# Patient Record
Sex: MALE | Race: WHITE | NOT HISPANIC OR LATINO | Employment: OTHER | URBAN - METROPOLITAN AREA
[De-identification: names, ages, dates, MRNs, and addresses within clinical notes are randomized per-mention and may not be internally consistent; named-entity substitution may affect disease eponyms.]

---

## 2019-02-11 ENCOUNTER — CONVERSION ENCOUNTER (OUTPATIENT)
Dept: GASTROENTEROLOGY | Facility: CLINIC | Age: 74
End: 2019-02-11

## 2019-02-11 ENCOUNTER — OFFICE VISIT CONVERTED (OUTPATIENT)
Dept: GASTROENTEROLOGY | Facility: CLINIC | Age: 74
End: 2019-02-11
Attending: INTERNAL MEDICINE

## 2019-03-15 ENCOUNTER — HOSPITAL ENCOUNTER (OUTPATIENT)
Dept: OTHER | Facility: HOSPITAL | Age: 74
Discharge: HOME | End: 2019-03-15
Attending: INTERNAL MEDICINE

## 2019-03-17 LAB — BACTERIA SPEC AEROBE CULT: NORMAL

## 2019-03-27 ENCOUNTER — HOSPITAL ENCOUNTER (OUTPATIENT)
Dept: OTHER | Facility: HOSPITAL | Age: 74
Discharge: HOME | End: 2019-03-27
Attending: INTERNAL MEDICINE

## 2019-03-27 LAB
ALBUMIN SERPL-MCNC: 4 G/DL (ref 3.5–5)
ALBUMIN/GLOB SERPL: 1.1 {RATIO} (ref 1.4–2.6)
ALP SERPL-CCNC: 88 U/L (ref 56–155)
ALT SERPL-CCNC: 49 U/L (ref 10–40)
ANION GAP SERPL CALC-SCNC: 18 MMOL/L (ref 8–19)
AST SERPL-CCNC: 36 U/L (ref 15–50)
BILIRUB SERPL-MCNC: 0.87 MG/DL (ref 0.2–1.3)
BUN SERPL-MCNC: 19 MG/DL (ref 5–25)
BUN/CREAT SERPL: 16 {RATIO} (ref 6–20)
CALCIUM SERPL-MCNC: 9.3 MG/DL (ref 8.7–10.4)
CHLORIDE SERPL-SCNC: 95 MMOL/L (ref 99–111)
CHOLEST SERPL-MCNC: 201 MG/DL (ref 107–200)
CHOLEST/HDLC SERPL: 5.6 {RATIO} (ref 3–6)
CK SERPL-CCNC: 72 U/L (ref 57–374)
CONV CO2: 30 MMOL/L (ref 22–32)
CONV TOTAL PROTEIN: 7.6 G/DL (ref 6.3–8.2)
CREAT UR-MCNC: 1.16 MG/DL (ref 0.7–1.2)
CRP SERPL-MCNC: 7.1 MG/L (ref 0–5)
EST. AVERAGE GLUCOSE BLD GHB EST-MCNC: 177 MG/DL
GFR SERPLBLD BASED ON 1.73 SQ M-ARVRAT: >60 ML/MIN/{1.73_M2}
GLOBULIN UR ELPH-MCNC: 3.6 G/DL (ref 2–3.5)
GLUCOSE SERPL-MCNC: 182 MG/DL (ref 70–99)
HBA1C MFR BLD: 7.8 % (ref 3.5–5.7)
HDLC SERPL-MCNC: 36 MG/DL (ref 40–60)
LDLC SERPL CALC-MCNC: 112 MG/DL (ref 70–100)
OSMOLALITY SERPL CALC.SUM OF ELEC: 293 MOSM/KG (ref 273–304)
POTASSIUM SERPL-SCNC: 4.7 MMOL/L (ref 3.5–5.3)
SODIUM SERPL-SCNC: 138 MMOL/L (ref 135–147)
TRIGL SERPL-MCNC: 460 MG/DL (ref 40–150)
URATE SERPL-MCNC: 5.8 MG/DL (ref 3.5–8.5)

## 2019-05-13 ENCOUNTER — OFFICE VISIT CONVERTED (OUTPATIENT)
Dept: GASTROENTEROLOGY | Facility: CLINIC | Age: 74
End: 2019-05-13
Attending: INTERNAL MEDICINE

## 2020-11-03 ENCOUNTER — TELEPHONE CONVERTED (OUTPATIENT)
Dept: UROLOGY | Facility: CLINIC | Age: 75
End: 2020-11-03
Attending: UROLOGY

## 2020-11-23 ENCOUNTER — TELEPHONE CONVERTED (OUTPATIENT)
Dept: UROLOGY | Facility: CLINIC | Age: 75
End: 2020-11-23
Attending: UROLOGY

## 2021-02-26 ENCOUNTER — TELEPHONE CONVERTED (OUTPATIENT)
Dept: UROLOGY | Facility: CLINIC | Age: 76
End: 2021-02-26
Attending: UROLOGY

## 2021-04-16 ENCOUNTER — OFFICE VISIT CONVERTED (OUTPATIENT)
Dept: UROLOGY | Facility: CLINIC | Age: 76
End: 2021-04-16
Attending: UROLOGY

## 2021-04-16 LAB
BILIRUB UR QL STRIP: NEGATIVE
COLOR UR: YELLOW
CONV CLARITY OF URINE: CLEAR
CONV PROTEIN IN URINE BY AUTOMATED TEST STRIP: NEGATIVE
CONV UROBILINOGEN IN URINE BY AUTOMATED TEST STRIP: NORMAL
GLUCOSE UR QL: NORMAL
HGB UR QL STRIP: NEGATIVE
KETONES UR QL STRIP: NEGATIVE
LEUKOCYTE ESTERASE UR QL STRIP: NEGATIVE
NITRITE UR QL STRIP: NEGATIVE
PH UR STRIP.AUTO: 5.5 [PH]
SP GR UR: NORMAL

## 2021-05-10 NOTE — H&P
"   History and Physical      Patient Name: Stephen Aguero   Patient ID: [id number]   Sex: Male   YOB: 1945    Primary Care Provider: Papi Vasquez MD   Referring Provider: Papi Vasquez MD    Visit Date: November 3, 2020    Provider: Alexander Montelongo MD   Location: [facility]   Location Address: [address] [place] [address]  [id number]   Location Phone: [phone]          Chief Complaint  · pt here for urologic issues      History Of Present Illness  TELEHEALTH TELEPHONE VISIT  Stephen Aguero is a 75 year old /White male who is presenting for evaluation via telehealth telephone visit. Verbal consent obtained before beginning visit.   Provider spent 15 minutes with the patient during the telehealth visit.   The following staff were present during this visit: jordan mejia   Past Medical History/ Overview of Patient Symptoms     59     PSA    9/20   5.5  8/12  2.95  7/11  2.68    \">75-year-old gentleman here for elevated PSA and BPH/ freq/urge incontience    weak stream.  No trouble with initiation or intermittency of stream.  Some frequency , can go every 30 minutes. Noc every hour.   Some urge incontinence. 4-5 pads QD.   No prostate meds  CAme on slowly    on lasix    no gross hematuria, dysuria or recurrent urinary tract infections.     No urologic family history,   Has never had any urologic surgery.    Has CHF/ LE edema.  Patient does not smoke.  Patient does not use blood thinner.    9/20 creatinine 1.0, GFR > 60     PSA    9/20   5.5  8/12  2.95  7/11  2.68           Past Medical History  Chronic pain; Diabetes; Elevated cholesterol; High blood pressure; Sleep apnea         Past Surgical History  Back surgery; Cholecystectomies, laparoscopic; Colonoscopy; Eye removal; Face surgery; Leg Surgery; Neck         Medication List  allopurinol 300 mg oral tablet; amlodipine 10 mg oral tablet; carvedilol 25 mg oral tablet; cetirizine 10 mg oral tablet; " duloxetine 30 mg oral capsule,delayed release(DR/EC); famotidine 20 mg oral tablet; Flomax 0.4 mg oral capsule; Lasix 80 mg oral tablet; Levemir FlexTouch U-100 Insuln 100 unit/mL (3 mL) subcutaneous insulin pen; metformin 500 mg oral tablet; Norco  mg oral tablet; potassium chloride 20 mEq oral tablet extended release; Vitamin D2 50,000 unit oral capsule         Allergy List  NO KNOWN DRUG ALLERGIES         Family Medical History  Family history of cancer         Social History  Alcohol (Never); Tobacco (Never)         Review of Systems  · Constitutional  o Denies  o : chills  · Gastrointestinal  o Denies  o : nausea          Assessment  · Elevated PSA     790.93/R97.20  · BPH (benign prostatic hyperplasia)     600.00/N40.0      Plan  · Orders  o Physician Telephone Evaluation, 11-20 minutes (50159) - 790.93/R97.20, 600.00/N40.0 - 11/03/2020  · Medications  o Medications have been Reconciled  o Transition of Care or Provider Policy  · Instructions  o Plan Of Care:   o Electronically Identified Patient Education Materials Provided Electronically         BPH    Having a lot of trouble with nocturia and frequency.  We will try him on Flomax 0.4 mg daily.  Risk/benefits and side effects discussed today.    Follow-up in the office in 3 months with PVR      Elevated PSA    Patient's PSA not elevated for his age.  We will check 11/21 for stability and discuss if he wants to do any further prostate cancer screening at that time.    Records reviewed today             Electronically Signed by: Alexander Montelongo MD -Author on November 3, 2020 11:27:54 AM

## 2021-05-13 NOTE — PROGRESS NOTES
"   Progress Note      Patient Name: Stephen Aguero   Patient ID: [id number]   Sex: Male   YOB: 1945    Primary Care Provider: Papi Vasquez MD   Referring Provider: Papi Vasquez MD    Visit Date: November 23, 2020    Provider: Alexander Montelongo MD   Location: [facility]   Location Address: [address] [place] [address]  [id number]   Location Phone: [phone]          Chief Complaint  · pt here for urologic issues      History Of Present Illness  TELEHEALTH TELEPHONE VISIT  Stephen Aguero is a 75 year old /White male who is presenting for evaluation via telehealth telephone visit. Verbal consent obtained before beginning visit.   Provider spent 7 minutes with the patient during the telehealth visit.   The following staff were present during this visit: jordan mejia   Past Medical History/ Overview of Patient Symptoms     59     PSA    9/20   5.5  8/12  2.95  7/11  2.68    \">75-year-old gentleman here for elevated PSA and BPH/ freq/urge incontinence  on TRT    Patient is on testosterone placement through his PCP.  Patient is doing 250 mg every 2 weeks injection.    Not bothering him being off.     started Flomax.  MAybe a little better stream.  Noc improved.   Some urge incontinence. 4-5 pads QD.   CAme on slowly    on lasix    Previous    No urologic family history,   Has never had any urologic surgery.    Has CHF/ LE edema.  Patient does not smoke.  Patient does not use blood thinner.    9/20 creatinine 1.0, GFR > 60     PSA    9/20   5.5  8/12  2.95  7/11  2.68           Past Medical History  BPH; Chronic pain; Diabetes; Elevated cholesterol; Elevated PSA; High blood pressure; Sleep apnea         Past Surgical History  Back surgery; Cholecystectomies, laparoscopic; Colonoscopy; Eye removal; Face surgery; Leg Surgery; Neck         Medication List  allopurinol 300 mg oral tablet; amlodipine 10 mg oral tablet; carvedilol 25 mg oral tablet; cetirizine 10 mg " oral tablet; duloxetine 30 mg oral capsule,delayed release(DR/EC); famotidine 20 mg oral tablet; Flomax 0.4 mg oral capsule; Lasix 80 mg oral tablet; Levemir FlexTouch U-100 Insuln 100 unit/mL (3 mL) subcutaneous insulin pen; metformin 500 mg oral tablet; Norco  mg oral tablet; potassium chloride 20 mEq oral tablet extended release; Vitamin D2 50,000 unit oral capsule         Allergy List  NO KNOWN DRUG ALLERGIES       Allergies Reconciled  Family Medical History  Family history of cancer         Social History  Alcohol (Never); Tobacco (Never)                 Assessment  · Elevated PSA     790.93/R97.20  · BPH (benign prostatic hyperplasia)     600.00/N40.0      Plan  · Orders  o Physican Telephone evaluation, 5-10 min (39689) - 790.93/R97.20, 600.00/N40.0 - 11/23/2020  o PSA ultrasensitive DIAGNOSTIC [facility] (00632) - 790.93/R97.20 - 03/08/2021  · Medications  o Medications have been Reconciled  o Transition of Care or Provider Policy  · Instructions  o Plan Of Care:   o Electronically Identified Patient Education Materials Provided Electronically         BPH    Cont  Flomax 0.4 mg daily.        Follow-up in the office in 3 months with PVR      Elevated PSA    Discussed risk and benefits of being on testosterone replacement therapy while evaluating a PSA.  Patient at this time after discussion does not want be on testosterone replacement at this point.  We will get a PSA in 3/21 and see .  At that point if PSA is stable or lower we could consider placing back on testosterone replacement after discussion.    He is getting TRT through his PCP             Electronically Signed by: Alexander Montelongo MD -Author on November 23, 2020 12:14:48 PM

## 2021-05-14 VITALS — WEIGHT: 298 LBS | HEIGHT: 71 IN | BODY MASS INDEX: 41.72 KG/M2 | RESPIRATION RATE: 19 BRPM

## 2021-05-14 NOTE — PROGRESS NOTES
"   Progress Note      Patient Name: Stephen Aguero   Patient ID: [id number]   Sex: Male   YOB: 1945    Primary Care Provider: Papi Vasquez MD   Referring Provider: Papi Vasquez MD    Visit Date: April 16, 2021    Provider: Alexander Montelongo MD   Location: [facility]   Location Address: [address] [place] [address]  [id number]   Location Phone: [phone]          Chief Complaint  · pt here for urologic issues      History Of Present Illness     59     PSA    4/21  MRI [id number] g, negative some signs of chronic prostatitis.  Hypertrophic bladder wall thickening  2/21   1.1  9/20   5.5  - PSA d 0.14  8/12  2.95  7/11  2.68    \">75-year-old gentleman here for elevated PSA and BPH/ freq/urge incontinence  h/o TRT.  Bothersome nocturia/frequency.    Patient did tell a big difference with energy being on testosterone.  Is been off this for couple months and is really bothering him.  He is having a lot of trouble with fatigue so much that sometimes even makes it too tired to walk.    was on testosterone placement through his PCP.  was doing 250 mg every 2 weeks injection.      Very fatigued.    on Flomax 0.4 mg QD.  Helping some.    Some urge incontinence. 4-5 pads QD.     Patient is having a lot of trouble with nocturia.  He got up 8 times a night.  He has really decreased his fluid intake 2/2 his frequency and nocturia is very bothersome.    No UTI/GH    on lasix    Previous    No urologic family history,   Has never had any urologic surgery.    Has CHF/ LE edema.  Patient does not smoke.  Patient does not use blood thinner.    9/20 creatinine 1.0, GFR > 60     PSA    4/21  MRI [id number] g, negative some signs of chronic prostatitis.  Hypertrophic bladder wall thickening  2/21   1.1  9/20   5.5  - PSA d 0.14  8/12  2.95  7/11  2.68           Past Medical History  BPH; Chronic pain; Diabetes; Elevated cholesterol; Elevated PSA; High blood pressure; Sleep apnea " "        Past Surgical History  Back surgery; Cholecystectomies, laparoscopic; Colonoscopy; Eye removal; Face surgery; Leg Surgery; Neck         Medication List  allopurinol 300 mg oral tablet; amlodipine 10 mg oral tablet; carvedilol 25 mg oral tablet; cetirizine 10 mg oral tablet; duloxetine 30 mg oral capsule,delayed release(DR/EC); famotidine 20 mg oral tablet; finasteride 5 mg oral tablet; Flomax 0.4 mg oral capsule; Lasix 80 mg oral tablet; Levemir FlexTouch U-100 Insuln 100 unit/mL (3 mL) subcutaneous insulin pen; metformin 500 mg oral tablet; Norco  mg oral tablet; potassium chloride 20 mEq oral tablet extended release; Vitamin D2 50,000 unit oral capsule         Allergy List  SULFA (SULFONAMIDES)       Allergies Reconciled  Family Medical History  Family history of cancer         Social History  Alcohol (Never); Tobacco (Never)         Review of Systems  · Constitutional  o Denies  o : chills, fever  · Gastrointestinal  o Denies  o : nausea, vomiting      Vitals  Date Time BP Position Site L\R Cuff Size HR RR TEMP (F) WT  HT  BMI kg/m2 BSA m2 O2 Sat FR L/min FiO2 HC       04/16/2021 10:12 AM       19  298lbs 0oz 5'  11\" 41.56 2.6             Physical Examination  · Constitutional  o Appearance  o : Well-appearing, well-developed, in no acute distress  · Respiratory  o Respiratory Effort  o : Unlabored breathing  · Neurologic  o Mental Status Examination  o :   § Orientation  § : Grossly oriented to person, place and time, judgment and insight intact, normal mood and affect          Results  · In-Office Procedures  o Lab procedure  § Automated Dipstick Urinalysis (Surg Spec) WITHOUT Micro [facility] (58738)   § Color Ur: Yellow   § Clarity Ur: Clear   § Glucose Ur Ql Strip: 100 mg/dL   § Bilirub Ur Ql Strip: Negative   § Ketones Ur Ql Strip: Negative   § Sp Gr Ur Qn: greater than 1.030   § Hgb Ur Ql Strip: Negative   § pH Ur-LsCnc: 5.5   § Prot Ur Ql Strip: Negative   § Urobilinogen Ur Strip-mCnc: 0.2 E.U./dL "   § Nitrite Ur Ql Strip: Negative   § WBC Est Ur Ql Strip: Negative   o Surgical procedure  § IOP - Bladder Scan/Residual Urine (85099)   § Specimen vol Ur: 56       Assessment  · Elevated PSA     790.93/R97.20  · BPH (benign prostatic hyperplasia)     600.00/N40.0  · Nocturia     788.43/R35.1      Plan  · Orders  o PSA ultrasensitive DIAGNOSTIC [facility] (72530) - 790.93/R97.20 - 10/16/2021  · Medications  o Medications have been Reconciled  o Transition of Care or Provider Policy  · Instructions  o Electronically Identified Patient Education Materials Provided Electronically     BPH/Nocturia    Continue Flomax 0.4 mg daily.  Risk and benefits discussed.  Patient is doing some better.  Still have a lot of trouble frequency and nocturia.  start  finasteride 5 mg daily.  Risk/benefits and side effect discussed.      Elevated PSA/prostate cancer on testosterone replacement    MRI negative, reviewed with the patient today.  Patient given reassurance at this time I feel that because of his medical comorbidities and his negative MRI would be very low risk for him to have a prostate cancer that would be clinically significant.  I do think at this time it is safe him to go back on testosterone replacement after risk and benefits of this were discussed with the patient he is aware.  He was let his PCP know.      Follow-up in 6 months with PSA    PVR at follow-up             Electronically Signed by: Alexander Montelongo MD -Author on April 16, 2021 11:14:58 AM

## 2021-05-14 NOTE — PROGRESS NOTES
"   Progress Note      Patient Name: Stephen Aguero   Patient ID: [id number]   Sex: Male   YOB: 1945    Primary Care Provider: Papi Vasquez MD   Referring Provider: Papi Vasquez MD    Visit Date: February 26, 2021    Provider: Alexander Montelongo MD   Location: [facility]   Location Address: [address] [place] [address]  [id number]   Location Phone: [phone]          Chief Complaint  · pt here for urologic issues      History Of Present Illness  TELEHEALTH TELEPHONE VISIT  Stephen Aguero is a 75 year old /White male who is presenting for evaluation via telehealth telephone visit. Verbal consent obtained before beginning visit.   Provider spent 11 minutes with the patient during the telehealth visit.   The following staff were present during this visit: PATRICIA Sofia   Past Medical History/ Overview of Patient Symptoms     59     PSA    2/21   1.1  9/20   5.5  8/12  2.95  7/11  2.68    \">75-year-old gentleman here for elevated PSA and BPH/ freq/urge incontinence  on TRT    was on testosterone placement through his PCP.  was doing 250 mg every 2 weeks injection.    He has been off for the last few months.    Very fatigued.    was on Flomax 0.4 mg QD.  Patient could not see any difference in this medication with his urination so he stopped this.  Some urge incontinence. 4-5 pads QD.       No UTI/dysuria/GH    on lasix    Previous    No urologic family history,   Has never had any urologic surgery.    Has CHF/ LE edema.  Patient does not smoke.  Patient does not use blood thinner.    9/20 creatinine 1.0, GFR > 60     PSA    2/21   1.1  9/20   5.5  8/12  2.95  7/11  2.68           Past Medical History  BPH; Chronic pain; Diabetes; Elevated cholesterol; Elevated PSA; High blood pressure; Sleep apnea         Past Surgical History  Back surgery; Cholecystectomies, laparoscopic; Colonoscopy; Eye removal; Face surgery; Leg Surgery; Neck         Medication " List  allopurinol 300 mg oral tablet; amlodipine 10 mg oral tablet; carvedilol 25 mg oral tablet; cetirizine 10 mg oral tablet; duloxetine 30 mg oral capsule,delayed release(DR/EC); famotidine 20 mg oral tablet; Flomax 0.4 mg oral capsule; Lasix 80 mg oral tablet; Levemir FlexTouch U-100 Insuln 100 unit/mL (3 mL) subcutaneous insulin pen; metformin 500 mg oral tablet; Norco  mg oral tablet; potassium chloride 20 mEq oral tablet extended release; Vitamin D2 50,000 unit oral capsule         Allergy List  SULFA (SULFONAMIDES)         Family Medical History  Family history of cancer         Social History  Alcohol (Never); Tobacco (Never)         Review of Systems  · Constitutional  o Denies  o : chills, fever  · Gastrointestinal  o Denies  o : nausea, vomiting              Assessment  · Elevated PSA     790.93/R97.20  · BPH (benign prostatic hyperplasia)     600.00/N40.0      Plan  · Orders  o Physician Telephone Evaluation, 11-20 minutes (59805) - 790.93/R97.20, 600.00/N40.0 - 02/26/2021  o MRI prostate wo then w contrast (20585) - 790.93/R97.20 - 03/19/2021   3-19-21 AT [facility] LOCATION. APPT TIME 5:20. ARRIVE AT 4:50 FOR REGISTRATION.  · Medications  o Medications have been Reconciled  o Transition of Care or Provider Policy  · Instructions  o Electronically Identified Patient Education Materials Provided Electronically     Elevated PSA    Today came back down after being off testosterone.  We did discuss I do think we should go ahead and get MRI the prostate rule out underlying malignancy.  Patient at this time will continue to stay off testosterone replacement and will see him back with MRI    Patient understands we are r/o a malignancy and he must follow-up     BPH    Stopped Flomax, did not like this was helping.  At this time wants no further treatment -  doing okay             Electronically Signed by: Alexander Montelongo MD -Author on March 1, 2021 08:42:26 AM

## 2021-05-15 VITALS
DIASTOLIC BLOOD PRESSURE: 75 MMHG | BODY MASS INDEX: 36.14 KG/M2 | WEIGHT: 258.12 LBS | SYSTOLIC BLOOD PRESSURE: 137 MMHG | HEIGHT: 71 IN

## 2021-05-16 VITALS
BODY MASS INDEX: 36.57 KG/M2 | WEIGHT: 261.25 LBS | HEIGHT: 71 IN | DIASTOLIC BLOOD PRESSURE: 81 MMHG | SYSTOLIC BLOOD PRESSURE: 136 MMHG

## 2021-07-26 PROBLEM — E78.00 ELEVATED CHOLESTEROL: Status: ACTIVE | Noted: 2021-07-26

## 2021-07-26 PROBLEM — E11.9 DIABETES (HCC): Status: ACTIVE | Noted: 2021-07-26

## 2021-07-26 PROBLEM — G47.30 SLEEP APNEA: Status: ACTIVE | Noted: 2021-07-26

## 2021-07-26 PROBLEM — I10 HIGH BLOOD PRESSURE: Status: ACTIVE | Noted: 2021-07-26

## 2021-07-30 ENCOUNTER — CLINICAL SUPPORT (OUTPATIENT)
Dept: GASTROENTEROLOGY | Facility: CLINIC | Age: 76
End: 2021-07-30

## 2021-07-30 ENCOUNTER — PREP FOR SURGERY (OUTPATIENT)
Dept: OTHER | Facility: HOSPITAL | Age: 76
End: 2021-07-30

## 2021-07-30 DIAGNOSIS — Z12.11 SCREENING FOR COLON CANCER: Primary | ICD-10-CM

## 2021-07-30 RX ORDER — FAMOTIDINE 20 MG/1
20 TABLET, FILM COATED ORAL 2 TIMES DAILY
COMMUNITY

## 2021-07-30 RX ORDER — CETIRIZINE HYDROCHLORIDE 10 MG/1
10 TABLET ORAL DAILY
COMMUNITY

## 2021-07-30 RX ORDER — FINASTERIDE 5 MG/1
TABLET, FILM COATED ORAL
COMMUNITY
Start: 2021-04-16 | End: 2022-03-02

## 2021-07-30 RX ORDER — SODIUM, POTASSIUM,MAG SULFATES 17.5-3.13G
2 SOLUTION, RECONSTITUTED, ORAL ORAL EVERY 12 HOURS
Qty: 177 ML | Refills: 0 | Status: SHIPPED | OUTPATIENT
Start: 2021-07-30 | End: 2022-03-02

## 2021-07-30 RX ORDER — HYDROCODONE BITARTRATE AND ACETAMINOPHEN 10; 325 MG/1; MG/1
1 TABLET ORAL EVERY 6 HOURS PRN
COMMUNITY

## 2021-07-30 RX ORDER — FUROSEMIDE 80 MG
80 TABLET ORAL DAILY
Status: ON HOLD | COMMUNITY
End: 2022-03-04 | Stop reason: SDUPTHER

## 2021-07-30 RX ORDER — GLIPIZIDE 10 MG/1
10 TABLET ORAL
COMMUNITY

## 2021-07-30 RX ORDER — BENAZEPRIL HYDROCHLORIDE 40 MG/1
40 TABLET, FILM COATED ORAL DAILY
COMMUNITY

## 2021-07-30 RX ORDER — ALLOPURINOL 300 MG/1
300 TABLET ORAL DAILY
COMMUNITY

## 2021-07-30 RX ORDER — INSULIN DETEMIR 100 [IU]/ML
INJECTION, SOLUTION SUBCUTANEOUS
COMMUNITY
End: 2022-03-02

## 2021-07-30 RX ORDER — DULOXETIN HYDROCHLORIDE 30 MG/1
30 CAPSULE, DELAYED RELEASE ORAL DAILY
COMMUNITY

## 2021-07-30 RX ORDER — AMLODIPINE BESYLATE 10 MG/1
10 TABLET ORAL DAILY
COMMUNITY
End: 2022-03-04 | Stop reason: HOSPADM

## 2021-07-30 RX ORDER — ERGOCALCIFEROL 1.25 MG/1
50000 CAPSULE ORAL WEEKLY
COMMUNITY

## 2021-07-30 RX ORDER — POTASSIUM CHLORIDE 20 MEQ/1
20 TABLET, EXTENDED RELEASE ORAL 2 TIMES DAILY
Status: ON HOLD | COMMUNITY
End: 2022-03-04 | Stop reason: SDUPTHER

## 2021-07-30 RX ORDER — CARVEDILOL 25 MG/1
25 TABLET ORAL 2 TIMES DAILY WITH MEALS
COMMUNITY

## 2021-11-16 DIAGNOSIS — R97.20 ELEVATED PROSTATE SPECIFIC ANTIGEN (PSA): Primary | ICD-10-CM

## 2022-01-03 ENCOUNTER — TELEPHONE (OUTPATIENT)
Dept: UROLOGY | Facility: CLINIC | Age: 77
End: 2022-01-03

## 2022-01-03 NOTE — TELEPHONE ENCOUNTER
Spoke with patient. Reminded him of follow up appt on 1-18-22 at 1:30pm.  Also advised we need PSA results prior to that appt.  He stated he will get it done this week or next.

## 2022-01-14 ENCOUNTER — LAB (OUTPATIENT)
Dept: LAB | Facility: HOSPITAL | Age: 77
End: 2022-01-14

## 2022-01-14 DIAGNOSIS — R97.20 ELEVATED PROSTATE SPECIFIC ANTIGEN (PSA): ICD-10-CM

## 2022-01-14 LAB — PSA SERPL-MCNC: 1.09 NG/ML (ref 0–4)

## 2022-01-14 PROCEDURE — 36415 COLL VENOUS BLD VENIPUNCTURE: CPT

## 2022-01-14 PROCEDURE — 84153 ASSAY OF PSA TOTAL: CPT

## 2022-01-16 PROBLEM — N40.1 BENIGN PROSTATIC HYPERPLASIA WITH URINARY FREQUENCY: Status: ACTIVE | Noted: 2022-01-16

## 2022-01-16 PROBLEM — R35.0 BENIGN PROSTATIC HYPERPLASIA WITH URINARY FREQUENCY: Status: ACTIVE | Noted: 2022-01-16

## 2022-01-16 PROBLEM — R97.20 ELEVATED PSA: Status: ACTIVE | Noted: 2022-01-16

## 2022-03-02 ENCOUNTER — APPOINTMENT (OUTPATIENT)
Dept: GENERAL RADIOLOGY | Facility: HOSPITAL | Age: 77
End: 2022-03-02

## 2022-03-02 ENCOUNTER — HOSPITAL ENCOUNTER (INPATIENT)
Facility: HOSPITAL | Age: 77
LOS: 2 days | Discharge: HOME HEALTH | End: 2022-03-04
Attending: EMERGENCY MEDICINE | Admitting: INTERNAL MEDICINE

## 2022-03-02 DIAGNOSIS — R09.02 HYPOXIA: Chronic | ICD-10-CM

## 2022-03-02 DIAGNOSIS — Z78.9 DECREASED ACTIVITIES OF DAILY LIVING (ADL): ICD-10-CM

## 2022-03-02 DIAGNOSIS — R53.1 GENERALIZED WEAKNESS: Primary | ICD-10-CM

## 2022-03-02 DIAGNOSIS — R26.2 DIFFICULTY IN WALKING: ICD-10-CM

## 2022-03-02 PROBLEM — R42 POSTURAL DIZZINESS WITH PRESYNCOPE: Status: ACTIVE | Noted: 2022-03-02

## 2022-03-02 PROBLEM — IMO0002 UNCONTROLLED DIABETES MELLITUS: Status: ACTIVE | Noted: 2021-07-26

## 2022-03-02 PROBLEM — R55 POSTURAL DIZZINESS WITH PRESYNCOPE: Status: ACTIVE | Noted: 2022-03-02

## 2022-03-02 LAB
ALBUMIN SERPL-MCNC: 3 G/DL (ref 3.5–5.2)
ALBUMIN/GLOB SERPL: 0.7 G/DL
ALP SERPL-CCNC: 160 U/L (ref 39–117)
ALT SERPL W P-5'-P-CCNC: 26 U/L (ref 1–41)
ANION GAP SERPL CALCULATED.3IONS-SCNC: 7.4 MMOL/L (ref 5–15)
ANION GAP SERPL CALCULATED.3IONS-SCNC: 7.5 MMOL/L (ref 5–15)
AST SERPL-CCNC: 35 U/L (ref 1–40)
BACTERIA UR QL AUTO: ABNORMAL /HPF
BASOPHILS # BLD AUTO: 0.09 10*3/MM3 (ref 0–0.2)
BASOPHILS NFR BLD AUTO: 1.2 % (ref 0–1.5)
BILIRUB SERPL-MCNC: 0.9 MG/DL (ref 0–1.2)
BILIRUB UR QL STRIP: NEGATIVE
BUN SERPL-MCNC: 15 MG/DL (ref 8–23)
BUN SERPL-MCNC: 15 MG/DL (ref 8–23)
BUN/CREAT SERPL: 17.2 (ref 7–25)
BUN/CREAT SERPL: 19 (ref 7–25)
CALCIUM SPEC-SCNC: 8.9 MG/DL (ref 8.6–10.5)
CALCIUM SPEC-SCNC: 9.2 MG/DL (ref 8.6–10.5)
CHLORIDE SERPL-SCNC: 101 MMOL/L (ref 98–107)
CHLORIDE SERPL-SCNC: 97 MMOL/L (ref 98–107)
CLARITY UR: ABNORMAL
CO2 SERPL-SCNC: 30.5 MMOL/L (ref 22–29)
CO2 SERPL-SCNC: 30.6 MMOL/L (ref 22–29)
COD CRY URNS QL: ABNORMAL /HPF
COLOR UR: ABNORMAL
CREAT SERPL-MCNC: 0.79 MG/DL (ref 0.76–1.27)
CREAT SERPL-MCNC: 0.87 MG/DL (ref 0.76–1.27)
DEPRECATED RDW RBC AUTO: 55.4 FL (ref 37–54)
EGFRCR SERPLBLD CKD-EPI 2021: 88.9 ML/MIN/1.73
EGFRCR SERPLBLD CKD-EPI 2021: 91.5 ML/MIN/1.73
EOSINOPHIL # BLD AUTO: 0.36 10*3/MM3 (ref 0–0.4)
EOSINOPHIL NFR BLD AUTO: 4.7 % (ref 0.3–6.2)
ERYTHROCYTE [DISTWIDTH] IN BLOOD BY AUTOMATED COUNT: 15.6 % (ref 12.3–15.4)
GLOBULIN UR ELPH-MCNC: 4.4 GM/DL
GLUCOSE BLDC GLUCOMTR-MCNC: 177 MG/DL (ref 70–99)
GLUCOSE SERPL-MCNC: 180 MG/DL (ref 65–99)
GLUCOSE SERPL-MCNC: 266 MG/DL (ref 65–99)
GLUCOSE UR STRIP-MCNC: ABNORMAL MG/DL
HBA1C MFR BLD: 8.4 % (ref 4.8–5.6)
HCT VFR BLD AUTO: 48.5 % (ref 37.5–51)
HGB BLD-MCNC: 16 G/DL (ref 13–17.7)
HGB UR QL STRIP.AUTO: ABNORMAL
HOLD SPECIMEN: NORMAL
HOLD SPECIMEN: NORMAL
HYALINE CASTS UR QL AUTO: ABNORMAL /LPF
IMM GRANULOCYTES # BLD AUTO: 0.06 10*3/MM3 (ref 0–0.05)
IMM GRANULOCYTES NFR BLD AUTO: 0.8 % (ref 0–0.5)
KETONES UR QL STRIP: NEGATIVE
LEUKOCYTE ESTERASE UR QL STRIP.AUTO: ABNORMAL
LYMPHOCYTES # BLD AUTO: 1.61 10*3/MM3 (ref 0.7–3.1)
LYMPHOCYTES NFR BLD AUTO: 20.8 % (ref 19.6–45.3)
MAGNESIUM SERPL-MCNC: 2.1 MG/DL (ref 1.6–2.4)
MCH RBC QN AUTO: 31.8 PG (ref 26.6–33)
MCHC RBC AUTO-ENTMCNC: 33 G/DL (ref 31.5–35.7)
MCV RBC AUTO: 96.4 FL (ref 79–97)
MONOCYTES # BLD AUTO: 0.85 10*3/MM3 (ref 0.1–0.9)
MONOCYTES NFR BLD AUTO: 11 % (ref 5–12)
NEUTROPHILS NFR BLD AUTO: 4.76 10*3/MM3 (ref 1.7–7)
NEUTROPHILS NFR BLD AUTO: 61.5 % (ref 42.7–76)
NITRITE UR QL STRIP: NEGATIVE
NRBC BLD AUTO-RTO: 0 /100 WBC (ref 0–0.2)
NT-PROBNP SERPL-MCNC: 271.8 PG/ML (ref 0–1800)
PH UR STRIP.AUTO: 5.5 [PH] (ref 5–8)
PLATELET # BLD AUTO: 173 10*3/MM3 (ref 140–450)
PMV BLD AUTO: 10.3 FL (ref 6–12)
POTASSIUM SERPL-SCNC: 4.2 MMOL/L (ref 3.5–5.2)
POTASSIUM SERPL-SCNC: 4.5 MMOL/L (ref 3.5–5.2)
PROCALCITONIN SERPL-MCNC: 0.09 NG/ML (ref 0–0.25)
PROT SERPL-MCNC: 7.4 G/DL (ref 6–8.5)
PROT UR QL STRIP: ABNORMAL
RBC # BLD AUTO: 5.03 10*6/MM3 (ref 4.14–5.8)
RBC # UR STRIP: ABNORMAL /HPF
REF LAB TEST METHOD: ABNORMAL
SODIUM SERPL-SCNC: 135 MMOL/L (ref 136–145)
SODIUM SERPL-SCNC: 139 MMOL/L (ref 136–145)
SP GR UR STRIP: 1.03 (ref 1–1.03)
SQUAMOUS #/AREA URNS HPF: ABNORMAL /HPF
T4 FREE SERPL-MCNC: 0.8 NG/DL (ref 0.93–1.7)
TROPONIN T SERPL-MCNC: <0.01 NG/ML (ref 0–0.03)
TSH SERPL DL<=0.05 MIU/L-ACNC: 3 UIU/ML (ref 0.27–4.2)
UROBILINOGEN UR QL STRIP: ABNORMAL
WBC # UR STRIP: ABNORMAL /HPF
WBC NRBC COR # BLD: 7.73 10*3/MM3 (ref 3.4–10.8)
WHOLE BLOOD HOLD SPECIMEN: NORMAL
WHOLE BLOOD HOLD SPECIMEN: NORMAL

## 2022-03-02 PROCEDURE — 63710000001 LEVALBUTEROL PER 0.5 MG: Performed by: INTERNAL MEDICINE

## 2022-03-02 PROCEDURE — 99284 EMERGENCY DEPT VISIT MOD MDM: CPT

## 2022-03-02 PROCEDURE — 82962 GLUCOSE BLOOD TEST: CPT

## 2022-03-02 PROCEDURE — 83735 ASSAY OF MAGNESIUM: CPT

## 2022-03-02 PROCEDURE — 84439 ASSAY OF FREE THYROXINE: CPT | Performed by: INTERNAL MEDICINE

## 2022-03-02 PROCEDURE — 80053 COMPREHEN METABOLIC PANEL: CPT

## 2022-03-02 PROCEDURE — 83880 ASSAY OF NATRIURETIC PEPTIDE: CPT | Performed by: INTERNAL MEDICINE

## 2022-03-02 PROCEDURE — 85025 COMPLETE CBC W/AUTO DIFF WBC: CPT

## 2022-03-02 PROCEDURE — 81001 URINALYSIS AUTO W/SCOPE: CPT | Performed by: EMERGENCY MEDICINE

## 2022-03-02 PROCEDURE — 83036 HEMOGLOBIN GLYCOSYLATED A1C: CPT | Performed by: INTERNAL MEDICINE

## 2022-03-02 PROCEDURE — 93005 ELECTROCARDIOGRAM TRACING: CPT | Performed by: EMERGENCY MEDICINE

## 2022-03-02 PROCEDURE — 71045 X-RAY EXAM CHEST 1 VIEW: CPT

## 2022-03-02 PROCEDURE — 94640 AIRWAY INHALATION TREATMENT: CPT

## 2022-03-02 PROCEDURE — 25010000002 ENOXAPARIN PER 10 MG: Performed by: INTERNAL MEDICINE

## 2022-03-02 PROCEDURE — 84145 PROCALCITONIN (PCT): CPT | Performed by: INTERNAL MEDICINE

## 2022-03-02 PROCEDURE — 84443 ASSAY THYROID STIM HORMONE: CPT | Performed by: INTERNAL MEDICINE

## 2022-03-02 PROCEDURE — 93005 ELECTROCARDIOGRAM TRACING: CPT

## 2022-03-02 PROCEDURE — 87086 URINE CULTURE/COLONY COUNT: CPT | Performed by: EMERGENCY MEDICINE

## 2022-03-02 PROCEDURE — 84484 ASSAY OF TROPONIN QUANT: CPT

## 2022-03-02 PROCEDURE — 93010 ELECTROCARDIOGRAM REPORT: CPT | Performed by: SPECIALIST

## 2022-03-02 PROCEDURE — 25010000002 SODIUM CHLORIDE 0.9 % WITH KCL 20 MEQ 20-0.9 MEQ/L-% SOLUTION: Performed by: INTERNAL MEDICINE

## 2022-03-02 PROCEDURE — 94799 UNLISTED PULMONARY SVC/PX: CPT

## 2022-03-02 RX ORDER — INSULIN LISPRO 100 [IU]/ML
INJECTION, SOLUTION INTRAVENOUS; SUBCUTANEOUS 3 TIMES DAILY
COMMUNITY
Start: 2021-12-09

## 2022-03-02 RX ORDER — ACETAMINOPHEN 325 MG/1
650 TABLET ORAL EVERY 4 HOURS PRN
Status: DISCONTINUED | OUTPATIENT
Start: 2022-03-02 | End: 2022-03-04 | Stop reason: HOSPADM

## 2022-03-02 RX ORDER — SODIUM CHLORIDE AND POTASSIUM CHLORIDE 150; 900 MG/100ML; MG/100ML
100 INJECTION, SOLUTION INTRAVENOUS CONTINUOUS
Status: DISCONTINUED | OUTPATIENT
Start: 2022-03-02 | End: 2022-03-03

## 2022-03-02 RX ORDER — NALOXONE HCL 0.4 MG/ML
0.4 VIAL (ML) INJECTION
Status: DISCONTINUED | OUTPATIENT
Start: 2022-03-02 | End: 2022-03-04 | Stop reason: HOSPADM

## 2022-03-02 RX ORDER — LEVALBUTEROL INHALATION SOLUTION 0.63 MG/3ML
0.63 SOLUTION RESPIRATORY (INHALATION) EVERY 4 HOURS PRN
Status: DISCONTINUED | OUTPATIENT
Start: 2022-03-02 | End: 2022-03-04 | Stop reason: HOSPADM

## 2022-03-02 RX ORDER — DEXTROSE MONOHYDRATE 100 MG/ML
25 INJECTION, SOLUTION INTRAVENOUS
Status: DISCONTINUED | OUTPATIENT
Start: 2022-03-02 | End: 2022-03-04 | Stop reason: HOSPADM

## 2022-03-02 RX ORDER — ONDANSETRON 2 MG/ML
4 INJECTION INTRAMUSCULAR; INTRAVENOUS EVERY 6 HOURS PRN
Status: DISCONTINUED | OUTPATIENT
Start: 2022-03-02 | End: 2022-03-04 | Stop reason: HOSPADM

## 2022-03-02 RX ORDER — FAMOTIDINE 20 MG/1
20 TABLET, FILM COATED ORAL DAILY
Status: DISCONTINUED | OUTPATIENT
Start: 2022-03-02 | End: 2022-03-04 | Stop reason: HOSPADM

## 2022-03-02 RX ORDER — POLYETHYLENE GLYCOL 3350 17 G/17G
17 POWDER, FOR SOLUTION ORAL DAILY PRN
Status: DISCONTINUED | OUTPATIENT
Start: 2022-03-02 | End: 2022-03-04 | Stop reason: HOSPADM

## 2022-03-02 RX ORDER — NICOTINE POLACRILEX 4 MG
15 LOZENGE BUCCAL
Status: DISCONTINUED | OUTPATIENT
Start: 2022-03-02 | End: 2022-03-04 | Stop reason: HOSPADM

## 2022-03-02 RX ORDER — SODIUM CHLORIDE 0.9 % (FLUSH) 0.9 %
10 SYRINGE (ML) INJECTION AS NEEDED
Status: DISCONTINUED | OUTPATIENT
Start: 2022-03-02 | End: 2022-03-04 | Stop reason: HOSPADM

## 2022-03-02 RX ORDER — CARVEDILOL 12.5 MG/1
12.5 TABLET ORAL 2 TIMES DAILY WITH MEALS
Status: DISCONTINUED | OUTPATIENT
Start: 2022-03-02 | End: 2022-03-04 | Stop reason: HOSPADM

## 2022-03-02 RX ORDER — ACETAMINOPHEN 160 MG/5ML
650 SOLUTION ORAL EVERY 4 HOURS PRN
Status: DISCONTINUED | OUTPATIENT
Start: 2022-03-02 | End: 2022-03-04 | Stop reason: HOSPADM

## 2022-03-02 RX ORDER — INSULIN GLARGINE 300 U/ML
60-70 INJECTION, SOLUTION SUBCUTANEOUS DAILY
COMMUNITY
Start: 2021-12-09

## 2022-03-02 RX ORDER — AMOXICILLIN 250 MG
2 CAPSULE ORAL NIGHTLY
Status: DISCONTINUED | OUTPATIENT
Start: 2022-03-02 | End: 2022-03-04 | Stop reason: HOSPADM

## 2022-03-02 RX ORDER — HYDROCODONE BITARTRATE AND ACETAMINOPHEN 5; 325 MG/1; MG/1
1 TABLET ORAL EVERY 4 HOURS PRN
Status: DISCONTINUED | OUTPATIENT
Start: 2022-03-02 | End: 2022-03-04 | Stop reason: HOSPADM

## 2022-03-02 RX ORDER — BISACODYL 5 MG/1
5 TABLET, DELAYED RELEASE ORAL DAILY PRN
Status: DISCONTINUED | OUTPATIENT
Start: 2022-03-02 | End: 2022-03-04 | Stop reason: HOSPADM

## 2022-03-02 RX ORDER — BISACODYL 10 MG
10 SUPPOSITORY, RECTAL RECTAL DAILY PRN
Status: DISCONTINUED | OUTPATIENT
Start: 2022-03-02 | End: 2022-03-04 | Stop reason: HOSPADM

## 2022-03-02 RX ORDER — SODIUM CHLORIDE 0.9 % (FLUSH) 0.9 %
10 SYRINGE (ML) INJECTION EVERY 12 HOURS SCHEDULED
Status: DISCONTINUED | OUTPATIENT
Start: 2022-03-02 | End: 2022-03-04 | Stop reason: HOSPADM

## 2022-03-02 RX ORDER — ACETAMINOPHEN 650 MG/1
650 SUPPOSITORY RECTAL EVERY 4 HOURS PRN
Status: DISCONTINUED | OUTPATIENT
Start: 2022-03-02 | End: 2022-03-04 | Stop reason: HOSPADM

## 2022-03-02 RX ORDER — LISINOPRIL 20 MG/1
20 TABLET ORAL
Status: DISCONTINUED | OUTPATIENT
Start: 2022-03-03 | End: 2022-03-04 | Stop reason: HOSPADM

## 2022-03-02 RX ADMIN — LEVALBUTEROL HYDROCHLORIDE 0.63 MG: 0.63 SOLUTION RESPIRATORY (INHALATION) at 21:25

## 2022-03-02 RX ADMIN — CARVEDILOL 12.5 MG: 12.5 TABLET, FILM COATED ORAL at 20:09

## 2022-03-02 RX ADMIN — POTASSIUM CHLORIDE AND SODIUM CHLORIDE 100 ML/HR: 900; 150 INJECTION, SOLUTION INTRAVENOUS at 19:06

## 2022-03-02 RX ADMIN — SENNOSIDES AND DOCUSATE SODIUM 2 TABLET: 50; 8.6 TABLET ORAL at 20:34

## 2022-03-02 RX ADMIN — FAMOTIDINE 20 MG: 20 TABLET ORAL at 20:09

## 2022-03-02 RX ADMIN — SODIUM CHLORIDE 1000 ML: 9 INJECTION, SOLUTION INTRAVENOUS at 15:19

## 2022-03-02 NOTE — H&P
[facility]   HISTORY AND PHYSICAL    Patient Name: Stephen Aguero  : 1945  MRN: [id number]  Primary Care Physician:  Papi Vasquez MD  Date of admission: 3/2/2022    Subjective   Subjective     Chief Complaint:   Weakness and almost passing out    HPI:    Stephen Aguero is a 76 y.o. male who was supposed to see his PCP Dr. Vasquez this afternoon he was getting ready, he was in the shower he felt like passing out and buckling down.  Patient's leg felt weak and he could not get up.  He was brought into ER work-up in the ER were essentially negative except for some generalized weakness.  Patient felt too weak to go home ER physician recommended admission to hospital for further care.  When examined patient in room patient is awake alert and oriented does not have much symptoms except for feeling weak.  Denies any chest pain or shortness of breath.  After the episode discharge checked his sugar it was 182..  Patient does report that he is feeling weak for several months and he has not been out of house for 3 months usually sits in the chair and walks to bathroom that is all his activity.    Review of Systems:    Fatigue and weakness.  No chest pain.  No shortness of breath.  Denies fever chills.  Complains of weakness of both legs inability to get up and walk.      Personal History     Past Medical History:   Diagnosis Date   • BPH (benign prostatic hyperplasia)    • Chronic pain    • Diabetes (HCC)    • Elevated cholesterol    • Elevated PSA    • High blood pressure    • Sleep apnea        Past Surgical History:   Procedure Laterality Date   • BACK SURGERY     • COLONOSCOPY      [facility]   • EYE FOREIGN BODY REMOVAL     • FACIAL COSMETIC SURGERY     • LAPAROSCOPIC CHOLECYSTECTOMY     • LEG SURGERY     • NECK SURGERY         Family History: family history includes Cancer (age of onset: 55) in his sister. Otherwise pertinent FHx was reviewed and not pertinent to current issue.    Social History:  reports  that he has never smoked. He has never used smokeless tobacco. Drug use questions deferred to the physician. He reports that he does not drink alcohol.    Home Medications:  DULoxetine, HYDROcodone-acetaminophen, Insulin Glargine (2 Unit Dial), Insulin Lispro (1 Unit Dial), allopurinol, amLODIPine, benazepril, carvedilol, cetirizine, ergocalciferol, famotidine, furosemide, glipizide, metFORMIN, and potassium chloride      Allergies:  Allergies   Allergen Reactions   • Sulfa Antibiotics Unknown - High Severity       Objective   Objective     Vitals:   Temp:  [98.3 °F (36.8 °C)] 98.3 °F (36.8 °C)  Heart Rate:  [] 106  Resp:  [20] 20  BP: (121-137)/(55-87) 128/75    Physical Exam    Elderly obese male not in acute distress.  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  Left eye pupil reactive.  Neck supple no adenopathy.  Heart regular distant sounds.  Lungs diminished breath sounds but clear bilaterally.  Abdomen obese.  Extremities trace of edema  Neuro awake alert and oriented      I have personally reviewed the results from the time of this admission to 3/2/2022 18:56 EST and agree with these findings:  [x]  Laboratory  []  Microbiology  [x]  Radiology  []  EKG/Telemetry   []  Cardiology/Vascular   []  Pathology  []  Old records  []  Other:    CBC:    WBC   Date Value Ref Range Status   03/02/2022 7.73 3.40 - 10.80 10*3/mm3 Final     RBC   Date Value Ref Range Status   03/02/2022 5.03 4.14 - 5.80 10*6/mm3 Final     Hemoglobin   Date Value Ref Range Status   03/02/2022 16.0 13.0 - 17.7 g/dL Final     Hematocrit   Date Value Ref Range Status   03/02/2022 48.5 37.5 - 51.0 % Final     MCV   Date Value Ref Range Status   03/02/2022 96.4 79.0 - 97.0 fL Final     MCH   Date Value Ref Range Status   03/02/2022 31.8 26.6 - 33.0 pg Final     MCHC   Date Value Ref Range Status   03/02/2022 33.0 31.5 - 35.7 g/dL Final     RDW   Date Value Ref Range Status   03/02/2022 15.6  (H) 12.3 - 15.4 % Final     RDW-SD   Date Value Ref Range Status   03/02/2022 55.4 (H) 37.0 - 54.0 fl Final     MPV   Date Value Ref Range Status   03/02/2022 10.3 6.0 - 12.0 fL Final     Platelets   Date Value Ref Range Status   03/02/2022 173 140 - 450 10*3/mm3 Final     Neutrophil %   Date Value Ref Range Status   03/02/2022 61.5 42.7 - 76.0 % Final     Lymphocyte %   Date Value Ref Range Status   03/02/2022 20.8 19.6 - 45.3 % Final     Monocyte %   Date Value Ref Range Status   03/02/2022 11.0 5.0 - 12.0 % Final     Eosinophil %   Date Value Ref Range Status   03/02/2022 4.7 0.3 - 6.2 % Final     Basophil %   Date Value Ref Range Status   03/02/2022 1.2 0.0 - 1.5 % Final     Immature Grans %   Date Value Ref Range Status   03/02/2022 0.8 (H) 0.0 - 0.5 % Final     Neutrophils, Absolute   Date Value Ref Range Status   03/02/2022 4.76 1.70 - 7.00 10*3/mm3 Final     Lymphocytes, Absolute   Date Value Ref Range Status   03/02/2022 1.61 0.70 - 3.10 10*3/mm3 Final     Monocytes, Absolute   Date Value Ref Range Status   03/02/2022 0.85 0.10 - 0.90 10*3/mm3 Final     Eosinophils, Absolute   Date Value Ref Range Status   03/02/2022 0.36 0.00 - 0.40 10*3/mm3 Final     Basophils, Absolute   Date Value Ref Range Status   03/02/2022 0.09 0.00 - 0.20 10*3/mm3 Final     Immature Grans, Absolute   Date Value Ref Range Status   03/02/2022 0.06 (H) 0.00 - 0.05 10*3/mm3 Final     nRBC   Date Value Ref Range Status   03/02/2022 0.0 0.0 - 0.2 /100 WBC Final        BMP:    Lab Results   Component Value Date    GLUCOSE 180 (H) 03/02/2022    BUN 15 03/02/2022    CREATININE 0.79 03/02/2022    BCR 19.0 03/02/2022    K 4.5 03/02/2022    CO2 30.6 (H) 03/02/2022    CALCIUM 9.2 03/02/2022    ALBUMIN 3.00 (L) 03/02/2022    LABIL2 1.0 (L) 09/27/2020    AST 35 03/02/2022    ALT 26 03/02/2022        XR Chest 1 View    Result Date: 3/2/2022    Lung volume exam with stable cardiomegaly and mild pulmonary vascular congestion.  Increased prominence  of the interstitial markings may reflect a mild degree of underlying pulmonary edema.  Superimposed pneumonia cannot be excluded       ARGELIA TINSLEY MD       Electronically Signed and Approved By: ARGELIA TINSLEY MD on 3/02/2022 at 11:41                      Assessment/Plan   Assessment / Plan       Current Diagnosis:  Active Hospital Problems    Diagnosis    • **Postural dizziness with presyncope    • Generalized weakness    • High blood pressure    • Sleep apnea    • Uncontrolled diabetes mellitus (HCC)      Plan:   Patient admitted to hospital per ER physician request.  Patient reports weakness and inability to walk.  No acute pathology noted on initial exam and lab work.  We will proceed with basic labs including a pro Gilbert, BR NP, thyroid and A1c.  Suspect hypoglycemia or hypotension.  Monitor sugars closely.  Monitor blood pressure with orthostats.  Requesting a CPAP, advised to use CPAP from home at home settings.  Hold most of the meds at this time.  We will reintroduce meds as patient improves.  PT and OT  Discussed with RN      DVT prophylaxis:  Medical DVT prophylaxis orders are present.    GI Prophylaxis:    Pepcid    CODE STATUS:    Code Status (Patient has no pulse and is not breathing): CPR (Attempt to Resuscitate)  Medical Interventions (Patient has pulse or is breathing): Full Support    Admission Status:  I believe this patient meets inpatient status.             I have dictated this note utilizing Dragon Dictation.             Please note that portions of this note were completed with a voice recognition program.             Part of this note may be an electronic transcription/translation of spoken language to printed text         using the Dragon Dictation System.       Electronically signed by Marco Bishop MD, 03/02/22, 6:46 PM EST.    Total time spent with in evaluation and management: 33 minutes

## 2022-03-03 ENCOUNTER — APPOINTMENT (OUTPATIENT)
Dept: CARDIOLOGY | Facility: HOSPITAL | Age: 77
End: 2022-03-03

## 2022-03-03 PROBLEM — R60.0 PEDAL EDEMA: Chronic | Status: ACTIVE | Noted: 2022-03-03

## 2022-03-03 PROBLEM — I87.2 CHRONIC VENOUS STASIS DERMATITIS: Chronic | Status: ACTIVE | Noted: 2022-03-03

## 2022-03-03 LAB
ANION GAP SERPL CALCULATED.3IONS-SCNC: 7.4 MMOL/L (ref 5–15)
BACTERIA SPEC AEROBE CULT: NORMAL
BH CV ECHO MEAS - AO ROOT DIAM: 3 CM
BH CV ECHO MEAS - EF(MOD-BP): 55 %
BH CV ECHO MEAS - IVSD: 1.5 CM
BH CV ECHO MEAS - LA DIMENSION(2D): 5.5 CM
BH CV ECHO MEAS - LAT PEAK E' VEL: 5 CM/SEC
BH CV ECHO MEAS - LVIDD: 5.5 CM
BH CV ECHO MEAS - LVIDS: 3.8 CM
BH CV ECHO MEAS - LVPWD: 1.7 CM
BH CV ECHO MEAS - MED PEAK E' VEL: 10 CM/SEC
BH CV ECHO MEAS - MV A MAX VEL: 42 CM/SEC
BH CV ECHO MEAS - MV DEC TIME: 188 MSEC
BH CV ECHO MEAS - MV E MAX VEL: 116 CM/SEC
BH CV ECHO MEAS - MV E/A: 2.7
BH CV ECHO MEASUREMENTS AVERAGE E/E' RATIO: 15.47
BH CV LOWER VASCULAR LEFT COMMON FEMORAL AUGMENT: NORMAL
BH CV LOWER VASCULAR LEFT COMMON FEMORAL COMPRESS: NORMAL
BH CV LOWER VASCULAR LEFT COMMON FEMORAL PHASIC: NORMAL
BH CV LOWER VASCULAR LEFT COMMON FEMORAL SPONT: NORMAL
BH CV LOWER VASCULAR LEFT DISTAL FEMORAL AUGMENT: NORMAL
BH CV LOWER VASCULAR LEFT DISTAL FEMORAL COMPETENT: NORMAL
BH CV LOWER VASCULAR LEFT DISTAL FEMORAL COMPRESS: NORMAL
BH CV LOWER VASCULAR LEFT DISTAL FEMORAL PHASIC: NORMAL
BH CV LOWER VASCULAR LEFT DISTAL FEMORAL SPONT: NORMAL
BH CV LOWER VASCULAR LEFT GASTRONEMIUS COMPRESS: NORMAL
BH CV LOWER VASCULAR LEFT GREATER SAPH AK COMPRESS: NORMAL
BH CV LOWER VASCULAR LEFT LESSER SAPH COMPRESS: NORMAL
BH CV LOWER VASCULAR LEFT MID FEMORAL COMPRESS: NORMAL
BH CV LOWER VASCULAR LEFT PERONEAL COMPRESS: NORMAL
BH CV LOWER VASCULAR LEFT POPLITEAL AUGMENT: NORMAL
BH CV LOWER VASCULAR LEFT POPLITEAL COMPETENT: NORMAL
BH CV LOWER VASCULAR LEFT POPLITEAL COMPRESS: NORMAL
BH CV LOWER VASCULAR LEFT POPLITEAL PHASIC: NORMAL
BH CV LOWER VASCULAR LEFT POPLITEAL SPONT: NORMAL
BH CV LOWER VASCULAR LEFT POSTERIOR TIBIAL AUGMENT: NORMAL
BH CV LOWER VASCULAR LEFT POSTERIOR TIBIAL COMPRESS: NORMAL
BH CV LOWER VASCULAR LEFT PROXIMAL FEMORAL COMPRESS: NORMAL
BH CV LOWER VASCULAR RIGHT COMMON FEMORAL AUGMENT: NORMAL
BH CV LOWER VASCULAR RIGHT COMMON FEMORAL COMPETENT: NORMAL
BH CV LOWER VASCULAR RIGHT COMMON FEMORAL COMPRESS: NORMAL
BH CV LOWER VASCULAR RIGHT COMMON FEMORAL PHASIC: NORMAL
BH CV LOWER VASCULAR RIGHT COMMON FEMORAL SPONT: NORMAL
BH CV LOWER VASCULAR RIGHT DISTAL FEMORAL AUGMENT: NORMAL
BH CV LOWER VASCULAR RIGHT DISTAL FEMORAL COMPETENT: NORMAL
BH CV LOWER VASCULAR RIGHT DISTAL FEMORAL COMPRESS: NORMAL
BH CV LOWER VASCULAR RIGHT DISTAL FEMORAL PHASIC: NORMAL
BH CV LOWER VASCULAR RIGHT DISTAL FEMORAL SPONT: NORMAL
BH CV LOWER VASCULAR RIGHT GASTRONEMIUS AUGMENT: NORMAL
BH CV LOWER VASCULAR RIGHT GASTRONEMIUS COMPRESS: NORMAL
BH CV LOWER VASCULAR RIGHT GREATER SAPH AK COMPRESS: NORMAL
BH CV LOWER VASCULAR RIGHT LESSER SAPH COMPRESS: NORMAL
BH CV LOWER VASCULAR RIGHT MID FEMORAL COMPRESS: NORMAL
BH CV LOWER VASCULAR RIGHT PERONEAL AUGMENT: NORMAL
BH CV LOWER VASCULAR RIGHT PERONEAL COMPRESS: NORMAL
BH CV LOWER VASCULAR RIGHT POPLITEAL AUGMENT: NORMAL
BH CV LOWER VASCULAR RIGHT POPLITEAL COMPETENT: NORMAL
BH CV LOWER VASCULAR RIGHT POPLITEAL COMPRESS: NORMAL
BH CV LOWER VASCULAR RIGHT POPLITEAL PHASIC: NORMAL
BH CV LOWER VASCULAR RIGHT POPLITEAL SPONT: NORMAL
BH CV LOWER VASCULAR RIGHT POSTERIOR TIBIAL AUGMENT: NORMAL
BH CV LOWER VASCULAR RIGHT POSTERIOR TIBIAL COMPRESS: NORMAL
BH CV LOWER VASCULAR RIGHT PROXIMAL FEMORAL COMPRESS: NORMAL
BUN SERPL-MCNC: 14 MG/DL (ref 8–23)
BUN/CREAT SERPL: 18.9 (ref 7–25)
CALCIUM SPEC-SCNC: 8.8 MG/DL (ref 8.6–10.5)
CHLORIDE SERPL-SCNC: 100 MMOL/L (ref 98–107)
CO2 SERPL-SCNC: 29.6 MMOL/L (ref 22–29)
CREAT SERPL-MCNC: 0.74 MG/DL (ref 0.76–1.27)
EGFRCR SERPLBLD CKD-EPI 2021: 93.3 ML/MIN/1.73
GLUCOSE BLDC GLUCOMTR-MCNC: 184 MG/DL (ref 70–99)
GLUCOSE BLDC GLUCOMTR-MCNC: 215 MG/DL (ref 70–99)
GLUCOSE BLDC GLUCOMTR-MCNC: 231 MG/DL (ref 70–99)
GLUCOSE BLDC GLUCOMTR-MCNC: 231 MG/DL (ref 70–99)
GLUCOSE BLDC GLUCOMTR-MCNC: 288 MG/DL (ref 70–99)
GLUCOSE SERPL-MCNC: 198 MG/DL (ref 65–99)
IVRT: 63 MSEC
LEFT ATRIUM VOLUME INDEX: 28 ML/M2
MAXIMAL PREDICTED HEART RATE: 143 BPM
MAXIMAL PREDICTED HEART RATE: 143 BPM
POTASSIUM SERPL-SCNC: 4.6 MMOL/L (ref 3.5–5.2)
SODIUM SERPL-SCNC: 137 MMOL/L (ref 136–145)
STRESS TARGET HR: 122 BPM
STRESS TARGET HR: 122 BPM

## 2022-03-03 PROCEDURE — 93970 EXTREMITY STUDY: CPT | Performed by: SURGERY

## 2022-03-03 PROCEDURE — 97161 PT EVAL LOW COMPLEX 20 MIN: CPT

## 2022-03-03 PROCEDURE — 80048 BASIC METABOLIC PNL TOTAL CA: CPT | Performed by: INTERNAL MEDICINE

## 2022-03-03 PROCEDURE — 93306 TTE W/DOPPLER COMPLETE: CPT | Performed by: INTERNAL MEDICINE

## 2022-03-03 PROCEDURE — 63710000001 INSULIN LISPRO (HUMAN) PER 5 UNITS: Performed by: INTERNAL MEDICINE

## 2022-03-03 PROCEDURE — 93970 EXTREMITY STUDY: CPT

## 2022-03-03 PROCEDURE — 93306 TTE W/DOPPLER COMPLETE: CPT

## 2022-03-03 PROCEDURE — 82962 GLUCOSE BLOOD TEST: CPT

## 2022-03-03 PROCEDURE — 25010000002 SODIUM CHLORIDE 0.9 % WITH KCL 20 MEQ 20-0.9 MEQ/L-% SOLUTION: Performed by: INTERNAL MEDICINE

## 2022-03-03 RX ORDER — SIMETHICONE 80 MG
80 TABLET,CHEWABLE ORAL 4 TIMES DAILY PRN
Status: DISCONTINUED | OUTPATIENT
Start: 2022-03-03 | End: 2022-03-04 | Stop reason: HOSPADM

## 2022-03-03 RX ADMIN — INSULIN LISPRO 8 UNITS: 100 INJECTION, SOLUTION INTRAVENOUS; SUBCUTANEOUS at 21:23

## 2022-03-03 RX ADMIN — FAMOTIDINE 20 MG: 20 TABLET ORAL at 08:27

## 2022-03-03 RX ADMIN — INSULIN LISPRO 5 UNITS: 100 INJECTION, SOLUTION INTRAVENOUS; SUBCUTANEOUS at 17:43

## 2022-03-03 RX ADMIN — SENNOSIDES AND DOCUSATE SODIUM 2 TABLET: 50; 8.6 TABLET ORAL at 21:05

## 2022-03-03 RX ADMIN — CARVEDILOL 12.5 MG: 12.5 TABLET, FILM COATED ORAL at 17:08

## 2022-03-03 RX ADMIN — Medication 10 ML: at 08:28

## 2022-03-03 RX ADMIN — HYDROCODONE BITARTRATE AND ACETAMINOPHEN 1 TABLET: 5; 325 TABLET ORAL at 08:27

## 2022-03-03 RX ADMIN — POTASSIUM CHLORIDE AND SODIUM CHLORIDE 100 ML/HR: 900; 150 INJECTION, SOLUTION INTRAVENOUS at 05:31

## 2022-03-03 RX ADMIN — CARVEDILOL 12.5 MG: 12.5 TABLET, FILM COATED ORAL at 08:27

## 2022-03-03 RX ADMIN — SIMETHICONE 80 MG: 80 TABLET, CHEWABLE ORAL at 23:25

## 2022-03-03 RX ADMIN — HYDROCODONE BITARTRATE AND ACETAMINOPHEN 1 TABLET: 5; 325 TABLET ORAL at 17:14

## 2022-03-03 RX ADMIN — TRIAMCINOLONE ACETONIDE 1 APPLICATION: 1 OINTMENT TOPICAL at 21:05

## 2022-03-03 RX ADMIN — Medication 10 ML: at 21:05

## 2022-03-03 NOTE — SIGNIFICANT NOTE
[facility]  Wound RN Consult/Progress Note         Patient Name: Stephen Aguero  : 1945  MRN: [id number]  Primary Care Physician:  Papi Vasquez MD  Referring Physician: Brendan Washington DO  Date of admission: 3/2/2022    Visit Dx:    ICD-10-CM ICD-9-CM   1. Generalized weakness  R53.1 780.79   2. Difficulty in walking  R26.2 719.7       SITUATION     Reason for Consult: Dryness and skin tear to right lower leg      BACKGROUND     Past Medical History:   Diagnosis Date   • BPH (benign prostatic hyperplasia)    • Chronic pain    • Diabetes (HCC)    • Elevated cholesterol    • Elevated PSA    • High blood pressure    • Sleep apnea        Past Surgical History:   Procedure Laterality Date   • BACK SURGERY     • COLONOSCOPY      [facility]   • EYE FOREIGN BODY REMOVAL     • FACIAL COSMETIC SURGERY     • LAPAROSCOPIC CHOLECYSTECTOMY     • LEG SURGERY     • NECK SURGERY         Allergies:  Allergies   Allergen Reactions   • Sulfa Antibiotics Unknown - High Severity       ASSESSMENT     Wound Assessment:  Wound 22 1712 Right lower leg Traumatic (Active)   Wound Image    22 1712   Dressing Appearance dried drainage; open to air 22 1712   Periwound redness; warm; dry 22 1712   Periwound Temperature warm 22 1712   Periwound Skin Turgor soft 22 1712   Edges callused 22 1712   Drainage Characteristics/Odor serosanguineous 22 1712   Drainage Amount small 22 1712   Care, Wound cleansed with; soap and water; irrigated with; sterile normal saline 22 1712   Dressing Care dressing applied; petroleum-based; silicone; border dressing 22 1712   Periwound Care topical treatment applied 22 1712        Epitheal %: 100    Exposed Bone: 0    Exposed Tendon: 0    RECOMMENDATION/PLAN      Seen today on [address] [place] and alert and oriented x 3. States legs buckled at home and reason why admitted to hospital. Upon assessment legs bilaterally are covered in yellow  thicken scaly lesions. With a  Traumatic skin tear to right leg. Legs cleansed bilaterally and petroleum gauze and silicone boarder dressing applied to tear to right leg. Duokex VLE is ordered to lower legs by primary MD. Overal skin condition is dry but no other breakdown noted to any folds or bony prominences.    Recommending daily dressing changes to the skin tear, washing legs bilaterally, and applying triamicinolone (Kenalog) to both lower legs bilaterally; may cover with a kerlex as needed.    Short Term Goal: Regain skin integrity    Mayank Moise RN     Electronically signed by Mayank Moise RN, 03/03/22, 5:19 PM EST.

## 2022-03-03 NOTE — PLAN OF CARE
Goal Outcome Evaluation:              Outcome Summary: PT HAS BEEN STABLE THROUGHOUT THE DAY. NO CONCERNS NOTED. FAMILY HAS BEEN AT BEDSIDE FOR THE MAJORITY OF THE DAY.

## 2022-03-03 NOTE — PLAN OF CARE
Goal Outcome Evaluation:  Plan of Care Reviewed With: patient           Outcome Summary: At this time, pt demonstrates safety w/ bed mobility, transfers, and gait. Pt reports they have been using the bathroom independently all day. Pt would likely benefit from outpatient physical therapy in order to challenge functional endurance to achieve pt's goals of improving their endurance. At this time, pt is safe to d/c.

## 2022-03-03 NOTE — PLAN OF CARE
Goal Outcome Evaluation:  Plan of Care Reviewed With: patient        Progress: no change  Outcome Summary: pt. resting quietly, no changes noted.  pt. is unsteady when he ambulates, one person assist needed.

## 2022-03-03 NOTE — THERAPY EVALUATION
Acute Care - Physical Therapy Initial Evaluation and Discharge Summary   [facility]     Patient Name: Stephen Aguero  : 1945  MRN: [id number]     Admit date: 3/2/2022     Referring Physician: Marco Bishop MD     Surgery Date:* No surgery found *          Today's Date: 3/3/2022      Visit Dx:     ICD-10-CM ICD-9-CM   1. Generalized weakness  R53.1 780.79   2. Difficulty in walking  R26.2 719.7     Patient Active Problem List   Diagnosis   • Uncontrolled diabetes mellitus (HCC)   • Elevated cholesterol   • High blood pressure   • Sleep apnea   • Screening for colon cancer   • Elevated PSA   • Benign prostatic hyperplasia with urinary frequency   • Generalized weakness   • Postural dizziness with presyncope     Past Medical History:   Diagnosis Date   • BPH (benign prostatic hyperplasia)    • Chronic pain    • Diabetes (HCC)    • Elevated cholesterol    • Elevated PSA    • High blood pressure    • Sleep apnea      Past Surgical History:   Procedure Laterality Date   • BACK SURGERY     • COLONOSCOPY      [facility]   • EYE FOREIGN BODY REMOVAL     • FACIAL COSMETIC SURGERY     • LAPAROSCOPIC CHOLECYSTECTOMY     • LEG SURGERY     • NECK SURGERY       PT Assessment (last 12 hours)     PT Evaluation and Treatment     Row Name 22 1511          Physical Therapy Time and Intention    Subjective Information complains of; pain  -PHYLLIS     Document Type evaluation  -PHYLLIS     Mode of Treatment individual therapy; physical therapy  -PHYLLIS     Patient Effort adequate  -PHYLLIS     Symptoms Noted During/After Treatment none  -PHYLLIS     Comment Pt is interested in improving their endurance at this time.  -PHYLLIS     Row Name 22 1511          General Information    Patient Profile Reviewed yes  -PHYLLIS     Patient Observations alert; cooperative; agree to therapy  -PHYLLIS     Prior Level of Function independent:; all household mobility; gait; transfer  -PHYLLIS     Equipment Currently Used at Home none  Pt reports they are going to start using their  RW. Pt owns a hospital bed, RW, cane, and WC.  -PHYLLIS     Limitations/Impairments visual  R eye  -PHYLLIS     Barriers to Rehab none identified  -PHYLLIS     Row Name 03/03/22 1511          Living Environment    Current Living Arrangements home/apartment/condo  -PHYLLIS     Home Accessibility stairs to enter home; stairs within home  -PHYLLIS     Lives With spouse  daughter can come and visit  -PHYLLIS     Row Name 03/03/22 1511          Home Main Entrance    Number of Stairs, Main Entrance two  -PHYLLIS     Row Name 03/03/22 1511          Stairs Within Home, Primary    Number of Stairs, Within Home, Primary other (see comments)  13 to basement, pt does not use  -PHYLLIS     Row Name 03/03/22 1511          Pain    Additional Documentation Pain Scale: Numbers Pre/Post-Treatment (Group)  -PHYLLIS     Row Name 03/03/22 1511          Pain Scale: Numbers Pre/Post-Treatment    Pretreatment Pain Rating 8/10  -PHYLLIS     Posttreatment Pain Rating 8/10  -PHYLLIS     Pain Location - Side Left  -PHYLLIS     Pain Location - Orientation --  LE  -PHYLLIS     Row Name 03/03/22 1511          Range of Motion (ROM)    Range of Motion ROM is WFL  -PHYLLIS     Row Name 03/03/22 1511          Strength (Manual Muscle Testing)    Strength (Manual Muscle Testing) left lower extremity strength; right lower extremity strength  -PHYLLIS     Left Lower Extremity Strength hip; knee; ankle  -PHYLLIS     Hip, Left (Strength) 4/5  -PHYLLIS     Knee, Left (Strength) 5/5  -PHYLLIS     Ankle, Left (Strength) 5/5  -PHYLLIS     Right Lower Extremity Strength hip; knee; ankle  -PHYLLIS     Hip, Right (Strength) 4/5  -PHYLLIS     Knee, Right (Strength) 5/5  -PHYLLIS     Ankle, Right (Strength) 5/5  -PHYLLIS     Row Name 03/03/22 1511          Bed Mobility    Bed Mobility bed mobility (all) activities  -PHYLLIS     All Activities, [place] (Bed Mobility) supervision  -PHYLLIS     Row Name 03/03/22 1511          Transfers    Transfers sit-stand transfer; stand-sit transfer  -PHYLLIS     Sit-Stand [place] (Transfers) supervision  -PHYLLIS     Stand-Sit [place] (Transfers)  supervision  -PHYLLIS     Row Name 03/03/22 1511          Sit-Stand Transfer    Assistive Device (Sit-Stand Transfers) --  no AD  -PHYLLIS     Row Name 03/03/22 1511          Stand-Sit Transfer    Assistive Device (Stand-Sit Transfers) --  no AD  -PHYLLIS     Row Name 03/03/22 1511          Gait/Stairs (Locomotion)    Gait/Stairs Locomotion gait/ambulation independence; gait/ambulation assistive device  -PHYLLIS     [place] Level (Gait) contact guard  -PHYLLIS     Assistive Device (Gait) walker, front-wheeled  -PHYLLIS     Distance in Feet (Gait) 25  -PHYLLIS     Pattern (Gait) step-through  -PHYLLIS     Deviations/Abnormal Patterns (Gait) base of support, wide  -PHYLLIS     Row Name 03/03/22 1511          Safety Issues, Functional Mobility    Impairments Affecting Function (Mobility) endurance/activity tolerance  -PHYLLIS     Row Name 03/03/22 1511          Balance    Balance Assessment standing dynamic balance  -PHYLLIS     Dynamic Standing Balance WFL  -PHYLLIS     Row Name 03/03/22 1511          Plan of Care Review    Plan of Care Reviewed With patient  -PHYLLIS     Outcome Summary At this time, pt demonstrates safety w/ bed mobility, transfers, and gait. Pt reports they have been using the bathroom independently all day. Pt would likely benefit from outpatient physical therapy in order to challenge functional endurance to achieve pt's goals of improving their endurance. At this time, pt is safe to d/c.  -PHYLLIS     Row Name 03/03/22 1511          Positioning and Restraints    Pre-Treatment Position in bed  -PHYLLIS     Post Treatment Position bed  -PHYLLIS     Row Name 03/03/22 1511          Therapy Assessment/Plan (PT)    Criteria for Skilled Interventions Met (PT) no problems identified which require skilled intervention  -PHYLLIS     Row Name 03/03/22 1511          Therapy Plan Review/Discharge Plan (PT)    Therapy Plan Review (PT) evaluation/treatment results reviewed; patient  -PHYLLIS           User Key  (r) = Recorded By, (t) = Taken By, (c) = Cosigned By    Initials Name Provider Type     Micky Malave, PT Physical Therapist                Physical Therapy Education                 Title: PT OT SLP Therapies (Done)     Topic: Physical Therapy (Done)     Point: Mobility training (Done)     Learning Progress Summary           Patient Acceptance, E, VU by PHYLLIS at 3/3/2022 1518   Family Acceptance, E, VU by PHYLLIS at 3/3/2022 1518                   Point: Home exercise program (Done)     Learning Progress Summary           Patient Acceptance, E, VU by PHYLLIS at 3/3/2022 1518   Family Acceptance, E, VU by PHYLLIS at 3/3/2022 1518                   Point: Body mechanics (Done)     Learning Progress Summary           Patient Acceptance, E, VU by PHYLLIS at 3/3/2022 1518   Family Acceptance, E, VU by PHYLLIS at 3/3/2022 1518                   Point: Precautions (Done)     Learning Progress Summary           Patient Acceptance, E, VU by PHYLLIS at 3/3/2022 1518   Family Acceptance, E, VU by PHYLLIS at 3/3/2022 1518                               User Key     Initials Effective Dates Name Provider Type Discipline    PHYLLIS 06/03/21 -  Micky Perkins, PT Physical Therapist PT              PT Recommendation and Plan  Anticipated Discharge Disposition (PT): home with outpatient therapy services, home with home health  Therapy Frequency (PT): evaluation only  Plan of Care Reviewed With: patient  Outcome Summary: At this time, pt demonstrates safety w/ bed mobility, transfers, and gait. Pt reports they have been using the bathroom independently all day. Pt would likely benefit from outpatient physical therapy in order to challenge functional endurance to achieve pt's goals of improving their endurance. At this time, pt is safe to d/c.   Outcome Measures     Row Name 03/03/22 1500             How much help from another person do you currently need...    Turning from your back to your side while in flat bed without using bedrails? 4  -PHYLLIS      Moving from lying on back to sitting on the side of a flat bed without bedrails? 4  -PHYLLIS      Moving to  and from a bed to a chair (including a wheelchair)? 4  -PHYLLIS      Standing up from a chair using your arms (e.g., wheelchair, bedside chair)? 4  -PHYLLIS      Climbing 3-5 steps with a railing? 3  -PHYLLIS      To walk in hospital room? 4  -PHYLLIS      AM-PAC 6 Clicks Score (PT) 23  -PHYLLIS              Functional Assessment    Outcome Measure Options AM-PAC 6 Clicks Basic Mobility (PT)  -PHYLLIS            User Key  (r) = Recorded By, (t) = Taken By, (c) = Cosigned By    Initials Name Provider Type    Micky Malave, PT Physical Therapist                 Time Calculation:    PT Charges     Row Name 03/03/22 1511             Time Calculation    PT Received On 03/03/22  -PHYLLIS              Untimed Charges    PT Eval/Re-eval Minutes 35  -PHYLLIS              Total Minutes    Untimed Charges Total Minutes 35  -PHYLLIS       Total Minutes 35  -PHYLLIS            User Key  (r) = Recorded By, (t) = Taken By, (c) = Cosigned By    Initials Name Provider Type    Micky Malave PT Physical Therapist              Therapy Charges for Today     Code Description Service Date Service Provider Modifiers Qty    61058095342 HC PT EVAL LOW COMPLEXITY 3 3/3/2022 Micky Perkins, PT GP 1          PT G-Codes  Outcome Measure Options: AM-PAC 6 Clicks Basic Mobility (PT)  AM-PAC 6 Clicks Score (PT): 23    Micky Perkins PT  3/3/2022

## 2022-03-04 VITALS
HEIGHT: 71 IN | WEIGHT: 288.14 LBS | RESPIRATION RATE: 18 BRPM | DIASTOLIC BLOOD PRESSURE: 54 MMHG | OXYGEN SATURATION: 95 % | SYSTOLIC BLOOD PRESSURE: 123 MMHG | BODY MASS INDEX: 40.34 KG/M2 | TEMPERATURE: 97.9 F | HEART RATE: 89 BPM

## 2022-03-04 PROBLEM — R42 POSTURAL DIZZINESS WITH PRESYNCOPE: Status: RESOLVED | Noted: 2022-03-02 | Resolved: 2022-03-04

## 2022-03-04 PROBLEM — R31.29 MICROSCOPIC HEMATURIA: Status: ACTIVE | Noted: 2022-03-04

## 2022-03-04 PROBLEM — R55 POSTURAL DIZZINESS WITH PRESYNCOPE: Status: RESOLVED | Noted: 2022-03-02 | Resolved: 2022-03-04

## 2022-03-04 PROBLEM — R09.02 HYPOXIA: Chronic | Status: ACTIVE | Noted: 2022-03-04

## 2022-03-04 PROBLEM — E11.65 UNCONTROLLED DIABETES MELLITUS WITH HYPERGLYCEMIA: Status: ACTIVE | Noted: 2021-07-26

## 2022-03-04 LAB
ANION GAP SERPL CALCULATED.3IONS-SCNC: 7.7 MMOL/L (ref 5–15)
BUN SERPL-MCNC: 16 MG/DL (ref 8–23)
BUN/CREAT SERPL: 19.5 (ref 7–25)
CALCIUM SPEC-SCNC: 8.9 MG/DL (ref 8.6–10.5)
CHLORIDE SERPL-SCNC: 100 MMOL/L (ref 98–107)
CO2 SERPL-SCNC: 29.3 MMOL/L (ref 22–29)
CREAT SERPL-MCNC: 0.82 MG/DL (ref 0.76–1.27)
EGFRCR SERPLBLD CKD-EPI 2021: 90.5 ML/MIN/1.73
GLUCOSE BLDC GLUCOMTR-MCNC: 190 MG/DL (ref 70–99)
GLUCOSE BLDC GLUCOMTR-MCNC: 229 MG/DL (ref 70–99)
GLUCOSE SERPL-MCNC: 198 MG/DL (ref 65–99)
POTASSIUM SERPL-SCNC: 4.5 MMOL/L (ref 3.5–5.2)
SODIUM SERPL-SCNC: 137 MMOL/L (ref 136–145)

## 2022-03-04 PROCEDURE — 97165 OT EVAL LOW COMPLEX 30 MIN: CPT

## 2022-03-04 PROCEDURE — 82962 GLUCOSE BLOOD TEST: CPT

## 2022-03-04 PROCEDURE — 63710000001 INSULIN LISPRO (HUMAN) PER 5 UNITS: Performed by: INTERNAL MEDICINE

## 2022-03-04 PROCEDURE — 94799 UNLISTED PULMONARY SVC/PX: CPT

## 2022-03-04 PROCEDURE — 80048 BASIC METABOLIC PNL TOTAL CA: CPT | Performed by: INTERNAL MEDICINE

## 2022-03-04 PROCEDURE — 94618 PULMONARY STRESS TESTING: CPT

## 2022-03-04 RX ORDER — POTASSIUM CHLORIDE 20 MEQ/1
20 TABLET, EXTENDED RELEASE ORAL DAILY
Qty: 15 TABLET | Refills: 0 | Status: SHIPPED | OUTPATIENT
Start: 2022-03-04 | End: 2022-03-19

## 2022-03-04 RX ORDER — FUROSEMIDE 80 MG
40 TABLET ORAL DAILY
Qty: 8 TABLET | Refills: 0 | Status: SHIPPED | OUTPATIENT
Start: 2022-03-04 | End: 2022-03-19

## 2022-03-04 RX ORDER — CEFUROXIME AXETIL 500 MG/1
500 TABLET ORAL 2 TIMES DAILY
Qty: 14 TABLET | Refills: 0 | Status: SHIPPED | OUTPATIENT
Start: 2022-03-04 | End: 2022-03-11

## 2022-03-04 RX ADMIN — INSULIN LISPRO 5 UNITS: 100 INJECTION, SOLUTION INTRAVENOUS; SUBCUTANEOUS at 11:57

## 2022-03-04 RX ADMIN — LISINOPRIL 20 MG: 20 TABLET ORAL at 08:45

## 2022-03-04 RX ADMIN — HYDROCODONE BITARTRATE AND ACETAMINOPHEN 1 TABLET: 5; 325 TABLET ORAL at 08:52

## 2022-03-04 RX ADMIN — FAMOTIDINE 20 MG: 20 TABLET ORAL at 08:45

## 2022-03-04 RX ADMIN — Medication 10 ML: at 08:44

## 2022-03-04 RX ADMIN — TRIAMCINOLONE ACETONIDE 1 APPLICATION: 1 OINTMENT TOPICAL at 08:46

## 2022-03-04 RX ADMIN — INSULIN LISPRO 3 UNITS: 100 INJECTION, SOLUTION INTRAVENOUS; SUBCUTANEOUS at 08:45

## 2022-03-04 RX ADMIN — CARVEDILOL 12.5 MG: 12.5 TABLET, FILM COATED ORAL at 08:45

## 2022-03-04 NOTE — NURSING NOTE
Exercise Oximetry    Patient Name:Stephen Aguero   MRN: [id number]   Date: 03/04/22             ROOM AIR BASELINE   SpO2% 96   Heart Rate 93   Blood Pressure 154/81     EXERCISE ON ROOM AIR SpO2% EXERCISE ON O2 @  3 LPM SpO2%   1 MINUTE 92 1 MINUTE 94   2 MINUTES 88 2 MINUTES 92   3 MINUTES 90 3 MINUTES 91   4 MINUTES 87 4 MINUTES 91   5 MINUTES 85 5 MINUTES 90   6 MINUTES  6 MINUTES 88              Distance Walked  250 Distance Walked 250   Dyspnea (Tisha Scale)   Dyspnea (Tisha Scale)   Fatigue (Tisha Scale)   Fatigue (Tisha Scale)   SpO2% Post Exercise  92 SpO2% Post Exercise  92   HR Post Exercise  112 HR Post Exercise 102   Time to Recovery   Time to Recovery     Comments:

## 2022-03-04 NOTE — PLAN OF CARE
Goal Outcome Evaluation:  Plan of Care Reviewed With: patient, daughter        Progress: no change  Outcome Summary: Skilled OT services are not indicated at this time as patient has had no significant decline in functional status. Recommend 3-1 commode and tub transfer bench to increase safety at home. DC OT. Pt/daughter in agreement.

## 2022-03-04 NOTE — PLAN OF CARE
Goal Outcome Evaluation:  Plan of Care Reviewed With: patient        Progress: no change  Outcome Summary: pt. resting quietly, no changes noted.  daughter at bedside.  pt. medicated x 1 for c/o gas with relief noted.

## 2022-03-04 NOTE — THERAPY EVALUATION
Patient Name: Stephen Aguero  : 1945    MRN: [id number]                              Today's Date: 3/4/2022       Admit Date: 3/2/2022    Visit Dx:     ICD-10-CM ICD-9-CM   1. Generalized weakness  R53.1 780.79   2. Difficulty in walking  R26.2 719.7   3. Decreased activities of daily living (ADL)  Z78.9 V49.89     Patient Active Problem List   Diagnosis   • Uncontrolled diabetes mellitus (HCC)   • Elevated cholesterol   • High blood pressure   • Sleep apnea   • Screening for colon cancer   • Elevated PSA   • Benign prostatic hyperplasia with urinary frequency   • Generalized weakness   • Postural dizziness with presyncope   • Pedal edema   • Chronic venous stasis dermatitis     Past Medical History:   Diagnosis Date   • BPH (benign prostatic hyperplasia)    • Chronic pain    • Diabetes (HCC)    • Elevated cholesterol    • Elevated PSA    • High blood pressure    • Sleep apnea      Past Surgical History:   Procedure Laterality Date   • BACK SURGERY     • COLONOSCOPY      [facility]   • EYE FOREIGN BODY REMOVAL     • FACIAL COSMETIC SURGERY     • LAPAROSCOPIC CHOLECYSTECTOMY     • LEG SURGERY     • NECK SURGERY        General Information     Row Name 22 1006          OT Time and Intention    Document Type evaluation  -AV     Mode of Treatment individual therapy; occupational therapy  -AV     Row Name 22 1006          General Information    Patient Profile Reviewed yes  -AV     Prior Level of Function ADL's; cooking; all household mobility  stands to shower/tub. stands at sink to groom. difficulty transfer/regular commode.ambulates without device. no home O2.  -AV     Existing Precautions/Restrictions no known precautions/restrictions  -AV     Barriers to Rehab none identified  -AV     Row Name 22 1006          Occupational Profile    Reason for Services/Referral (Occupational Profile) Patientis a 76 year old male admitted to hospital on 3/2/22 with near syncope. He is currently on 2N/ room air.  OT consulted to evaluate for ADL needs. No previous OT services for current condition.  -AV     Row Name 03/04/22 1006          Living Environment    Lives With spouse  -AV     Row Name 03/04/22 1006          Home Main Entrance    Number of Stairs, Main Entrance two  -AV     Row Name 03/04/22 1006          Stairs Within Home, Primary    Stairs, Within Home, Primary non-essential basement stairs  -AV     Row Name 03/04/22 1006          Cognition    Orientation Status (Cognition) --  alert, pleasant and cooperative. able to retain information and follow commands  -AV           User Key  (r) = Recorded By, (t) = Taken By, (c) = Cosigned By    Initials Name Provider Type    Emigdio Gonzales OT Occupational Therapist                 Mobility/ADL's     Row Name 03/04/22 1008          Transfers    Comment (Transfers) supervision  -AV     Row Name 03/04/22 1008          Activities of Daily Living    BADL Assessment/Intervention --  (I) ADLs with supervision for standing portions due to hospital setting. Pt reports no difficulty with task completion.  -AV           User Key  (r) = Recorded By, (t) = Taken By, (c) = Cosigned By    Initials Name Provider Type    Emigdio Gonzales OT Occupational Therapist               Obj/Interventions     Row Name 03/04/22 1009          Sensory Assessment (Somatosensory)    Sensory Assessment (Somatosensory) UE sensation intact  -AV     Row Name 03/04/22 1009          Vision Assessment/Intervention    Visual Impairment/Limitations other (see comments)  WFL left eye. none right.  -AV     Row Name 03/04/22 1009          Range of Motion Comprehensive    General Range of Motion bilateral upper extremity ROM WFL  AROM  -AV     Row Name 03/04/22 1009          Strength Comprehensive (MMT)    Comment, General Manual Muscle Testing (MMT) Assessment upper extremities equal at 5/5  -AV     Row Name 03/04/22 1009          Motor Skills    Motor Skills coordination; functional endurance  -AV      Coordination WFL  right dominant  -AV     Functional Endurance WFL basic ADLs  -AV     Row Name 03/04/22 1009          Balance    Balance Assessment standing dynamic balance  -AV     Dynamic Standing Balance WFL  -AV           User Key  (r) = Recorded By, (t) = Taken By, (c) = Cosigned By    Initials Name Provider Type    Emigdio Gonzales, OT Occupational Therapist               Goals/Plan    No documentation.                Clinical Impression     Row Name 03/04/22 1011          Pain Assessment    Additional Documentation Pain Scale: Numbers Pre/Post-Treatment (Group)  -AV     Row Name 03/04/22 1011          Pain Scale: Numbers Pre/Post-Treatment    Pretreatment Pain Rating 8/10  -AV     Posttreatment Pain Rating 8/10  -AV     Pre/Posttreatment Pain Comment reports he has just told nurse  -AV     Row Name 03/04/22 1011          Plan of Care Review    Plan of Care Reviewed With patient; daughter  -AV     Progress no change  -AV     Outcome Summary Skilled OT services are not indicated at this time as patient has had no significant decline in functional status. Recommend 3-1 commode and tub transfer bench to increase safety at home. DC OT. Pt/daughter in agreement.  -AV     Row Name 03/04/22 1011          Therapy Assessment/Plan (OT)    Patient/Family Therapy Goal Statement (OT) NA  -AV     Rehab Potential (OT) --  NA  -AV     Criteria for Skilled Therapeutic Interventions Met (OT) does not meet criteria for skilled intervention  -AV     Therapy Frequency (OT) evaluation only  -AV     Row Name 03/04/22 1011          Therapy Plan Review/Discharge Plan (OT)    Equipment Needs Upon Discharge (OT) commode chair; tub bench  -AV     Anticipated Discharge Disposition (OT) home  -AV     Row Name 03/04/22 1011          Vital Signs    O2 Delivery Pre Treatment room air  -AV     O2 Delivery Intra Treatment room air  -AV     O2 Delivery Post Treatment room air  -AV           User Key  (r) = Recorded By, (t) = Taken By, (c) =  Cosigned By    Initials Name Provider Type    Emigdio Gonzales OT Occupational Therapist               Outcome Measures     Row Name 03/04/22 1013          How much help from another is currently needed...    Putting on and taking off regular lower body clothing? 4  -AV     Bathing (including washing, rinsing, and drying) 4  -AV     Toileting (which includes using toilet bed pan or urinal) 4  -AV     Putting on and taking off regular upper body clothing 4  -AV     Taking care of personal grooming (such as brushing teeth) 4  -AV     Eating meals 4  -AV     AM-PAC 6 Clicks Score (OT) 24  -AV     Row Name 03/04/22 0852          How much help from another person do you currently need...    Turning from your back to your side while in flat bed without using bedrails? 4  -DB     Moving from lying on back to sitting on the side of a flat bed without bedrails? 4  -DB     Moving to and from a bed to a chair (including a wheelchair)? 4  -DB     Standing up from a chair using your arms (e.g., wheelchair, bedside chair)? 4  -DB     Climbing 3-5 steps with a railing? 3  -DB     To walk in hospital room? 3  -DB     AM-PAC 6 Clicks Score (PT) 22  -DB     Row Name 03/04/22 1013          Functional Assessment    Outcome Measure Options AM-PAC 6 Clicks Daily Activity (OT); Optimal Instrument  -AV     Row Name 03/04/22 1013          Optimal Instrument    Optimal Instrument Optimal - 3  -AV     Bending/Stooping 1  -AV     Standing 1  -AV     Reaching 1  -AV     From the list, choose the 3 activities you would most like to be able to do without any difficulty Bending/stooping; Standing; Reaching  -AV     Total Score Optimal - 3 3  -AV           User Key  (r) = Recorded By, (t) = Taken By, (c) = Cosigned By    Initials Name Provider Type    Emigdio oGnzales OT Occupational Therapist    DB Marcelina Silverman, RN Registered Nurse                Occupational Therapy Education                 Title: PT OT SLP Therapies (Done)     Topic:  Occupational Therapy (Done)     Point: ADL training (Done)     Description:   Instruct learner(s) on proper safety adaptation and remediation techniques during self care or transfers.   Instruct in proper use of assistive devices.              Learning Progress Summary           Patient Acceptance, E, VU by AV at 3/4/2022 1014    Comment: no need for skilled OT services at this time  adaptive equipment recommendations    daughter present throughout session   Family Acceptance, E, VU by AV at 3/4/2022 1014    Comment: no need for skilled OT services at this time  adaptive equipment recommendations    daughter present throughout session                               User Key     Initials Effective Dates Name Provider Type Discipline     06/16/21 -  Emigdio Alamo OT Occupational Therapist OT              OT Recommendation and Plan  Therapy Frequency (OT): evaluation only  Plan of Care Review  Plan of Care Reviewed With: patient, daughter  Progress: no change  Outcome Summary: Skilled OT services are not indicated at this time as patient has had no significant decline in functional status. Recommend 3-1 commode and tub transfer bench to increase safety at home. DC OT. Pt/daughter in agreement.     Time Calculation:    Time Calculation- OT     Row Name 03/04/22 1015             Time Calculation- OT    OT Received On 03/04/22  -AV              Untimed Charges    OT Eval/Re-eval Minutes 35  -AV              Total Minutes    Untimed Charges Total Minutes 35  -AV       Total Minutes 35  -AV            User Key  (r) = Recorded By, (t) = Taken By, (c) = Cosigned By    Initials Name Provider Type    AV Emigdio Alamo OT Occupational Therapist              Therapy Charges for Today     Code Description Service Date Service Provider Modifiers Qty    46637205838 HC OT EVAL LOW COMPLEXITY 3 3/4/2022 Emigdio Alamo OT GO 1               Emigdio Alamo OT  3/4/2022

## 2022-03-04 NOTE — PROGRESS NOTES
[facility]     Progress Note    Patient Name: Stephen Aguero  : 1945  MRN: [id number]  Primary Care Physician:  Papi Vasquez MD  Date of admission: 3/2/2022      Subjective   Brief summary.    Patient admitted per ER recommendation for generalized weakness and inability to walk      HPI:  Follow-up on patient's weakness  Difficulty walking difficulty getting up.  Patient has this issue for several weeks.  Daughter at bedside , now concerned about weakness.  He was trying to get in shower and felt weak and buckled his knees.  Patient complains of some shortness of breath with exertion.  Swelling of legs and chronic venous stasis in legs.  Dry skin and scaly  Concerned about infection    Review of Systems     No chest pain  Shortness of breath with exertion  Swelling of abdomen  Swelling of legs  No fever chills        Objective     Vitals:   Temp:  [97.4 °F (36.3 °C)-98.3 °F (36.8 °C)] 98.3 °F (36.8 °C)  Heart Rate:  [] 100  Resp:  [18-22] 20  BP: (119-158)/(62-94) 153/87  Flow (L/min):  [2] 2    Physical Exam :     Morbidly obese male not in acute distress comfortable in bed  Daughter at bedside  Heart regular  Lungs diminished breath sounds  Abdomen obese soft nontender  Extremities with 2+ edema and chronic venous stasis changes and scales      Result Review:  I have personally reviewed the results from the time of this admission to 3/3/2022 19:34 EST and agree with these findings:  [x]  Laboratory  [x]  Microbiology  [x]  Radiology  []  EKG/Telemetry   []  Cardiology/Vascular   []  Pathology  []  Old records  []  Other:           Assessment / Plan       Active Hospital Problems:  Active Hospital Problems    Diagnosis    • **Postural dizziness with presyncope    • Generalized weakness    • High blood pressure    • Sleep apnea    • Uncontrolled diabetes mellitus (HCC)        Plan:   No evidence of orthostatic hypotension documented by nursing  Check 2D echo  Check venous Doppler  No evidence  of significant infection in lower extremities  Patient has chronic issues  Daughter concerned about them now  Will consult PT OT  Will benefit from rehab, monitor for hypoglycemia       DVT prophylaxis:  Medical DVT prophylaxis orders are present.    CODE STATUS:   Code Status (Patient has no pulse and is not breathing): CPR (Attempt to Resuscitate)  Medical Interventions (Patient has pulse or is breathing): Full Support            Electronically signed by Marco Bishop MD, 03/03/22, 7:34 PM EST.

## 2022-03-04 NOTE — DISCHARGE SUMMARY
[facility]         DISCHARGE SUMMARY    Patient Name: Stephen Aguero  : 1945  MRN: [id number]    Date of Admission: 3/2/2022  Date of Discharge:   Primary Care Physician: Papi Vasquez MD    Consults     No orders found from 2022 to 3/3/2022.          Presenting Problem:   Generalized weakness [R53.1]    Active and Resolved Hospital Problems:  Active Hospital Problems    Diagnosis POA   • Hypoxia [R09.02] Yes   • Microscopic hematuria [R31.29] Yes   • Pedal edema [R60.0] Yes   • Chronic venous stasis dermatitis [I87.2] Yes   • Generalized weakness [R53.1] Yes   • High blood pressure [I10] Yes   • Sleep apnea [G47.30] Yes   • Uncontrolled diabetes mellitus with hyperglycemia (HCC) [E11.65] Yes      Resolved Hospital Problems    Diagnosis POA   • **Postural dizziness with presyncope [R42, R55] Yes     Mild hematuria    Hospital Course     Hospital Course:  Stephen Aguero is a 76 y.o. male admitted to hospital for generalized weakness.  Patient had weakness and presyncopal episode.  He was admitted per ER request.  Extensive work-up was done which was negative for any acute issues.  Patient had mildly orthostatic hypotension he was on Norvasc, Benzapril and beta-blocker Coreg.  His Norvasc was discontinued for hypotension as well as edema of legs.  Patient has chronic venous stasis in both lower extremities.  Wound care nurse saw patient and recommended steroid cream.  Patient has been weak and unable to walk at home for several weeks and walks minimally from bed to bathroom.  Patient's daughter was present all along during the stay in hospital.  Patient had an echo which was showing mild diastolic dysfunction.  A venous Doppler for lower extremities did not show any DVT.  Patient was hypoxic with exertion.  Hypoxia is multifactorial related to diastolic dysfunction and deconditioning and hypoventilation.  Patient had a 6-minute walk test which qualified him for  oxygen.  He will be discharged home with home health as he is feeling better.  I recommended inpatient rehab and evaluation for rehab placement but patient and daughter both declined.  Patient will be discharged home with home health, oxygen and walker was provided at discharge.    ADD----  Addendum.    After patient discharge I reviewed his labs and his urine has shown some hematuria and WBCs, although patient has no symptoms I called him and talk to him over the phone he reports he is seeing urologist Dr. Manzanares and having some prostate issues.  Patient was notified that his urine cultures are negative but considering significant amount of WBCs and microscopic hematuria I will prescribe him Ceftin patient was advised to pick the prescription from the pharmacy.  And to discuss further with his PCP on his next visit        DISCHARGE Follow Up Recommendations for labs and diagnostics:   Discharge to home  Heart healthy diet\  Elastic SNATOS hose for lower extremities.  Decrease salt intake.  Monitoring of blood pressure recommended.  Monitor urine for hematuria as an outpatient.  If continues to have blood needs additional work-up including CT ultrasound or urology work-up may be warranted.    Day of Discharge     Vital Signs:  Temp:  [97.3 °F (36.3 °C)-97.9 °F (36.6 °C)] 97.9 °F (36.6 °C)  Heart Rate:  [77-94] 89  Resp:  [16-20] 18  BP: (123-154)/(52-81) 123/54    Physical Exam:    Elderly male not in acute distress  Neck supple  Heart regular and lungs clear  Abdomen soft and obese.  Extremities 2+ edema no calf tenderness.        Pertinent  and/or Most Recent Results     LAB RESULTS:      Lab 03/02/22  1830 03/02/22  1052   WBC  --  7.73   HEMOGLOBIN  --  16.0   HEMATOCRIT  --  48.5   PLATELETS  --  173   NEUTROS ABS  --  4.76   IMMATURE GRANS (ABS)  --  0.06*   LYMPHS ABS  --  1.61   MONOS ABS  --  0.85   EOS ABS  --  0.36   MCV  --  96.4   PROCALCITONIN 0.09  --          Lab 03/04/22  0416 03/03/22  0425  03/02/22  1830 03/02/22  1052   SODIUM 137 137 139 135*   POTASSIUM 4.5 4.6 4.2 4.5   CHLORIDE 100 100 101 97*   CO2 29.3* 29.6* 30.5* 30.6*   ANION GAP 7.7 7.4 7.5 7.4   BUN 16 14 15 15   CREATININE 0.82 0.74* 0.87 0.79   EGFR 90.5 93.3 88.9 91.5   GLUCOSE 198* 198* 266* 180*   CALCIUM 8.9 8.8 8.9 9.2   MAGNESIUM  --   --   --  2.1   HEMOGLOBIN A1C  --   --  8.40*  --    TSH  --   --  3.000  --          Lab 03/02/22  1052   TOTAL PROTEIN 7.4   ALBUMIN 3.00*   GLOBULIN 4.4   ALT (SGPT) 26   AST (SGOT) 35   BILIRUBIN 0.9   ALK PHOS 160*         Lab 03/02/22  1830 03/02/22  1052   PROBNP 271.8  --    TROPONIN T  --  <0.010                 Brief Urine Lab Results  (Last result in the past 365 days)      Color   Clarity   Blood   Leuk Est   Nitrite   Protein   CREAT   Urine HCG        03/02/22 1422 Dark Yellow   Cloudy   Moderate (2+)   Small (1+)   Negative   >=300 mg/dL (3+)               Microbiology Results (last 10 days)     Procedure Component Value - Date/Time    Urine Culture - Urine, Urine, Random Void [[id number]] Collected: 03/02/22 1422    Lab Status: Final result Specimen: Urine, Random Void Updated: 03/03/22 1134     Urine Culture <10,000 CFU/mL Normal Urogenital Gaby          XR Chest 1 View    Result Date: 3/2/2022  Impression:   Lung volume exam with stable cardiomegaly and mild pulmonary vascular congestion.  Increased prominence of the interstitial markings may reflect a mild degree of underlying pulmonary edema.  Superimposed pneumonia cannot be excluded       ARGELIA TINSLEY MD       Electronically Signed and Approved By: ARGELIA TINSLEY MD on 3/02/2022 at 11:41               Results for orders placed during the hospital encounter of 03/02/22    Duplex Venous Lower Extremity - Bilateral CAR    Interpretation Summary  · Normal bilateral lower extremity venous duplex scan.      Results for orders placed during the hospital encounter of 03/02/22    Duplex Venous Lower Extremity - Bilateral  CAR    Interpretation Summary  · Normal bilateral lower extremity venous duplex scan.      Results for orders placed during the hospital encounter of 03/02/22    Adult Transthoracic Echo Complete W/ Cont if Necessary Per Protocol    Interpretation Summary  · The left ventricular cavity is borderline dilated.  · Left ventricular wall thickness is consistent with mild concentric hypertrophy.  · Left ventricular ejection fraction appears to be 56 - 60%.  · Left ventricular diastolic function is consistent with (grade II w/high LAP) pseudonormalization.  · Estimated right ventricular systolic pressure from tricuspid regurgitation is normal (<35 mmHg).  · Mild tricuspid regurgitation, mild aortic valve sclerosis but normal function      Labs Pending at Discharge:        Discharge Details        Discharge Medications      New Medications      Instructions Start Date   cefuroxime 500 MG tablet  Commonly known as: CEFTIN   500 mg, Oral, 2 Times Daily      triamcinolone 0.1 % ointment  Commonly known as: KENALOG   1 application, Topical, 2 Times Daily         Changes to Medications      Instructions Start Date   furosemide 80 MG tablet  Commonly known as: Lasix  What changed: how much to take   40 mg, Oral, Daily      potassium chloride 20 MEQ CR tablet  Commonly known as: K-DUR,KLOR-CON  What changed: when to take this   20 mEq, Oral, Daily         Continue These Medications      Instructions Start Date   allopurinol 300 MG tablet  Commonly known as: ZYLOPRIM   300 mg, Oral, Daily      benazepril 40 MG tablet  Commonly known as: LOTENSIN   40 mg, Oral, Daily      carvedilol 25 MG tablet  Commonly known as: COREG   25 mg, Oral, 2 Times Daily With Meals      cetirizine 10 MG tablet  Commonly known as: zyrTEC   10 mg, Oral, Daily      DULoxetine 30 MG capsule  Commonly known as: CYMBALTA   30 mg, Oral, Daily      ergocalciferol 1.25 MG (92895 UT) capsule  Commonly known as: ERGOCALCIFEROL   50,000 Units, Oral, Weekly       famotidine 20 MG tablet  Commonly known as: PEPCID   20 mg, Oral, 2 Times Daily      glipizide 10 MG tablet  Commonly known as: GLUCOTROL   10 mg, Oral, 2 Times Daily Before Meals      HumaLOG KwikPen 100 UNIT/ML solution pen-injector  Generic drug: Insulin Lispro (1 Unit Dial)   3 Times Daily      metFORMIN 500 MG tablet  Commonly known as: GLUCOPHAGE   1,000 mg, Oral, 2 Times Daily With Meals      Norco  MG per tablet  Generic drug: HYDROcodone-acetaminophen   1 tablet, Oral, Every 6 Hours PRN      Toujeo Max SoloStar 300 UNIT/ML solution pen-injector injection  Generic drug: Insulin Glargine (2 Unit Dial)   60-70 Units, Subcutaneous, Daily         Stop These Medications    amLODIPine 10 MG tablet  Commonly known as: NORVASC            Allergies   Allergen Reactions   • Sulfa Antibiotics Unknown - High Severity         [place] [facility] [place]    Diet:  Heart healthy      Discharge Activity:     Activity Instructions     Activity as Tolerated              No future appointments.    Additional Instructions for the Follow-ups that You Need to Schedule     Discharge Follow-up with PCP   As directed       Currently Documented PCP:    Papi Vasquez MD    PCP Phone Number:    [phone]     Follow Up Details: Monday           Work-up for hematuria as outpatient recommended    Time spent on Discharge including face to face service: 29 minutes.            I have dictated this note utilizing Dragon Dictation.             Please note that portions of this note were completed with a voice recognition program.             Part of this note may be an electronic transcription/translation of spoken language to printed text         using the Dragon Dictation System.       Electronically signed by Marco Bishop MD, 03/04/22, 6:00 PM EST.

## 2022-03-05 ENCOUNTER — READMISSION MANAGEMENT (OUTPATIENT)
Dept: CALL CENTER | Facility: HOSPITAL | Age: 77
End: 2022-03-05

## 2022-03-05 NOTE — OUTREACH NOTE
Prep Survey    Flowsheet Row Responses   [facility] facility patient discharged from? [facility]   Is LACE score < 7 ? Yes   Emergency Room discharge w/ pulse ox? No   Eligibility Not Eligible   What are the reasons patient is not eligible? Other   Does the patient have one of the following disease processes/diagnoses(primary or secondary)? Other   Prep survey completed? Yes          BETTY MCCORMACK - Registered Nurse

## 2022-03-23 LAB — QT INTERVAL: 400 MS

## 2023-01-01 ENCOUNTER — APPOINTMENT (OUTPATIENT)
Dept: GENERAL RADIOLOGY | Facility: HOSPITAL | Age: 78
DRG: 189 | End: 2023-01-01
Payer: MEDICARE

## 2023-01-01 ENCOUNTER — APPOINTMENT (OUTPATIENT)
Dept: CT IMAGING | Facility: HOSPITAL | Age: 78
DRG: 189 | End: 2023-01-01
Payer: MEDICARE

## 2023-01-01 ENCOUNTER — APPOINTMENT (OUTPATIENT)
Dept: MRI IMAGING | Facility: HOSPITAL | Age: 78
DRG: 189 | End: 2023-01-01
Payer: MEDICARE

## 2023-01-01 ENCOUNTER — HOSPITAL ENCOUNTER (INPATIENT)
Facility: HOSPITAL | Age: 78
LOS: 16 days | DRG: 189 | End: 2023-04-29
Attending: EMERGENCY MEDICINE | Admitting: INTERNAL MEDICINE
Payer: MEDICARE

## 2023-01-01 VITALS
HEIGHT: 72 IN | HEART RATE: 121 BPM | DIASTOLIC BLOOD PRESSURE: 44 MMHG | BODY MASS INDEX: 38.79 KG/M2 | OXYGEN SATURATION: 76 % | RESPIRATION RATE: 40 BRPM | TEMPERATURE: 100.1 F | SYSTOLIC BLOOD PRESSURE: 66 MMHG | WEIGHT: 286.38 LBS

## 2023-01-01 DIAGNOSIS — Z78.9 DECREASED ACTIVITIES OF DAILY LIVING (ADL): ICD-10-CM

## 2023-01-01 DIAGNOSIS — J96.00 ACUTE RESPIRATORY FAILURE, UNSPECIFIED WHETHER WITH HYPOXIA OR HYPERCAPNIA: Primary | ICD-10-CM

## 2023-01-01 DIAGNOSIS — R26.2 DIFFICULTY IN WALKING: ICD-10-CM

## 2023-01-01 LAB
ALBUMIN SERPL-MCNC: 2.3 G/DL (ref 3.5–5.2)
ALBUMIN SERPL-MCNC: 2.6 G/DL (ref 3.5–5.2)
ALBUMIN SERPL-MCNC: 2.7 G/DL (ref 3.5–5.2)
ALBUMIN SERPL-MCNC: 2.7 G/DL (ref 3.5–5.2)
ALBUMIN SERPL-MCNC: 2.8 G/DL (ref 3.5–5.2)
ALBUMIN SERPL-MCNC: 2.9 G/DL (ref 3.5–5.2)
ALBUMIN SERPL-MCNC: 3 G/DL (ref 3.5–5.2)
ALBUMIN/GLOB SERPL: 0.5 G/DL
ALBUMIN/GLOB SERPL: 0.6 G/DL
ALBUMIN/GLOB SERPL: 0.7 G/DL
ALBUMIN/GLOB SERPL: 0.7 G/DL
ALP SERPL-CCNC: 121 U/L (ref 39–117)
ALP SERPL-CCNC: 125 U/L (ref 39–117)
ALP SERPL-CCNC: 128 U/L (ref 39–117)
ALP SERPL-CCNC: 129 U/L (ref 39–117)
ALP SERPL-CCNC: 148 U/L (ref 39–117)
ALP SERPL-CCNC: 148 U/L (ref 39–117)
ALP SERPL-CCNC: 152 U/L (ref 39–117)
ALP SERPL-CCNC: 158 U/L (ref 39–117)
ALP SERPL-CCNC: 161 U/L (ref 39–117)
ALT SERPL W P-5'-P-CCNC: 21 U/L (ref 1–41)
ALT SERPL W P-5'-P-CCNC: 22 U/L (ref 1–41)
ALT SERPL W P-5'-P-CCNC: 22 U/L (ref 1–41)
ALT SERPL W P-5'-P-CCNC: 23 U/L (ref 1–41)
ALT SERPL W P-5'-P-CCNC: 24 U/L (ref 1–41)
ALT SERPL W P-5'-P-CCNC: 27 U/L (ref 1–41)
ALT SERPL W P-5'-P-CCNC: 28 U/L (ref 1–41)
ALT SERPL W P-5'-P-CCNC: 30 U/L (ref 1–41)
ALT SERPL W P-5'-P-CCNC: 31 U/L (ref 1–41)
AMMONIA BLD-SCNC: 105 UMOL/L (ref 16–60)
AMMONIA BLD-SCNC: 59 UMOL/L (ref 16–60)
AMMONIA BLD-SCNC: 62 UMOL/L (ref 16–60)
AMMONIA BLD-SCNC: 68 UMOL/L (ref 16–60)
AMMONIA BLD-SCNC: 96 UMOL/L (ref 16–60)
ANION GAP SERPL CALCULATED.3IONS-SCNC: 11 MMOL/L (ref 5–15)
ANION GAP SERPL CALCULATED.3IONS-SCNC: 12.7 MMOL/L (ref 5–15)
ANION GAP SERPL CALCULATED.3IONS-SCNC: 5.7 MMOL/L (ref 5–15)
ANION GAP SERPL CALCULATED.3IONS-SCNC: 6.6 MMOL/L (ref 5–15)
ANION GAP SERPL CALCULATED.3IONS-SCNC: 7.5 MMOL/L (ref 5–15)
ANION GAP SERPL CALCULATED.3IONS-SCNC: 8.2 MMOL/L (ref 5–15)
ANION GAP SERPL CALCULATED.3IONS-SCNC: 8.7 MMOL/L (ref 5–15)
ANION GAP SERPL CALCULATED.3IONS-SCNC: 9.6 MMOL/L (ref 5–15)
ANION GAP SERPL CALCULATED.3IONS-SCNC: 9.6 MMOL/L (ref 5–15)
ANION GAP SERPL CALCULATED.3IONS-SCNC: 9.8 MMOL/L (ref 5–15)
ARTERIAL PATENCY WRIST A: POSITIVE
AST SERPL-CCNC: 31 U/L (ref 1–40)
AST SERPL-CCNC: 31 U/L (ref 1–40)
AST SERPL-CCNC: 33 U/L (ref 1–40)
AST SERPL-CCNC: 33 U/L (ref 1–40)
AST SERPL-CCNC: 35 U/L (ref 1–40)
AST SERPL-CCNC: 36 U/L (ref 1–40)
AST SERPL-CCNC: 40 U/L (ref 1–40)
AST SERPL-CCNC: 41 U/L (ref 1–40)
AST SERPL-CCNC: 44 U/L (ref 1–40)
BACTERIA SPEC AEROBE CULT: NORMAL
BACTERIA SPEC AEROBE CULT: NORMAL
BASE EXCESS BLDA CALC-SCNC: 11.5 MMOL/L (ref -2–2)
BASE EXCESS BLDA CALC-SCNC: 4.8 MMOL/L (ref -2–2)
BASE EXCESS BLDA CALC-SCNC: 5.6 MMOL/L (ref -2–2)
BASE EXCESS BLDA CALC-SCNC: 7.8 MMOL/L (ref -2–2)
BASOPHILS # BLD AUTO: 0.06 10*3/MM3 (ref 0–0.2)
BASOPHILS # BLD AUTO: 0.06 10*3/MM3 (ref 0–0.2)
BASOPHILS # BLD AUTO: 0.07 10*3/MM3 (ref 0–0.2)
BASOPHILS # BLD AUTO: 0.08 10*3/MM3 (ref 0–0.2)
BASOPHILS # BLD AUTO: 0.09 10*3/MM3 (ref 0–0.2)
BASOPHILS # BLD AUTO: 0.09 10*3/MM3 (ref 0–0.2)
BASOPHILS # BLD AUTO: 0.1 10*3/MM3 (ref 0–0.2)
BASOPHILS # BLD AUTO: 0.11 10*3/MM3 (ref 0–0.2)
BASOPHILS # BLD AUTO: 0.12 10*3/MM3 (ref 0–0.2)
BASOPHILS NFR BLD AUTO: 0.7 % (ref 0–1.5)
BASOPHILS NFR BLD AUTO: 0.7 % (ref 0–1.5)
BASOPHILS NFR BLD AUTO: 0.8 % (ref 0–1.5)
BASOPHILS NFR BLD AUTO: 0.9 % (ref 0–1.5)
BASOPHILS NFR BLD AUTO: 1.1 % (ref 0–1.5)
BASOPHILS NFR BLD AUTO: 1.1 % (ref 0–1.5)
BASOPHILS NFR BLD AUTO: 1.2 % (ref 0–1.5)
BASOPHILS NFR BLD AUTO: 1.4 % (ref 0–1.5)
BASOPHILS NFR BLD AUTO: 1.4 % (ref 0–1.5)
BDY SITE: ABNORMAL
BILIRUB SERPL-MCNC: 0.8 MG/DL (ref 0–1.2)
BILIRUB SERPL-MCNC: 0.8 MG/DL (ref 0–1.2)
BILIRUB SERPL-MCNC: 0.9 MG/DL (ref 0–1.2)
BILIRUB SERPL-MCNC: 0.9 MG/DL (ref 0–1.2)
BILIRUB SERPL-MCNC: 1 MG/DL (ref 0–1.2)
BILIRUB SERPL-MCNC: 1.1 MG/DL (ref 0–1.2)
BILIRUB SERPL-MCNC: 1.1 MG/DL (ref 0–1.2)
BUN SERPL-MCNC: 14 MG/DL (ref 8–23)
BUN SERPL-MCNC: 17 MG/DL (ref 8–23)
BUN SERPL-MCNC: 19 MG/DL (ref 8–23)
BUN SERPL-MCNC: 20 MG/DL (ref 8–23)
BUN SERPL-MCNC: 23 MG/DL (ref 8–23)
BUN SERPL-MCNC: 23 MG/DL (ref 8–23)
BUN SERPL-MCNC: 24 MG/DL (ref 8–23)
BUN SERPL-MCNC: 27 MG/DL (ref 8–23)
BUN/CREAT SERPL: 15 (ref 7–25)
BUN/CREAT SERPL: 17.7 (ref 7–25)
BUN/CREAT SERPL: 17.9 (ref 7–25)
BUN/CREAT SERPL: 20 (ref 7–25)
BUN/CREAT SERPL: 20.2 (ref 7–25)
BUN/CREAT SERPL: 22.1 (ref 7–25)
BUN/CREAT SERPL: 22.1 (ref 7–25)
BUN/CREAT SERPL: 23.1 (ref 7–25)
BUN/CREAT SERPL: 24.7 (ref 7–25)
BUN/CREAT SERPL: 26.7 (ref 7–25)
CA-I BLDA-SCNC: 1.07 MMOL/L (ref 1.13–1.32)
CALCIUM SPEC-SCNC: 8.7 MG/DL (ref 8.6–10.5)
CALCIUM SPEC-SCNC: 8.8 MG/DL (ref 8.6–10.5)
CALCIUM SPEC-SCNC: 8.9 MG/DL (ref 8.6–10.5)
CALCIUM SPEC-SCNC: 9 MG/DL (ref 8.6–10.5)
CALCIUM SPEC-SCNC: 9.2 MG/DL (ref 8.6–10.5)
CALCIUM SPEC-SCNC: 9.4 MG/DL (ref 8.6–10.5)
CALCIUM SPEC-SCNC: 9.6 MG/DL (ref 8.6–10.5)
CALCIUM SPEC-SCNC: 9.7 MG/DL (ref 8.6–10.5)
CHLORIDE BLDA-SCNC: 95 MMOL/L (ref 98–106)
CHLORIDE SERPL-SCNC: 91 MMOL/L (ref 98–107)
CHLORIDE SERPL-SCNC: 92 MMOL/L (ref 98–107)
CHLORIDE SERPL-SCNC: 92 MMOL/L (ref 98–107)
CHLORIDE SERPL-SCNC: 93 MMOL/L (ref 98–107)
CHLORIDE SERPL-SCNC: 95 MMOL/L (ref 98–107)
CHLORIDE SERPL-SCNC: 96 MMOL/L (ref 98–107)
CHLORIDE SERPL-SCNC: 96 MMOL/L (ref 98–107)
CHLORIDE SERPL-SCNC: 97 MMOL/L (ref 98–107)
CK SERPL-CCNC: 40 U/L (ref 20–200)
CO2 SERPL-SCNC: 22.3 MMOL/L (ref 22–29)
CO2 SERPL-SCNC: 29.3 MMOL/L (ref 22–29)
CO2 SERPL-SCNC: 29.4 MMOL/L (ref 22–29)
CO2 SERPL-SCNC: 32.4 MMOL/L (ref 22–29)
CO2 SERPL-SCNC: 33 MMOL/L (ref 22–29)
CO2 SERPL-SCNC: 34.4 MMOL/L (ref 22–29)
CO2 SERPL-SCNC: 35.2 MMOL/L (ref 22–29)
CO2 SERPL-SCNC: 35.5 MMOL/L (ref 22–29)
CO2 SERPL-SCNC: 37.8 MMOL/L (ref 22–29)
CO2 SERPL-SCNC: 38.3 MMOL/L (ref 22–29)
COHGB MFR BLD: 0.9 % (ref 0–1.5)
COHGB MFR BLD: 1 % (ref 0–1.5)
COHGB MFR BLD: 1.1 % (ref 0–1.5)
COHGB MFR BLD: 1.7 % (ref 0–1.5)
CREAT SERPL-MCNC: 0.77 MG/DL (ref 0.76–1.27)
CREAT SERPL-MCNC: 0.78 MG/DL (ref 0.76–1.27)
CREAT SERPL-MCNC: 0.81 MG/DL (ref 0.76–1.27)
CREAT SERPL-MCNC: 0.84 MG/DL (ref 0.76–1.27)
CREAT SERPL-MCNC: 0.86 MG/DL (ref 0.76–1.27)
CREAT SERPL-MCNC: 1.01 MG/DL (ref 0.76–1.27)
CREAT SERPL-MCNC: 1.04 MG/DL (ref 0.76–1.27)
CREAT SERPL-MCNC: 1.13 MG/DL (ref 0.76–1.27)
CREAT SERPL-MCNC: 1.15 MG/DL (ref 0.76–1.27)
CREAT SERPL-MCNC: 1.3 MG/DL (ref 0.76–1.27)
D-LACTATE SERPL-SCNC: 3.7 MMOL/L (ref 0.5–2)
D-LACTATE SERPL-SCNC: 4.2 MMOL/L (ref 0.5–2)
DEPRECATED RDW RBC AUTO: 54.7 FL (ref 37–54)
DEPRECATED RDW RBC AUTO: 54.7 FL (ref 37–54)
DEPRECATED RDW RBC AUTO: 54.9 FL (ref 37–54)
DEPRECATED RDW RBC AUTO: 55.1 FL (ref 37–54)
DEPRECATED RDW RBC AUTO: 55.4 FL (ref 37–54)
DEPRECATED RDW RBC AUTO: 55.6 FL (ref 37–54)
DEPRECATED RDW RBC AUTO: 55.7 FL (ref 37–54)
DEPRECATED RDW RBC AUTO: 56 FL (ref 37–54)
DEPRECATED RDW RBC AUTO: 56 FL (ref 37–54)
EGFRCR SERPLBLD CKD-EPI 2021: 56.2 ML/MIN/1.73
EGFRCR SERPLBLD CKD-EPI 2021: 65.1 ML/MIN/1.73
EGFRCR SERPLBLD CKD-EPI 2021: 66.5 ML/MIN/1.73
EGFRCR SERPLBLD CKD-EPI 2021: 73.5 ML/MIN/1.73
EGFRCR SERPLBLD CKD-EPI 2021: 76.1 ML/MIN/1.73
EGFRCR SERPLBLD CKD-EPI 2021: 88.6 ML/MIN/1.73
EGFRCR SERPLBLD CKD-EPI 2021: 89.3 ML/MIN/1.73
EGFRCR SERPLBLD CKD-EPI 2021: 90.2 ML/MIN/1.73
EGFRCR SERPLBLD CKD-EPI 2021: 91.3 ML/MIN/1.73
EGFRCR SERPLBLD CKD-EPI 2021: 91.6 ML/MIN/1.73
EOSINOPHIL # BLD AUTO: 0.22 10*3/MM3 (ref 0–0.4)
EOSINOPHIL # BLD AUTO: 0.29 10*3/MM3 (ref 0–0.4)
EOSINOPHIL # BLD AUTO: 0.3 10*3/MM3 (ref 0–0.4)
EOSINOPHIL # BLD AUTO: 0.33 10*3/MM3 (ref 0–0.4)
EOSINOPHIL # BLD AUTO: 0.36 10*3/MM3 (ref 0–0.4)
EOSINOPHIL # BLD AUTO: 0.37 10*3/MM3 (ref 0–0.4)
EOSINOPHIL # BLD AUTO: 0.44 10*3/MM3 (ref 0–0.4)
EOSINOPHIL # BLD AUTO: 0.45 10*3/MM3 (ref 0–0.4)
EOSINOPHIL # BLD AUTO: 0.68 10*3/MM3 (ref 0–0.4)
EOSINOPHIL NFR BLD AUTO: 2.2 % (ref 0.3–6.2)
EOSINOPHIL NFR BLD AUTO: 3.9 % (ref 0.3–6.2)
EOSINOPHIL NFR BLD AUTO: 4 % (ref 0.3–6.2)
EOSINOPHIL NFR BLD AUTO: 4.1 % (ref 0.3–6.2)
EOSINOPHIL NFR BLD AUTO: 4.2 % (ref 0.3–6.2)
EOSINOPHIL NFR BLD AUTO: 4.2 % (ref 0.3–6.2)
EOSINOPHIL NFR BLD AUTO: 4.4 % (ref 0.3–6.2)
EOSINOPHIL NFR BLD AUTO: 5.3 % (ref 0.3–6.2)
EOSINOPHIL NFR BLD AUTO: 8.4 % (ref 0.3–6.2)
ERYTHROCYTE [DISTWIDTH] IN BLOOD BY AUTOMATED COUNT: 15.3 % (ref 12.3–15.4)
ERYTHROCYTE [DISTWIDTH] IN BLOOD BY AUTOMATED COUNT: 15.4 % (ref 12.3–15.4)
ERYTHROCYTE [DISTWIDTH] IN BLOOD BY AUTOMATED COUNT: 15.4 % (ref 12.3–15.4)
ERYTHROCYTE [DISTWIDTH] IN BLOOD BY AUTOMATED COUNT: 15.5 % (ref 12.3–15.4)
ERYTHROCYTE [DISTWIDTH] IN BLOOD BY AUTOMATED COUNT: 15.6 % (ref 12.3–15.4)
ERYTHROCYTE [DISTWIDTH] IN BLOOD BY AUTOMATED COUNT: 15.6 % (ref 12.3–15.4)
FHHB: 3 % (ref 0–5)
FHHB: 3.3 % (ref 0–5)
FHHB: 5.7 % (ref 0–5)
FHHB: 9.7 % (ref 0–5)
GAS FLOW AIRWAY: 2 LPM
GEN 5 2HR TROPONIN T REFLEX: 30 NG/L
GLOBULIN UR ELPH-MCNC: 4.1 GM/DL
GLOBULIN UR ELPH-MCNC: 4.3 GM/DL
GLOBULIN UR ELPH-MCNC: 4.5 GM/DL
GLOBULIN UR ELPH-MCNC: 4.6 GM/DL
GLOBULIN UR ELPH-MCNC: 4.7 GM/DL
GLOBULIN UR ELPH-MCNC: 4.9 GM/DL
GLOBULIN UR ELPH-MCNC: 4.9 GM/DL
GLUCOSE BLDA-MCNC: 79 MG/DL (ref 70–99)
GLUCOSE BLDC GLUCOMTR-MCNC: 118 MG/DL (ref 70–99)
GLUCOSE BLDC GLUCOMTR-MCNC: 131 MG/DL (ref 70–99)
GLUCOSE BLDC GLUCOMTR-MCNC: 149 MG/DL (ref 70–99)
GLUCOSE BLDC GLUCOMTR-MCNC: 155 MG/DL (ref 70–99)
GLUCOSE BLDC GLUCOMTR-MCNC: 160 MG/DL (ref 70–99)
GLUCOSE BLDC GLUCOMTR-MCNC: 160 MG/DL (ref 70–99)
GLUCOSE BLDC GLUCOMTR-MCNC: 230 MG/DL (ref 70–99)
GLUCOSE BLDC GLUCOMTR-MCNC: 231 MG/DL (ref 70–99)
GLUCOSE BLDC GLUCOMTR-MCNC: 59 MG/DL (ref 70–99)
GLUCOSE BLDC GLUCOMTR-MCNC: 62 MG/DL (ref 70–99)
GLUCOSE BLDC GLUCOMTR-MCNC: 62 MG/DL (ref 70–99)
GLUCOSE BLDC GLUCOMTR-MCNC: 83 MG/DL (ref 70–99)
GLUCOSE SERPL-MCNC: 135 MG/DL (ref 65–99)
GLUCOSE SERPL-MCNC: 135 MG/DL (ref 65–99)
GLUCOSE SERPL-MCNC: 142 MG/DL (ref 65–99)
GLUCOSE SERPL-MCNC: 167 MG/DL (ref 65–99)
GLUCOSE SERPL-MCNC: 190 MG/DL (ref 65–99)
GLUCOSE SERPL-MCNC: 192 MG/DL (ref 65–99)
GLUCOSE SERPL-MCNC: 192 MG/DL (ref 65–99)
GLUCOSE SERPL-MCNC: 237 MG/DL (ref 65–99)
GLUCOSE SERPL-MCNC: 83 MG/DL (ref 65–99)
GLUCOSE SERPL-MCNC: 93 MG/DL (ref 65–99)
HCO3 BLDA-SCNC: 30.7 MMOL/L (ref 22–26)
HCO3 BLDA-SCNC: 31.1 MMOL/L (ref 22–26)
HCO3 BLDA-SCNC: 32.5 MMOL/L (ref 22–26)
HCO3 BLDA-SCNC: 37.1 MMOL/L (ref 22–26)
HCT VFR BLD AUTO: 41.1 % (ref 37.5–51)
HCT VFR BLD AUTO: 43 % (ref 37.5–51)
HCT VFR BLD AUTO: 43.9 % (ref 37.5–51)
HCT VFR BLD AUTO: 44.3 % (ref 37.5–51)
HCT VFR BLD AUTO: 45.1 % (ref 37.5–51)
HCT VFR BLD AUTO: 46.4 % (ref 37.5–51)
HCT VFR BLD AUTO: 48.3 % (ref 37.5–51)
HCT VFR BLD AUTO: 48.6 % (ref 37.5–51)
HCT VFR BLD AUTO: 50.6 % (ref 37.5–51)
HGB BLD-MCNC: 13.8 G/DL (ref 13–17.7)
HGB BLD-MCNC: 14.6 G/DL (ref 13–17.7)
HGB BLD-MCNC: 14.7 G/DL (ref 13–17.7)
HGB BLD-MCNC: 14.7 G/DL (ref 13–17.7)
HGB BLD-MCNC: 14.9 G/DL (ref 13–17.7)
HGB BLD-MCNC: 15.8 G/DL (ref 13–17.7)
HGB BLD-MCNC: 16.1 G/DL (ref 13–17.7)
HGB BLD-MCNC: 16.3 G/DL (ref 13–17.7)
HGB BLD-MCNC: 16.9 G/DL (ref 13–17.7)
HGB BLDA-MCNC: 15 G/DL (ref 13.8–16.4)
HGB BLDA-MCNC: 16.1 G/DL (ref 13.8–16.4)
HGB BLDA-MCNC: 16.5 G/DL (ref 13.8–16.4)
HGB BLDA-MCNC: 17.4 G/DL (ref 13.8–16.4)
HOLD SPECIMEN: NORMAL
HOLD SPECIMEN: NORMAL
IMM GRANULOCYTES # BLD AUTO: 0.02 10*3/MM3 (ref 0–0.05)
IMM GRANULOCYTES # BLD AUTO: 0.03 10*3/MM3 (ref 0–0.05)
IMM GRANULOCYTES NFR BLD AUTO: 0.2 % (ref 0–0.5)
IMM GRANULOCYTES NFR BLD AUTO: 0.3 % (ref 0–0.5)
IMM GRANULOCYTES NFR BLD AUTO: 0.4 % (ref 0–0.5)
IMM GRANULOCYTES NFR BLD AUTO: 0.4 % (ref 0–0.5)
INHALED O2 CONCENTRATION: 21 %
INHALED O2 CONCENTRATION: 28 %
LACTATE BLDA-SCNC: 3.55 MMOL/L (ref 0.5–2)
LACTATE BLDA-SCNC: ABNORMAL MMOL/L
LYMPHOCYTES # BLD AUTO: 2.08 10*3/MM3 (ref 0.7–3.1)
LYMPHOCYTES # BLD AUTO: 2.29 10*3/MM3 (ref 0.7–3.1)
LYMPHOCYTES # BLD AUTO: 2.3 10*3/MM3 (ref 0.7–3.1)
LYMPHOCYTES # BLD AUTO: 2.38 10*3/MM3 (ref 0.7–3.1)
LYMPHOCYTES # BLD AUTO: 2.53 10*3/MM3 (ref 0.7–3.1)
LYMPHOCYTES # BLD AUTO: 2.58 10*3/MM3 (ref 0.7–3.1)
LYMPHOCYTES # BLD AUTO: 2.66 10*3/MM3 (ref 0.7–3.1)
LYMPHOCYTES # BLD AUTO: 2.9 10*3/MM3 (ref 0.7–3.1)
LYMPHOCYTES # BLD AUTO: 3.55 10*3/MM3 (ref 0.7–3.1)
LYMPHOCYTES NFR BLD AUTO: 20.2 % (ref 19.6–45.3)
LYMPHOCYTES NFR BLD AUTO: 22.9 % (ref 19.6–45.3)
LYMPHOCYTES NFR BLD AUTO: 26.7 % (ref 19.6–45.3)
LYMPHOCYTES NFR BLD AUTO: 28.9 % (ref 19.6–45.3)
LYMPHOCYTES NFR BLD AUTO: 32 % (ref 19.6–45.3)
LYMPHOCYTES NFR BLD AUTO: 33.9 % (ref 19.6–45.3)
LYMPHOCYTES NFR BLD AUTO: 35 % (ref 19.6–45.3)
LYMPHOCYTES NFR BLD AUTO: 35.6 % (ref 19.6–45.3)
LYMPHOCYTES NFR BLD AUTO: 40.3 % (ref 19.6–45.3)
MAGNESIUM SERPL-MCNC: 2 MG/DL (ref 1.6–2.4)
MCH RBC QN AUTO: 32 PG (ref 26.6–33)
MCH RBC QN AUTO: 32.4 PG (ref 26.6–33)
MCH RBC QN AUTO: 32.4 PG (ref 26.6–33)
MCH RBC QN AUTO: 32.8 PG (ref 26.6–33)
MCH RBC QN AUTO: 32.9 PG (ref 26.6–33)
MCH RBC QN AUTO: 32.9 PG (ref 26.6–33)
MCH RBC QN AUTO: 33 PG (ref 26.6–33)
MCH RBC QN AUTO: 33 PG (ref 26.6–33)
MCH RBC QN AUTO: 33.2 PG (ref 26.6–33)
MCHC RBC AUTO-ENTMCNC: 33 G/DL (ref 31.5–35.7)
MCHC RBC AUTO-ENTMCNC: 33.1 G/DL (ref 31.5–35.7)
MCHC RBC AUTO-ENTMCNC: 33.2 G/DL (ref 31.5–35.7)
MCHC RBC AUTO-ENTMCNC: 33.4 G/DL (ref 31.5–35.7)
MCHC RBC AUTO-ENTMCNC: 33.5 G/DL (ref 31.5–35.7)
MCHC RBC AUTO-ENTMCNC: 33.6 G/DL (ref 31.5–35.7)
MCHC RBC AUTO-ENTMCNC: 33.7 G/DL (ref 31.5–35.7)
MCHC RBC AUTO-ENTMCNC: 34 G/DL (ref 31.5–35.7)
MCHC RBC AUTO-ENTMCNC: 34.1 G/DL (ref 31.5–35.7)
MCV RBC AUTO: 96.6 FL (ref 79–97)
MCV RBC AUTO: 96.9 FL (ref 79–97)
MCV RBC AUTO: 97.3 FL (ref 79–97)
MCV RBC AUTO: 97.6 FL (ref 79–97)
MCV RBC AUTO: 98 FL (ref 79–97)
MCV RBC AUTO: 98.1 FL (ref 79–97)
MCV RBC AUTO: 98.2 FL (ref 79–97)
MCV RBC AUTO: 98.3 FL (ref 79–97)
MCV RBC AUTO: 98.4 FL (ref 79–97)
METHGB BLD QL: 0.2 % (ref 0–1.5)
METHGB BLD QL: 0.3 % (ref 0–1.5)
MODALITY: ABNORMAL
MONOCYTES # BLD AUTO: 1.11 10*3/MM3 (ref 0.1–0.9)
MONOCYTES # BLD AUTO: 1.16 10*3/MM3 (ref 0.1–0.9)
MONOCYTES # BLD AUTO: 1.16 10*3/MM3 (ref 0.1–0.9)
MONOCYTES # BLD AUTO: 1.26 10*3/MM3 (ref 0.1–0.9)
MONOCYTES # BLD AUTO: 1.27 10*3/MM3 (ref 0.1–0.9)
MONOCYTES # BLD AUTO: 1.29 10*3/MM3 (ref 0.1–0.9)
MONOCYTES # BLD AUTO: 1.29 10*3/MM3 (ref 0.1–0.9)
MONOCYTES # BLD AUTO: 1.37 10*3/MM3 (ref 0.1–0.9)
MONOCYTES # BLD AUTO: 1.44 10*3/MM3 (ref 0.1–0.9)
MONOCYTES NFR BLD AUTO: 13.7 % (ref 5–12)
MONOCYTES NFR BLD AUTO: 14 % (ref 5–12)
MONOCYTES NFR BLD AUTO: 14 % (ref 5–12)
MONOCYTES NFR BLD AUTO: 14.3 % (ref 5–12)
MONOCYTES NFR BLD AUTO: 14.9 % (ref 5–12)
MONOCYTES NFR BLD AUTO: 15 % (ref 5–12)
MONOCYTES NFR BLD AUTO: 15.4 % (ref 5–12)
MONOCYTES NFR BLD AUTO: 15.7 % (ref 5–12)
MONOCYTES NFR BLD AUTO: 15.8 % (ref 5–12)
NEUTROPHILS NFR BLD AUTO: 3.14 10*3/MM3 (ref 1.7–7)
NEUTROPHILS NFR BLD AUTO: 3.22 10*3/MM3 (ref 1.7–7)
NEUTROPHILS NFR BLD AUTO: 3.45 10*3/MM3 (ref 1.7–7)
NEUTROPHILS NFR BLD AUTO: 3.49 10*3/MM3 (ref 1.7–7)
NEUTROPHILS NFR BLD AUTO: 3.93 10*3/MM3 (ref 1.7–7)
NEUTROPHILS NFR BLD AUTO: 38.6 % (ref 42.7–76)
NEUTROPHILS NFR BLD AUTO: 39.6 % (ref 42.7–76)
NEUTROPHILS NFR BLD AUTO: 4.16 10*3/MM3 (ref 1.7–7)
NEUTROPHILS NFR BLD AUTO: 4.54 10*3/MM3 (ref 1.7–7)
NEUTROPHILS NFR BLD AUTO: 43.7 % (ref 42.7–76)
NEUTROPHILS NFR BLD AUTO: 46.2 % (ref 42.7–76)
NEUTROPHILS NFR BLD AUTO: 47.4 % (ref 42.7–76)
NEUTROPHILS NFR BLD AUTO: 50.6 % (ref 42.7–76)
NEUTROPHILS NFR BLD AUTO: 52.7 % (ref 42.7–76)
NEUTROPHILS NFR BLD AUTO: 6 10*3/MM3 (ref 1.7–7)
NEUTROPHILS NFR BLD AUTO: 6.19 10*3/MM3 (ref 1.7–7)
NEUTROPHILS NFR BLD AUTO: 60.2 % (ref 42.7–76)
NEUTROPHILS NFR BLD AUTO: 60.2 % (ref 42.7–76)
NOTE: ABNORMAL
NRBC BLD AUTO-RTO: 0 /100 WBC (ref 0–0.2)
NT-PROBNP SERPL-MCNC: 117.4 PG/ML (ref 0–1800)
NT-PROBNP SERPL-MCNC: 436 PG/ML (ref 0–1800)
OXYHGB MFR BLDV: 89 % (ref 94–99)
OXYHGB MFR BLDV: 92.3 % (ref 94–99)
OXYHGB MFR BLDV: 95.6 % (ref 94–99)
OXYHGB MFR BLDV: 95.6 % (ref 94–99)
PCO2 BLDA: 44.6 MM HG (ref 35–45)
PCO2 BLDA: 47.1 MM HG (ref 35–45)
PCO2 BLDA: 49.6 MM HG (ref 35–45)
PCO2 BLDA: 51.3 MM HG (ref 35–45)
PH BLDA: 7.41 PH UNITS (ref 7.35–7.45)
PH BLDA: 7.44 PH UNITS (ref 7.35–7.45)
PH BLDA: 7.48 PH UNITS (ref 7.35–7.45)
PH BLDA: 7.48 PH UNITS (ref 7.35–7.45)
PLATELET # BLD AUTO: 156 10*3/MM3 (ref 140–450)
PLATELET # BLD AUTO: 207 10*3/MM3 (ref 140–450)
PLATELET # BLD AUTO: 207 10*3/MM3 (ref 140–450)
PLATELET # BLD AUTO: 211 10*3/MM3 (ref 140–450)
PLATELET # BLD AUTO: 212 10*3/MM3 (ref 140–450)
PLATELET # BLD AUTO: 216 10*3/MM3 (ref 140–450)
PLATELET # BLD AUTO: 220 10*3/MM3 (ref 140–450)
PLATELET # BLD AUTO: 225 10*3/MM3 (ref 140–450)
PLATELET # BLD AUTO: 230 10*3/MM3 (ref 140–450)
PMV BLD AUTO: 10.2 FL (ref 6–12)
PMV BLD AUTO: 10.4 FL (ref 6–12)
PMV BLD AUTO: 10.5 FL (ref 6–12)
PMV BLD AUTO: 10.7 FL (ref 6–12)
PMV BLD AUTO: 10.7 FL (ref 6–12)
PMV BLD AUTO: 9.9 FL (ref 6–12)
PMV BLD AUTO: 9.9 FL (ref 6–12)
PO2 BLD: 247 MM[HG] (ref 0–500)
PO2 BLD: 284 MM[HG] (ref 0–500)
PO2 BLD: 327 MM[HG] (ref 0–500)
PO2 BLD: 349 MM[HG] (ref 0–500)
PO2 BLDA: 59.6 MM HG (ref 80–100)
PO2 BLDA: 69.2 MM HG (ref 80–100)
PO2 BLDA: 91.5 MM HG (ref 80–100)
PO2 BLDA: 97.8 MM HG (ref 80–100)
POTASSIUM BLDA-SCNC: 4.15 MMOL/L (ref 3.5–5)
POTASSIUM SERPL-SCNC: 3.6 MMOL/L (ref 3.5–5.2)
POTASSIUM SERPL-SCNC: 3.7 MMOL/L (ref 3.5–5.2)
POTASSIUM SERPL-SCNC: 3.8 MMOL/L (ref 3.5–5.2)
POTASSIUM SERPL-SCNC: 3.9 MMOL/L (ref 3.5–5.2)
POTASSIUM SERPL-SCNC: 4.2 MMOL/L (ref 3.5–5.2)
POTASSIUM SERPL-SCNC: 4.3 MMOL/L (ref 3.5–5.2)
POTASSIUM SERPL-SCNC: 4.4 MMOL/L (ref 3.5–5.2)
POTASSIUM SERPL-SCNC: 4.4 MMOL/L (ref 3.5–5.2)
POTASSIUM SERPL-SCNC: 4.7 MMOL/L (ref 3.5–5.2)
POTASSIUM SERPL-SCNC: 5.5 MMOL/L (ref 3.5–5.2)
PROT SERPL-MCNC: 6.8 G/DL (ref 6–8.5)
PROT SERPL-MCNC: 6.9 G/DL (ref 6–8.5)
PROT SERPL-MCNC: 7 G/DL (ref 6–8.5)
PROT SERPL-MCNC: 7.1 G/DL (ref 6–8.5)
PROT SERPL-MCNC: 7.2 G/DL (ref 6–8.5)
PROT SERPL-MCNC: 7.4 G/DL (ref 6–8.5)
PROT SERPL-MCNC: 7.5 G/DL (ref 6–8.5)
PROT SERPL-MCNC: 7.6 G/DL (ref 6–8.5)
PROT SERPL-MCNC: 7.9 G/DL (ref 6–8.5)
QT INTERVAL: 414 MS
QT INTERVAL: 419 MS
RBC # BLD AUTO: 4.19 10*6/MM3 (ref 4.14–5.8)
RBC # BLD AUTO: 4.42 10*6/MM3 (ref 4.14–5.8)
RBC # BLD AUTO: 4.47 10*6/MM3 (ref 4.14–5.8)
RBC # BLD AUTO: 4.54 10*6/MM3 (ref 4.14–5.8)
RBC # BLD AUTO: 4.6 10*6/MM3 (ref 4.14–5.8)
RBC # BLD AUTO: 4.79 10*6/MM3 (ref 4.14–5.8)
RBC # BLD AUTO: 4.91 10*6/MM3 (ref 4.14–5.8)
RBC # BLD AUTO: 5.03 10*6/MM3 (ref 4.14–5.8)
RBC # BLD AUTO: 5.15 10*6/MM3 (ref 4.14–5.8)
SAO2 % BLDCOA: 90.2 % (ref 95–99)
SAO2 % BLDCOA: 94.2 % (ref 95–99)
SAO2 % BLDCOA: 96.7 % (ref 95–99)
SAO2 % BLDCOA: 97 % (ref 95–99)
SODIUM BLDA-SCNC: 137.5 MMOL/L (ref 136–146)
SODIUM SERPL-SCNC: 130 MMOL/L (ref 136–145)
SODIUM SERPL-SCNC: 131 MMOL/L (ref 136–145)
SODIUM SERPL-SCNC: 131 MMOL/L (ref 136–145)
SODIUM SERPL-SCNC: 135 MMOL/L (ref 136–145)
SODIUM SERPL-SCNC: 136 MMOL/L (ref 136–145)
SODIUM SERPL-SCNC: 136 MMOL/L (ref 136–145)
SODIUM SERPL-SCNC: 139 MMOL/L (ref 136–145)
SODIUM SERPL-SCNC: 139 MMOL/L (ref 136–145)
SODIUM SERPL-SCNC: 141 MMOL/L (ref 136–145)
SODIUM SERPL-SCNC: 141 MMOL/L (ref 136–145)
TROPONIN T DELTA: -2 NG/L
TROPONIN T SERPL HS-MCNC: 24 NG/L
TROPONIN T SERPL HS-MCNC: 32 NG/L
WBC NRBC COR # BLD: 10.28 10*3/MM3 (ref 3.4–10.8)
WBC NRBC COR # BLD: 7.37 10*3/MM3 (ref 3.4–10.8)
WBC NRBC COR # BLD: 7.46 10*3/MM3 (ref 3.4–10.8)
WBC NRBC COR # BLD: 8.14 10*3/MM3 (ref 3.4–10.8)
WBC NRBC COR # BLD: 8.23 10*3/MM3 (ref 3.4–10.8)
WBC NRBC COR # BLD: 8.3 10*3/MM3 (ref 3.4–10.8)
WBC NRBC COR # BLD: 8.61 10*3/MM3 (ref 3.4–10.8)
WBC NRBC COR # BLD: 8.81 10*3/MM3 (ref 3.4–10.8)
WBC NRBC COR # BLD: 9.98 10*3/MM3 (ref 3.4–10.8)
WHOLE BLOOD HOLD COAG: NORMAL
WHOLE BLOOD HOLD SPECIMEN: NORMAL

## 2023-01-01 PROCEDURE — 94799 UNLISTED PULMONARY SVC/PX: CPT

## 2023-01-01 PROCEDURE — 71045 X-RAY EXAM CHEST 1 VIEW: CPT

## 2023-01-01 PROCEDURE — 82140 ASSAY OF AMMONIA: CPT | Performed by: INTERNAL MEDICINE

## 2023-01-01 PROCEDURE — 94761 N-INVAS EAR/PLS OXIMETRY MLT: CPT

## 2023-01-01 PROCEDURE — 0 CEFTRIAXONE PER 250 MG: Performed by: EMERGENCY MEDICINE

## 2023-01-01 PROCEDURE — 84484 ASSAY OF TROPONIN QUANT: CPT | Performed by: EMERGENCY MEDICINE

## 2023-01-01 PROCEDURE — 25010000002 FUROSEMIDE PER 20 MG: Performed by: INTERNAL MEDICINE

## 2023-01-01 PROCEDURE — 80053 COMPREHEN METABOLIC PANEL: CPT | Performed by: INTERNAL MEDICINE

## 2023-01-01 PROCEDURE — 25010000002 MORPHINE PER 10 MG: Performed by: INTERNAL MEDICINE

## 2023-01-01 PROCEDURE — 97166 OT EVAL MOD COMPLEX 45 MIN: CPT

## 2023-01-01 PROCEDURE — 25510000001 IOPAMIDOL PER 1 ML: Performed by: EMERGENCY MEDICINE

## 2023-01-01 PROCEDURE — 83735 ASSAY OF MAGNESIUM: CPT | Performed by: EMERGENCY MEDICINE

## 2023-01-01 PROCEDURE — 83880 ASSAY OF NATRIURETIC PEPTIDE: CPT | Performed by: INTERNAL MEDICINE

## 2023-01-01 PROCEDURE — 70551 MRI BRAIN STEM W/O DYE: CPT

## 2023-01-01 PROCEDURE — 82375 ASSAY CARBOXYHB QUANT: CPT | Performed by: INTERNAL MEDICINE

## 2023-01-01 PROCEDURE — 93005 ELECTROCARDIOGRAM TRACING: CPT

## 2023-01-01 PROCEDURE — 36600 WITHDRAWAL OF ARTERIAL BLOOD: CPT

## 2023-01-01 PROCEDURE — 99233 SBSQ HOSP IP/OBS HIGH 50: CPT | Performed by: INTERNAL MEDICINE

## 2023-01-01 PROCEDURE — 99222 1ST HOSP IP/OBS MODERATE 55: CPT | Performed by: INTERNAL MEDICINE

## 2023-01-01 PROCEDURE — 85025 COMPLETE CBC W/AUTO DIFF WBC: CPT | Performed by: INTERNAL MEDICINE

## 2023-01-01 PROCEDURE — 83050 HGB METHEMOGLOBIN QUAN: CPT | Performed by: INTERNAL MEDICINE

## 2023-01-01 PROCEDURE — 82375 ASSAY CARBOXYHB QUANT: CPT

## 2023-01-01 PROCEDURE — 94762 N-INVAS EAR/PLS OXIMTRY CONT: CPT

## 2023-01-01 PROCEDURE — 97161 PT EVAL LOW COMPLEX 20 MIN: CPT

## 2023-01-01 PROCEDURE — 94640 AIRWAY INHALATION TREATMENT: CPT

## 2023-01-01 PROCEDURE — 25010000002 HALOPERIDOL LACTATE PER 5 MG: Performed by: INTERNAL MEDICINE

## 2023-01-01 PROCEDURE — 71260 CT THORAX DX C+: CPT

## 2023-01-01 PROCEDURE — 83050 HGB METHEMOGLOBIN QUAN: CPT

## 2023-01-01 PROCEDURE — 25010000002 AZITHROMYCIN PER 500 MG: Performed by: EMERGENCY MEDICINE

## 2023-01-01 PROCEDURE — 97110 THERAPEUTIC EXERCISES: CPT

## 2023-01-01 PROCEDURE — 82805 BLOOD GASES W/O2 SATURATION: CPT

## 2023-01-01 PROCEDURE — 82962 GLUCOSE BLOOD TEST: CPT

## 2023-01-01 PROCEDURE — 25010000002 LORAZEPAM PER 2 MG: Performed by: INTERNAL MEDICINE

## 2023-01-01 PROCEDURE — 99223 1ST HOSP IP/OBS HIGH 75: CPT | Performed by: INTERNAL MEDICINE

## 2023-01-01 PROCEDURE — 99232 SBSQ HOSP IP/OBS MODERATE 35: CPT | Performed by: INTERNAL MEDICINE

## 2023-01-01 PROCEDURE — 70450 CT HEAD/BRAIN W/O DYE: CPT

## 2023-01-01 PROCEDURE — 94760 N-INVAS EAR/PLS OXIMETRY 1: CPT

## 2023-01-01 PROCEDURE — 36600 WITHDRAWAL OF ARTERIAL BLOOD: CPT | Performed by: STUDENT IN AN ORGANIZED HEALTH CARE EDUCATION/TRAINING PROGRAM

## 2023-01-01 PROCEDURE — 87040 BLOOD CULTURE FOR BACTERIA: CPT

## 2023-01-01 PROCEDURE — 82805 BLOOD GASES W/O2 SATURATION: CPT | Performed by: INTERNAL MEDICINE

## 2023-01-01 PROCEDURE — 36600 WITHDRAWAL OF ARTERIAL BLOOD: CPT | Performed by: INTERNAL MEDICINE

## 2023-01-01 PROCEDURE — 82375 ASSAY CARBOXYHB QUANT: CPT | Performed by: STUDENT IN AN ORGANIZED HEALTH CARE EDUCATION/TRAINING PROGRAM

## 2023-01-01 PROCEDURE — 94660 CPAP INITIATION&MGMT: CPT

## 2023-01-01 PROCEDURE — 82805 BLOOD GASES W/O2 SATURATION: CPT | Performed by: STUDENT IN AN ORGANIZED HEALTH CARE EDUCATION/TRAINING PROGRAM

## 2023-01-01 PROCEDURE — 80048 BASIC METABOLIC PNL TOTAL CA: CPT | Performed by: INTERNAL MEDICINE

## 2023-01-01 PROCEDURE — 99285 EMERGENCY DEPT VISIT HI MDM: CPT

## 2023-01-01 PROCEDURE — 93005 ELECTROCARDIOGRAM TRACING: CPT | Performed by: INTERNAL MEDICINE

## 2023-01-01 PROCEDURE — 93005 ELECTROCARDIOGRAM TRACING: CPT | Performed by: EMERGENCY MEDICINE

## 2023-01-01 PROCEDURE — 83880 ASSAY OF NATRIURETIC PEPTIDE: CPT | Performed by: EMERGENCY MEDICINE

## 2023-01-01 PROCEDURE — 82550 ASSAY OF CK (CPK): CPT | Performed by: INTERNAL MEDICINE

## 2023-01-01 PROCEDURE — 85025 COMPLETE CBC W/AUTO DIFF WBC: CPT

## 2023-01-01 PROCEDURE — 83605 ASSAY OF LACTIC ACID: CPT | Performed by: EMERGENCY MEDICINE

## 2023-01-01 PROCEDURE — 84484 ASSAY OF TROPONIN QUANT: CPT | Performed by: INTERNAL MEDICINE

## 2023-01-01 PROCEDURE — 93010 ELECTROCARDIOGRAM REPORT: CPT | Performed by: INTERNAL MEDICINE

## 2023-01-01 PROCEDURE — 83050 HGB METHEMOGLOBIN QUAN: CPT | Performed by: STUDENT IN AN ORGANIZED HEALTH CARE EDUCATION/TRAINING PROGRAM

## 2023-01-01 PROCEDURE — 80053 COMPREHEN METABOLIC PANEL: CPT | Performed by: EMERGENCY MEDICINE

## 2023-01-01 PROCEDURE — 5A09357 ASSISTANCE WITH RESPIRATORY VENTILATION, LESS THAN 24 CONSECUTIVE HOURS, CONTINUOUS POSITIVE AIRWAY PRESSURE: ICD-10-PCS | Performed by: INTERNAL MEDICINE

## 2023-01-01 RX ORDER — LORAZEPAM 2 MG/ML
1 INJECTION INTRAMUSCULAR
Status: DISCONTINUED | OUTPATIENT
Start: 2023-01-01 | End: 2023-01-01 | Stop reason: HOSPADM

## 2023-01-01 RX ORDER — ACETAMINOPHEN 650 MG/1
650 SUPPOSITORY RECTAL EVERY 4 HOURS PRN
Status: DISCONTINUED | OUTPATIENT
Start: 2023-01-01 | End: 2023-01-01 | Stop reason: HOSPADM

## 2023-01-01 RX ORDER — HYDROCODONE BITARTRATE AND ACETAMINOPHEN 5; 325 MG/1; MG/1
1 TABLET ORAL EVERY 4 HOURS PRN
Status: DISCONTINUED | OUTPATIENT
Start: 2023-01-01 | End: 2023-01-01

## 2023-01-01 RX ORDER — QUETIAPINE FUMARATE 25 MG/1
25 TABLET, FILM COATED ORAL NIGHTLY
Status: DISCONTINUED | OUTPATIENT
Start: 2023-01-01 | End: 2023-01-01

## 2023-01-01 RX ORDER — ACETAMINOPHEN 325 MG/1
650 TABLET ORAL EVERY 4 HOURS PRN
Status: DISCONTINUED | OUTPATIENT
Start: 2023-01-01 | End: 2023-01-01

## 2023-01-01 RX ORDER — POTASSIUM CHLORIDE 750 MG/1
40 CAPSULE, EXTENDED RELEASE ORAL ONCE
Status: COMPLETED | OUTPATIENT
Start: 2023-01-01 | End: 2023-01-01

## 2023-01-01 RX ORDER — NALOXONE HCL 0.4 MG/ML
0.4 VIAL (ML) INJECTION
Status: DISCONTINUED | OUTPATIENT
Start: 2023-01-01 | End: 2023-01-01 | Stop reason: HOSPADM

## 2023-01-01 RX ORDER — METOLAZONE 5 MG/1
10 TABLET ORAL ONCE
Status: COMPLETED | OUTPATIENT
Start: 2023-01-01 | End: 2023-01-01

## 2023-01-01 RX ORDER — MORPHINE SULFATE 2 MG/ML
2 INJECTION, SOLUTION INTRAMUSCULAR; INTRAVENOUS EVERY 4 HOURS PRN
Status: DISCONTINUED | OUTPATIENT
Start: 2023-01-01 | End: 2023-01-01

## 2023-01-01 RX ORDER — CARVEDILOL 6.25 MG/1
6.25 TABLET ORAL 2 TIMES DAILY
Status: DISCONTINUED | OUTPATIENT
Start: 2023-01-01 | End: 2023-01-01

## 2023-01-01 RX ORDER — ATROPINE SULFATE 10 MG/ML
2 SOLUTION/ DROPS OPHTHALMIC
Status: DISCONTINUED | OUTPATIENT
Start: 2023-01-01 | End: 2023-01-01 | Stop reason: HOSPADM

## 2023-01-01 RX ORDER — OXYCODONE HYDROCHLORIDE 5 MG/1
5 TABLET ORAL EVERY 8 HOURS SCHEDULED
Status: DISCONTINUED | OUTPATIENT
Start: 2023-01-01 | End: 2023-01-01

## 2023-01-01 RX ORDER — HYDROCODONE BITARTRATE AND ACETAMINOPHEN 10; 325 MG/1; MG/1
1 TABLET ORAL EVERY 4 HOURS
Status: DISCONTINUED | OUTPATIENT
Start: 2023-01-01 | End: 2023-01-01

## 2023-01-01 RX ORDER — HYDROCODONE BITARTRATE AND ACETAMINOPHEN 5; 325 MG/1; MG/1
1 TABLET ORAL EVERY 4 HOURS PRN
Status: DISPENSED | OUTPATIENT
Start: 2023-01-01 | End: 2023-01-01

## 2023-01-01 RX ORDER — CARVEDILOL 25 MG/1
25 TABLET ORAL 2 TIMES DAILY
Status: DISCONTINUED | OUTPATIENT
Start: 2023-01-01 | End: 2023-01-01

## 2023-01-01 RX ORDER — POLYETHYLENE GLYCOL 3350 17 G/17G
17 POWDER, FOR SOLUTION ORAL DAILY
COMMUNITY

## 2023-01-01 RX ORDER — ALBUTEROL SULFATE 2.5 MG/3ML
2.5 SOLUTION RESPIRATORY (INHALATION) EVERY 4 HOURS PRN
COMMUNITY

## 2023-01-01 RX ORDER — HALOPERIDOL 5 MG/ML
4 INJECTION INTRAMUSCULAR EVERY 4 HOURS PRN
Status: DISCONTINUED | OUTPATIENT
Start: 2023-01-01 | End: 2023-01-01 | Stop reason: HOSPADM

## 2023-01-01 RX ORDER — ALUMINA, MAGNESIA, AND SIMETHICONE 2400; 2400; 240 MG/30ML; MG/30ML; MG/30ML
15 SUSPENSION ORAL EVERY 6 HOURS PRN
Status: DISCONTINUED | OUTPATIENT
Start: 2023-01-01 | End: 2023-01-01

## 2023-01-01 RX ORDER — MORPHINE SULFATE 2 MG/ML
2 INJECTION, SOLUTION INTRAMUSCULAR; INTRAVENOUS
Status: DISCONTINUED | OUTPATIENT
Start: 2023-01-01 | End: 2023-01-01

## 2023-01-01 RX ORDER — LACTULOSE 10 G/15ML
30 SOLUTION ORAL 3 TIMES DAILY
Status: DISCONTINUED | OUTPATIENT
Start: 2023-01-01 | End: 2023-01-01

## 2023-01-01 RX ORDER — MORPHINE SULFATE 2 MG/ML
2 INJECTION, SOLUTION INTRAMUSCULAR; INTRAVENOUS
Status: DISCONTINUED | OUTPATIENT
Start: 2023-01-01 | End: 2023-01-01 | Stop reason: HOSPADM

## 2023-01-01 RX ORDER — SPIRONOLACTONE 25 MG/1
50 TABLET ORAL
Status: DISCONTINUED | OUTPATIENT
Start: 2023-01-01 | End: 2023-01-01

## 2023-01-01 RX ORDER — FAMOTIDINE 20 MG/1
40 TABLET, FILM COATED ORAL DAILY
Status: DISCONTINUED | OUTPATIENT
Start: 2023-01-01 | End: 2023-01-01

## 2023-01-01 RX ORDER — SPIRONOLACTONE 25 MG/1
100 TABLET ORAL
Status: DISCONTINUED | OUTPATIENT
Start: 2023-01-01 | End: 2023-01-01

## 2023-01-01 RX ORDER — INSULIN ASPART 100 [IU]/ML
INJECTION, SOLUTION INTRAVENOUS; SUBCUTANEOUS
COMMUNITY

## 2023-01-01 RX ORDER — CEFTRIAXONE SODIUM 2 G/50ML
2 INJECTION, SOLUTION INTRAVENOUS ONCE
Status: COMPLETED | OUTPATIENT
Start: 2023-01-01 | End: 2023-01-01

## 2023-01-01 RX ORDER — IPRATROPIUM BROMIDE AND ALBUTEROL SULFATE 2.5; .5 MG/3ML; MG/3ML
3 SOLUTION RESPIRATORY (INHALATION) EVERY 6 HOURS PRN
Status: DISCONTINUED | OUTPATIENT
Start: 2023-01-01 | End: 2023-01-01 | Stop reason: HOSPADM

## 2023-01-01 RX ORDER — GINSENG 100 MG
1 CAPSULE ORAL EVERY 12 HOURS SCHEDULED
Status: DISCONTINUED | OUTPATIENT
Start: 2023-01-01 | End: 2023-01-01

## 2023-01-01 RX ORDER — LORAZEPAM 2 MG/ML
1 CONCENTRATE ORAL
Status: DISCONTINUED | OUTPATIENT
Start: 2023-01-01 | End: 2023-01-01 | Stop reason: HOSPADM

## 2023-01-01 RX ORDER — SODIUM CHLORIDE 9 MG/ML
75 INJECTION, SOLUTION INTRAVENOUS CONTINUOUS
Status: DISCONTINUED | OUTPATIENT
Start: 2023-01-01 | End: 2023-01-01

## 2023-01-01 RX ORDER — OXYCODONE HYDROCHLORIDE 5 MG/1
10 TABLET ORAL EVERY 6 HOURS
Status: DISCONTINUED | OUTPATIENT
Start: 2023-01-01 | End: 2023-01-01

## 2023-01-01 RX ORDER — SPIRONOLACTONE 25 MG/1
25 TABLET ORAL DAILY
Status: DISCONTINUED | OUTPATIENT
Start: 2023-01-01 | End: 2023-01-01

## 2023-01-01 RX ORDER — ONDANSETRON 4 MG/1
4 TABLET, FILM COATED ORAL EVERY 8 HOURS PRN
COMMUNITY

## 2023-01-01 RX ORDER — GLYCOPYRROLATE 0.2 MG/ML
0.4 INJECTION INTRAMUSCULAR; INTRAVENOUS
Status: DISCONTINUED | OUTPATIENT
Start: 2023-01-01 | End: 2023-01-01 | Stop reason: HOSPADM

## 2023-01-01 RX ORDER — SODIUM CHLORIDE 0.9 % (FLUSH) 0.9 %
10 SYRINGE (ML) INJECTION AS NEEDED
Status: DISCONTINUED | OUTPATIENT
Start: 2023-01-01 | End: 2023-01-01 | Stop reason: HOSPADM

## 2023-01-01 RX ORDER — MORPHINE SULFATE 2 MG/ML
2 INJECTION, SOLUTION INTRAMUSCULAR; INTRAVENOUS
Status: ACTIVE | OUTPATIENT
Start: 2023-01-01 | End: 2023-01-01

## 2023-01-01 RX ORDER — HYDROCODONE BITARTRATE AND ACETAMINOPHEN 10; 325 MG/1; MG/1
1 TABLET ORAL EVERY 6 HOURS PRN
Status: DISCONTINUED | OUTPATIENT
Start: 2023-01-01 | End: 2023-01-01

## 2023-01-01 RX ORDER — HYDROCODONE BITARTRATE AND ACETAMINOPHEN 10; 325 MG/1; MG/1
1 TABLET ORAL EVERY 4 HOURS PRN
Status: DISCONTINUED | OUTPATIENT
Start: 2023-01-01 | End: 2023-01-01

## 2023-01-01 RX ORDER — GINSENG 100 MG
1 CAPSULE ORAL
Status: DISCONTINUED | OUTPATIENT
Start: 2023-01-01 | End: 2023-01-01 | Stop reason: HOSPADM

## 2023-01-01 RX ORDER — QUETIAPINE FUMARATE 25 MG/1
12.5 TABLET, FILM COATED ORAL NIGHTLY
Status: DISCONTINUED | OUTPATIENT
Start: 2023-01-01 | End: 2023-01-01 | Stop reason: HOSPADM

## 2023-01-01 RX ORDER — POLYETHYLENE GLYCOL 3350 17 G/17G
17 POWDER, FOR SOLUTION ORAL DAILY PRN
Status: DISCONTINUED | OUTPATIENT
Start: 2023-01-01 | End: 2023-01-01 | Stop reason: HOSPADM

## 2023-01-01 RX ORDER — TEMAZEPAM 15 MG/1
15 CAPSULE ORAL NIGHTLY PRN
Status: ACTIVE | OUTPATIENT
Start: 2023-01-01 | End: 2023-01-01

## 2023-01-01 RX ORDER — FUROSEMIDE 10 MG/ML
40 INJECTION INTRAMUSCULAR; INTRAVENOUS DAILY
Status: DISCONTINUED | OUTPATIENT
Start: 2023-01-01 | End: 2023-01-01

## 2023-01-01 RX ORDER — FUROSEMIDE 40 MG/1
40 TABLET ORAL DAILY
Status: DISCONTINUED | OUTPATIENT
Start: 2023-01-01 | End: 2023-01-01

## 2023-01-01 RX ORDER — FUROSEMIDE 10 MG/ML
40 INJECTION INTRAMUSCULAR; INTRAVENOUS EVERY 12 HOURS
Status: DISCONTINUED | OUTPATIENT
Start: 2023-01-01 | End: 2023-01-01

## 2023-01-01 RX ORDER — AMOXICILLIN 250 MG
1 CAPSULE ORAL 2 TIMES DAILY
Status: DISCONTINUED | OUTPATIENT
Start: 2023-01-01 | End: 2023-01-01 | Stop reason: HOSPADM

## 2023-01-01 RX ORDER — BISACODYL 10 MG
10 SUPPOSITORY, RECTAL RECTAL DAILY PRN
Status: DISCONTINUED | OUTPATIENT
Start: 2023-01-01 | End: 2023-01-01 | Stop reason: HOSPADM

## 2023-01-01 RX ORDER — DEXTROSE MONOHYDRATE 25 G/50ML
25 INJECTION, SOLUTION INTRAVENOUS ONCE
Status: COMPLETED | OUTPATIENT
Start: 2023-01-01 | End: 2023-01-01

## 2023-01-01 RX ORDER — ONDANSETRON 2 MG/ML
4 INJECTION INTRAMUSCULAR; INTRAVENOUS EVERY 4 HOURS PRN
Status: DISCONTINUED | OUTPATIENT
Start: 2023-01-01 | End: 2023-01-01 | Stop reason: HOSPADM

## 2023-01-01 RX ORDER — LACTULOSE 10 G/15ML
30 SOLUTION ORAL 2 TIMES DAILY
Status: DISCONTINUED | OUTPATIENT
Start: 2023-01-01 | End: 2023-01-01

## 2023-01-01 RX ORDER — MORPHINE SULFATE 20 MG/ML
5 SOLUTION ORAL
Status: DISCONTINUED | OUTPATIENT
Start: 2023-01-01 | End: 2023-01-01

## 2023-01-01 RX ORDER — MORPHINE SULFATE 20 MG/ML
10 SOLUTION ORAL
Status: DISCONTINUED | OUTPATIENT
Start: 2023-01-01 | End: 2023-01-01 | Stop reason: HOSPADM

## 2023-01-01 RX ORDER — BISACODYL 5 MG/1
5 TABLET, DELAYED RELEASE ORAL DAILY PRN
Status: DISCONTINUED | OUTPATIENT
Start: 2023-01-01 | End: 2023-01-01 | Stop reason: HOSPADM

## 2023-01-01 RX ORDER — ALPRAZOLAM 0.25 MG/1
0.25 TABLET ORAL EVERY 6 HOURS PRN
Status: DISPENSED | OUTPATIENT
Start: 2023-01-01 | End: 2023-01-01

## 2023-01-01 RX ADMIN — APIXABAN 5 MG: 5 TABLET, FILM COATED ORAL at 20:21

## 2023-01-01 RX ADMIN — LORAZEPAM 1 MG: 2 SOLUTION, CONCENTRATE ORAL at 15:29

## 2023-01-01 RX ADMIN — APIXABAN 5 MG: 5 TABLET, FILM COATED ORAL at 20:49

## 2023-01-01 RX ADMIN — BACITRACIN 0.9 G: 500 OINTMENT TOPICAL at 21:15

## 2023-01-01 RX ADMIN — LACTULOSE 30 G: 20 SOLUTION ORAL at 11:47

## 2023-01-01 RX ADMIN — HALOPERIDOL LACTATE 4 MG: 5 INJECTION, SOLUTION INTRAMUSCULAR at 14:22

## 2023-01-01 RX ADMIN — POTASSIUM CHLORIDE 40 MEQ: 10 CAPSULE, COATED, EXTENDED RELEASE ORAL at 15:54

## 2023-01-01 RX ADMIN — LORAZEPAM 1 MG: 2 SOLUTION, CONCENTRATE ORAL at 12:01

## 2023-01-01 RX ADMIN — MICONAZOLE NITRATE: 2 POWDER TOPICAL at 21:15

## 2023-01-01 RX ADMIN — APIXABAN 5 MG: 5 TABLET, FILM COATED ORAL at 09:41

## 2023-01-01 RX ADMIN — MICONAZOLE NITRATE: 2 POWDER TOPICAL at 08:29

## 2023-01-01 RX ADMIN — APIXABAN 5 MG: 5 TABLET, FILM COATED ORAL at 20:54

## 2023-01-01 RX ADMIN — ATROPINE SULFATE 2 DROP: 10 SOLUTION/ DROPS OPHTHALMIC at 07:37

## 2023-01-01 RX ADMIN — MICONAZOLE NITRATE: 2 POWDER TOPICAL at 09:29

## 2023-01-01 RX ADMIN — BACITRACIN 0.9 G: 500 OINTMENT TOPICAL at 09:24

## 2023-01-01 RX ADMIN — TRIAMCINOLONE ACETONIDE 1 APPLICATION: 1 OINTMENT TOPICAL at 09:29

## 2023-01-01 RX ADMIN — HALOPERIDOL LACTATE 4 MG: 5 INJECTION, SOLUTION INTRAMUSCULAR at 00:00

## 2023-01-01 RX ADMIN — ALPRAZOLAM 0.25 MG: 0.25 TABLET ORAL at 06:09

## 2023-01-01 RX ADMIN — LACTULOSE 30 G: 20 SOLUTION ORAL at 09:49

## 2023-01-01 RX ADMIN — LORAZEPAM 1 MG: 2 SOLUTION, CONCENTRATE ORAL at 00:14

## 2023-01-01 RX ADMIN — CARVEDILOL 6.25 MG: 6.25 TABLET, FILM COATED ORAL at 09:24

## 2023-01-01 RX ADMIN — SENNOSIDES AND DOCUSATE SODIUM 1 TABLET: 8.6; 5 TABLET ORAL at 09:48

## 2023-01-01 RX ADMIN — CARVEDILOL 6.25 MG: 6.25 TABLET, FILM COATED ORAL at 20:49

## 2023-01-01 RX ADMIN — Medication 10 ML: at 09:27

## 2023-01-01 RX ADMIN — LACTULOSE 30 G: 20 SOLUTION ORAL at 16:30

## 2023-01-01 RX ADMIN — SENNOSIDES AND DOCUSATE SODIUM 1 TABLET: 8.6; 5 TABLET ORAL at 09:41

## 2023-01-01 RX ADMIN — OXYCODONE HYDROCHLORIDE 10 MG: 5 TABLET ORAL at 18:35

## 2023-01-01 RX ADMIN — LORAZEPAM 1 MG: 2 SOLUTION, CONCENTRATE ORAL at 05:17

## 2023-01-01 RX ADMIN — HYDROCODONE BITARTRATE AND ACETAMINOPHEN 1 TABLET: 5; 325 TABLET ORAL at 10:57

## 2023-01-01 RX ADMIN — MICONAZOLE NITRATE: 2 POWDER TOPICAL at 20:50

## 2023-01-01 RX ADMIN — APIXABAN 5 MG: 5 TABLET, FILM COATED ORAL at 21:20

## 2023-01-01 RX ADMIN — OXYCODONE 5 MG: 5 TABLET ORAL at 21:55

## 2023-01-01 RX ADMIN — LACTULOSE 30 G: 20 SOLUTION ORAL at 20:54

## 2023-01-01 RX ADMIN — BACITRACIN 0.9 G: 500 OINTMENT TOPICAL at 11:46

## 2023-01-01 RX ADMIN — HYDROCODONE BITARTRATE AND ACETAMINOPHEN 1 TABLET: 5; 325 TABLET ORAL at 08:27

## 2023-01-01 RX ADMIN — SENNOSIDES AND DOCUSATE SODIUM 1 TABLET: 8.6; 5 TABLET ORAL at 11:46

## 2023-01-01 RX ADMIN — SPIRONOLACTONE 50 MG: 25 TABLET ORAL at 18:35

## 2023-01-01 RX ADMIN — LORAZEPAM 1 MG: 2 SOLUTION, CONCENTRATE ORAL at 18:22

## 2023-01-01 RX ADMIN — FUROSEMIDE 40 MG: 10 INJECTION, SOLUTION INTRAMUSCULAR; INTRAVENOUS at 09:23

## 2023-01-01 RX ADMIN — FAMOTIDINE 40 MG: 20 TABLET, FILM COATED ORAL at 09:48

## 2023-01-01 RX ADMIN — Medication 10 ML: at 09:28

## 2023-01-01 RX ADMIN — LACTULOSE 30 G: 20 SOLUTION ORAL at 09:28

## 2023-01-01 RX ADMIN — METOLAZONE 10 MG: 5 TABLET ORAL at 11:46

## 2023-01-01 RX ADMIN — CARVEDILOL 6.25 MG: 6.25 TABLET, FILM COATED ORAL at 09:28

## 2023-01-01 RX ADMIN — SPIRONOLACTONE 100 MG: 25 TABLET ORAL at 08:27

## 2023-01-01 RX ADMIN — AZITHROMYCIN DIHYDRATE 500 MG: 500 INJECTION, POWDER, LYOPHILIZED, FOR SOLUTION INTRAVENOUS at 16:20

## 2023-01-01 RX ADMIN — LACTULOSE 30 G: 20 SOLUTION ORAL at 20:21

## 2023-01-01 RX ADMIN — ATROPINE SULFATE 2 DROP: 10 SOLUTION/ DROPS OPHTHALMIC at 19:28

## 2023-01-01 RX ADMIN — HYDROCODONE BITARTRATE AND ACETAMINOPHEN 1 TABLET: 5; 325 TABLET ORAL at 06:09

## 2023-01-01 RX ADMIN — MORPHINE SULFATE 5 MG: 20 SOLUTION ORAL at 12:01

## 2023-01-01 RX ADMIN — TRIAMCINOLONE ACETONIDE 1 APPLICATION: 1 OINTMENT TOPICAL at 09:55

## 2023-01-01 RX ADMIN — ALPRAZOLAM 0.25 MG: 0.25 TABLET ORAL at 16:10

## 2023-01-01 RX ADMIN — OXYCODONE HYDROCHLORIDE 10 MG: 5 TABLET ORAL at 11:10

## 2023-01-01 RX ADMIN — LORAZEPAM 1 MG: 2 SOLUTION, CONCENTRATE ORAL at 14:21

## 2023-01-01 RX ADMIN — GLYCOPYRROLATE 0.4 MG: 0.2 INJECTION INTRAMUSCULAR; INTRAVENOUS at 13:18

## 2023-01-01 RX ADMIN — LACTULOSE 30 G: 20 SOLUTION ORAL at 16:06

## 2023-01-01 RX ADMIN — TRIAMCINOLONE ACETONIDE 1 APPLICATION: 1 OINTMENT TOPICAL at 08:30

## 2023-01-01 RX ADMIN — MORPHINE SULFATE 10 MG: 20 SOLUTION ORAL at 21:28

## 2023-01-01 RX ADMIN — LORAZEPAM 1 MG: 2 SOLUTION, CONCENTRATE ORAL at 13:49

## 2023-01-01 RX ADMIN — LACTULOSE 30 G: 20 SOLUTION ORAL at 09:44

## 2023-01-01 RX ADMIN — MORPHINE SULFATE 10 MG: 20 SOLUTION ORAL at 01:34

## 2023-01-01 RX ADMIN — LACTULOSE 30 G: 20 SOLUTION ORAL at 21:06

## 2023-01-01 RX ADMIN — LORAZEPAM 1 MG: 2 SOLUTION, CONCENTRATE ORAL at 21:11

## 2023-01-01 RX ADMIN — MICONAZOLE NITRATE: 2 POWDER TOPICAL at 10:33

## 2023-01-01 RX ADMIN — TRIAMCINOLONE ACETONIDE 1 APPLICATION: 1 OINTMENT TOPICAL at 09:38

## 2023-01-01 RX ADMIN — Medication 10 ML: at 21:58

## 2023-01-01 RX ADMIN — BACITRACIN 0.9 G: 500 OINTMENT TOPICAL at 20:21

## 2023-01-01 RX ADMIN — DEXTROSE MONOHYDRATE 25 G: 25 INJECTION, SOLUTION INTRAVENOUS at 17:42

## 2023-01-01 RX ADMIN — BACITRACIN 0.9 G: 500 OINTMENT TOPICAL at 21:06

## 2023-01-01 RX ADMIN — MORPHINE SULFATE 5 MG: 20 SOLUTION ORAL at 11:58

## 2023-01-01 RX ADMIN — SENNOSIDES AND DOCUSATE SODIUM 1 TABLET: 8.6; 5 TABLET ORAL at 08:27

## 2023-01-01 RX ADMIN — SPIRONOLACTONE 50 MG: 25 TABLET ORAL at 09:48

## 2023-01-01 RX ADMIN — FUROSEMIDE 40 MG: 40 TABLET ORAL at 09:27

## 2023-01-01 RX ADMIN — LORAZEPAM 1 MG: 2 INJECTION INTRAMUSCULAR; INTRAVENOUS at 19:59

## 2023-01-01 RX ADMIN — CARVEDILOL 6.25 MG: 6.25 TABLET, FILM COATED ORAL at 21:15

## 2023-01-01 RX ADMIN — HALOPERIDOL LACTATE 4 MG: 5 INJECTION, SOLUTION INTRAMUSCULAR at 22:37

## 2023-01-01 RX ADMIN — CARVEDILOL 6.25 MG: 6.25 TABLET, FILM COATED ORAL at 20:21

## 2023-01-01 RX ADMIN — MORPHINE SULFATE 2 MG: 2 INJECTION, SOLUTION INTRAMUSCULAR; INTRAVENOUS at 19:59

## 2023-01-01 RX ADMIN — MORPHINE SULFATE 2 MG: 2 INJECTION, SOLUTION INTRAMUSCULAR; INTRAVENOUS at 19:26

## 2023-01-01 RX ADMIN — LACTULOSE 30 G: 20 SOLUTION ORAL at 08:28

## 2023-01-01 RX ADMIN — FUROSEMIDE 40 MG: 10 INJECTION, SOLUTION INTRAMUSCULAR; INTRAVENOUS at 09:53

## 2023-01-01 RX ADMIN — MICONAZOLE NITRATE: 2 POWDER TOPICAL at 20:22

## 2023-01-01 RX ADMIN — LORAZEPAM 1 MG: 2 SOLUTION, CONCENTRATE ORAL at 03:20

## 2023-01-01 RX ADMIN — LACTULOSE 30 G: 20 SOLUTION ORAL at 20:13

## 2023-01-01 RX ADMIN — LORAZEPAM 1 MG: 2 SOLUTION, CONCENTRATE ORAL at 01:35

## 2023-01-01 RX ADMIN — BACITRACIN 0.9 G: 500 OINTMENT TOPICAL at 08:26

## 2023-01-01 RX ADMIN — SENNOSIDES AND DOCUSATE SODIUM 1 TABLET: 8.6; 5 TABLET ORAL at 20:54

## 2023-01-01 RX ADMIN — SPIRONOLACTONE 50 MG: 25 TABLET ORAL at 17:42

## 2023-01-01 RX ADMIN — HYDROCODONE BITARTRATE AND ACETAMINOPHEN 1 TABLET: 5; 325 TABLET ORAL at 20:58

## 2023-01-01 RX ADMIN — GLYCOPYRROLATE 0.4 MG: 0.2 INJECTION INTRAMUSCULAR; INTRAVENOUS at 22:45

## 2023-01-01 RX ADMIN — FAMOTIDINE 40 MG: 20 TABLET, FILM COATED ORAL at 08:26

## 2023-01-01 RX ADMIN — LACTULOSE 30 G: 20 SOLUTION ORAL at 09:26

## 2023-01-01 RX ADMIN — MICONAZOLE NITRATE: 2 POWDER TOPICAL at 09:19

## 2023-01-01 RX ADMIN — ALPRAZOLAM 0.25 MG: 0.25 TABLET ORAL at 21:15

## 2023-01-01 RX ADMIN — LORAZEPAM 1 MG: 2 SOLUTION, CONCENTRATE ORAL at 05:53

## 2023-01-01 RX ADMIN — FAMOTIDINE 40 MG: 20 TABLET, FILM COATED ORAL at 09:27

## 2023-01-01 RX ADMIN — FUROSEMIDE 40 MG: 10 INJECTION, SOLUTION INTRAMUSCULAR; INTRAVENOUS at 03:02

## 2023-01-01 RX ADMIN — CARVEDILOL 6.25 MG: 6.25 TABLET, FILM COATED ORAL at 20:12

## 2023-01-01 RX ADMIN — GLYCOPYRROLATE 0.4 MG: 0.2 INJECTION INTRAMUSCULAR; INTRAVENOUS at 19:08

## 2023-01-01 RX ADMIN — ALPRAZOLAM 0.25 MG: 0.25 TABLET ORAL at 02:39

## 2023-01-01 RX ADMIN — TRIAMCINOLONE ACETONIDE 1 APPLICATION: 1 OINTMENT TOPICAL at 16:57

## 2023-01-01 RX ADMIN — ATROPINE SULFATE 2 DROP: 10 SOLUTION/ DROPS OPHTHALMIC at 08:42

## 2023-01-01 RX ADMIN — MORPHINE SULFATE 2 MG: 2 INJECTION, SOLUTION INTRAMUSCULAR; INTRAVENOUS at 16:04

## 2023-01-01 RX ADMIN — SPIRONOLACTONE 50 MG: 25 TABLET ORAL at 18:08

## 2023-01-01 RX ADMIN — LORAZEPAM 1 MG: 2 SOLUTION, CONCENTRATE ORAL at 10:17

## 2023-01-01 RX ADMIN — ATROPINE SULFATE 2 DROP: 10 SOLUTION/ DROPS OPHTHALMIC at 01:19

## 2023-01-01 RX ADMIN — HYDROCODONE BITARTRATE AND ACETAMINOPHEN 1 TABLET: 5; 325 TABLET ORAL at 22:40

## 2023-01-01 RX ADMIN — FAMOTIDINE 40 MG: 20 TABLET, FILM COATED ORAL at 09:41

## 2023-01-01 RX ADMIN — LACTULOSE 30 G: 20 SOLUTION ORAL at 21:15

## 2023-01-01 RX ADMIN — MORPHINE SULFATE 2 MG: 2 INJECTION, SOLUTION INTRAMUSCULAR; INTRAVENOUS at 18:53

## 2023-01-01 RX ADMIN — HYDROCODONE BITARTRATE AND ACETAMINOPHEN 1 TABLET: 10; 325 TABLET ORAL at 09:41

## 2023-01-01 RX ADMIN — ATROPINE SULFATE 2 DROP: 10 SOLUTION/ DROPS OPHTHALMIC at 15:08

## 2023-01-01 RX ADMIN — MORPHINE SULFATE 2 MG: 2 INJECTION, SOLUTION INTRAMUSCULAR; INTRAVENOUS at 03:45

## 2023-01-01 RX ADMIN — ATROPINE SULFATE 2 DROP: 10 SOLUTION/ DROPS OPHTHALMIC at 03:29

## 2023-01-01 RX ADMIN — LORAZEPAM 1 MG: 2 SOLUTION, CONCENTRATE ORAL at 23:07

## 2023-01-01 RX ADMIN — GLYCOPYRROLATE 0.4 MG: 0.2 INJECTION INTRAMUSCULAR; INTRAVENOUS at 15:37

## 2023-01-01 RX ADMIN — HYDROCODONE BITARTRATE AND ACETAMINOPHEN 1 TABLET: 10; 325 TABLET ORAL at 22:19

## 2023-01-01 RX ADMIN — HYDROCODONE BITARTRATE AND ACETAMINOPHEN 1 TABLET: 10; 325 TABLET ORAL at 16:27

## 2023-01-01 RX ADMIN — SPIRONOLACTONE 50 MG: 25 TABLET ORAL at 09:27

## 2023-01-01 RX ADMIN — HYDROCODONE BITARTRATE AND ACETAMINOPHEN 1 TABLET: 5; 325 TABLET ORAL at 13:15

## 2023-01-01 RX ADMIN — MORPHINE SULFATE 5 MG: 20 SOLUTION ORAL at 14:21

## 2023-01-01 RX ADMIN — MICONAZOLE NITRATE: 2 POWDER TOPICAL at 12:52

## 2023-01-01 RX ADMIN — CARVEDILOL 6.25 MG: 6.25 TABLET, FILM COATED ORAL at 08:27

## 2023-01-01 RX ADMIN — HYDROCODONE BITARTRATE AND ACETAMINOPHEN 1 TABLET: 5; 325 TABLET ORAL at 17:17

## 2023-01-01 RX ADMIN — MORPHINE SULFATE 10 MG: 20 SOLUTION ORAL at 17:55

## 2023-01-01 RX ADMIN — SPIRONOLACTONE 50 MG: 25 TABLET ORAL at 09:24

## 2023-01-01 RX ADMIN — SPIRONOLACTONE 50 MG: 25 TABLET ORAL at 17:48

## 2023-01-01 RX ADMIN — CARVEDILOL 6.25 MG: 6.25 TABLET, FILM COATED ORAL at 21:57

## 2023-01-01 RX ADMIN — ATROPINE SULFATE 2 DROP: 10 SOLUTION/ DROPS OPHTHALMIC at 16:07

## 2023-01-01 RX ADMIN — SENNOSIDES AND DOCUSATE SODIUM 1 TABLET: 8.6; 5 TABLET ORAL at 21:20

## 2023-01-01 RX ADMIN — SPIRONOLACTONE 50 MG: 25 TABLET ORAL at 09:41

## 2023-01-01 RX ADMIN — MORPHINE SULFATE 10 MG: 20 SOLUTION ORAL at 09:24

## 2023-01-01 RX ADMIN — LACTULOSE 30 G: 20 SOLUTION ORAL at 09:53

## 2023-01-01 RX ADMIN — LACTULOSE 30 G: 20 SOLUTION ORAL at 20:49

## 2023-01-01 RX ADMIN — LORAZEPAM 1 MG: 2 SOLUTION, CONCENTRATE ORAL at 13:18

## 2023-01-01 RX ADMIN — LORAZEPAM 1 MG: 2 INJECTION INTRAMUSCULAR; INTRAVENOUS at 18:54

## 2023-01-01 RX ADMIN — ATROPINE SULFATE 2 DROP: 10 SOLUTION/ DROPS OPHTHALMIC at 05:17

## 2023-01-01 RX ADMIN — BACITRACIN 0.9 G: 500 OINTMENT TOPICAL at 10:34

## 2023-01-01 RX ADMIN — APIXABAN 5 MG: 5 TABLET, FILM COATED ORAL at 08:42

## 2023-01-01 RX ADMIN — ATROPINE SULFATE 2 DROP: 10 SOLUTION/ DROPS OPHTHALMIC at 17:55

## 2023-01-01 RX ADMIN — ALPRAZOLAM 0.25 MG: 0.25 TABLET ORAL at 12:18

## 2023-01-01 RX ADMIN — LACTULOSE 30 G: 20 SOLUTION ORAL at 21:57

## 2023-01-01 RX ADMIN — FAMOTIDINE 40 MG: 20 TABLET, FILM COATED ORAL at 11:46

## 2023-01-01 RX ADMIN — LACTULOSE 30 G: 20 SOLUTION ORAL at 17:17

## 2023-01-01 RX ADMIN — BACITRACIN 0.9 G: 500 OINTMENT TOPICAL at 20:50

## 2023-01-01 RX ADMIN — CARVEDILOL 6.25 MG: 6.25 TABLET, FILM COATED ORAL at 21:06

## 2023-01-01 RX ADMIN — LACTULOSE 30 G: 20 SOLUTION ORAL at 09:23

## 2023-01-01 RX ADMIN — FUROSEMIDE 40 MG: 10 INJECTION, SOLUTION INTRAMUSCULAR; INTRAVENOUS at 08:42

## 2023-01-01 RX ADMIN — MICONAZOLE NITRATE: 2 POWDER TOPICAL at 14:15

## 2023-01-01 RX ADMIN — SODIUM CHLORIDE 500 ML: 9 INJECTION, SOLUTION INTRAVENOUS at 19:02

## 2023-01-01 RX ADMIN — APIXABAN 5 MG: 5 TABLET, FILM COATED ORAL at 20:12

## 2023-01-01 RX ADMIN — BACITRACIN 0.9 G: 500 OINTMENT TOPICAL at 09:28

## 2023-01-01 RX ADMIN — SENNOSIDES AND DOCUSATE SODIUM 1 TABLET: 8.6; 5 TABLET ORAL at 09:23

## 2023-01-01 RX ADMIN — SPIRONOLACTONE 50 MG: 25 TABLET ORAL at 17:34

## 2023-01-01 RX ADMIN — Medication 10 ML: at 09:54

## 2023-01-01 RX ADMIN — CEFTRIAXONE SODIUM 2 G: 2 INJECTION, SOLUTION INTRAVENOUS at 16:14

## 2023-01-01 RX ADMIN — SODIUM CHLORIDE 250 ML: 9 INJECTION, SOLUTION INTRAVENOUS at 18:27

## 2023-01-01 RX ADMIN — GLYCOPYRROLATE 0.4 MG: 0.2 INJECTION INTRAMUSCULAR; INTRAVENOUS at 13:35

## 2023-01-01 RX ADMIN — APIXABAN 5 MG: 5 TABLET, FILM COATED ORAL at 09:28

## 2023-01-01 RX ADMIN — Medication 10 ML: at 21:57

## 2023-01-01 RX ADMIN — MORPHINE SULFATE 5 MG: 20 SOLUTION ORAL at 18:22

## 2023-01-01 RX ADMIN — ACETAMINOPHEN 650 MG: 325 TABLET ORAL at 11:52

## 2023-01-01 RX ADMIN — HALOPERIDOL LACTATE 4 MG: 5 INJECTION, SOLUTION INTRAMUSCULAR at 15:30

## 2023-01-01 RX ADMIN — SENNOSIDES AND DOCUSATE SODIUM 1 TABLET: 8.6; 5 TABLET ORAL at 21:15

## 2023-01-01 RX ADMIN — SPIRONOLACTONE 50 MG: 25 TABLET ORAL at 11:46

## 2023-01-01 RX ADMIN — Medication 10 ML: at 15:38

## 2023-01-01 RX ADMIN — HYDROCODONE BITARTRATE AND ACETAMINOPHEN 1 TABLET: 5; 325 TABLET ORAL at 21:20

## 2023-01-01 RX ADMIN — OXYCODONE HYDROCHLORIDE 10 MG: 5 TABLET ORAL at 13:54

## 2023-01-01 RX ADMIN — MICONAZOLE NITRATE: 2 POWDER TOPICAL at 09:50

## 2023-01-01 RX ADMIN — MORPHINE SULFATE 5 MG: 20 SOLUTION ORAL at 13:49

## 2023-01-01 RX ADMIN — MORPHINE SULFATE 5 MG: 20 SOLUTION ORAL at 15:29

## 2023-01-01 RX ADMIN — CARVEDILOL 25 MG: 25 TABLET, FILM COATED ORAL at 09:27

## 2023-01-01 RX ADMIN — MORPHINE SULFATE 2 MG: 2 INJECTION, SOLUTION INTRAMUSCULAR; INTRAVENOUS at 15:31

## 2023-01-01 RX ADMIN — MORPHINE SULFATE 10 MG: 20 SOLUTION ORAL at 15:37

## 2023-01-01 RX ADMIN — CARVEDILOL 6.25 MG: 6.25 TABLET, FILM COATED ORAL at 21:20

## 2023-01-01 RX ADMIN — CARVEDILOL 6.25 MG: 6.25 TABLET, FILM COATED ORAL at 20:54

## 2023-01-01 RX ADMIN — SENNOSIDES AND DOCUSATE SODIUM 1 TABLET: 8.6; 5 TABLET ORAL at 09:28

## 2023-01-01 RX ADMIN — LORAZEPAM 1 MG: 2 SOLUTION, CONCENTRATE ORAL at 07:45

## 2023-01-01 RX ADMIN — MICONAZOLE NITRATE: 2 POWDER TOPICAL at 21:39

## 2023-01-01 RX ADMIN — SENNOSIDES AND DOCUSATE SODIUM 1 TABLET: 8.6; 5 TABLET ORAL at 09:27

## 2023-01-01 RX ADMIN — SPIRONOLACTONE 50 MG: 25 TABLET ORAL at 09:54

## 2023-01-01 RX ADMIN — ATROPINE SULFATE 2 DROP: 10 SOLUTION/ DROPS OPHTHALMIC at 08:00

## 2023-01-01 RX ADMIN — HYDROCODONE BITARTRATE AND ACETAMINOPHEN 1 TABLET: 5; 325 TABLET ORAL at 15:10

## 2023-01-01 RX ADMIN — SENNOSIDES AND DOCUSATE SODIUM 1 TABLET: 8.6; 5 TABLET ORAL at 21:55

## 2023-01-01 RX ADMIN — BACITRACIN 0.9 G: 500 OINTMENT TOPICAL at 10:20

## 2023-01-01 RX ADMIN — MORPHINE SULFATE 5 MG: 20 SOLUTION ORAL at 03:21

## 2023-01-01 RX ADMIN — SENNOSIDES AND DOCUSATE SODIUM 1 TABLET: 8.6; 5 TABLET ORAL at 21:56

## 2023-01-01 RX ADMIN — HYDROCODONE BITARTRATE AND ACETAMINOPHEN 1 TABLET: 5; 325 TABLET ORAL at 15:02

## 2023-01-01 RX ADMIN — MORPHINE SULFATE 5 MG: 20 SOLUTION ORAL at 10:00

## 2023-01-01 RX ADMIN — ATROPINE SULFATE 2 DROP: 10 SOLUTION/ DROPS OPHTHALMIC at 14:21

## 2023-01-01 RX ADMIN — MORPHINE SULFATE 10 MG: 20 SOLUTION ORAL at 07:45

## 2023-01-01 RX ADMIN — HYDROCODONE BITARTRATE AND ACETAMINOPHEN 1 TABLET: 5; 325 TABLET ORAL at 04:22

## 2023-01-01 RX ADMIN — FAMOTIDINE 40 MG: 20 TABLET, FILM COATED ORAL at 09:23

## 2023-01-01 RX ADMIN — LACTULOSE 30 G: 20 SOLUTION ORAL at 21:20

## 2023-01-01 RX ADMIN — LORAZEPAM 1 MG: 2 INJECTION INTRAMUSCULAR; INTRAVENOUS at 21:39

## 2023-01-01 RX ADMIN — SENNOSIDES AND DOCUSATE SODIUM 1 TABLET: 8.6; 5 TABLET ORAL at 20:21

## 2023-01-01 RX ADMIN — FAMOTIDINE 40 MG: 20 TABLET, FILM COATED ORAL at 09:26

## 2023-01-01 RX ADMIN — APIXABAN 5 MG: 5 TABLET, FILM COATED ORAL at 21:55

## 2023-01-01 RX ADMIN — SPIRONOLACTONE 50 MG: 25 TABLET ORAL at 20:13

## 2023-01-01 RX ADMIN — MICONAZOLE NITRATE: 2 POWDER TOPICAL at 21:58

## 2023-01-01 RX ADMIN — Medication 10 ML: at 09:23

## 2023-01-01 RX ADMIN — SPIRONOLACTONE 50 MG: 25 TABLET ORAL at 09:28

## 2023-01-01 RX ADMIN — IPRATROPIUM BROMIDE AND ALBUTEROL SULFATE 3 ML: 2.5; .5 SOLUTION RESPIRATORY (INHALATION) at 05:36

## 2023-01-01 RX ADMIN — MORPHINE SULFATE 10 MG: 20 SOLUTION ORAL at 10:17

## 2023-01-01 RX ADMIN — TRIAMCINOLONE ACETONIDE 1 APPLICATION: 1 OINTMENT TOPICAL at 14:16

## 2023-01-01 RX ADMIN — SPIRONOLACTONE 50 MG: 25 TABLET ORAL at 17:36

## 2023-01-01 RX ADMIN — IOPAMIDOL 100 ML: 755 INJECTION, SOLUTION INTRAVENOUS at 17:05

## 2023-01-01 RX ADMIN — MORPHINE SULFATE 2 MG: 2 INJECTION, SOLUTION INTRAMUSCULAR; INTRAVENOUS at 21:39

## 2023-01-01 RX ADMIN — FAMOTIDINE 40 MG: 20 TABLET, FILM COATED ORAL at 09:54

## 2023-01-01 RX ADMIN — APIXABAN 5 MG: 5 TABLET, FILM COATED ORAL at 09:27

## 2023-01-01 RX ADMIN — CARVEDILOL 6.25 MG: 6.25 TABLET, FILM COATED ORAL at 09:48

## 2023-01-01 RX ADMIN — APIXABAN 5 MG: 5 TABLET, FILM COATED ORAL at 09:54

## 2023-01-01 RX ADMIN — MORPHINE SULFATE 5 MG: 20 SOLUTION ORAL at 08:51

## 2023-01-01 RX ADMIN — HYDROCODONE BITARTRATE AND ACETAMINOPHEN 1 TABLET: 5; 325 TABLET ORAL at 20:54

## 2023-01-01 RX ADMIN — SPIRONOLACTONE 25 MG: 25 TABLET ORAL at 09:27

## 2023-01-01 RX ADMIN — SPIRONOLACTONE 100 MG: 25 TABLET ORAL at 18:02

## 2023-01-01 RX ADMIN — FUROSEMIDE 40 MG: 10 INJECTION, SOLUTION INTRAMUSCULAR; INTRAVENOUS at 08:26

## 2023-01-01 RX ADMIN — LORAZEPAM 1 MG: 2 SOLUTION, CONCENTRATE ORAL at 15:37

## 2023-01-01 RX ADMIN — LORAZEPAM 1 MG: 2 SOLUTION, CONCENTRATE ORAL at 11:58

## 2023-01-01 RX ADMIN — LACTULOSE 30 G: 20 SOLUTION ORAL at 08:42

## 2023-01-01 RX ADMIN — MORPHINE SULFATE 5 MG: 20 SOLUTION ORAL at 13:18

## 2023-01-01 RX ADMIN — ATROPINE SULFATE 2 DROP: 10 SOLUTION/ DROPS OPHTHALMIC at 01:35

## 2023-01-01 RX ADMIN — TRIAMCINOLONE ACETONIDE 1 APPLICATION: 1 OINTMENT TOPICAL at 10:20

## 2023-01-01 RX ADMIN — HALOPERIDOL LACTATE 4 MG: 5 INJECTION, SOLUTION INTRAMUSCULAR at 11:01

## 2023-01-01 RX ADMIN — FUROSEMIDE 40 MG: 40 TABLET ORAL at 09:41

## 2023-01-01 RX ADMIN — LORAZEPAM 1 MG: 2 SOLUTION, CONCENTRATE ORAL at 03:29

## 2023-01-01 RX ADMIN — TRIAMCINOLONE ACETONIDE 1 APPLICATION: 1 OINTMENT TOPICAL at 08:43

## 2023-01-01 RX ADMIN — CARVEDILOL 6.25 MG: 6.25 TABLET, FILM COATED ORAL at 08:42

## 2023-01-01 RX ADMIN — MICONAZOLE NITRATE: 2 POWDER TOPICAL at 21:21

## 2023-01-01 RX ADMIN — MORPHINE SULFATE 2 MG: 2 INJECTION, SOLUTION INTRAMUSCULAR; INTRAVENOUS at 12:13

## 2023-01-01 RX ADMIN — CARVEDILOL 6.25 MG: 6.25 TABLET, FILM COATED ORAL at 09:27

## 2023-01-01 RX ADMIN — SENNOSIDES AND DOCUSATE SODIUM 1 TABLET: 8.6; 5 TABLET ORAL at 20:49

## 2023-01-01 RX ADMIN — BACITRACIN 0.9 G: 500 OINTMENT TOPICAL at 09:53

## 2023-01-01 RX ADMIN — APIXABAN 5 MG: 5 TABLET, FILM COATED ORAL at 21:15

## 2023-01-01 RX ADMIN — FUROSEMIDE 40 MG: 10 INJECTION, SOLUTION INTRAMUSCULAR; INTRAVENOUS at 09:28

## 2023-01-01 RX ADMIN — HYDROCODONE BITARTRATE AND ACETAMINOPHEN 1 TABLET: 5; 325 TABLET ORAL at 04:36

## 2023-01-01 RX ADMIN — MORPHINE SULFATE 10 MG: 20 SOLUTION ORAL at 19:08

## 2023-01-01 RX ADMIN — MORPHINE SULFATE 2 MG: 2 INJECTION, SOLUTION INTRAMUSCULAR; INTRAVENOUS at 14:54

## 2023-01-01 RX ADMIN — LACTULOSE 30 G: 20 SOLUTION ORAL at 09:25

## 2023-01-01 RX ADMIN — BACITRACIN 0.9 G: 500 OINTMENT TOPICAL at 08:42

## 2023-01-01 RX ADMIN — LACTULOSE 30 G: 20 SOLUTION ORAL at 16:25

## 2023-01-01 RX ADMIN — MORPHINE SULFATE 2 MG: 2 INJECTION, SOLUTION INTRAMUSCULAR; INTRAVENOUS at 15:02

## 2023-01-01 RX ADMIN — MICONAZOLE NITRATE: 2 POWDER TOPICAL at 09:25

## 2023-01-01 RX ADMIN — CARVEDILOL 6.25 MG: 6.25 TABLET, FILM COATED ORAL at 09:41

## 2023-01-01 RX ADMIN — APIXABAN 5 MG: 5 TABLET, FILM COATED ORAL at 09:48

## 2023-01-01 RX ADMIN — SODIUM CHLORIDE 75 ML/HR: 9 INJECTION, SOLUTION INTRAVENOUS at 20:37

## 2023-01-01 RX ADMIN — BACITRACIN 0.9 G: 500 OINTMENT TOPICAL at 14:46

## 2023-01-01 RX ADMIN — LACTULOSE 30 G: 20 SOLUTION ORAL at 21:56

## 2023-01-01 RX ADMIN — SENNOSIDES AND DOCUSATE SODIUM 1 TABLET: 8.6; 5 TABLET ORAL at 21:57

## 2023-01-01 RX ADMIN — CARVEDILOL 6.25 MG: 6.25 TABLET, FILM COATED ORAL at 11:46

## 2023-01-01 RX ADMIN — APIXABAN 5 MG: 5 TABLET, FILM COATED ORAL at 08:27

## 2023-01-01 RX ADMIN — ATROPINE SULFATE 2 DROP: 10 SOLUTION/ DROPS OPHTHALMIC at 11:58

## 2023-01-01 RX ADMIN — QUETIAPINE FUMARATE 12.5 MG: 25 TABLET ORAL at 21:57

## 2023-01-01 RX ADMIN — HYDROCODONE BITARTRATE AND ACETAMINOPHEN 1 TABLET: 5; 325 TABLET ORAL at 02:43

## 2023-01-01 RX ADMIN — HYDROCODONE BITARTRATE AND ACETAMINOPHEN 1 TABLET: 5; 325 TABLET ORAL at 09:27

## 2023-01-01 RX ADMIN — QUETIAPINE FUMARATE 25 MG: 25 TABLET ORAL at 21:57

## 2023-01-01 RX ADMIN — APIXABAN 5 MG: 5 TABLET, FILM COATED ORAL at 21:57

## 2023-01-01 RX ADMIN — HYDROCODONE BITARTRATE AND ACETAMINOPHEN 1 TABLET: 5; 325 TABLET ORAL at 21:15

## 2023-01-01 RX ADMIN — FUROSEMIDE 40 MG: 10 INJECTION, SOLUTION INTRAMUSCULAR; INTRAVENOUS at 12:32

## 2023-01-01 RX ADMIN — LORAZEPAM 1 MG: 2 SOLUTION, CONCENTRATE ORAL at 23:50

## 2023-01-01 RX ADMIN — FUROSEMIDE 40 MG: 40 TABLET ORAL at 09:49

## 2023-01-01 RX ADMIN — FAMOTIDINE 40 MG: 20 TABLET, FILM COATED ORAL at 09:28

## 2023-01-01 RX ADMIN — FUROSEMIDE 40 MG: 10 INJECTION, SOLUTION INTRAMUSCULAR; INTRAVENOUS at 11:46

## 2023-01-01 RX ADMIN — LORAZEPAM 1 MG: 2 SOLUTION, CONCENTRATE ORAL at 01:10

## 2023-01-01 RX ADMIN — SENNOSIDES AND DOCUSATE SODIUM 1 TABLET: 8.6; 5 TABLET ORAL at 08:42

## 2023-01-01 RX ADMIN — LORAZEPAM 1 MG: 2 SOLUTION, CONCENTRATE ORAL at 22:45

## 2023-01-01 RX ADMIN — MICONAZOLE NITRATE: 2 POWDER TOPICAL at 21:55

## 2023-01-01 RX ADMIN — TRIAMCINOLONE ACETONIDE 1 APPLICATION: 1 OINTMENT TOPICAL at 10:34

## 2023-01-01 RX ADMIN — SPIRONOLACTONE 50 MG: 25 TABLET ORAL at 08:42

## 2023-01-01 RX ADMIN — BACITRACIN 0.9 G: 500 OINTMENT TOPICAL at 16:57

## 2023-01-01 RX ADMIN — MORPHINE SULFATE 10 MG: 20 SOLUTION ORAL at 22:45

## 2023-01-01 RX ADMIN — APIXABAN 5 MG: 5 TABLET, FILM COATED ORAL at 11:46

## 2023-01-01 RX ADMIN — BACITRACIN 0.9 G: 500 OINTMENT TOPICAL at 21:01

## 2023-01-01 RX ADMIN — MORPHINE SULFATE 5 MG: 20 SOLUTION ORAL at 18:07

## 2023-01-01 RX ADMIN — BACITRACIN 0.9 G: 500 OINTMENT TOPICAL at 20:54

## 2023-01-01 RX ADMIN — MORPHINE SULFATE 2 MG: 2 INJECTION, SOLUTION INTRAMUSCULAR; INTRAVENOUS at 19:13

## 2023-01-01 RX ADMIN — ATROPINE SULFATE 2 DROP: 10 SOLUTION/ DROPS OPHTHALMIC at 10:23

## 2023-01-01 RX ADMIN — LORAZEPAM 1 MG: 2 SOLUTION, CONCENTRATE ORAL at 10:00

## 2023-01-01 RX ADMIN — LACTULOSE 30 G: 20 SOLUTION ORAL at 15:02

## 2023-01-01 RX ADMIN — FAMOTIDINE 40 MG: 20 TABLET, FILM COATED ORAL at 08:42

## 2023-01-01 RX ADMIN — APIXABAN 5 MG: 5 TABLET, FILM COATED ORAL at 21:07

## 2023-01-01 RX ADMIN — LORAZEPAM 1 MG: 2 SOLUTION, CONCENTRATE ORAL at 22:49

## 2023-01-01 RX ADMIN — GLYCOPYRROLATE 0.4 MG: 0.2 INJECTION INTRAMUSCULAR; INTRAVENOUS at 03:31

## 2023-01-01 RX ADMIN — CARVEDILOL 6.25 MG: 6.25 TABLET, FILM COATED ORAL at 21:55

## 2023-01-01 RX ADMIN — CARVEDILOL 6.25 MG: 6.25 TABLET, FILM COATED ORAL at 09:54

## 2023-01-01 RX ADMIN — SODIUM CHLORIDE 1000 ML: 9 INJECTION, SOLUTION INTRAVENOUS at 16:12

## 2023-01-01 RX ADMIN — SENNOSIDES AND DOCUSATE SODIUM 1 TABLET: 8.6; 5 TABLET ORAL at 09:54

## 2023-01-01 RX ADMIN — APIXABAN 5 MG: 5 TABLET, FILM COATED ORAL at 09:24

## 2023-03-22 ENCOUNTER — HOSPITAL ENCOUNTER (INPATIENT)
Facility: HOSPITAL | Age: 78
LOS: 17 days | Discharge: NURSING, REHAB OR LONG-TERM CARE | DRG: 871 | End: 2023-04-08
Attending: EMERGENCY MEDICINE | Admitting: INTERNAL MEDICINE
Payer: MEDICARE

## 2023-03-22 ENCOUNTER — APPOINTMENT (OUTPATIENT)
Dept: CT IMAGING | Facility: HOSPITAL | Age: 78
DRG: 871 | End: 2023-03-22
Payer: MEDICARE

## 2023-03-22 ENCOUNTER — APPOINTMENT (OUTPATIENT)
Dept: GENERAL RADIOLOGY | Facility: HOSPITAL | Age: 78
DRG: 871 | End: 2023-03-22
Payer: MEDICARE

## 2023-03-22 DIAGNOSIS — R40.0 SOMNOLENCE: ICD-10-CM

## 2023-03-22 DIAGNOSIS — R13.12 DYSPHAGIA, OROPHARYNGEAL: ICD-10-CM

## 2023-03-22 DIAGNOSIS — J44.9 CHRONIC OBSTRUCTIVE PULMONARY DISEASE, UNSPECIFIED COPD TYPE: Chronic | ICD-10-CM

## 2023-03-22 DIAGNOSIS — R06.89 HYPERCAPNIA: ICD-10-CM

## 2023-03-22 DIAGNOSIS — A41.9 SEPSIS, DUE TO UNSPECIFIED ORGANISM, UNSPECIFIED WHETHER ACUTE ORGAN DYSFUNCTION PRESENT: ICD-10-CM

## 2023-03-22 DIAGNOSIS — J96.10 CHRONIC RESPIRATORY FAILURE, UNSPECIFIED WHETHER WITH HYPOXIA OR HYPERCAPNIA: ICD-10-CM

## 2023-03-22 DIAGNOSIS — E16.2 HYPOGLYCEMIA: Primary | ICD-10-CM

## 2023-03-22 DIAGNOSIS — J18.9 PNEUMONIA OF BOTH LUNGS DUE TO INFECTIOUS ORGANISM, UNSPECIFIED PART OF LUNG: ICD-10-CM

## 2023-03-22 DIAGNOSIS — Z78.9 DECREASED ACTIVITIES OF DAILY LIVING (ADL): ICD-10-CM

## 2023-03-22 DIAGNOSIS — R26.2 DIFFICULTY WALKING: ICD-10-CM

## 2023-03-22 PROBLEM — J96.02 ACUTE RESPIRATORY FAILURE WITH HYPERCAPNIA: Status: ACTIVE | Noted: 2023-03-22

## 2023-03-22 LAB
ALBUMIN SERPL-MCNC: 2.9 G/DL (ref 3.5–5.2)
ALBUMIN SERPL-MCNC: 2.9 G/DL (ref 3.5–5.2)
ALBUMIN/GLOB SERPL: 0.7 G/DL
ALBUMIN/GLOB SERPL: 0.7 G/DL
ALP SERPL-CCNC: 138 U/L (ref 39–117)
ALP SERPL-CCNC: 138 U/L (ref 39–117)
ALT SERPL W P-5'-P-CCNC: 29 U/L (ref 1–41)
ALT SERPL W P-5'-P-CCNC: 29 U/L (ref 1–41)
AMPHET+METHAMPHET UR QL: NEGATIVE
AMPHET+METHAMPHET UR QL: NEGATIVE
ANION GAP SERPL CALCULATED.3IONS-SCNC: 5.5 MMOL/L (ref 5–15)
ANION GAP SERPL CALCULATED.3IONS-SCNC: 5.5 MMOL/L (ref 5–15)
ARTERIAL PATENCY WRIST A: POSITIVE
AST SERPL-CCNC: 40 U/L (ref 1–40)
AST SERPL-CCNC: 40 U/L (ref 1–40)
BACTERIA UR QL AUTO: ABNORMAL /HPF
BACTERIA UR QL AUTO: ABNORMAL /HPF
BARBITURATES UR QL SCN: NEGATIVE
BARBITURATES UR QL SCN: NEGATIVE
BASE EXCESS BLDA CALC-SCNC: 0.8 MMOL/L (ref -2–2)
BASE EXCESS BLDA CALC-SCNC: 0.8 MMOL/L (ref -2–2)
BASE EXCESS BLDA CALC-SCNC: 2.9 MMOL/L (ref -2–2)
BASE EXCESS BLDA CALC-SCNC: 2.9 MMOL/L (ref -2–2)
BASOPHILS # BLD AUTO: 0.1 10*3/MM3 (ref 0–0.2)
BASOPHILS # BLD AUTO: 0.1 10*3/MM3 (ref 0–0.2)
BASOPHILS NFR BLD AUTO: 1.1 % (ref 0–1.5)
BASOPHILS NFR BLD AUTO: 1.1 % (ref 0–1.5)
BDY SITE: ABNORMAL
BENZODIAZ UR QL SCN: NEGATIVE
BENZODIAZ UR QL SCN: NEGATIVE
BILIRUB SERPL-MCNC: 0.8 MG/DL (ref 0–1.2)
BILIRUB SERPL-MCNC: 0.8 MG/DL (ref 0–1.2)
BILIRUB UR QL STRIP: ABNORMAL
BILIRUB UR QL STRIP: ABNORMAL
BUN SERPL-MCNC: 16 MG/DL (ref 8–23)
BUN SERPL-MCNC: 16 MG/DL (ref 8–23)
BUN/CREAT SERPL: 23.2 (ref 7–25)
BUN/CREAT SERPL: 23.2 (ref 7–25)
CA-I BLDA-SCNC: 1.18 MMOL/L (ref 1.13–1.32)
CA-I BLDA-SCNC: 1.18 MMOL/L (ref 1.13–1.32)
CALCIUM SPEC-SCNC: 8.6 MG/DL (ref 8.6–10.5)
CALCIUM SPEC-SCNC: 8.6 MG/DL (ref 8.6–10.5)
CANNABINOIDS SERPL QL: NEGATIVE
CANNABINOIDS SERPL QL: NEGATIVE
CHLORIDE BLDA-SCNC: 97 MMOL/L (ref 98–106)
CHLORIDE BLDA-SCNC: 97 MMOL/L (ref 98–106)
CHLORIDE SERPL-SCNC: 100 MMOL/L (ref 98–107)
CHLORIDE SERPL-SCNC: 100 MMOL/L (ref 98–107)
CLARITY UR: ABNORMAL
CLARITY UR: ABNORMAL
CO2 SERPL-SCNC: 32.5 MMOL/L (ref 22–29)
CO2 SERPL-SCNC: 32.5 MMOL/L (ref 22–29)
COCAINE UR QL: NEGATIVE
COCAINE UR QL: NEGATIVE
COHGB MFR BLD: 1.6 % (ref 0–1.5)
COHGB MFR BLD: 1.6 % (ref 0–1.5)
COHGB MFR BLD: 1.9 % (ref 0–1.5)
COHGB MFR BLD: 1.9 % (ref 0–1.5)
COLOR UR: ABNORMAL
COLOR UR: ABNORMAL
CREAT SERPL-MCNC: 0.69 MG/DL (ref 0.76–1.27)
CREAT SERPL-MCNC: 0.69 MG/DL (ref 0.76–1.27)
D-LACTATE SERPL-SCNC: 1.5 MMOL/L (ref 0.5–2)
D-LACTATE SERPL-SCNC: 1.5 MMOL/L (ref 0.5–2)
D-LACTATE SERPL-SCNC: 2.1 MMOL/L (ref 0.5–2)
D-LACTATE SERPL-SCNC: 2.1 MMOL/L (ref 0.5–2)
DEPRECATED RDW RBC AUTO: 58.4 FL (ref 37–54)
DEPRECATED RDW RBC AUTO: 58.4 FL (ref 37–54)
EGFRCR SERPLBLD CKD-EPI 2021: 94.7 ML/MIN/1.73
EGFRCR SERPLBLD CKD-EPI 2021: 94.7 ML/MIN/1.73
EOSINOPHIL # BLD AUTO: 0.3 10*3/MM3 (ref 0–0.4)
EOSINOPHIL # BLD AUTO: 0.3 10*3/MM3 (ref 0–0.4)
EOSINOPHIL NFR BLD AUTO: 3.3 % (ref 0.3–6.2)
EOSINOPHIL NFR BLD AUTO: 3.3 % (ref 0.3–6.2)
ERYTHROCYTE [DISTWIDTH] IN BLOOD BY AUTOMATED COUNT: 15.9 % (ref 12.3–15.4)
ERYTHROCYTE [DISTWIDTH] IN BLOOD BY AUTOMATED COUNT: 15.9 % (ref 12.3–15.4)
FHHB: 2.9 % (ref 0–5)
FHHB: 2.9 % (ref 0–5)
FHHB: 5.4 % (ref 0–5)
FHHB: 5.4 % (ref 0–5)
FLUAV AG NPH QL: NEGATIVE
FLUAV AG NPH QL: NEGATIVE
FLUBV AG NPH QL IA: NEGATIVE
FLUBV AG NPH QL IA: NEGATIVE
GAS FLOW AIRWAY: 2 LPM
GAS FLOW AIRWAY: 2 LPM
GAS FLOW AIRWAY: 4 LPM
GAS FLOW AIRWAY: 4 LPM
GLOBULIN UR ELPH-MCNC: 4.2 GM/DL
GLOBULIN UR ELPH-MCNC: 4.2 GM/DL
GLUCOSE BLDA-MCNC: 283 MG/DL (ref 70–99)
GLUCOSE BLDA-MCNC: 283 MG/DL (ref 70–99)
GLUCOSE BLDC GLUCOMTR-MCNC: 100 MG/DL (ref 70–99)
GLUCOSE BLDC GLUCOMTR-MCNC: 100 MG/DL (ref 70–99)
GLUCOSE BLDC GLUCOMTR-MCNC: 109 MG/DL (ref 70–99)
GLUCOSE BLDC GLUCOMTR-MCNC: 109 MG/DL (ref 70–99)
GLUCOSE BLDC GLUCOMTR-MCNC: 136 MG/DL (ref 70–99)
GLUCOSE BLDC GLUCOMTR-MCNC: 136 MG/DL (ref 70–99)
GLUCOSE BLDC GLUCOMTR-MCNC: 76 MG/DL (ref 70–99)
GLUCOSE BLDC GLUCOMTR-MCNC: 76 MG/DL (ref 70–99)
GLUCOSE BLDC GLUCOMTR-MCNC: 97 MG/DL (ref 70–99)
GLUCOSE BLDC GLUCOMTR-MCNC: 97 MG/DL (ref 70–99)
GLUCOSE SERPL-MCNC: 145 MG/DL (ref 65–99)
GLUCOSE SERPL-MCNC: 145 MG/DL (ref 65–99)
GLUCOSE UR STRIP-MCNC: ABNORMAL MG/DL
GLUCOSE UR STRIP-MCNC: ABNORMAL MG/DL
HCO3 BLDA-SCNC: 30.3 MMOL/L (ref 22–26)
HCO3 BLDA-SCNC: 30.3 MMOL/L (ref 22–26)
HCO3 BLDA-SCNC: 32.3 MMOL/L (ref 22–26)
HCO3 BLDA-SCNC: 32.3 MMOL/L (ref 22–26)
HCT VFR BLD AUTO: 47 % (ref 37.5–51)
HCT VFR BLD AUTO: 47 % (ref 37.5–51)
HGB BLD-MCNC: 15.4 G/DL (ref 13–17.7)
HGB BLD-MCNC: 15.4 G/DL (ref 13–17.7)
HGB BLDA-MCNC: 15.2 G/DL (ref 13.8–16.4)
HGB BLDA-MCNC: 15.2 G/DL (ref 13.8–16.4)
HGB BLDA-MCNC: 16.1 G/DL (ref 13.8–16.4)
HGB BLDA-MCNC: 16.1 G/DL (ref 13.8–16.4)
HGB UR QL STRIP.AUTO: ABNORMAL
HGB UR QL STRIP.AUTO: ABNORMAL
HOLD SPECIMEN: NORMAL
HYALINE CASTS UR QL AUTO: ABNORMAL /LPF
HYALINE CASTS UR QL AUTO: ABNORMAL /LPF
IMM GRANULOCYTES # BLD AUTO: 0.04 10*3/MM3 (ref 0–0.05)
IMM GRANULOCYTES # BLD AUTO: 0.04 10*3/MM3 (ref 0–0.05)
IMM GRANULOCYTES NFR BLD AUTO: 0.4 % (ref 0–0.5)
IMM GRANULOCYTES NFR BLD AUTO: 0.4 % (ref 0–0.5)
INHALED O2 CONCENTRATION: 28 %
INHALED O2 CONCENTRATION: 28 %
INHALED O2 CONCENTRATION: 36 %
INHALED O2 CONCENTRATION: 36 %
KETONES UR QL STRIP: NEGATIVE
KETONES UR QL STRIP: NEGATIVE
LACTATE BLDA-SCNC: 1.29 MMOL/L (ref 0.5–2)
LACTATE BLDA-SCNC: 1.29 MMOL/L (ref 0.5–2)
LEUKOCYTE ESTERASE UR QL STRIP.AUTO: ABNORMAL
LEUKOCYTE ESTERASE UR QL STRIP.AUTO: ABNORMAL
LYMPHOCYTES # BLD AUTO: 2.25 10*3/MM3 (ref 0.7–3.1)
LYMPHOCYTES # BLD AUTO: 2.25 10*3/MM3 (ref 0.7–3.1)
LYMPHOCYTES NFR BLD AUTO: 24.6 % (ref 19.6–45.3)
LYMPHOCYTES NFR BLD AUTO: 24.6 % (ref 19.6–45.3)
MCH RBC QN AUTO: 32.5 PG (ref 26.6–33)
MCH RBC QN AUTO: 32.5 PG (ref 26.6–33)
MCHC RBC AUTO-ENTMCNC: 32.8 G/DL (ref 31.5–35.7)
MCHC RBC AUTO-ENTMCNC: 32.8 G/DL (ref 31.5–35.7)
MCV RBC AUTO: 99.2 FL (ref 79–97)
MCV RBC AUTO: 99.2 FL (ref 79–97)
METHADONE UR QL SCN: NEGATIVE
METHADONE UR QL SCN: NEGATIVE
METHGB BLD QL: 0.1 % (ref 0–1.5)
METHGB BLD QL: 0.1 % (ref 0–1.5)
METHGB BLD QL: 0.2 % (ref 0–1.5)
METHGB BLD QL: 0.2 % (ref 0–1.5)
MODALITY: ABNORMAL
MONOCYTES # BLD AUTO: 1.11 10*3/MM3 (ref 0.1–0.9)
MONOCYTES # BLD AUTO: 1.11 10*3/MM3 (ref 0.1–0.9)
MONOCYTES NFR BLD AUTO: 12.2 % (ref 5–12)
MONOCYTES NFR BLD AUTO: 12.2 % (ref 5–12)
NEUTROPHILS NFR BLD AUTO: 5.33 10*3/MM3 (ref 1.7–7)
NEUTROPHILS NFR BLD AUTO: 5.33 10*3/MM3 (ref 1.7–7)
NEUTROPHILS NFR BLD AUTO: 58.4 % (ref 42.7–76)
NEUTROPHILS NFR BLD AUTO: 58.4 % (ref 42.7–76)
NITRITE UR QL STRIP: POSITIVE
NITRITE UR QL STRIP: POSITIVE
NRBC BLD AUTO-RTO: 0 /100 WBC (ref 0–0.2)
NRBC BLD AUTO-RTO: 0 /100 WBC (ref 0–0.2)
NT-PROBNP SERPL-MCNC: 232.7 PG/ML (ref 0–1800)
NT-PROBNP SERPL-MCNC: 232.7 PG/ML (ref 0–1800)
OPIATES UR QL: NEGATIVE
OPIATES UR QL: NEGATIVE
OXYCODONE UR QL SCN: NEGATIVE
OXYCODONE UR QL SCN: NEGATIVE
OXYHGB MFR BLDV: 92.8 % (ref 94–99)
OXYHGB MFR BLDV: 92.8 % (ref 94–99)
OXYHGB MFR BLDV: 95.1 % (ref 94–99)
OXYHGB MFR BLDV: 95.1 % (ref 94–99)
PCO2 BLDA: 69.5 MM HG (ref 35–45)
PCO2 BLDA: 69.5 MM HG (ref 35–45)
PCO2 BLDA: 72 MM HG (ref 35–45)
PCO2 BLDA: 72 MM HG (ref 35–45)
PH BLDA: 7.26 PH UNITS (ref 7.35–7.45)
PH BLDA: 7.26 PH UNITS (ref 7.35–7.45)
PH BLDA: 7.27 PH UNITS (ref 7.35–7.45)
PH BLDA: 7.27 PH UNITS (ref 7.35–7.45)
PH UR STRIP.AUTO: 6.5 [PH] (ref 5–8)
PH UR STRIP.AUTO: 6.5 [PH] (ref 5–8)
PLATELET # BLD AUTO: 212 10*3/MM3 (ref 140–450)
PLATELET # BLD AUTO: 212 10*3/MM3 (ref 140–450)
PMV BLD AUTO: 10.5 FL (ref 6–12)
PMV BLD AUTO: 10.5 FL (ref 6–12)
PO2 BLD: 300 MM[HG] (ref 0–500)
PO2 BLD: 300 MM[HG] (ref 0–500)
PO2 BLD: 310 MM[HG] (ref 0–500)
PO2 BLD: 310 MM[HG] (ref 0–500)
PO2 BLDA: 111.5 MM HG (ref 80–100)
PO2 BLDA: 111.5 MM HG (ref 80–100)
PO2 BLDA: 84.1 MM HG (ref 80–100)
PO2 BLDA: 84.1 MM HG (ref 80–100)
POTASSIUM BLDA-SCNC: 3.76 MMOL/L (ref 3.5–5)
POTASSIUM BLDA-SCNC: 3.76 MMOL/L (ref 3.5–5)
POTASSIUM SERPL-SCNC: 3.7 MMOL/L (ref 3.5–5.2)
POTASSIUM SERPL-SCNC: 3.7 MMOL/L (ref 3.5–5.2)
PROCALCITONIN SERPL-MCNC: 0.09 NG/ML (ref 0–0.25)
PROCALCITONIN SERPL-MCNC: 0.09 NG/ML (ref 0–0.25)
PROT SERPL-MCNC: 7.1 G/DL (ref 6–8.5)
PROT SERPL-MCNC: 7.1 G/DL (ref 6–8.5)
PROT UR QL STRIP: ABNORMAL
PROT UR QL STRIP: ABNORMAL
RBC # BLD AUTO: 4.74 10*6/MM3 (ref 4.14–5.8)
RBC # BLD AUTO: 4.74 10*6/MM3 (ref 4.14–5.8)
RBC # UR STRIP: ABNORMAL /HPF
RBC # UR STRIP: ABNORMAL /HPF
REF LAB TEST METHOD: ABNORMAL
REF LAB TEST METHOD: ABNORMAL
SAO2 % BLDCOA: 94.5 % (ref 95–99)
SAO2 % BLDCOA: 94.5 % (ref 95–99)
SAO2 % BLDCOA: 97 % (ref 95–99)
SAO2 % BLDCOA: 97 % (ref 95–99)
SARS-COV-2 RNA RESP QL NAA+PROBE: NOT DETECTED
SARS-COV-2 RNA RESP QL NAA+PROBE: NOT DETECTED
SODIUM BLDA-SCNC: 135.3 MMOL/L (ref 136–146)
SODIUM BLDA-SCNC: 135.3 MMOL/L (ref 136–146)
SODIUM SERPL-SCNC: 138 MMOL/L (ref 136–145)
SODIUM SERPL-SCNC: 138 MMOL/L (ref 136–145)
SP GR UR STRIP: 1.03 (ref 1–1.03)
SP GR UR STRIP: 1.03 (ref 1–1.03)
SQUAMOUS #/AREA URNS HPF: ABNORMAL /HPF
SQUAMOUS #/AREA URNS HPF: ABNORMAL /HPF
UROBILINOGEN UR QL STRIP: ABNORMAL
UROBILINOGEN UR QL STRIP: ABNORMAL
WBC # UR STRIP: ABNORMAL /HPF
WBC # UR STRIP: ABNORMAL /HPF
WBC NRBC COR # BLD: 9.13 10*3/MM3 (ref 3.4–10.8)
WBC NRBC COR # BLD: 9.13 10*3/MM3 (ref 3.4–10.8)
WHOLE BLOOD HOLD COAG: NORMAL
WHOLE BLOOD HOLD COAG: NORMAL
WHOLE BLOOD HOLD SPECIMEN: NORMAL
WHOLE BLOOD HOLD SPECIMEN: NORMAL

## 2023-03-22 PROCEDURE — 99285 EMERGENCY DEPT VISIT HI MDM: CPT

## 2023-03-22 PROCEDURE — 80307 DRUG TEST PRSMV CHEM ANLYZR: CPT | Performed by: EMERGENCY MEDICINE

## 2023-03-22 PROCEDURE — 25010000002 CEFEPIME PER 500 MG: Performed by: EMERGENCY MEDICINE

## 2023-03-22 PROCEDURE — 93005 ELECTROCARDIOGRAM TRACING: CPT

## 2023-03-22 PROCEDURE — 85025 COMPLETE CBC W/AUTO DIFF WBC: CPT | Performed by: EMERGENCY MEDICINE

## 2023-03-22 PROCEDURE — 83605 ASSAY OF LACTIC ACID: CPT | Performed by: INTERNAL MEDICINE

## 2023-03-22 PROCEDURE — 94799 UNLISTED PULMONARY SVC/PX: CPT

## 2023-03-22 PROCEDURE — 36600 WITHDRAWAL OF ARTERIAL BLOOD: CPT | Performed by: EMERGENCY MEDICINE

## 2023-03-22 PROCEDURE — 36415 COLL VENOUS BLD VENIPUNCTURE: CPT

## 2023-03-22 PROCEDURE — U0004 COV-19 TEST NON-CDC HGH THRU: HCPCS | Performed by: EMERGENCY MEDICINE

## 2023-03-22 PROCEDURE — 87086 URINE CULTURE/COLONY COUNT: CPT | Performed by: EMERGENCY MEDICINE

## 2023-03-22 PROCEDURE — 82962 GLUCOSE BLOOD TEST: CPT

## 2023-03-22 PROCEDURE — 81001 URINALYSIS AUTO W/SCOPE: CPT | Performed by: EMERGENCY MEDICINE

## 2023-03-22 PROCEDURE — U0005 INFEC AGEN DETEC AMPLI PROBE: HCPCS | Performed by: EMERGENCY MEDICINE

## 2023-03-22 PROCEDURE — 71045 X-RAY EXAM CHEST 1 VIEW: CPT

## 2023-03-22 PROCEDURE — 25010000002 VANCOMYCIN 5 G RECONSTITUTED SOLUTION: Performed by: EMERGENCY MEDICINE

## 2023-03-22 PROCEDURE — 25010000002 ENOXAPARIN PER 10 MG: Performed by: INTERNAL MEDICINE

## 2023-03-22 PROCEDURE — 83605 ASSAY OF LACTIC ACID: CPT | Performed by: EMERGENCY MEDICINE

## 2023-03-22 PROCEDURE — 87077 CULTURE AEROBIC IDENTIFY: CPT | Performed by: EMERGENCY MEDICINE

## 2023-03-22 PROCEDURE — 87040 BLOOD CULTURE FOR BACTERIA: CPT | Performed by: EMERGENCY MEDICINE

## 2023-03-22 PROCEDURE — 83050 HGB METHEMOGLOBIN QUAN: CPT | Performed by: EMERGENCY MEDICINE

## 2023-03-22 PROCEDURE — 82805 BLOOD GASES W/O2 SATURATION: CPT | Performed by: EMERGENCY MEDICINE

## 2023-03-22 PROCEDURE — 87186 SC STD MICRODIL/AGAR DIL: CPT | Performed by: EMERGENCY MEDICINE

## 2023-03-22 PROCEDURE — 87899 AGENT NOS ASSAY W/OPTIC: CPT | Performed by: INTERNAL MEDICINE

## 2023-03-22 PROCEDURE — 93005 ELECTROCARDIOGRAM TRACING: CPT | Performed by: EMERGENCY MEDICINE

## 2023-03-22 PROCEDURE — 93010 ELECTROCARDIOGRAM REPORT: CPT | Performed by: SPECIALIST

## 2023-03-22 PROCEDURE — 87804 INFLUENZA ASSAY W/OPTIC: CPT | Performed by: INTERNAL MEDICINE

## 2023-03-22 PROCEDURE — 70450 CT HEAD/BRAIN W/O DYE: CPT

## 2023-03-22 PROCEDURE — 80053 COMPREHEN METABOLIC PANEL: CPT | Performed by: EMERGENCY MEDICINE

## 2023-03-22 PROCEDURE — 86738 MYCOPLASMA ANTIBODY: CPT | Performed by: INTERNAL MEDICINE

## 2023-03-22 PROCEDURE — 82375 ASSAY CARBOXYHB QUANT: CPT | Performed by: EMERGENCY MEDICINE

## 2023-03-22 PROCEDURE — 84145 PROCALCITONIN (PCT): CPT | Performed by: INTERNAL MEDICINE

## 2023-03-22 PROCEDURE — 83880 ASSAY OF NATRIURETIC PEPTIDE: CPT | Performed by: EMERGENCY MEDICINE

## 2023-03-22 PROCEDURE — 94660 CPAP INITIATION&MGMT: CPT

## 2023-03-22 PROCEDURE — 87449 NOS EACH ORGANISM AG IA: CPT | Performed by: INTERNAL MEDICINE

## 2023-03-22 PROCEDURE — 94640 AIRWAY INHALATION TREATMENT: CPT

## 2023-03-22 RX ORDER — IPRATROPIUM/ALBUTEROL SULFATE 20-100 MCG
1 MIST INHALER (GRAM) INHALATION 4 TIMES DAILY
COMMUNITY
Start: 2023-02-28 | End: 2023-04-08 | Stop reason: HOSPADM

## 2023-03-22 RX ORDER — INSULIN LISPRO 100 [IU]/ML
INJECTION, SOLUTION INTRAVENOUS; SUBCUTANEOUS
COMMUNITY
Start: 2022-12-20 | End: 2023-04-08 | Stop reason: HOSPADM

## 2023-03-22 RX ORDER — BISACODYL 5 MG/1
5 TABLET, DELAYED RELEASE ORAL DAILY PRN
Status: DISCONTINUED | OUTPATIENT
Start: 2023-03-22 | End: 2023-04-08 | Stop reason: HOSPADM

## 2023-03-22 RX ORDER — GLIPIZIDE 10 MG/1
10 TABLET ORAL 2 TIMES DAILY
COMMUNITY
Start: 2022-12-20 | End: 2023-04-08 | Stop reason: HOSPADM

## 2023-03-22 RX ORDER — ALLOPURINOL 300 MG/1
300 TABLET ORAL DAILY
Status: ON HOLD | COMMUNITY
Start: 2022-12-20

## 2023-03-22 RX ORDER — TRIAMCINOLONE ACETONIDE 1 MG/G
1 CREAM TOPICAL 2 TIMES DAILY
Status: ON HOLD | COMMUNITY
Start: 2022-12-20

## 2023-03-22 RX ORDER — SODIUM CHLORIDE 0.9 % (FLUSH) 0.9 %
10 SYRINGE (ML) INJECTION AS NEEDED
Status: DISCONTINUED | OUTPATIENT
Start: 2023-03-22 | End: 2023-04-08 | Stop reason: HOSPADM

## 2023-03-22 RX ORDER — ALUMINA, MAGNESIA, AND SIMETHICONE 2400; 2400; 240 MG/30ML; MG/30ML; MG/30ML
15 SUSPENSION ORAL EVERY 6 HOURS PRN
Status: DISCONTINUED | OUTPATIENT
Start: 2023-03-22 | End: 2023-04-08 | Stop reason: HOSPADM

## 2023-03-22 RX ORDER — TEMAZEPAM 15 MG/1
15 CAPSULE ORAL NIGHTLY PRN
Status: DISCONTINUED | OUTPATIENT
Start: 2023-03-22 | End: 2023-03-29

## 2023-03-22 RX ORDER — ACETAMINOPHEN 325 MG/1
650 TABLET ORAL EVERY 4 HOURS PRN
Status: DISCONTINUED | OUTPATIENT
Start: 2023-03-22 | End: 2023-04-08 | Stop reason: HOSPADM

## 2023-03-22 RX ORDER — FAMOTIDINE 20 MG/1
20 TABLET, FILM COATED ORAL 2 TIMES DAILY
Status: ON HOLD | COMMUNITY
Start: 2022-12-20

## 2023-03-22 RX ORDER — ALPRAZOLAM 0.25 MG/1
0.25 TABLET ORAL EVERY 6 HOURS PRN
Status: DISCONTINUED | OUTPATIENT
Start: 2023-03-22 | End: 2023-03-29

## 2023-03-22 RX ORDER — FUROSEMIDE 80 MG
80 TABLET ORAL DAILY
Status: ON HOLD | COMMUNITY
Start: 2022-12-20

## 2023-03-22 RX ORDER — NICOTINE POLACRILEX 4 MG
15 LOZENGE BUCCAL
Status: DISCONTINUED | OUTPATIENT
Start: 2023-03-22 | End: 2023-04-08 | Stop reason: HOSPADM

## 2023-03-22 RX ORDER — ENOXAPARIN SODIUM 100 MG/ML
40 INJECTION SUBCUTANEOUS EVERY 12 HOURS
Status: DISCONTINUED | OUTPATIENT
Start: 2023-03-22 | End: 2023-03-25

## 2023-03-22 RX ORDER — DEXTROSE MONOHYDRATE 25 G/50ML
25 INJECTION, SOLUTION INTRAVENOUS
Status: DISCONTINUED | OUTPATIENT
Start: 2023-03-22 | End: 2023-04-08 | Stop reason: HOSPADM

## 2023-03-22 RX ORDER — DEXTROSE MONOHYDRATE 25 G/50ML
INJECTION, SOLUTION INTRAVENOUS
Status: COMPLETED
Start: 2023-03-22 | End: 2023-03-22

## 2023-03-22 RX ORDER — BENAZEPRIL HYDROCHLORIDE 40 MG/1
40 TABLET, FILM COATED ORAL DAILY
COMMUNITY
Start: 2022-12-20 | End: 2023-04-08 | Stop reason: HOSPADM

## 2023-03-22 RX ORDER — CARVEDILOL 25 MG/1
25 TABLET ORAL 2 TIMES DAILY WITH MEALS
Status: ON HOLD | COMMUNITY
Start: 2022-12-20

## 2023-03-22 RX ORDER — IPRATROPIUM BROMIDE AND ALBUTEROL SULFATE 2.5; .5 MG/3ML; MG/3ML
3 SOLUTION RESPIRATORY (INHALATION)
Status: DISCONTINUED | OUTPATIENT
Start: 2023-03-22 | End: 2023-04-02

## 2023-03-22 RX ORDER — MORPHINE SULFATE 2 MG/ML
2 INJECTION, SOLUTION INTRAMUSCULAR; INTRAVENOUS
Status: ACTIVE | OUTPATIENT
Start: 2023-03-22 | End: 2023-03-25

## 2023-03-22 RX ORDER — CEFEPIME 1 G/50ML
2 INJECTION, SOLUTION INTRAVENOUS ONCE
Status: COMPLETED | OUTPATIENT
Start: 2023-03-22 | End: 2023-03-22

## 2023-03-22 RX ORDER — INSULIN GLARGINE 300 U/ML
60 INJECTION, SOLUTION SUBCUTANEOUS DAILY
Status: ON HOLD | COMMUNITY
Start: 2022-12-20 | End: 2023-04-08 | Stop reason: SDUPTHER

## 2023-03-22 RX ORDER — NALOXONE HCL 0.4 MG/ML
0.4 VIAL (ML) INJECTION
Status: DISCONTINUED | OUTPATIENT
Start: 2023-03-22 | End: 2023-04-08 | Stop reason: HOSPADM

## 2023-03-22 RX ORDER — DULOXETIN HYDROCHLORIDE 30 MG/1
60 CAPSULE, DELAYED RELEASE ORAL DAILY
Status: ON HOLD | COMMUNITY
Start: 2022-12-20

## 2023-03-22 RX ORDER — AMOXICILLIN 250 MG
1 CAPSULE ORAL 2 TIMES DAILY
Status: DISCONTINUED | OUTPATIENT
Start: 2023-03-22 | End: 2023-04-08 | Stop reason: HOSPADM

## 2023-03-22 RX ORDER — FAMOTIDINE 20 MG/1
40 TABLET, FILM COATED ORAL DAILY
Status: DISCONTINUED | OUTPATIENT
Start: 2023-03-23 | End: 2023-04-08 | Stop reason: HOSPADM

## 2023-03-22 RX ORDER — HYDROCODONE BITARTRATE AND ACETAMINOPHEN 5; 325 MG/1; MG/1
1 TABLET ORAL EVERY 4 HOURS PRN
Status: DISCONTINUED | OUTPATIENT
Start: 2023-03-22 | End: 2023-03-23

## 2023-03-22 RX ORDER — BISACODYL 10 MG
10 SUPPOSITORY, RECTAL RECTAL DAILY PRN
Status: DISCONTINUED | OUTPATIENT
Start: 2023-03-22 | End: 2023-04-08 | Stop reason: HOSPADM

## 2023-03-22 RX ORDER — ONDANSETRON 2 MG/ML
4 INJECTION INTRAMUSCULAR; INTRAVENOUS EVERY 4 HOURS PRN
Status: DISCONTINUED | OUTPATIENT
Start: 2023-03-22 | End: 2023-04-08 | Stop reason: HOSPADM

## 2023-03-22 RX ORDER — POLYETHYLENE GLYCOL 3350 17 G/17G
17 POWDER, FOR SOLUTION ORAL DAILY PRN
Status: DISCONTINUED | OUTPATIENT
Start: 2023-03-22 | End: 2023-04-08 | Stop reason: HOSPADM

## 2023-03-22 RX ORDER — POTASSIUM CHLORIDE 1500 MG/1
1 TABLET, FILM COATED, EXTENDED RELEASE ORAL DAILY
Status: ON HOLD | COMMUNITY
Start: 2022-12-20

## 2023-03-22 RX ORDER — DEXTROSE MONOHYDRATE 25 G/50ML
25 INJECTION, SOLUTION INTRAVENOUS ONCE
Status: COMPLETED | OUTPATIENT
Start: 2023-03-22 | End: 2023-03-22

## 2023-03-22 RX ADMIN — ENOXAPARIN SODIUM 40 MG: 100 INJECTION SUBCUTANEOUS at 22:07

## 2023-03-22 RX ADMIN — CEFEPIME 2 G: 1 INJECTION, SOLUTION INTRAVENOUS at 17:00

## 2023-03-22 RX ADMIN — SODIUM CHLORIDE 1000 ML: 9 INJECTION, SOLUTION INTRAVENOUS at 17:39

## 2023-03-22 RX ADMIN — DEXTROSE MONOHYDRATE 25 G: 500 INJECTION PARENTERAL at 18:42

## 2023-03-22 RX ADMIN — VANCOMYCIN HYDROCHLORIDE 2750 MG: 5 INJECTION, POWDER, LYOPHILIZED, FOR SOLUTION INTRAVENOUS at 17:50

## 2023-03-22 RX ADMIN — IPRATROPIUM BROMIDE AND ALBUTEROL SULFATE 3 ML: 2.5; .5 SOLUTION RESPIRATORY (INHALATION) at 23:03

## 2023-03-22 RX ADMIN — DEXTROSE MONOHYDRATE 25 G: 500 INJECTION PARENTERAL at 15:44

## 2023-03-22 RX ADMIN — SODIUM CHLORIDE 1000 ML: 9 INJECTION, SOLUTION INTRAVENOUS at 16:04

## 2023-03-22 NOTE — H&P
[facility]   HISTORY AND PHYSICAL    Patient Name: Stephen Aguero  : 1945  MRN: [id number]  Primary Care Physician:  Papi Vasquez MD  Date of admission: 3/22/2023    Subjective   Subjective     Chief Complaint:   Decreased level of consciousness          HPI:  (Patient seen and evaluated in the emergency room, room 1 around 7:15 PM on 2023).      Stephen Aguero is a 77 y.o. male brought into ER with decreased level of consciousness.  Patient was in good health and state of health when his wife left to go for shopping, and she came back she found him unresponsive sugar was low.  EMS was called.  Patient was given dextrose sugar was noted to be 45.  Patient was brought into ER where his sugar was 95.  Patient had some shortness of breath and confused in ER.  Further work-up revealed CO2 retention and respiratory acidosis.  Patient was placed on BiPAP, when examined patient is lethargic and sleepy difficult to arouse but opening eyes on verbal commands.  No further history available according to wife and family member who is at bedside reports patient has not been doing well for several months, almost 6 -7 months he has not seen any physician and he is homebound.  Wife reports he is taking medications and he took insulin yesterday.  Patient was also treated for possible sepsis in the ER by fluids, he was hypothermic he was placed on warming blanket.      Review of Systems:    No fever chills reported.  Weakness and fatigue.  Swelling of legs.  No chest pain.  Decreased level of consciousness prior to arrival.  No nausea vomiting or diarrhea reported.  No reports of headache.      Personal History     Past Medical History:   Diagnosis Date   • Diabetes mellitus (HCC)    • Elevated cholesterol    • GERD (gastroesophageal reflux disease)    • Hypertension         Significant for COPD, diabetes, hypertension, gout, questionable CHF    Past Surgical History:   Procedure Laterality Date   •  ABDOMINAL SURGERY     • APPENDECTOMY     • EYE SURGERY          No recent surgeries    Family History: Family history is unknown by patient. Otherwise pertinent FHx was reviewed and not pertinent to current issue.    Social History:  reports that he has quit smoking. His smoking use included cigarettes. He has never used smokeless tobacco. He reports that he does not drink alcohol and does not use drugs.    Home Medications:  DULoxetine, Insulin Glargine (2 Unit Dial), Insulin Lispro (1 Unit Dial), allopurinol, benazepril, carvedilol, famotidine, furosemide, glipizide, ipratropium-albuterol, metFORMIN, potassium chloride, and triamcinolone      Allergies:  Allergies   Allergen Reactions   • Sulfa Antibiotics Unknown - Low Severity       Objective   Objective     Vitals:   Temp:  [93.9 °F (34.4 °C)-98.8 °F (37.1 °C)] 98.8 °F (37.1 °C)  Heart Rate:  [] 104  Resp:  [19-22] 22  BP: (102-176)/(64-95) 110/68  Flow (L/min):  [2] 2    Physical Exam  Elderly male, obese, in mild respiratory distress wearing BiPAP .  HEENT pupils reactive external ocular muscle intact on the left side, right enophthalmos..  Tongue is dry.  Neck supple obese.  Heart regular.  Lungs diminished breath sounds bilaterally.  Abdomen is obese nontender.  Extremities with 2+ edema in lower extremities.  Chronic venous stasis changes noted.  Skin dry and scaly in lower extremities.  Neuro exam cannot be done completely as patient somnolent.  Patient moving extremities equally.          I have personally reviewed the results from the time of this admission to 3/23/2023 07:32 EDT and agree with these findings:  [x]  Laboratory  [x]  Microbiology  [x]  Radiology  []  EKG/Telemetry   []  Cardiology/Vascular   []  Pathology  []  Old records  []  Other:    CBC:    WBC   Date Value Ref Range Status   03/23/2023 9.64 3.40 - 10.80 10*3/mm3 Final     RBC   Date Value Ref Range Status   03/23/2023 4.47 4.14 - 5.80 10*6/mm3 Final     Hemoglobin   Date Value  Ref Range Status   03/23/2023 14.6 13.0 - 17.7 g/dL Final     Hematocrit   Date Value Ref Range Status   03/23/2023 43.6 37.5 - 51.0 % Final     MCV   Date Value Ref Range Status   03/23/2023 97.5 (H) 79.0 - 97.0 fL Final     MCH   Date Value Ref Range Status   03/23/2023 32.7 26.6 - 33.0 pg Final     MCHC   Date Value Ref Range Status   03/23/2023 33.5 31.5 - 35.7 g/dL Final     RDW   Date Value Ref Range Status   03/23/2023 15.8 (H) 12.3 - 15.4 % Final     RDW-SD   Date Value Ref Range Status   03/23/2023 57.4 (H) 37.0 - 54.0 fl Final     MPV   Date Value Ref Range Status   03/23/2023 10.1 6.0 - 12.0 fL Final     Platelets   Date Value Ref Range Status   03/23/2023 201 140 - 450 10*3/mm3 Final     Neutrophil %   Date Value Ref Range Status   03/23/2023 62.4 42.7 - 76.0 % Final     Lymphocyte %   Date Value Ref Range Status   03/23/2023 20.0 19.6 - 45.3 % Final     Monocyte %   Date Value Ref Range Status   03/23/2023 13.1 (H) 5.0 - 12.0 % Final     Eosinophil %   Date Value Ref Range Status   03/23/2023 3.1 0.3 - 6.2 % Final     Basophil %   Date Value Ref Range Status   03/23/2023 1.1 0.0 - 1.5 % Final     Immature Grans %   Date Value Ref Range Status   03/23/2023 0.3 0.0 - 0.5 % Final     Neutrophils, Absolute   Date Value Ref Range Status   03/23/2023 6.01 1.70 - 7.00 10*3/mm3 Final     Lymphocytes, Absolute   Date Value Ref Range Status   03/23/2023 1.93 0.70 - 3.10 10*3/mm3 Final     Monocytes, Absolute   Date Value Ref Range Status   03/23/2023 1.26 (H) 0.10 - 0.90 10*3/mm3 Final     Eosinophils, Absolute   Date Value Ref Range Status   03/23/2023 0.30 0.00 - 0.40 10*3/mm3 Final     Basophils, Absolute   Date Value Ref Range Status   03/23/2023 0.11 0.00 - 0.20 10*3/mm3 Final     Immature Grans, Absolute   Date Value Ref Range Status   03/23/2023 0.03 0.00 - 0.05 10*3/mm3 Final     nRBC   Date Value Ref Range Status   03/23/2023 0.0 0.0 - 0.2 /100 WBC Final        BMP:    Lab Results   Component Value Date     GLUCOSE 108 (H) 03/23/2023    BUN 15 03/23/2023    CREATININE 0.64 (L) 03/23/2023    BCR 23.4 03/23/2023    K 4.2 03/23/2023    CO2 28.7 03/23/2023    CALCIUM 8.3 (L) 03/23/2023    ALBUMIN 2.4 (L) 03/23/2023    AST 35 03/23/2023    ALT 25 03/23/2023        CT Head Without Contrast    Result Date: 3/22/2023    CT scan of the head without IV contrast demonstrating diffuse atrophy and white matter changes.  No acute intracranial abnormality is seen.  Postoperative changes are seen in the right orbital and right frontal sinus region.  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     MORENA KINGSLEY MD       Electronically Signed and Approved By: MORENA KINGSLEY MD on 3/22/2023 at 16:37                      Assessment & Plan   Assessment / Plan       Current Diagnosis:  Active Hospital Problems    Diagnosis    • **Acute respiratory failure with hypercapnia (HCC)    • Hypoglycemia    • Diabetes mellitus (HCC)    • COPD (chronic obstructive pulmonary disease) (HCC)    • Pneumonia    • Acute UTI (urinary tract infection)      Plan:   Patient presented with multiple issues, patient was brought in for decreased level of consciousness, work-up revealed, pneumonia, hypercapnia, UTI.    Hypoglycemia resolved post glucose administration.  Detailed history cannot be obtained patient's wife at bedside but not able to give me much information other than he is mostly bedridden and progressively getting worse for the last 6 months.  Unable to see any physician for the last 6 months as he is homebound.    At this point we will aggressively treat for acute respiratory failure with CO2 retention..  BiPAP.  Neb treatments.  IV antibiotics initiated in ER continue antibiotics empirically for UTI as well as pneumonia.  Monitor sugars.  GI prophylaxis.  Intensivist consulted, Dr. Luis notified.  Further management will based on clinical course, lab results and consultant input.        DVT prophylaxis:  Medical DVT prophylaxis orders are  present.    GI Prophylaxis:    Pepcid    CODE STATUS:    Code Status (Patient has no pulse and is not breathing): CPR (Attempt to Resuscitate)  Medical Interventions (Patient has pulse or is breathing): Full Support    Admission Status:  I believe this patient meets inpatient status.             I have dictated this note utilizing Dragon Dictation.             Please note that portions of this note were completed with a voice recognition program.             Part of this note may be an electronic transcription/translation of spoken language to printed text         using the Dragon Dictation System.       Electronically signed by Marco iBshop MD, 03/22/23, 7:32 PM EDT.    Total time spent with in evaluation and management:

## 2023-03-22 NOTE — ED PROVIDER NOTES
"Time: 3:47 PM EDT  Date of encounter:  3/22/2023  Independent Historian/Clinical History and Information was obtained by:   Nursing Staff and EMS  Chief Complaint: hypoglycemia     History is limited by: Altered Mental Status    History of Present Illness:  Patient is a 77 y.o. year old male who presents to the emergency department for evaluation of hypoglycemia. Patient was found unresponsive on couch by wife. Wife states that she told EMS that she was only gone for 30 minutes. EMS checked patient blood sugar which was 45. EMS states that patient was given D50 and the blood sugar went up to 95.     HPI    Patient Care Team  Primary Care Provider: Papi Vasquez MD    Past Medical History:     Allergies   Allergen Reactions   • Sulfa Antibiotics Unknown - Low Severity     Past Medical History:   Diagnosis Date   • Diabetes mellitus (HCC)    • Elevated cholesterol    • GERD (gastroesophageal reflux disease)    • Hypertension      Past Surgical History:   Procedure Laterality Date   • ABDOMINAL SURGERY     • APPENDECTOMY     • EYE SURGERY       Family History   Family history unknown: Yes       Home Medications:  Prior to Admission medications    Not on File        Social History:   Social History     Tobacco Use   • Smoking status: Former     Types: Cigarettes   • Smokeless tobacco: Never   Vaping Use   • Vaping Use: Never used   Substance Use Topics   • Alcohol use: Never   • Drug use: Never         Review of Systems:  Review of Systems   Unable to perform ROS: Mental status change        Physical Exam:  /64 (BP Location: Other (Comment), Patient Position: Lying) Comment (BP Location): left wrist  Pulse 78   Temp 97.2 °F (36.2 °C) (Oral)   Resp 28   Ht 180.3 cm (71\")   Wt (!) 141 kg (310 lb 3 oz)   SpO2 (!) 89%   BMI 43.26 kg/m²     Physical Exam  Vitals and nursing note reviewed.   HENT:      Head: Normocephalic and atraumatic.   Eyes:      Extraocular Movements: Extraocular movements intact. "   Cardiovascular:      Rate and Rhythm: Normal rate. Rhythm irregularly irregular.      Comments: Patient is in A-fib   Pulmonary:      Effort: Pulmonary effort is normal. No respiratory distress.      Breath sounds: Normal breath sounds.   Abdominal:      General: Abdomen is flat.      Palpations: Abdomen is soft.      Tenderness: There is no abdominal tenderness.   Musculoskeletal:         General: Normal range of motion.      Cervical back: Normal range of motion and neck supple.      Right lower leg: No edema.      Left lower leg: No edema.   Skin:     General: Skin is warm and dry.      Capillary Refill: Capillary refill takes less than 2 seconds.      Comments: Chronic skin changes on legs    Neurological:      Comments: Patient is mildly arousable but does not answer questions                   Procedures:  Procedures      Medical Decision Making:  Sepsis criteria was met in the emergency department and the Sepsis protocol (including antibiotic administration) was initiated.      SIRS criteria considered:   1.  Temperature > 100.4 or <98.6    2.  Heart Rate > 90    3.  Respiratory Rate > 22    4.  WBC > 12K or <4K.             Severe Sepsis:     Respiratory: Mechanical Ventilation or Bipap  Hypotension: SBP > 90 or MAP < 65  Renal: Creatinine > 2  Metabolic: Lactic Acid > 2  Hematologic: Platelets < 100K or INR > 1.5  Hepatic: BILI  >  2  CNS: Sudden AMS     Septic Shock:     Severe Sepsis + Persistent hypotension or Lactic Acid > 4     Normal saline bolus, Antibiotics, and final disposition was based on these definitions.        Sepsis was recognized at 1624    Antibiotics were ordered.       30 cc/kg bolus was not indicated.       Patient did not receive the recommended 30ml/kg fluid bolus for sepsis because it would be harmful or detrimental to the patient.    The patient has Heart Failure.   The patient was ordered 1 L of fluids.    Total Critical Care time of 45 minutes. Total critical care time  documented does not include time spent on separately billed procedures for services of nurses or physician assistants. I personally saw and examined the patient. I have reviewed all diagnostic interpretations and treatment plans as written. I was present for the key portions of any procedures performed and the inclusive time noted in any critical care statement. Critical care time includes patient management by me, time spent at the patients bedside,  time to review lab and imaging results, discussing patient care, documentation in the medical record, and time spent with family or caregiver.    Comorbidities that affect care:    Diabetes, Hypertension    External Notes reviewed:    None      The following orders were placed and all results were independently analyzed by me:  Orders Placed This Encounter   Procedures   • Blood Culture - Blood,   • Blood Culture - Blood,   • COVID-19,APTIMA PANTHER(YESSENIA),[facility]GEL, NP/OP SWAB IN UTM/VTM/SALINE TRANSPORT MEDIA,24 HR TAT - Swab, Nasopharynx   • Urine Culture - Urine,   • Influenza Antigen, Rapid - Swab, Nasopharynx   • Respiratory Panel PCR w/COVID-19(SARS-CoV-2) RACH/OG/TRENA/PAD/COR/MAD/RADHA In-House, NP Swab in UTM/VTM, 3-4 HR TAT - Swab, Nasopharynx   • S. Pneumo Ag Urine or CSF - Urine, Urine, Clean Catch   • Legionella Antigen, Urine - Urine, Urine, Clean Catch   • Respiratory Culture - Sputum, Cough   • Mycoplasma Pneumoniae Antibody, IgM - Blood, Arm, Left   • CT Head Without Contrast   • XR Chest 1 View   • XR Abdomen KUB   • Hayes Draw   • ABG with Co-Ox and Electrolytes   • Urine Drug Screen - Urine, Clean Catch   • Lactic Acid, Plasma   • BNP   • Blood Gas, Arterial -With Co-Ox Panel: Yes   • Urinalysis With Culture If Indicated - Urine, Clean Catch   • Comprehensive Metabolic Panel   • CBC Auto Differential   • Urinalysis, Microscopic Only - Urine, Clean Catch   • CBC Auto Differential   • Comprehensive Metabolic Panel   • Lactic Acid, Plasma   •  Procalcitonin   • STAT Lactic Acid, Reflex   • STAT Lactic Acid, Reflex   • STAT Lactic Acid, Reflex   • Comprehensive Metabolic Panel   • Basic Metabolic Panel   • TSH   • T4, Free   • CBC Auto Differential   • Magnesium   • High Sensitivity Troponin T   • High Sensitivity Troponin T 2Hr   • CBC Auto Differential   • Diet: Cardiac Diets; Healthy Heart (2-3 Na+); Texture: Regular Texture (IDDSI 7); Fluid Consistency: Thin (IDDSI 0)   • Cardiac Monitoring   • Continuous Pulse Oximetry   • Vital Signs   • Apply warming blanket   • Vital Signs   • Notify Provider (With Default Parameters)   • Up With Assistance   • Intake & Output   • Weigh Patient   • Oral Care   • Continue Indwelling Urinary Catheter   • Assess Need for Indwelling Urinary Catheter - Follow Removal Protocol   • Urinary Catheter Care   • Code Status and Medical Interventions:   • General MD Inpatient Consult   • Inpatient Case Management  Consult   • Inpatient Pulmonology Consult   • Inpatient Cardiology Consult   • OT Consult: Eval & Treat   • PT Consult: Eval & Treat Discharge Placement Assessment   • Oxygen Therapy- Nasal Cannula; Titrate for SPO2: 90%   • NIPPV (CPAP or BIPAP)   • SLP Consult: Eval & Treat RN Dysphagia Screen Failed   • POC Glucose STAT   • POC Glucose Once   • POC Glucose Once   • POC Glucose Once   • POC Glucose Once   • POC Glucose Once   • POC Glucose Once   • POC Glucose Once   • POC Glucose Once   • POC Glucose Once   • POC Glucose Once   • POC Glucose Once   • POC Glucose Q6H   • POC Glucose Once   • POC Glucose Once   • POC Glucose Once   • POC Glucose Once   • POC Glucose Once   • POC Glucose Once   • POC Glucose Once   • POC Glucose Once   • ECG 12 Lead Rhythm Change   • ECG 12 Lead Tachycardia   • Adult Transthoracic Echo Complete W/ Cont if Necessary Per Protocol   • Consult to Wound / Ostomy Care   • Insert Peripheral IV   • Inpatient Admission   • Transfer Patient   • Green Top (Gel)   • Lavender Top    • Gold Top - SST   • Light Blue Top   • CBC & Differential   • CBC & Differential   • CBC & Differential       Medications Given in the Emergency Department:  Medications   sodium chloride 0.9 % flush 10 mL (10 mL Intravenous Given 3/24/23 0809)   acetaminophen (TYLENOL) tablet 650 mg (has no administration in time range)   morphine injection 2 mg (has no administration in time range)     And   naloxone (NARCAN) injection 0.4 mg (has no administration in time range)   HYDROmorphone (DILAUDID) injection 0.5 mg (0.5 mg Intravenous Given 3/23/23 1120)     And   naloxone (NARCAN) injection 0.4 mg (has no administration in time range)   aluminum-magnesium hydroxide-simethicone (MAALOX MAX) 400-400-40 MG/5ML suspension 15 mL (has no administration in time range)   famotidine (PEPCID) tablet 40 mg (40 mg Oral Given 3/24/23 0810)   ALPRAZolam (XANAX) tablet 0.25 mg (has no administration in time range)   sennosides-docusate (PERICOLACE) 8.6-50 MG per tablet 1 tablet ( Oral Canceled Entry 3/24/23 2006)     And   polyethylene glycol (MIRALAX) packet 17 g (has no administration in time range)     And   bisacodyl (DULCOLAX) EC tablet 5 mg (has no administration in time range)     And   bisacodyl (DULCOLAX) suppository 10 mg (has no administration in time range)   ondansetron (ZOFRAN) injection 4 mg (has no administration in time range)   temazepam (RESTORIL) capsule 15 mg (has no administration in time range)   Pharmacy to Dose enoxaparin (LOVENOX) (has no administration in time range)   ipratropium-albuterol (DUO-NEB) nebulizer solution 3 mL (3 mL Nebulization Given 3/25/23 0750)   dextrose (GLUTOSE) oral gel 15 g (has no administration in time range)   dextrose (D50W) (25 g/50 mL) IV injection 25 g (has no administration in time range)   glucagon (GLUCAGEN) injection 1 mg (has no administration in time range)   insulin regular (humuLIN R,novoLIN R) injection 2-7 Units (2 Units Subcutaneous Not Given 3/25/23 0519)    Enoxaparin Sodium (LOVENOX) syringe 40 mg ( Subcutaneous Canceled Entry 3/24/23 2109)   Pharmacy to Dose Cefepime (has no administration in time range)   cefepime (MAXIPIME) 1 g/100 mL 0.9% NS IVPB (mbp) (1 g Intravenous New Bag 3/24/23 2308)   HYDROcodone-acetaminophen (NORCO)  MG per tablet 1 tablet (1 tablet Oral Given 3/23/23 1628)   carvedilol (COREG) tablet 25 mg ( Oral Canceled Entry 3/24/23 2006)   DULoxetine (CYMBALTA) DR capsule 60 mg (60 mg Oral Given 3/24/23 1233)   glipizide (GLUCOTROL) tablet 5 mg (5 mg Oral Given 3/24/23 1233)   dilTIAZem CD (CARDIZEM CD) 24 hr capsule 240 mg (240 mg Oral Given 3/24/23 1745)   sodium chloride 0.9 % bolus 1,000 mL (0 mL Intravenous Stopped 3/22/23 1734)   sodium chloride 0.9 % bolus 1,000 mL (1,000 mL Intravenous New Bag 3/22/23 1739)   vancomycin 2750 mg/500 mL 0.9% NS IVPB ([facility]) (2,750 mg Intravenous New Bag 3/22/23 1750)   cefepime (MAXIPIME) IVPB 2 g (premix) in D5 (0 g Intravenous Stopped 3/22/23 1739)   dextrose (D50W) (25 g/50 mL) IV injection 25 g (25 g Intravenous Given 3/22/23 1842)        ED Course:         Labs:    Lab Results (last 24 hours)     Procedure Component Value Units Date/Time    CBC & Differential [[id number]]  (Abnormal) Collected: 03/24/23 1110    Specimen: Blood Updated: 03/24/23 1119    Narrative:      The following orders were created for panel order CBC & Differential.  Procedure                               Abnormality         Status                     ---------                               -----------         ------                     CBC Auto Differential[[id number]]        Abnormal            Final result                 Please view results for these tests on the individual orders.    Basic Metabolic Panel [[id number]]  (Abnormal) Collected: 03/24/23 1110    Specimen: Blood Updated: 03/24/23 1142     Glucose 177 mg/dL      BUN 14 mg/dL      Creatinine 0.59 mg/dL      Sodium 139 mmol/L      Potassium 4.2 mmol/L      Chloride  102 mmol/L      CO2 29.7 mmol/L      Calcium 8.5 mg/dL      BUN/Creatinine Ratio 23.7     Anion Gap 7.3 mmol/L      eGFR 99.3 mL/min/1.73     Narrative:      GFR Normal >60  Chronic Kidney Disease <60  Kidney Failure <15    The GFR formula is only valid for adults with stable renal function between ages 18 and 70.    TSH [[id number]]  (Normal) Collected: 03/24/23 1110    Specimen: Blood Updated: 03/24/23 1148     TSH 2.410 uIU/mL     T4, Free [[id number]]  (Abnormal) Collected: 03/24/23 1110    Specimen: Blood Updated: 03/24/23 1148     Free T4 0.85 ng/dL     Narrative:      Results may be falsely increased if patient taking Biotin.      CBC Auto Differential [[id number]]  (Abnormal) Collected: 03/24/23 1110    Specimen: Blood Updated: 03/24/23 1119     WBC 8.95 10*3/mm3      RBC 4.43 10*6/mm3      Hemoglobin 14.5 g/dL      Hematocrit 43.9 %      MCV 99.1 fL      MCH 32.7 pg      MCHC 33.0 g/dL      RDW 16.2 %      RDW-SD 59.6 fl      MPV 10.0 fL      Platelets 180 10*3/mm3      Neutrophil % 54.7 %      Lymphocyte % 24.2 %      Monocyte % 15.9 %      Eosinophil % 3.8 %      Basophil % 1.1 %      Immature Grans % 0.3 %      Neutrophils, Absolute 4.89 10*3/mm3      Lymphocytes, Absolute 2.17 10*3/mm3      Monocytes, Absolute 1.42 10*3/mm3      Eosinophils, Absolute 0.34 10*3/mm3      Basophils, Absolute 0.10 10*3/mm3      Immature Grans, Absolute 0.03 10*3/mm3      nRBC 0.0 /100 WBC     Magnesium [[id number]]  (Normal) Collected: 03/24/23 1110    Specimen: Blood Updated: 03/24/23 1414     Magnesium 1.9 mg/dL     POC Glucose Once [[id number]]  (Abnormal) Collected: 03/24/23 1207    Specimen: Blood Updated: 03/24/23 1208     Glucose 130 mg/dL      Comment: Serial Number: [id number]:  [id number]       POC Glucose Once [[id number]]  (Abnormal) Collected: 03/24/23 1713    Specimen: Blood Updated: 03/24/23 1714     Glucose 118 mg/dL      Comment: Serial Number: [id number]:  [id number]       High Sensitivity Troponin T  [[id number]]  (Abnormal) Collected: 03/24/23 1715    Specimen: Blood from Arm, Right Updated: 03/24/23 1802     HS Troponin T 19 ng/L     Narrative:      High Sensitive Troponin T Reference Range:  <10.0 ng/L- Negative Female for AMI  <15.0 ng/L- Negative Male for AMI  >=10 - Abnormal Female indicating possible myocardial injury.  >=15 - Abnormal Male indicating possible myocardial injury.   Clinicians would have to utilize clinical acumen, EKG, Troponin, and serial changes to determine if it is an Acute Myocardial Infarction or myocardial injury due to an underlying chronic condition.         High Sensitivity Troponin T 2Hr [[id number]]  (Abnormal) Collected: 03/24/23 1942    Specimen: Blood from Arm, Right Updated: 03/24/23 2024     HS Troponin T 18 ng/L      Troponin T Delta -1 ng/L     Narrative:      High Sensitive Troponin T Reference Range:  <10.0 ng/L- Negative Female for AMI  <15.0 ng/L- Negative Male for AMI  >=10 - Abnormal Female indicating possible myocardial injury.  >=15 - Abnormal Male indicating possible myocardial injury.   Clinicians would have to utilize clinical acumen, EKG, Troponin, and serial changes to determine if it is an Acute Myocardial Infarction or myocardial injury due to an underlying chronic condition.         POC Glucose Once [[id number]]  (Abnormal) Collected: 03/24/23 2306    Specimen: Blood Updated: 03/24/23 2308     Glucose 124 mg/dL      Comment: Serial Number: [id number]:  [id number]       Comprehensive Metabolic Panel [[id number]]  (Abnormal) Collected: 03/25/23 0509    Specimen: Blood Updated: 03/25/23 0629     Glucose 150 mg/dL      BUN 16 mg/dL      Creatinine 0.57 mg/dL      Sodium 139 mmol/L      Potassium 4.1 mmol/L      Chloride 104 mmol/L      CO2 30.2 mmol/L      Calcium 8.3 mg/dL      Total Protein 6.3 g/dL      Albumin 2.4 g/dL      ALT (SGPT) 21 U/L      AST (SGOT) 27 U/L      Alkaline Phosphatase 112 U/L      Total Bilirubin 0.9 mg/dL      Globulin 3.9 gm/dL       A/G Ratio 0.6 g/dL      BUN/Creatinine Ratio 28.1     Anion Gap 4.8 mmol/L      eGFR 100.3 mL/min/1.73     Narrative:      GFR Normal >60  Chronic Kidney Disease <60  Kidney Failure <15    The GFR formula is only valid for adults with stable renal function between ages 18 and 70.    CBC & Differential [[id number]]  (Abnormal) Collected: 03/25/23 0509    Specimen: Blood Updated: 03/25/23 0556    Narrative:      The following orders were created for panel order CBC & Differential.  Procedure                               Abnormality         Status                     ---------                               -----------         ------                     CBC Auto Differential[[id number]]        Abnormal            Final result                 Please view results for these tests on the individual orders.    CBC Auto Differential [[id number]]  (Abnormal) Collected: 03/25/23 0509    Specimen: Blood Updated: 03/25/23 0556     WBC 7.35 10*3/mm3      RBC 4.14 10*6/mm3      Hemoglobin 13.6 g/dL      Hematocrit 40.9 %      MCV 98.8 fL      MCH 32.9 pg      MCHC 33.3 g/dL      RDW 15.9 %      RDW-SD 57.8 fl      MPV 10.4 fL      Platelets 166 10*3/mm3      Neutrophil % 52.8 %      Lymphocyte % 28.0 %      Monocyte % 13.7 %      Eosinophil % 4.1 %      Basophil % 1.0 %      Immature Grans % 0.4 %      Neutrophils, Absolute 3.88 10*3/mm3      Lymphocytes, Absolute 2.06 10*3/mm3      Monocytes, Absolute 1.01 10*3/mm3      Eosinophils, Absolute 0.30 10*3/mm3      Basophils, Absolute 0.07 10*3/mm3      Immature Grans, Absolute 0.03 10*3/mm3      nRBC 0.0 /100 WBC     POC Glucose Once [[id number]]  (Abnormal) Collected: 03/25/23 0517    Specimen: Blood Updated: 03/25/23 0518     Glucose 136 mg/dL      Comment: Serial Number: [id number]:  [id number]              Imaging:    No Radiology Exams Resulted Within Past 24 Hours      Differential Diagnosis and Discussion:    Metabolic: Differential diagnosis includes but is not limited  to hypertension, hyperglycemia, hyperkalemia, hypocalcemia, metabolic acidosis, hypokalemia, hypoglycemia, malnutrition, hypothyroidism, hyperthyroidism, and adrenal insufficiency.     All labs were reviewed and interpreted by me.  EKG was interpreted by me.    MDM  Number of Diagnoses or Management Options     Amount and/or Complexity of Data Reviewed  Obtain history from someone other than the patient: yes (Per nursing staff, patient's blood sugar was rechecked in trauma room and the reading was 53)         Patient Care Considerations:          Consultants/Shared Management Plan:    PCP: I have discussed the case with Dr. XIOMARA Bishop who agrees to accept the patient for admission.    Social Determinants of Health:    Patient has presented with family members who are responsible, reliable and will ensure follow up care.      Disposition and Care Coordination:    Admit:   Through independent evaluation of the patient's history, physical, and imperical data, the patient meets criteria for observation/admission to the hospital.        Final diagnoses:   Hypoglycemia   Pneumonia of both lungs due to infectious organism, unspecified part of lung   Hypercapnia   Somnolence   Sepsis, due to unspecified organism, unspecified whether acute organ dysfunction present (HCC)        ED Disposition     ED Disposition   Decision to Admit    Condition   --    Comment   Level of Care: Critical Care [6]   Diagnosis: Acute respiratory failure with hypercapnia (HCC) [229252]   Admitting Physician: YOAV BISHOP [[id number]]   Isolate for COVID?: No [0]   Certification: I Certify That Inpatient Hospital Services Are Medically Necessary For Greater Than 2 Midnights               This medical record created using voice recognition software.        Documentation assistance provided by Polly Aceves acting as scribe for  Dr. Brendan Washington DO . Information recorded by the scribe was done at my direction and has been verified and validated by me.        Polly Aceves  03/22/23 1554       Brendan Washington DO  03/25/23 0858

## 2023-03-23 ENCOUNTER — APPOINTMENT (OUTPATIENT)
Dept: GENERAL RADIOLOGY | Facility: HOSPITAL | Age: 78
DRG: 871 | End: 2023-03-23
Payer: MEDICARE

## 2023-03-23 PROBLEM — E16.2 HYPOGLYCEMIA: Status: ACTIVE | Noted: 2023-01-01

## 2023-03-23 PROBLEM — E66.2 OBESITY HYPOVENTILATION SYNDROME: Status: ACTIVE | Noted: 2023-03-23

## 2023-03-23 LAB
ALBUMIN SERPL-MCNC: 2.4 G/DL (ref 3.5–5.2)
ALBUMIN SERPL-MCNC: 2.4 G/DL (ref 3.5–5.2)
ALBUMIN/GLOB SERPL: 0.6 G/DL
ALBUMIN/GLOB SERPL: 0.6 G/DL
ALP SERPL-CCNC: 113 U/L (ref 39–117)
ALP SERPL-CCNC: 113 U/L (ref 39–117)
ALT SERPL W P-5'-P-CCNC: 25 U/L (ref 1–41)
ALT SERPL W P-5'-P-CCNC: 25 U/L (ref 1–41)
ANION GAP SERPL CALCULATED.3IONS-SCNC: 6.3 MMOL/L (ref 5–15)
ANION GAP SERPL CALCULATED.3IONS-SCNC: 6.3 MMOL/L (ref 5–15)
AST SERPL-CCNC: 35 U/L (ref 1–40)
AST SERPL-CCNC: 35 U/L (ref 1–40)
BASOPHILS # BLD AUTO: 0.11 10*3/MM3 (ref 0–0.2)
BASOPHILS # BLD AUTO: 0.11 10*3/MM3 (ref 0–0.2)
BASOPHILS NFR BLD AUTO: 1.1 % (ref 0–1.5)
BASOPHILS NFR BLD AUTO: 1.1 % (ref 0–1.5)
BILIRUB SERPL-MCNC: 0.9 MG/DL (ref 0–1.2)
BILIRUB SERPL-MCNC: 0.9 MG/DL (ref 0–1.2)
BUN SERPL-MCNC: 15 MG/DL (ref 8–23)
BUN SERPL-MCNC: 15 MG/DL (ref 8–23)
BUN/CREAT SERPL: 23.4 (ref 7–25)
BUN/CREAT SERPL: 23.4 (ref 7–25)
CALCIUM SPEC-SCNC: 8.3 MG/DL (ref 8.6–10.5)
CALCIUM SPEC-SCNC: 8.3 MG/DL (ref 8.6–10.5)
CHLORIDE SERPL-SCNC: 104 MMOL/L (ref 98–107)
CHLORIDE SERPL-SCNC: 104 MMOL/L (ref 98–107)
CO2 SERPL-SCNC: 28.7 MMOL/L (ref 22–29)
CO2 SERPL-SCNC: 28.7 MMOL/L (ref 22–29)
CREAT SERPL-MCNC: 0.64 MG/DL (ref 0.76–1.27)
CREAT SERPL-MCNC: 0.64 MG/DL (ref 0.76–1.27)
D-LACTATE SERPL-SCNC: 1.9 MMOL/L (ref 0.5–2)
D-LACTATE SERPL-SCNC: 1.9 MMOL/L (ref 0.5–2)
D-LACTATE SERPL-SCNC: 2.2 MMOL/L (ref 0.5–2)
D-LACTATE SERPL-SCNC: 2.2 MMOL/L (ref 0.5–2)
D-LACTATE SERPL-SCNC: 2.3 MMOL/L (ref 0.5–2)
D-LACTATE SERPL-SCNC: 2.3 MMOL/L (ref 0.5–2)
DEPRECATED RDW RBC AUTO: 57.4 FL (ref 37–54)
DEPRECATED RDW RBC AUTO: 57.4 FL (ref 37–54)
EGFRCR SERPLBLD CKD-EPI 2021: 96.9 ML/MIN/1.73
EGFRCR SERPLBLD CKD-EPI 2021: 96.9 ML/MIN/1.73
EOSINOPHIL # BLD AUTO: 0.3 10*3/MM3 (ref 0–0.4)
EOSINOPHIL # BLD AUTO: 0.3 10*3/MM3 (ref 0–0.4)
EOSINOPHIL NFR BLD AUTO: 3.1 % (ref 0.3–6.2)
EOSINOPHIL NFR BLD AUTO: 3.1 % (ref 0.3–6.2)
ERYTHROCYTE [DISTWIDTH] IN BLOOD BY AUTOMATED COUNT: 15.8 % (ref 12.3–15.4)
ERYTHROCYTE [DISTWIDTH] IN BLOOD BY AUTOMATED COUNT: 15.8 % (ref 12.3–15.4)
GLOBULIN UR ELPH-MCNC: 4 GM/DL
GLOBULIN UR ELPH-MCNC: 4 GM/DL
GLUCOSE BLDC GLUCOMTR-MCNC: 108 MG/DL (ref 70–99)
GLUCOSE BLDC GLUCOMTR-MCNC: 108 MG/DL (ref 70–99)
GLUCOSE BLDC GLUCOMTR-MCNC: 117 MG/DL (ref 70–99)
GLUCOSE BLDC GLUCOMTR-MCNC: 117 MG/DL (ref 70–99)
GLUCOSE BLDC GLUCOMTR-MCNC: 144 MG/DL (ref 70–99)
GLUCOSE BLDC GLUCOMTR-MCNC: 144 MG/DL (ref 70–99)
GLUCOSE BLDC GLUCOMTR-MCNC: 76 MG/DL (ref 70–99)
GLUCOSE BLDC GLUCOMTR-MCNC: 76 MG/DL (ref 70–99)
GLUCOSE BLDC GLUCOMTR-MCNC: 80 MG/DL (ref 70–99)
GLUCOSE BLDC GLUCOMTR-MCNC: 80 MG/DL (ref 70–99)
GLUCOSE BLDC GLUCOMTR-MCNC: 87 MG/DL (ref 70–99)
GLUCOSE BLDC GLUCOMTR-MCNC: 87 MG/DL (ref 70–99)
GLUCOSE BLDC GLUCOMTR-MCNC: 88 MG/DL (ref 70–99)
GLUCOSE BLDC GLUCOMTR-MCNC: 88 MG/DL (ref 70–99)
GLUCOSE BLDC GLUCOMTR-MCNC: 98 MG/DL (ref 70–99)
GLUCOSE BLDC GLUCOMTR-MCNC: 98 MG/DL (ref 70–99)
GLUCOSE SERPL-MCNC: 108 MG/DL (ref 65–99)
GLUCOSE SERPL-MCNC: 108 MG/DL (ref 65–99)
HCT VFR BLD AUTO: 43.6 % (ref 37.5–51)
HCT VFR BLD AUTO: 43.6 % (ref 37.5–51)
HGB BLD-MCNC: 14.6 G/DL (ref 13–17.7)
HGB BLD-MCNC: 14.6 G/DL (ref 13–17.7)
IMM GRANULOCYTES # BLD AUTO: 0.03 10*3/MM3 (ref 0–0.05)
IMM GRANULOCYTES # BLD AUTO: 0.03 10*3/MM3 (ref 0–0.05)
IMM GRANULOCYTES NFR BLD AUTO: 0.3 % (ref 0–0.5)
IMM GRANULOCYTES NFR BLD AUTO: 0.3 % (ref 0–0.5)
L PNEUMO1 AG UR QL IA: NEGATIVE
L PNEUMO1 AG UR QL IA: NEGATIVE
LYMPHOCYTES # BLD AUTO: 1.93 10*3/MM3 (ref 0.7–3.1)
LYMPHOCYTES # BLD AUTO: 1.93 10*3/MM3 (ref 0.7–3.1)
LYMPHOCYTES NFR BLD AUTO: 20 % (ref 19.6–45.3)
LYMPHOCYTES NFR BLD AUTO: 20 % (ref 19.6–45.3)
M PNEUMO IGM SER QL: POSITIVE
M PNEUMO IGM SER QL: POSITIVE
MCH RBC QN AUTO: 32.7 PG (ref 26.6–33)
MCH RBC QN AUTO: 32.7 PG (ref 26.6–33)
MCHC RBC AUTO-ENTMCNC: 33.5 G/DL (ref 31.5–35.7)
MCHC RBC AUTO-ENTMCNC: 33.5 G/DL (ref 31.5–35.7)
MCV RBC AUTO: 97.5 FL (ref 79–97)
MCV RBC AUTO: 97.5 FL (ref 79–97)
MONOCYTES # BLD AUTO: 1.26 10*3/MM3 (ref 0.1–0.9)
MONOCYTES # BLD AUTO: 1.26 10*3/MM3 (ref 0.1–0.9)
MONOCYTES NFR BLD AUTO: 13.1 % (ref 5–12)
MONOCYTES NFR BLD AUTO: 13.1 % (ref 5–12)
NEUTROPHILS NFR BLD AUTO: 6.01 10*3/MM3 (ref 1.7–7)
NEUTROPHILS NFR BLD AUTO: 6.01 10*3/MM3 (ref 1.7–7)
NEUTROPHILS NFR BLD AUTO: 62.4 % (ref 42.7–76)
NEUTROPHILS NFR BLD AUTO: 62.4 % (ref 42.7–76)
NRBC BLD AUTO-RTO: 0 /100 WBC (ref 0–0.2)
NRBC BLD AUTO-RTO: 0 /100 WBC (ref 0–0.2)
PLATELET # BLD AUTO: 201 10*3/MM3 (ref 140–450)
PLATELET # BLD AUTO: 201 10*3/MM3 (ref 140–450)
PMV BLD AUTO: 10.1 FL (ref 6–12)
PMV BLD AUTO: 10.1 FL (ref 6–12)
POTASSIUM SERPL-SCNC: 4.2 MMOL/L (ref 3.5–5.2)
POTASSIUM SERPL-SCNC: 4.2 MMOL/L (ref 3.5–5.2)
PROT SERPL-MCNC: 6.4 G/DL (ref 6–8.5)
PROT SERPL-MCNC: 6.4 G/DL (ref 6–8.5)
RBC # BLD AUTO: 4.47 10*6/MM3 (ref 4.14–5.8)
RBC # BLD AUTO: 4.47 10*6/MM3 (ref 4.14–5.8)
S PNEUM AG SPEC QL LA: NEGATIVE
S PNEUM AG SPEC QL LA: NEGATIVE
SODIUM SERPL-SCNC: 139 MMOL/L (ref 136–145)
SODIUM SERPL-SCNC: 139 MMOL/L (ref 136–145)
WBC NRBC COR # BLD: 9.64 10*3/MM3 (ref 3.4–10.8)
WBC NRBC COR # BLD: 9.64 10*3/MM3 (ref 3.4–10.8)

## 2023-03-23 PROCEDURE — 85025 COMPLETE CBC W/AUTO DIFF WBC: CPT | Performed by: INTERNAL MEDICINE

## 2023-03-23 PROCEDURE — 99291 CRITICAL CARE FIRST HOUR: CPT | Performed by: INTERNAL MEDICINE

## 2023-03-23 PROCEDURE — 25010000002 ENOXAPARIN PER 10 MG: Performed by: INTERNAL MEDICINE

## 2023-03-23 PROCEDURE — 92610 EVALUATE SWALLOWING FUNCTION: CPT

## 2023-03-23 PROCEDURE — 25010000002 CEFEPIME PER 500 MG: Performed by: INTERNAL MEDICINE

## 2023-03-23 PROCEDURE — 94761 N-INVAS EAR/PLS OXIMETRY MLT: CPT

## 2023-03-23 PROCEDURE — 25010000002 HYDROMORPHONE 1 MG/ML SOLUTION: Performed by: INTERNAL MEDICINE

## 2023-03-23 PROCEDURE — 94799 UNLISTED PULMONARY SVC/PX: CPT

## 2023-03-23 PROCEDURE — 83605 ASSAY OF LACTIC ACID: CPT | Performed by: INTERNAL MEDICINE

## 2023-03-23 PROCEDURE — 94664 DEMO&/EVAL PT USE INHALER: CPT

## 2023-03-23 PROCEDURE — 74018 RADEX ABDOMEN 1 VIEW: CPT

## 2023-03-23 PROCEDURE — 80053 COMPREHEN METABOLIC PANEL: CPT | Performed by: INTERNAL MEDICINE

## 2023-03-23 PROCEDURE — 82962 GLUCOSE BLOOD TEST: CPT

## 2023-03-23 RX ORDER — HYDROCODONE BITARTRATE AND ACETAMINOPHEN 10; 325 MG/1; MG/1
1 TABLET ORAL EVERY 6 HOURS PRN
Status: DISCONTINUED | OUTPATIENT
Start: 2023-03-23 | End: 2023-03-29

## 2023-03-23 RX ADMIN — IPRATROPIUM BROMIDE AND ALBUTEROL SULFATE 3 ML: 2.5; .5 SOLUTION RESPIRATORY (INHALATION) at 06:39

## 2023-03-23 RX ADMIN — SENNOSIDES AND DOCUSATE SODIUM 1 TABLET: 8.6; 5 TABLET ORAL at 21:45

## 2023-03-23 RX ADMIN — CEFEPIME HYDROCHLORIDE 1 G: 1 INJECTION, POWDER, FOR SOLUTION INTRAMUSCULAR; INTRAVENOUS at 16:28

## 2023-03-23 RX ADMIN — IPRATROPIUM BROMIDE AND ALBUTEROL SULFATE 3 ML: 2.5; .5 SOLUTION RESPIRATORY (INHALATION) at 15:18

## 2023-03-23 RX ADMIN — IPRATROPIUM BROMIDE AND ALBUTEROL SULFATE 3 ML: 2.5; .5 SOLUTION RESPIRATORY (INHALATION) at 18:54

## 2023-03-23 RX ADMIN — IPRATROPIUM BROMIDE AND ALBUTEROL SULFATE 3 ML: 2.5; .5 SOLUTION RESPIRATORY (INHALATION) at 04:34

## 2023-03-23 RX ADMIN — CEFEPIME HYDROCHLORIDE 1 G: 1 INJECTION, POWDER, FOR SOLUTION INTRAMUSCULAR; INTRAVENOUS at 00:14

## 2023-03-23 RX ADMIN — FAMOTIDINE 40 MG: 20 TABLET ORAL at 09:04

## 2023-03-23 RX ADMIN — Medication 10 ML: at 21:45

## 2023-03-23 RX ADMIN — ENOXAPARIN SODIUM 40 MG: 100 INJECTION SUBCUTANEOUS at 09:04

## 2023-03-23 RX ADMIN — HYDROMORPHONE HYDROCHLORIDE 0.5 MG: 1 INJECTION, SOLUTION INTRAMUSCULAR; INTRAVENOUS; SUBCUTANEOUS at 11:20

## 2023-03-23 RX ADMIN — IPRATROPIUM BROMIDE AND ALBUTEROL SULFATE 3 ML: 2.5; .5 SOLUTION RESPIRATORY (INHALATION) at 11:39

## 2023-03-23 RX ADMIN — CEFEPIME HYDROCHLORIDE 1 G: 1 INJECTION, POWDER, FOR SOLUTION INTRAMUSCULAR; INTRAVENOUS at 08:36

## 2023-03-23 RX ADMIN — ENOXAPARIN SODIUM 40 MG: 100 INJECTION SUBCUTANEOUS at 21:45

## 2023-03-23 RX ADMIN — HYDROCODONE BITARTRATE AND ACETAMINOPHEN 1 TABLET: 5; 325 TABLET ORAL at 10:32

## 2023-03-23 RX ADMIN — HYDROCODONE BITARTRATE AND ACETAMINOPHEN 1 TABLET: 10; 325 TABLET ORAL at 16:28

## 2023-03-23 RX ADMIN — HYDROCODONE BITARTRATE AND ACETAMINOPHEN 1 TABLET: 5; 325 TABLET ORAL at 03:18

## 2023-03-23 NOTE — PLAN OF CARE
Goal Outcome Evaluation:         Pt tolerated bipap throughout the night.  No soa is noted. No breakdown noted on the skin. Pt is currently on 14/7 30%,

## 2023-03-23 NOTE — PLAN OF CARE
Goal Outcome Evaluation:   Pt. Has tolerated the bipap very well. Sats around mid 90's on 35%02.

## 2023-03-23 NOTE — CONSULTS
[facility]   Consult Note    Patient Name: Stephen Aguero  : 1945  MRN: [id number]  Primary Care Physician:  Papi Vasquez MD  Referring Physician: No Known Provider  Date of admission: 3/22/2023    Inpatient Pulmonology Consult  Consult performed by: Ramses Luis DO  Consult ordered by: Marco Bishop MD        Subjective   Subjective     Reason for Consult/ Chief Complaint: Reason for consultation critical care management    History of Present Illness  Stephen Aguero is a 77 y.o. male in the ICU due to low level of consciousness  Was brought in by family after having been found to have decreased level of consciousness  Found to have a blood glucose of 45  Has been very confused  Does have a urinary tract infection    Review of Systems   Positive for confusion  Positive for difficulty urinating  Positive for weakness  Positive for fatigue  Positive for leg swelling      Personal History     Past Medical History:   Diagnosis Date   • Diabetes mellitus (HCC)    • Elevated cholesterol    • GERD (gastroesophageal reflux disease)    • Hypertension        Past Surgical History:   Procedure Laterality Date   • ABDOMINAL SURGERY     • APPENDECTOMY     • EYE SURGERY         Family History: Family history is unknown by patient. Otherwise pertinent FHx was reviewed and not pertinent to current issue.    Social History:  reports that he has quit smoking. His smoking use included cigarettes. He has never used smokeless tobacco. He reports that he does not drink alcohol and does not use drugs.    Home Medications:   DULoxetine, Insulin Glargine (2 Unit Dial), Insulin Lispro (1 Unit Dial), allopurinol, benazepril, carvedilol, famotidine, furosemide, glipizide, ipratropium-albuterol, metFORMIN, potassium chloride, and triamcinolone    Allergies:  Allergies   Allergen Reactions   • Sulfa Antibiotics Unknown - Low Severity       Objective    Objective     Vitals:  Temp:  [93.9 °F (34.4 °C)-98.8 °F (37.1  °C)] 97.9 °F (36.6 °C)  Heart Rate:  [] 108  Resp:  [19-22] 20  BP: ()/(61-97) 137/67  Flow (L/min):  [2] 2    Physical Exam  Right eyelid abnormality  Obese male  Bilateral lower extremity edema with venous stasis changes  Diminished breath sounds at the bases  Abdomen is obese  Result Review    Result Review:  I have personally reviewed the results from the time of this admission to 3/23/2023 12:57 EDT and agree with these findings:  [x]  Laboratory  [x]  Microbiology  [x]  Radiology  [x]  EKG/Telemetry   [x]  Cardiology/Vascular   []  Pathology  []  Old records  []  Other:    Most notable findings include:     Assessment & Plan   Assessment / Plan     Brief Patient Summary:  Stephen Aguero is a 77 y.o. male who in the intensive care unit with altered mental status    Active Hospital Problems:  Active Hospital Problems    Diagnosis    • **Acute respiratory failure with hypercapnia (HCC)    • Hypoglycemia    • Obesity hypoventilation syndrome (HCC)    • Diabetes mellitus (HCC)    • COPD (chronic obstructive pulmonary disease) (HCC)    • Pneumonia    • Acute UTI (urinary tract infection)        Plan:  Blood gas reviewed showed a pH of 7.27 with a PCO2 of 72 and a PaO2 of 84.1 suspect that the patient hypercapnia was due in part due to his altered mental status from hypoglycemia    We will continue with nocturnal NIPPV given his chronic obesity hypoventilation syndrome    Patient does have urinary tract infection with greater than 100,000 gram-negative rods     Continue BiPAP 14/7 asleep    Sharma catheter placed due to urinary retention and urinary tract infection    Continue DuoNeb scheduled    Resume patient's home [place]    Check for COVID and influenza    Sliding scale insulin    We will hold off on steroids at this time    Patient is critically ill in ICU with  hypercapnic respiratory failure, COPD altered mental status and urinary tract infection     I spent 30 minutes of critical care time excluding  any procedure notes, spent in review, analysis, obtaining history and physical, formulating care plan, and I led multi-disciplinary critical care rounds with bedside nurse, respiratory therapist, clinical pharmacist and other allied services. I have discussed the case with primary service and other consultants as well.        Electronically signed by Ramses Luis DO, 03/23/23, 11:05 AM EDT.

## 2023-03-23 NOTE — THERAPY EVALUATION
Acute Care - Speech Language Pathology   Swallow Initial Evaluation [facility]     Patient Name: Stephen Aguero  : 1945  MRN: [id number]  Today's Date: 3/23/2023               Admit Date: 3/22/2023    Visit Dx:     ICD-10-CM ICD-9-CM   1. Hypoglycemia  E16.2 251.2   2. Pneumonia of both lungs due to infectious organism, unspecified part of lung  J18.9 483.8   3. Hypercapnia  R06.89 786.09   4. Somnolence  R40.0 780.09   5. Sepsis, due to unspecified organism, unspecified whether acute organ dysfunction present (HCC)  A41.9 038.9     995.91   6. Dysphagia, oropharyngeal  R13.12 787.22     Patient Active Problem List   Diagnosis   • Acute respiratory failure with hypercapnia (HCC)   • Hypoglycemia   • Diabetes mellitus (HCC)   • COPD (chronic obstructive pulmonary disease) (HCC)   • Pneumonia   • Acute UTI (urinary tract infection)     Past Medical History:   Diagnosis Date   • Diabetes mellitus (HCC)    • Elevated cholesterol    • GERD (gastroesophageal reflux disease)    • Hypertension      Past Surgical History:   Procedure Laterality Date   • ABDOMINAL SURGERY     • APPENDECTOMY     • EYE SURGERY         PAIN SCALE: None noted    PRECAUTIONS/CONTRAINDICATIONS: None noted    SUSPECTED ABUSE/NEGLECT/EXPLOITATION: None noted    SOCIAL/PSYCHOLOGICAL NEEDS/BARRIERS: None noted    PAST SOCIAL HISTORY: Lives at home    PRIOR FUNCTION: Some recent decline in function    PATIENT GOALS/EXPECTATIONS: Did not report    HISTORY: This patient is a 77-year-old male admitted with the above diagnosis.  Patient denies any difficulty swallowing at home however does report having a sore throat recently but make swallowing painful at times.    CURRENT DIET LEVEL: Regular diet-thin liquids    OBJECTIVE:    TEST ADMINISTERED: Clinical dysphagia evaluation    COGNITION/SAFETY AWARENESS: Alert    BEHAVIORAL OBSERVATIONS: Cooperative    ORAL MOTOR EXAM: Generalized oral motor weakness is noted.    VOICE QUALITY: Vocal quality within  functional limits    REFLEX EXAM: Deferred    POSTURE: 90 degrees upright    FEEDING/SWALLOWING FUNCTION: Assessed with thin liquids, purée consistencies and soft solids    CLINICAL OBSERVATIONS: Oral stage is characterized by prolonged mastication with soft solids.  No significant oral residue noted after the swallow.  Pharyngeal phase is timely with good laryngeal elevation per palpation.  Denies globus sensation after completion of swallow.  Patient does require strategies with O2 sats dropping into the upper 80s at times with progressive trials, fatigues easily    DYSPHAGIA CRITERIA: Risk of aspiration     FUNCTIONAL ASSESSMENT INSTRUMENT: Patient currently scored a level 5 of 7 on Functional Communication Measures for swallowing indicating a 20-39% limitation in function.    ASSESSMENT/ PLAN OF CARE:  Pt presents with limitations, noted below, that impede his ability to swallow safely. The skills of a therapist will be required to safely and effectively implement the following treatment plan to restore maximal level of function.    PROBLEMS:  1.  Dysphagia   LTG 1: (30 days) patient will increase ability to tolerate least restrictive diet and improve functional communication measure for swallowing to a 6 of 7   STG 1a: (14 days) patient/family teaching regarding diet recommendations, safe swallow strategies and signs and symptoms of aspiration.   STG 1b: (14 days) patient will tolerate mechanical soft diet and thin liquids without clinical sign or symptom of aspiration with 8 of 10 trials.   STG 1c: (14 days) patient will tolerate therapeutic trials of regular solids without clinical sign or symptom of aspiration with 8 of 10 trials.     TREATMENT: Dysphagia therapy to ensure diet tolerance, advance to least restrictive diet and analyze for aspiration risk.    FREQUENCY/DURATION: Daily 5 times a week    REHAB POTENTIAL:  Pt has good rehab potential.  The following limitations may influence improvement/ length  of tx medical status.    RECOMMENDATIONS:   1.   DIET: Mechanical soft diet-thin liquids   2.  POSITION: 90 degrees upright for all intake  3.  COMPENSATORY STRATEGIES: Small single sips of liquids, encourage frequent rest periods of short of breath or if O2 sats drop below 90, oral meds in applesauce whole    Pt/responsible party agrees with plan of care and has been informed of all alternatives, risks and benefits.    EDUCATION  The patient has been educated in the following areas:   Dysphagia (Swallowing Impairment).           Shyanne Moss, SLP  3/23/2023

## 2023-03-23 NOTE — PLAN OF CARE
Problem: Adult Inpatient Plan of Care  Goal: Plan of Care Review  Outcome: Ongoing, Progressing  Flowsheets (Taken 3/23/2023 1831)  Progress: no change  Plan of Care Reviewed With: patient  Outcome Evaluation: Patient disoriented to situation. Patient is now a Q6 hour blood glucose check, blood glucose levels WDL. Patient on IV antibiotics.  Goal: Patient-Specific Goal (Individualized)  Outcome: Ongoing, Progressing  Goal: Absence of Hospital-Acquired Illness or Injury  Outcome: Ongoing, Progressing  Intervention: Identify and Manage Fall Risk  Recent Flowsheet Documentation  Taken 3/23/2023 1800 by Venessa Glasgow RN  Safety Promotion/Fall Prevention: safety round/check completed  Taken 3/23/2023 1600 by Venessa Glasgow RN  Safety Promotion/Fall Prevention: safety round/check completed  Taken 3/23/2023 1300 by Venessa Glasgow RN  Safety Promotion/Fall Prevention: safety round/check completed  Taken 3/23/2023 1205 by Venessa Glasgow RN  Safety Promotion/Fall Prevention: safety round/check completed  Goal: Optimal Comfort and Wellbeing  Outcome: Ongoing, Progressing  Intervention: Provide Person-Centered Care  Recent Flowsheet Documentation  Taken 3/23/2023 1252 by Venessa Glasgow RN  Trust Relationship/Rapport:   care explained   choices provided   emotional support provided   empathic listening provided   questions answered   questions encouraged   reassurance provided   thoughts/feelings acknowledged  Goal: Readiness for Transition of Care  Outcome: Ongoing, Progressing     Problem: Noninvasive Ventilation Acute  Goal: Effective Unassisted Ventilation and Oxygenation  Outcome: Ongoing, Progressing     Problem: Skin Injury Risk Increased  Goal: Skin Health and Integrity  Outcome: Ongoing, Progressing     Problem: Fall Injury Risk  Goal: Absence of Fall and Fall-Related Injury  Outcome: Ongoing, Progressing  Intervention: Promote Injury-Free Environment  Recent Flowsheet Documentation  Taken 3/23/2023 1800 by  Venessa Glasgow, RN  Safety Promotion/Fall Prevention: safety round/check completed  Taken 3/23/2023 1600 by Venessa Glasgow RN  Safety Promotion/Fall Prevention: safety round/check completed  Taken 3/23/2023 1300 by Venessa Glasgow, RN  Safety Promotion/Fall Prevention: safety round/check completed  Taken 3/23/2023 1205 by Venessa Glasgow RN  Safety Promotion/Fall Prevention: safety round/check completed   Goal Outcome Evaluation:  Plan of Care Reviewed With: patient        Progress: no change  Outcome Evaluation: Patient disoriented to situation. Patient is now a Q6 hour blood glucose check, blood glucose levels WDL. Patient on IV antibiotics.

## 2023-03-23 NOTE — PROGRESS NOTES
[facility]     Progress Note    Patient Name: Stephen Aguero  : 1945  MRN: [id number]  Primary Care Physician:  Papi Vasquez MD  Date of admission: 3/22/2023      Subjective   Brief summary.  Patient admitted with respiratory failure and UTI along with decreased level of consciousness      HPI:  Patient today more awake and alert sitting in bed and eating breakfast.  Does not recollect much of yesterday's events.  Family at bedside.  Denies any shortness of breath.  Overall weak.  Blood sugar still on the low side    Review of Systems     Fatigue.  No CP.  No complaints of shortness of breath  Leg swelling.  No abdominal pain  No nausea vomiting      Objective     Vitals:   Temp:  [93.9 °F (34.4 °C)-98.8 °F (37.1 °C)] 98.8 °F (37.1 °C)  Heart Rate:  [] 104  Resp:  [19-22] 22  BP: (102-176)/(64-95) 110/68  Flow (L/min):  [2] 2    Physical Exam :     Elderly morbidly obese male not in acute distress  Poor hygiene  Neck supple  Heart regular  Lungs diminished breath sounds  Abdomen obese and soft  Nontender  Extremities with 3+ edema with chronic venous stasis changes      Result Review:  I have personally reviewed the results from the time of this admission to 3/23/2023 09:19 EDT and agree with these findings:  [x]  Laboratory  [x]  Microbiology  [x]  Radiology  []  EKG/Telemetry   []  Cardiology/Vascular   []  Pathology  []  Old records  []  Other:           Assessment / Plan       Active Hospital Problems:  Active Hospital Problems    Diagnosis    • **Acute respiratory failure with hypercapnia (HCC)    • Hypoglycemia    • Diabetes mellitus (HCC)    • COPD (chronic obstructive pulmonary disease) (HCC)    • Pneumonia    • Acute UTI (urinary tract infection)        Plan:   Hypoglycemia improving but still somewhat low.  Monitor sugars.  Respiratory status improved.  Continue nebs.  Pneumonia on chest x-ray.  Pneumonia work-up pending.  Continue antibiotic for UTI cefepime initiated pending  cultures.  Increase activity.  Out of bed.  If remains stable transfer out of ICU.         DVT prophylaxis:  Medical DVT prophylaxis orders are present.    CODE STATUS:   Code Status (Patient has no pulse and is not breathing): CPR (Attempt to Resuscitate)  Medical Interventions (Patient has pulse or is breathing): Full Support            Electronically signed by Marco Bishop MD, 03/23/23, 9:19 AM EDT.

## 2023-03-24 ENCOUNTER — APPOINTMENT (OUTPATIENT)
Dept: CARDIOLOGY | Facility: HOSPITAL | Age: 78
DRG: 871 | End: 2023-03-24
Payer: MEDICARE

## 2023-03-24 PROBLEM — R00.0 TACHYCARDIA: Status: ACTIVE | Noted: 2023-03-24

## 2023-03-24 LAB
ANION GAP SERPL CALCULATED.3IONS-SCNC: 7.3 MMOL/L (ref 5–15)
ANION GAP SERPL CALCULATED.3IONS-SCNC: 7.3 MMOL/L (ref 5–15)
BACTERIA SPEC AEROBE CULT: ABNORMAL
BACTERIA SPEC AEROBE CULT: ABNORMAL
BASOPHILS # BLD AUTO: 0.1 10*3/MM3 (ref 0–0.2)
BASOPHILS # BLD AUTO: 0.1 10*3/MM3 (ref 0–0.2)
BASOPHILS NFR BLD AUTO: 1.1 % (ref 0–1.5)
BASOPHILS NFR BLD AUTO: 1.1 % (ref 0–1.5)
BUN SERPL-MCNC: 14 MG/DL (ref 8–23)
BUN SERPL-MCNC: 14 MG/DL (ref 8–23)
BUN/CREAT SERPL: 23.7 (ref 7–25)
BUN/CREAT SERPL: 23.7 (ref 7–25)
CALCIUM SPEC-SCNC: 8.5 MG/DL (ref 8.6–10.5)
CALCIUM SPEC-SCNC: 8.5 MG/DL (ref 8.6–10.5)
CHLORIDE SERPL-SCNC: 102 MMOL/L (ref 98–107)
CHLORIDE SERPL-SCNC: 102 MMOL/L (ref 98–107)
CO2 SERPL-SCNC: 29.7 MMOL/L (ref 22–29)
CO2 SERPL-SCNC: 29.7 MMOL/L (ref 22–29)
CREAT SERPL-MCNC: 0.59 MG/DL (ref 0.76–1.27)
CREAT SERPL-MCNC: 0.59 MG/DL (ref 0.76–1.27)
DEPRECATED RDW RBC AUTO: 59.6 FL (ref 37–54)
DEPRECATED RDW RBC AUTO: 59.6 FL (ref 37–54)
EGFRCR SERPLBLD CKD-EPI 2021: 99.3 ML/MIN/1.73
EGFRCR SERPLBLD CKD-EPI 2021: 99.3 ML/MIN/1.73
EOSINOPHIL # BLD AUTO: 0.34 10*3/MM3 (ref 0–0.4)
EOSINOPHIL # BLD AUTO: 0.34 10*3/MM3 (ref 0–0.4)
EOSINOPHIL NFR BLD AUTO: 3.8 % (ref 0.3–6.2)
EOSINOPHIL NFR BLD AUTO: 3.8 % (ref 0.3–6.2)
ERYTHROCYTE [DISTWIDTH] IN BLOOD BY AUTOMATED COUNT: 16.2 % (ref 12.3–15.4)
ERYTHROCYTE [DISTWIDTH] IN BLOOD BY AUTOMATED COUNT: 16.2 % (ref 12.3–15.4)
GEN 5 2HR TROPONIN T REFLEX: 18 NG/L
GEN 5 2HR TROPONIN T REFLEX: 18 NG/L
GLUCOSE BLDC GLUCOMTR-MCNC: 117 MG/DL (ref 70–99)
GLUCOSE BLDC GLUCOMTR-MCNC: 117 MG/DL (ref 70–99)
GLUCOSE BLDC GLUCOMTR-MCNC: 118 MG/DL (ref 70–99)
GLUCOSE BLDC GLUCOMTR-MCNC: 118 MG/DL (ref 70–99)
GLUCOSE BLDC GLUCOMTR-MCNC: 124 MG/DL (ref 70–99)
GLUCOSE BLDC GLUCOMTR-MCNC: 124 MG/DL (ref 70–99)
GLUCOSE BLDC GLUCOMTR-MCNC: 130 MG/DL (ref 70–99)
GLUCOSE BLDC GLUCOMTR-MCNC: 130 MG/DL (ref 70–99)
GLUCOSE BLDC GLUCOMTR-MCNC: 151 MG/DL (ref 70–99)
GLUCOSE BLDC GLUCOMTR-MCNC: 151 MG/DL (ref 70–99)
GLUCOSE SERPL-MCNC: 177 MG/DL (ref 65–99)
GLUCOSE SERPL-MCNC: 177 MG/DL (ref 65–99)
HCT VFR BLD AUTO: 43.9 % (ref 37.5–51)
HCT VFR BLD AUTO: 43.9 % (ref 37.5–51)
HGB BLD-MCNC: 14.5 G/DL (ref 13–17.7)
HGB BLD-MCNC: 14.5 G/DL (ref 13–17.7)
IMM GRANULOCYTES # BLD AUTO: 0.03 10*3/MM3 (ref 0–0.05)
IMM GRANULOCYTES # BLD AUTO: 0.03 10*3/MM3 (ref 0–0.05)
IMM GRANULOCYTES NFR BLD AUTO: 0.3 % (ref 0–0.5)
IMM GRANULOCYTES NFR BLD AUTO: 0.3 % (ref 0–0.5)
LYMPHOCYTES # BLD AUTO: 2.17 10*3/MM3 (ref 0.7–3.1)
LYMPHOCYTES # BLD AUTO: 2.17 10*3/MM3 (ref 0.7–3.1)
LYMPHOCYTES NFR BLD AUTO: 24.2 % (ref 19.6–45.3)
LYMPHOCYTES NFR BLD AUTO: 24.2 % (ref 19.6–45.3)
MAGNESIUM SERPL-MCNC: 1.9 MG/DL (ref 1.6–2.4)
MAGNESIUM SERPL-MCNC: 1.9 MG/DL (ref 1.6–2.4)
MCH RBC QN AUTO: 32.7 PG (ref 26.6–33)
MCH RBC QN AUTO: 32.7 PG (ref 26.6–33)
MCHC RBC AUTO-ENTMCNC: 33 G/DL (ref 31.5–35.7)
MCHC RBC AUTO-ENTMCNC: 33 G/DL (ref 31.5–35.7)
MCV RBC AUTO: 99.1 FL (ref 79–97)
MCV RBC AUTO: 99.1 FL (ref 79–97)
MONOCYTES # BLD AUTO: 1.42 10*3/MM3 (ref 0.1–0.9)
MONOCYTES # BLD AUTO: 1.42 10*3/MM3 (ref 0.1–0.9)
MONOCYTES NFR BLD AUTO: 15.9 % (ref 5–12)
MONOCYTES NFR BLD AUTO: 15.9 % (ref 5–12)
NEUTROPHILS NFR BLD AUTO: 4.89 10*3/MM3 (ref 1.7–7)
NEUTROPHILS NFR BLD AUTO: 4.89 10*3/MM3 (ref 1.7–7)
NEUTROPHILS NFR BLD AUTO: 54.7 % (ref 42.7–76)
NEUTROPHILS NFR BLD AUTO: 54.7 % (ref 42.7–76)
NRBC BLD AUTO-RTO: 0 /100 WBC (ref 0–0.2)
NRBC BLD AUTO-RTO: 0 /100 WBC (ref 0–0.2)
PLATELET # BLD AUTO: 180 10*3/MM3 (ref 140–450)
PLATELET # BLD AUTO: 180 10*3/MM3 (ref 140–450)
PMV BLD AUTO: 10 FL (ref 6–12)
PMV BLD AUTO: 10 FL (ref 6–12)
POTASSIUM SERPL-SCNC: 4.2 MMOL/L (ref 3.5–5.2)
POTASSIUM SERPL-SCNC: 4.2 MMOL/L (ref 3.5–5.2)
RBC # BLD AUTO: 4.43 10*6/MM3 (ref 4.14–5.8)
RBC # BLD AUTO: 4.43 10*6/MM3 (ref 4.14–5.8)
SODIUM SERPL-SCNC: 139 MMOL/L (ref 136–145)
SODIUM SERPL-SCNC: 139 MMOL/L (ref 136–145)
T4 FREE SERPL-MCNC: 0.85 NG/DL (ref 0.93–1.7)
T4 FREE SERPL-MCNC: 0.85 NG/DL (ref 0.93–1.7)
TROPONIN T DELTA: -1 NG/L
TROPONIN T DELTA: -1 NG/L
TROPONIN T SERPL HS-MCNC: 19 NG/L
TROPONIN T SERPL HS-MCNC: 19 NG/L
TSH SERPL DL<=0.05 MIU/L-ACNC: 2.41 UIU/ML (ref 0.27–4.2)
TSH SERPL DL<=0.05 MIU/L-ACNC: 2.41 UIU/ML (ref 0.27–4.2)
WBC NRBC COR # BLD: 8.95 10*3/MM3 (ref 3.4–10.8)
WBC NRBC COR # BLD: 8.95 10*3/MM3 (ref 3.4–10.8)

## 2023-03-24 PROCEDURE — 93005 ELECTROCARDIOGRAM TRACING: CPT | Performed by: INTERNAL MEDICINE

## 2023-03-24 PROCEDURE — 84484 ASSAY OF TROPONIN QUANT: CPT | Performed by: INTERNAL MEDICINE

## 2023-03-24 PROCEDURE — 94799 UNLISTED PULMONARY SVC/PX: CPT

## 2023-03-24 PROCEDURE — 83735 ASSAY OF MAGNESIUM: CPT | Performed by: INTERNAL MEDICINE

## 2023-03-24 PROCEDURE — 94760 N-INVAS EAR/PLS OXIMETRY 1: CPT

## 2023-03-24 PROCEDURE — 93306 TTE W/DOPPLER COMPLETE: CPT

## 2023-03-24 PROCEDURE — 94660 CPAP INITIATION&MGMT: CPT

## 2023-03-24 PROCEDURE — 85025 COMPLETE CBC W/AUTO DIFF WBC: CPT | Performed by: INTERNAL MEDICINE

## 2023-03-24 PROCEDURE — 80048 BASIC METABOLIC PNL TOTAL CA: CPT | Performed by: INTERNAL MEDICINE

## 2023-03-24 PROCEDURE — 94664 DEMO&/EVAL PT USE INHALER: CPT

## 2023-03-24 PROCEDURE — 25010000002 ENOXAPARIN PER 10 MG: Performed by: INTERNAL MEDICINE

## 2023-03-24 PROCEDURE — 84439 ASSAY OF FREE THYROXINE: CPT | Performed by: INTERNAL MEDICINE

## 2023-03-24 PROCEDURE — 99232 SBSQ HOSP IP/OBS MODERATE 35: CPT | Performed by: INTERNAL MEDICINE

## 2023-03-24 PROCEDURE — 84443 ASSAY THYROID STIM HORMONE: CPT | Performed by: INTERNAL MEDICINE

## 2023-03-24 PROCEDURE — 93010 ELECTROCARDIOGRAM REPORT: CPT | Performed by: INTERNAL MEDICINE

## 2023-03-24 PROCEDURE — 25010000002 CEFEPIME PER 500 MG: Performed by: INTERNAL MEDICINE

## 2023-03-24 PROCEDURE — 94761 N-INVAS EAR/PLS OXIMETRY MLT: CPT

## 2023-03-24 PROCEDURE — 82962 GLUCOSE BLOOD TEST: CPT

## 2023-03-24 RX ORDER — GLIPIZIDE 5 MG/1
5 TABLET ORAL
Status: DISCONTINUED | OUTPATIENT
Start: 2023-03-24 | End: 2023-04-08 | Stop reason: HOSPADM

## 2023-03-24 RX ORDER — DILTIAZEM HYDROCHLORIDE 240 MG/1
240 CAPSULE, COATED, EXTENDED RELEASE ORAL
Status: DISCONTINUED | OUTPATIENT
Start: 2023-03-24 | End: 2023-03-26

## 2023-03-24 RX ORDER — DULOXETIN HYDROCHLORIDE 30 MG/1
60 CAPSULE, DELAYED RELEASE ORAL DAILY
Status: DISCONTINUED | OUTPATIENT
Start: 2023-03-24 | End: 2023-04-08 | Stop reason: HOSPADM

## 2023-03-24 RX ORDER — CARVEDILOL 25 MG/1
25 TABLET ORAL 2 TIMES DAILY
Status: DISCONTINUED | OUTPATIENT
Start: 2023-03-24 | End: 2023-04-08 | Stop reason: HOSPADM

## 2023-03-24 RX ADMIN — IPRATROPIUM BROMIDE AND ALBUTEROL SULFATE 3 ML: 2.5; .5 SOLUTION RESPIRATORY (INHALATION) at 22:47

## 2023-03-24 RX ADMIN — CARVEDILOL 25 MG: 25 TABLET, FILM COATED ORAL at 19:42

## 2023-03-24 RX ADMIN — IPRATROPIUM BROMIDE AND ALBUTEROL SULFATE 3 ML: 2.5; .5 SOLUTION RESPIRATORY (INHALATION) at 03:18

## 2023-03-24 RX ADMIN — Medication 10 ML: at 08:09

## 2023-03-24 RX ADMIN — IPRATROPIUM BROMIDE AND ALBUTEROL SULFATE 3 ML: 2.5; .5 SOLUTION RESPIRATORY (INHALATION) at 06:42

## 2023-03-24 RX ADMIN — CEFEPIME HYDROCHLORIDE 1 G: 1 INJECTION, POWDER, FOR SOLUTION INTRAMUSCULAR; INTRAVENOUS at 08:09

## 2023-03-24 RX ADMIN — GLIPIZIDE 5 MG: 5 TABLET ORAL at 12:33

## 2023-03-24 RX ADMIN — IPRATROPIUM BROMIDE AND ALBUTEROL SULFATE 3 ML: 2.5; .5 SOLUTION RESPIRATORY (INHALATION) at 15:59

## 2023-03-24 RX ADMIN — IPRATROPIUM BROMIDE AND ALBUTEROL SULFATE 3 ML: 2.5; .5 SOLUTION RESPIRATORY (INHALATION) at 00:27

## 2023-03-24 RX ADMIN — DILTIAZEM HYDROCHLORIDE 240 MG: 240 CAPSULE, COATED, EXTENDED RELEASE ORAL at 17:45

## 2023-03-24 RX ADMIN — IPRATROPIUM BROMIDE AND ALBUTEROL SULFATE 3 ML: 2.5; .5 SOLUTION RESPIRATORY (INHALATION) at 11:56

## 2023-03-24 RX ADMIN — CEFEPIME HYDROCHLORIDE 1 G: 1 INJECTION, POWDER, FOR SOLUTION INTRAMUSCULAR; INTRAVENOUS at 15:31

## 2023-03-24 RX ADMIN — ENOXAPARIN SODIUM 40 MG: 100 INJECTION SUBCUTANEOUS at 10:12

## 2023-03-24 RX ADMIN — SENNOSIDES AND DOCUSATE SODIUM 1 TABLET: 8.6; 5 TABLET ORAL at 19:42

## 2023-03-24 RX ADMIN — CARVEDILOL 25 MG: 25 TABLET, FILM COATED ORAL at 12:33

## 2023-03-24 RX ADMIN — CEFEPIME HYDROCHLORIDE 1 G: 1 INJECTION, POWDER, FOR SOLUTION INTRAMUSCULAR; INTRAVENOUS at 23:08

## 2023-03-24 RX ADMIN — ENOXAPARIN SODIUM 40 MG: 100 INJECTION SUBCUTANEOUS at 19:42

## 2023-03-24 RX ADMIN — FAMOTIDINE 40 MG: 20 TABLET ORAL at 08:10

## 2023-03-24 RX ADMIN — IPRATROPIUM BROMIDE AND ALBUTEROL SULFATE 3 ML: 2.5; .5 SOLUTION RESPIRATORY (INHALATION) at 19:07

## 2023-03-24 RX ADMIN — DULOXETINE HYDROCHLORIDE 60 MG: 30 CAPSULE, DELAYED RELEASE ORAL at 12:33

## 2023-03-24 RX ADMIN — CEFEPIME HYDROCHLORIDE 1 G: 1 INJECTION, POWDER, FOR SOLUTION INTRAMUSCULAR; INTRAVENOUS at 00:07

## 2023-03-24 NOTE — PLAN OF CARE
Goal Outcome Evaluation:  Plan of Care Reviewed With: patient        Progress: no change    PATIENT WEARING BIPAP AT HS TOLERATED WELL, POWDER TO FOLDS AND DRY SHEETS TO ABSORB MOISTURE, NO ACUTE EVETNS

## 2023-03-24 NOTE — PROGRESS NOTES
[facility]     Progress Note    Patient Name: Stephen Aguero  : 1945  MRN: [id number]  Primary Care Physician:  Papi Vasquez MD  Date of admission: 3/22/2023      Subjective   Brief summary.  Patient admitted with respiratory failure and UTI along with decreased level of consciousness.  Patient improved and transferred out of ICU yesterday      HPI:  Patient seen earlier this morning.  Awake and alert waiting for breakfast.  Wearing BiPAP.  No chest pain.  Heart rate increased some shortness of breath.  .  No fever chills reported.  Overall feeling better                 Review of Systems     Fatigue.  No CP.  Denies increased heart rate or palpitations  Leg swelling.  No abdominal pain  No nausea vomiting      Objective     Vitals:   Temp:  [97.5 °F (36.4 °C)-98.1 °F (36.7 °C)] 97.6 °F (36.4 °C)  Heart Rate:  [] 136  Resp:  [16-24] 16  BP: (121-170)/(80-92) 170/90  Flow (L/min):  [1-2] 1    Physical Exam :     Elderly morbidly obese male not in acute distress  Using BiPAP  Neck supple  Heart irregular and tachycardic  Lungs diminished breath sounds  Abdomen obese and soft  Nontender  Extremities with 3+ edema with chronic venous stasis changes      Result Review:  I have personally reviewed the results from the time of this admission to 3/24/2023 13:52 EDT and agree with these findings:  [x]  Laboratory  [x]  Microbiology  [x]  Radiology  []  EKG/Telemetry   []  Cardiology/Vascular   []  Pathology  []  Old records  []  Other:    EKG with tachycardia questionable A-fib      Assessment / Plan       Active Hospital Problems:  Active Hospital Problems    Diagnosis    • **Acute respiratory failure with hypercapnia (HCC)    • Tachycardia    • Hypoglycemia    • Obesity hypoventilation syndrome (HCC)    • Diabetes mellitus (HCC)    • COPD (chronic obstructive pulmonary disease) (HCC)    • Pneumonia    • Acute UTI (urinary tract infection)        Plan:   Overall patient doing better.  Hypoglycemia  resolved.  Continue antibiotics for UTI and pneumonia.  Patient tachycardic, beta-blocker was on hold because of hypotension on arrival, blood pressure up now we will start Coreg.  EKG with questionable A-fib.  I will consult cardiologist, Dr. Andrews notified.  Thyroid profile as well as magnesium ordered  Continue other medications and treatment.  Check labs in a.m.       DVT prophylaxis:  Medical DVT prophylaxis orders are present.    CODE STATUS:   Code Status (Patient has no pulse and is not breathing): CPR (Attempt to Resuscitate)  Medical Interventions (Patient has pulse or is breathing): Full Support              Electronically signed by Marco Bishop MD, 03/24/23, 1:53 PM EDT.  .

## 2023-03-24 NOTE — PROGRESS NOTES
Reason for consultation respiratory failure    Subjective  Patient with chronic obesity hypoventilation syndrome  Tolerated BiPAP  Feels breathing about at his baseline    Review of systems  Positive for shortness of breath  Negative for chest pain          Vitals:    03/24/23 1156 03/24/23 1213 03/24/23 1554 03/24/23 1559   BP:  170/90     BP Location:  Left arm     Patient Position:  Lying     Pulse: (!) 122 (!) 136 110 108   Resp: 16 16 18 18   Temp:  97.6 °F (36.4 °C) 97.1 °F (36.2 °C)    TempSrc:  Oral Oral    SpO2: 95% 96% 98% 92%   Weight:       Height:           Physical Exam  Right eyelid abnormality  Obese male  Bilateral lower extremity edema with venous stasis changes  Diminished breath sounds at the bases  Abdomen is obese         Brief Patient Summary:  Stephen Aguero is a 77 y.o. male who in the intensive care unit with altered mental status     Active Hospital Problems:       Active Hospital Problems     Diagnosis     • **Acute respiratory failure with hypercapnia (HCC)     • Hypoglycemia     • Obesity hypoventilation syndrome (HCC)     • Diabetes mellitus (HCC)     • COPD (chronic obstructive pulmonary disease) (HCC)     • Pneumonia     • Acute UTI (urinary tract infection)        Plan  Continue antibiotics for urinary tract infection    Continue bronchodilator therapies with DuoNebs every 4 hours    Continue NIPPV to sleep      Electronically signed by Ramses Luis DO, 03/24/23, 6:02 PM EDT.

## 2023-03-24 NOTE — CONSULTS
Cardiology Consult Note  [address]          Patient Identification:  Stephen Aguero      [id number]  77 y.o.        male  1945         Reason for Consultation:  *Atrial fibrillation with rapid rate**    PCP: Papi Vasquez MD    History of Present Illness:     Patient is a 77-year-old male who was brought to the emergency room for decreased level of consciousness.  Patient was hospitalized with decreased level of consciousness and was found to have a low blood sugar and was brought to the emergency room and was found to have CO2 retention and respiratory acidosis as well as pneumonia.  He also had atrial fibrillation with slightly rapid rate and hypotension.  No chest pain.  Not sure how long he has atrial fibrillation not on any anticoagulant.  No history of myocardial infarction    Past History:  Past Medical History:   Diagnosis Date   • Diabetes mellitus (HCC)    • Elevated cholesterol    • GERD (gastroesophageal reflux disease)    • Hypertension      Past Surgical History:   Procedure Laterality Date   • ABDOMINAL SURGERY     • APPENDECTOMY     • EYE SURGERY       Allergies   Allergen Reactions   • Sulfa Antibiotics Unknown - Low Severity     Social History     Socioeconomic History   • Marital status:    Tobacco Use   • Smoking status: Former     Types: Cigarettes   • Smokeless tobacco: Never   Vaping Use   • Vaping Use: Never used   Substance and Sexual Activity   • Alcohol use: Never   • Drug use: Never   • Sexual activity: Defer     Family History   Family history unknown: Yes         Medications:  Prior to Admission medications    Medication Sig Start Date End Date Taking? Authorizing Provider   allopurinol (ZYLOPRIM) 300 MG tablet Take 1 tablet by mouth Daily. 12/20/22   ProviderSteven MD   benazepril (LOTENSIN) 40 MG tablet Take 1 tablet by mouth Daily. 12/20/22   ProviderSteven MD   carvedilol (COREG) 25 MG tablet Take 1 tablet by mouth 2 (Two)  Times a Day. 12/20/22   Steven Mojica MD   Combivent Respimat  MCG/ACT inhaler Inhale 1 puff 4 (Four) Times a Day. 2/28/23   Steven Mojica MD   DULoxetine (CYMBALTA) 60 MG capsule Take 1 capsule by mouth Daily. 12/20/22   Steven Mojica MD   famotidine (PEPCID) 20 MG tablet Take 1 tablet by mouth 2 (Two) Times a Day. 12/20/22   Steven Mojica MD   furosemide (LASIX) 80 MG tablet Take 1 tablet by mouth Daily. 12/20/22   Steven Mojica MD   glipizide (GLUCOTROL) 10 MG tablet Take 1 tablet by mouth 2 (Two) Times a Day. 12/20/22   Steven Mojica MD   HumaLOG KwikPen 100 UNIT/ML solution pen-injector 3 (Three) Times a Day Before Meals. 12/20/22   Steven Mojica MD   metFORMIN (GLUCOPHAGE) 500 MG tablet Take 2 tablets by mouth 2 (Two) Times a Day. 12/20/22   Steven Mojica MD   potassium chloride 10 MEQ CR tablet Take 1 tablet by mouth 2 (Two) Times a Day.   12/20/22   Provider, Historical, MD   Toujeo Max SoloStar 300 UNIT/ML solution pen-injector injection Inject 60 Units under the skin into the appropriate area as directed Daily. 12/20/22   Steven Mojica MD   triamcinolone (KENALOG) 0.1 % cream Apply 1 application topically to the appropriate area as directed 2 (Two) Times a Day. 12/20/22   Steven Mojica MD      Current medications:  carvedilol, 25 mg, Oral, BID  cefepime, 1 g, Intravenous, Q8H  dilTIAZem CD, 240 mg, Oral, Q24H  DULoxetine, 60 mg, Oral, Daily  enoxaparin, 40 mg, Subcutaneous, Q12H  famotidine, 40 mg, Oral, Daily  glipizide, 5 mg, Oral, QAM AC  insulin regular, 2-7 Units, Subcutaneous, Q6H  ipratropium-albuterol, 3 mL, Nebulization, Q4H - RT  senna-docusate sodium, 1 tablet, Oral, BID      Current IV drips:  Pharmacy to Dose Cefepime,   Pharmacy to Dose enoxaparin (LOVENOX),           Review of Systems  Review of Systems   Constitutional: Negative for appetite change, fatigue and fever.   HENT: Negative for congestion.   "  Respiratory: Negative for apnea, choking, chest tightness, shortness of breath and wheezing.    Cardiovascular: Negative for chest pain, palpitations and leg swelling.   Gastrointestinal: Negative for diarrhea, nausea and vomiting.   Genitourinary: Negative for dysuria, frequency and hematuria.   Musculoskeletal: Positive for arthralgias. Negative for myalgias.   Skin: Negative for pallor.   Neurological: Negative for dizziness, tremors, syncope and weakness.   Psychiatric/Behavioral: Negative for agitation and confusion.         Physical Exam    /90 (BP Location: Left arm, Patient Position: Lying)   Pulse 108   Temp 97.1 °F (36.2 °C) (Oral)   Resp 18   Ht 180.3 cm (71\")   Wt (!) 141 kg (310 lb 3 oz)   SpO2 92%   BMI 43.26 kg/m²  Body mass index is 43.26 kg/m².   Oxygen saturation: @ SpO2  Min: 86 %  Max: 98 %    General Appearance:   · no acute distress  · Alert and oriented x3  HENT:   · lips not cyanotic  · Atraumatic  Neck:  · thyroid not enlarged  · supple  Respiratory:  · no respiratory distress  · normal breath sounds  · no rales  Cardiovascular:  · no jugular venous distention  · regular rhythm  · apical impulse normal  · S1 normal, S2 normal  · no S3, no S4   · no murmur  · no rub, no thrill  · no carotid bruit  · pedal pulses normal  · lower extremity edema: none    Gastrointestinal:   · bowel sounds normal  · nontender  · no hepatomegaly, no splenomegaly  Musculoskeletal:  · no clubbing of fingers.   · normocephalic, head atraumatic  Skin:   · warm, dry  · no rashes  Neuro/Psychiatric:  · normal mood and affect  · judgment and insight appropriate      Cardiographics:     ECG  (personally reviewed) atrial fibrillation with slightly rapid rate and right bundle branch block   Telemetry:  (personally reviewed) atrial fibrillation with slightly rapid rate          No results found for this or any previous visit.      Cardiolite (Tc-99m Sestamibi) stress test   Lab Review:       CBC    CBC " 3/22/23 3/23/23 3/24/23   WBC 9.13 9.64 8.95   RBC 4.74 4.47 4.43   Hemoglobin 15.4 14.6 14.5   Hematocrit 47.0 43.6 43.9   MCV 99.2 (A) 97.5 (A) 99.1 (A)   MCH 32.5 32.7 32.7   MCHC 32.8 33.5 33.0   RDW 15.9 (A) 15.8 (A) 16.2 (A)   Platelets 212 201 180   (A) Abnormal value                CMP    CMP 3/22/23 3/22/23 3/23/23 3/24/23    1544 1545     Glucose 283 (A) 145 (A) 108 (A) 177 (A)   BUN  16 15 14   Creatinine  0.69 (A) 0.64 (A) 0.59 (A)   eGFR  94.7 96.9 99.3   Sodium 135.3 (A) 138 139 139   Potassium  3.7 4.2 4.2   Chloride  100 104 102   Calcium  8.6 8.3 (A) 8.5 (A)   Total Protein  7.1 6.4    Albumin  2.9 (A) 2.4 (A)    Globulin  4.2 4.0    Total Bilirubin  0.8 0.9    Alkaline Phosphatase  138 (A) 113    AST (SGOT)  40 35    ALT (SGPT)  29 25    Albumin/Globulin Ratio  0.7 0.6    BUN/Creatinine Ratio  23.2 23.4 23.7   Anion Gap  5.5 6.3 7.3   (A) Abnormal value               CARDIAC LABS:      Lab 03/22/23  1545   PROBNP 232.7      No results found for: DIGOXIN   Lab Results   Component Value Date    TSH 2.410 03/24/2023           Invalid input(s): LDLCALC  No results found for: POCTROP  No components found for: DDIMERQUAN  Lab Results   Component Value Date    MG 1.9 03/24/2023                 Assessment:  Atrial fibrillation with slightly rapid rate  Hypertension  Diabetes mellitus  Pneumonia  Hyperglycemia  COPD        Plan:  Patient heart rate is still slightly high and his blood pressure is also high.  He is already on Coreg 25 p.o. twice daily.  We will start on Cardizem  mg once a day  His Aldair vas score is 4 and may benefit from from chronic anticoagulation      Thank you for allowing me to share in [facility].        Yassine Toledo MD   3/24/2023    17:09 EDT    Portions of this documentation were transcribed electronically from a voice recognition software.  I confirm all data accurately represents the service(s) I performed at today's visit.

## 2023-03-24 NOTE — PLAN OF CARE
Goal Outcome Evaluation:  Plan of Care Reviewed With: patient        Progress: no change  Outcome Evaluation: Pt vss. HR has been elevted up into the 120's. No c/o pain. Remians on 2L nc. Will continue to monitor

## 2023-03-24 NOTE — PLAN OF CARE
10/20/2021  20:05-- Report received from previous nurse, Hector Covarrubias, [facility]. Goal Outcome Evaluation: Pt placed on BIPAP 14/7 28%, tolerated well with no issues.

## 2023-03-25 PROBLEM — I48.91 NEW ONSET A-FIB: Status: ACTIVE | Noted: 2023-03-25

## 2023-03-25 LAB
ALBUMIN SERPL-MCNC: 2.4 G/DL (ref 3.5–5.2)
ALBUMIN SERPL-MCNC: 2.4 G/DL (ref 3.5–5.2)
ALBUMIN/GLOB SERPL: 0.6 G/DL
ALBUMIN/GLOB SERPL: 0.6 G/DL
ALP SERPL-CCNC: 112 U/L (ref 39–117)
ALP SERPL-CCNC: 112 U/L (ref 39–117)
ALT SERPL W P-5'-P-CCNC: 21 U/L (ref 1–41)
ALT SERPL W P-5'-P-CCNC: 21 U/L (ref 1–41)
ANION GAP SERPL CALCULATED.3IONS-SCNC: 4.8 MMOL/L (ref 5–15)
ANION GAP SERPL CALCULATED.3IONS-SCNC: 4.8 MMOL/L (ref 5–15)
ASCENDING AORTA: 3.7 CM
ASCENDING AORTA: 3.7 CM
AST SERPL-CCNC: 27 U/L (ref 1–40)
AST SERPL-CCNC: 27 U/L (ref 1–40)
BASOPHILS # BLD AUTO: 0.07 10*3/MM3 (ref 0–0.2)
BASOPHILS # BLD AUTO: 0.07 10*3/MM3 (ref 0–0.2)
BASOPHILS NFR BLD AUTO: 1 % (ref 0–1.5)
BASOPHILS NFR BLD AUTO: 1 % (ref 0–1.5)
BH CV ECHO MEAS - AO ROOT DIAM: 3.5 CM
BH CV ECHO MEAS - AO ROOT DIAM: 3.5 CM
BH CV ECHO MEAS - EDV(MOD-SP2): 88 ML
BH CV ECHO MEAS - EDV(MOD-SP2): 88 ML
BH CV ECHO MEAS - EDV(MOD-SP4): 55 ML
BH CV ECHO MEAS - EDV(MOD-SP4): 55 ML
BH CV ECHO MEAS - EF(MOD-BP): 54 %
BH CV ECHO MEAS - EF(MOD-BP): 54 %
BH CV ECHO MEAS - ESV(MOD-SP2): 38 ML
BH CV ECHO MEAS - ESV(MOD-SP2): 38 ML
BH CV ECHO MEAS - ESV(MOD-SP4): 28 ML
BH CV ECHO MEAS - ESV(MOD-SP4): 28 ML
BH CV ECHO MEAS - IVSD: 0.8 CM
BH CV ECHO MEAS - IVSD: 0.8 CM
BH CV ECHO MEAS - LA DIMENSION: 28 CM
BH CV ECHO MEAS - LA DIMENSION: 28 CM
BH CV ECHO MEAS - LVIDD: 5.6 CM
BH CV ECHO MEAS - LVIDD: 5.6 CM
BH CV ECHO MEAS - LVIDS: 3.8 CM
BH CV ECHO MEAS - LVIDS: 3.8 CM
BH CV ECHO MEAS - LVOT DIAM: 2.2 CM
BH CV ECHO MEAS - LVOT DIAM: 2.2 CM
BH CV ECHO MEAS - LVPWD: 0.8 CM
BH CV ECHO MEAS - LVPWD: 0.8 CM
BH CV ECHO MEAS - RAP SYSTOLE: 8 MMHG
BH CV ECHO MEAS - RAP SYSTOLE: 8 MMHG
BH CV ECHO MEAS - RVDD: 3.2 CM
BH CV ECHO MEAS - RVDD: 3.2 CM
BH CV ECHO MEAS - RVSP: 42 MMHG
BH CV ECHO MEAS - RVSP: 42 MMHG
BH CV ECHO MEAS - TR MAX PG: 34 MMHG
BH CV ECHO MEAS - TR MAX PG: 34 MMHG
BH CV ECHO MEAS - TR MAX VEL: 290 CM/SEC
BH CV ECHO MEAS - TR MAX VEL: 290 CM/SEC
BILIRUB SERPL-MCNC: 0.9 MG/DL (ref 0–1.2)
BILIRUB SERPL-MCNC: 0.9 MG/DL (ref 0–1.2)
BUN SERPL-MCNC: 16 MG/DL (ref 8–23)
BUN SERPL-MCNC: 16 MG/DL (ref 8–23)
BUN/CREAT SERPL: 28.1 (ref 7–25)
BUN/CREAT SERPL: 28.1 (ref 7–25)
CALCIUM SPEC-SCNC: 8.3 MG/DL (ref 8.6–10.5)
CALCIUM SPEC-SCNC: 8.3 MG/DL (ref 8.6–10.5)
CHLORIDE SERPL-SCNC: 104 MMOL/L (ref 98–107)
CHLORIDE SERPL-SCNC: 104 MMOL/L (ref 98–107)
CO2 SERPL-SCNC: 30.2 MMOL/L (ref 22–29)
CO2 SERPL-SCNC: 30.2 MMOL/L (ref 22–29)
CREAT SERPL-MCNC: 0.57 MG/DL (ref 0.76–1.27)
CREAT SERPL-MCNC: 0.57 MG/DL (ref 0.76–1.27)
DEPRECATED RDW RBC AUTO: 57.8 FL (ref 37–54)
DEPRECATED RDW RBC AUTO: 57.8 FL (ref 37–54)
EGFRCR SERPLBLD CKD-EPI 2021: 100.3 ML/MIN/1.73
EGFRCR SERPLBLD CKD-EPI 2021: 100.3 ML/MIN/1.73
EOSINOPHIL # BLD AUTO: 0.3 10*3/MM3 (ref 0–0.4)
EOSINOPHIL # BLD AUTO: 0.3 10*3/MM3 (ref 0–0.4)
EOSINOPHIL NFR BLD AUTO: 4.1 % (ref 0.3–6.2)
EOSINOPHIL NFR BLD AUTO: 4.1 % (ref 0.3–6.2)
ERYTHROCYTE [DISTWIDTH] IN BLOOD BY AUTOMATED COUNT: 15.9 % (ref 12.3–15.4)
ERYTHROCYTE [DISTWIDTH] IN BLOOD BY AUTOMATED COUNT: 15.9 % (ref 12.3–15.4)
GLOBULIN UR ELPH-MCNC: 3.9 GM/DL
GLOBULIN UR ELPH-MCNC: 3.9 GM/DL
GLUCOSE BLDC GLUCOMTR-MCNC: 123 MG/DL (ref 70–99)
GLUCOSE BLDC GLUCOMTR-MCNC: 123 MG/DL (ref 70–99)
GLUCOSE BLDC GLUCOMTR-MCNC: 136 MG/DL (ref 70–99)
GLUCOSE BLDC GLUCOMTR-MCNC: 136 MG/DL (ref 70–99)
GLUCOSE BLDC GLUCOMTR-MCNC: 151 MG/DL (ref 70–99)
GLUCOSE BLDC GLUCOMTR-MCNC: 151 MG/DL (ref 70–99)
GLUCOSE BLDC GLUCOMTR-MCNC: 190 MG/DL (ref 70–99)
GLUCOSE BLDC GLUCOMTR-MCNC: 190 MG/DL (ref 70–99)
GLUCOSE SERPL-MCNC: 150 MG/DL (ref 65–99)
GLUCOSE SERPL-MCNC: 150 MG/DL (ref 65–99)
HCT VFR BLD AUTO: 40.9 % (ref 37.5–51)
HCT VFR BLD AUTO: 40.9 % (ref 37.5–51)
HGB BLD-MCNC: 13.6 G/DL (ref 13–17.7)
HGB BLD-MCNC: 13.6 G/DL (ref 13–17.7)
IMM GRANULOCYTES # BLD AUTO: 0.03 10*3/MM3 (ref 0–0.05)
IMM GRANULOCYTES # BLD AUTO: 0.03 10*3/MM3 (ref 0–0.05)
IMM GRANULOCYTES NFR BLD AUTO: 0.4 % (ref 0–0.5)
IMM GRANULOCYTES NFR BLD AUTO: 0.4 % (ref 0–0.5)
LEFT ATRIUM VOLUME INDEX: 28 ML/M2
LEFT ATRIUM VOLUME INDEX: 28 ML/M2
LYMPHOCYTES # BLD AUTO: 2.06 10*3/MM3 (ref 0.7–3.1)
LYMPHOCYTES # BLD AUTO: 2.06 10*3/MM3 (ref 0.7–3.1)
LYMPHOCYTES NFR BLD AUTO: 28 % (ref 19.6–45.3)
LYMPHOCYTES NFR BLD AUTO: 28 % (ref 19.6–45.3)
MAXIMAL PREDICTED HEART RATE: 142 BPM
MAXIMAL PREDICTED HEART RATE: 142 BPM
MCH RBC QN AUTO: 32.9 PG (ref 26.6–33)
MCH RBC QN AUTO: 32.9 PG (ref 26.6–33)
MCHC RBC AUTO-ENTMCNC: 33.3 G/DL (ref 31.5–35.7)
MCHC RBC AUTO-ENTMCNC: 33.3 G/DL (ref 31.5–35.7)
MCV RBC AUTO: 98.8 FL (ref 79–97)
MCV RBC AUTO: 98.8 FL (ref 79–97)
MONOCYTES # BLD AUTO: 1.01 10*3/MM3 (ref 0.1–0.9)
MONOCYTES # BLD AUTO: 1.01 10*3/MM3 (ref 0.1–0.9)
MONOCYTES NFR BLD AUTO: 13.7 % (ref 5–12)
MONOCYTES NFR BLD AUTO: 13.7 % (ref 5–12)
NEUTROPHILS NFR BLD AUTO: 3.88 10*3/MM3 (ref 1.7–7)
NEUTROPHILS NFR BLD AUTO: 3.88 10*3/MM3 (ref 1.7–7)
NEUTROPHILS NFR BLD AUTO: 52.8 % (ref 42.7–76)
NEUTROPHILS NFR BLD AUTO: 52.8 % (ref 42.7–76)
NRBC BLD AUTO-RTO: 0 /100 WBC (ref 0–0.2)
NRBC BLD AUTO-RTO: 0 /100 WBC (ref 0–0.2)
PLATELET # BLD AUTO: 166 10*3/MM3 (ref 140–450)
PLATELET # BLD AUTO: 166 10*3/MM3 (ref 140–450)
PMV BLD AUTO: 10.4 FL (ref 6–12)
PMV BLD AUTO: 10.4 FL (ref 6–12)
POTASSIUM SERPL-SCNC: 4.1 MMOL/L (ref 3.5–5.2)
POTASSIUM SERPL-SCNC: 4.1 MMOL/L (ref 3.5–5.2)
PROT SERPL-MCNC: 6.3 G/DL (ref 6–8.5)
PROT SERPL-MCNC: 6.3 G/DL (ref 6–8.5)
QT INTERVAL: 352 MS
QT INTERVAL: 352 MS
RBC # BLD AUTO: 4.14 10*6/MM3 (ref 4.14–5.8)
RBC # BLD AUTO: 4.14 10*6/MM3 (ref 4.14–5.8)
SODIUM SERPL-SCNC: 139 MMOL/L (ref 136–145)
SODIUM SERPL-SCNC: 139 MMOL/L (ref 136–145)
STRESS TARGET HR: 121 BPM
STRESS TARGET HR: 121 BPM
WBC NRBC COR # BLD: 7.35 10*3/MM3 (ref 3.4–10.8)
WBC NRBC COR # BLD: 7.35 10*3/MM3 (ref 3.4–10.8)

## 2023-03-25 PROCEDURE — 94760 N-INVAS EAR/PLS OXIMETRY 1: CPT

## 2023-03-25 PROCEDURE — 94799 UNLISTED PULMONARY SVC/PX: CPT

## 2023-03-25 PROCEDURE — 94664 DEMO&/EVAL PT USE INHALER: CPT

## 2023-03-25 PROCEDURE — 25010000002 FUROSEMIDE PER 20 MG: Performed by: NURSE PRACTITIONER

## 2023-03-25 PROCEDURE — 94761 N-INVAS EAR/PLS OXIMETRY MLT: CPT

## 2023-03-25 PROCEDURE — 63710000001 INSULIN REGULAR HUMAN PER 5 UNITS: Performed by: INTERNAL MEDICINE

## 2023-03-25 PROCEDURE — 82962 GLUCOSE BLOOD TEST: CPT

## 2023-03-25 PROCEDURE — 99232 SBSQ HOSP IP/OBS MODERATE 35: CPT | Performed by: INTERNAL MEDICINE

## 2023-03-25 PROCEDURE — 80053 COMPREHEN METABOLIC PANEL: CPT | Performed by: INTERNAL MEDICINE

## 2023-03-25 PROCEDURE — 85025 COMPLETE CBC W/AUTO DIFF WBC: CPT | Performed by: INTERNAL MEDICINE

## 2023-03-25 PROCEDURE — 25010000002 CEFEPIME PER 500 MG: Performed by: INTERNAL MEDICINE

## 2023-03-25 RX ORDER — FUROSEMIDE 10 MG/ML
40 INJECTION INTRAMUSCULAR; INTRAVENOUS ONCE
Status: COMPLETED | OUTPATIENT
Start: 2023-03-25 | End: 2023-03-25

## 2023-03-25 RX ADMIN — CARVEDILOL 25 MG: 25 TABLET, FILM COATED ORAL at 08:46

## 2023-03-25 RX ADMIN — FAMOTIDINE 40 MG: 20 TABLET ORAL at 08:46

## 2023-03-25 RX ADMIN — FUROSEMIDE 40 MG: 10 INJECTION, SOLUTION INTRAMUSCULAR; INTRAVENOUS at 10:52

## 2023-03-25 RX ADMIN — Medication 10 ML: at 08:46

## 2023-03-25 RX ADMIN — CEFEPIME HYDROCHLORIDE 1 G: 1 INJECTION, POWDER, FOR SOLUTION INTRAMUSCULAR; INTRAVENOUS at 08:46

## 2023-03-25 RX ADMIN — CARVEDILOL 25 MG: 25 TABLET, FILM COATED ORAL at 20:43

## 2023-03-25 RX ADMIN — INSULIN HUMAN 2 UNITS: 100 INJECTION, SOLUTION PARENTERAL at 12:37

## 2023-03-25 RX ADMIN — DULOXETINE HYDROCHLORIDE 60 MG: 30 CAPSULE, DELAYED RELEASE ORAL at 08:46

## 2023-03-25 RX ADMIN — INSULIN HUMAN 2 UNITS: 100 INJECTION, SOLUTION PARENTERAL at 17:24

## 2023-03-25 RX ADMIN — CEFEPIME HYDROCHLORIDE 1 G: 1 INJECTION, POWDER, FOR SOLUTION INTRAMUSCULAR; INTRAVENOUS at 23:16

## 2023-03-25 RX ADMIN — IPRATROPIUM BROMIDE AND ALBUTEROL SULFATE 3 ML: 2.5; .5 SOLUTION RESPIRATORY (INHALATION) at 22:51

## 2023-03-25 RX ADMIN — SENNOSIDES AND DOCUSATE SODIUM 1 TABLET: 8.6; 5 TABLET ORAL at 08:46

## 2023-03-25 RX ADMIN — IPRATROPIUM BROMIDE AND ALBUTEROL SULFATE 3 ML: 2.5; .5 SOLUTION RESPIRATORY (INHALATION) at 11:54

## 2023-03-25 RX ADMIN — GLIPIZIDE 5 MG: 5 TABLET ORAL at 08:46

## 2023-03-25 RX ADMIN — IPRATROPIUM BROMIDE AND ALBUTEROL SULFATE 3 ML: 2.5; .5 SOLUTION RESPIRATORY (INHALATION) at 18:25

## 2023-03-25 RX ADMIN — DILTIAZEM HYDROCHLORIDE 240 MG: 240 CAPSULE, COATED, EXTENDED RELEASE ORAL at 08:46

## 2023-03-25 RX ADMIN — APIXABAN 5 MG: 5 TABLET, FILM COATED ORAL at 10:52

## 2023-03-25 RX ADMIN — IPRATROPIUM BROMIDE AND ALBUTEROL SULFATE 3 ML: 2.5; .5 SOLUTION RESPIRATORY (INHALATION) at 07:50

## 2023-03-25 RX ADMIN — IPRATROPIUM BROMIDE AND ALBUTEROL SULFATE 3 ML: 2.5; .5 SOLUTION RESPIRATORY (INHALATION) at 14:58

## 2023-03-25 RX ADMIN — APIXABAN 5 MG: 5 TABLET, FILM COATED ORAL at 20:42

## 2023-03-25 RX ADMIN — CEFEPIME HYDROCHLORIDE 1 G: 1 INJECTION, POWDER, FOR SOLUTION INTRAMUSCULAR; INTRAVENOUS at 16:39

## 2023-03-25 RX ADMIN — IPRATROPIUM BROMIDE AND ALBUTEROL SULFATE 3 ML: 2.5; .5 SOLUTION RESPIRATORY (INHALATION) at 03:03

## 2023-03-25 RX ADMIN — SENNOSIDES AND DOCUSATE SODIUM 1 TABLET: 8.6; 5 TABLET ORAL at 20:42

## 2023-03-25 NOTE — PROGRESS NOTES
CARDIOLOGY  INPATIENT PROGRESS NOTE               [address]    3/25/2023      PATIENT IDENTIFICATION:   Name:  Stephen Aguero      MRN:  [id number]     77 y.o.  male                 SUBJECTIVE:   Patient feeling better  Atrial fibrillation with controlled rate    OBJECTIVE:  Vitals:    03/25/23 0404 03/25/23 0541 03/25/23 0750 03/25/23 0759   BP: 124/77   151/64   BP Location: Left arm   Other (Comment)   Patient Position: Lying   Lying   Pulse: 84  83 78   Resp: 21 22 28   Temp: 98.2 °F (36.8 °C)   97.2 °F (36.2 °C)   TempSrc: Oral   Oral   SpO2: 95% 94% 92% (!) 89%   Weight:       Height:               Body mass index is 43.26 kg/m².    Intake/Output Summary (Last 24 hours) at 3/25/2023 1118  Last data filed at 3/24/2023 2305  Gross per 24 hour   Intake 660 ml   Output 650 ml   Net 10 ml       Telemetry:     Physical Exam  General Appearance:   · no acute distress  · Alert and oriented x3  HENT:   · lips not cyanotic  · Atraumatic  Neck:  · No JVD   · supple  Respiratory:  · no respiratory distress  · normal breath sounds  · no rales  Cardiovascular:    · regular rhythm  · no S3, no S4   · no murmur  · no rub  · lower extremity edema: none    Skin:   · warm, dry  · No rashes      Allergies   Allergen Reactions   • Sulfa Antibiotics Unknown - Low Severity       Scheduled meds:  apixaban, 5 mg, Oral, Q12H  carvedilol, 25 mg, Oral, BID  cefepime, 1 g, Intravenous, Q8H  dilTIAZem CD, 240 mg, Oral, Q24H  DULoxetine, 60 mg, Oral, Daily  famotidine, 40 mg, Oral, Daily  glipizide, 5 mg, Oral, QAM AC  insulin regular, 2-7 Units, Subcutaneous, Q6H  ipratropium-albuterol, 3 mL, Nebulization, Q4H - RT  senna-docusate sodium, 1 tablet, Oral, BID      IV meds:                      Pharmacy to Dose Cefepime,         Data Review:  CBC    CBC 3/23/23 3/24/23 3/25/23   WBC 9.64 8.95 7.35   RBC 4.47 4.43 4.14   Hemoglobin 14.6 14.5 13.6   Hematocrit 43.6 43.9 40.9   MCV 97.5 (A) 99.1 (A) 98.8 (A)   MCH  32.7 32.7 32.9   MCHC 33.5 33.0 33.3   RDW 15.8 (A) 16.2 (A) 15.9 (A)   Platelets 201 180 166   (A) Abnormal value            CMP    CMP 3/23/23 3/24/23 3/25/23   Glucose 108 (A) 177 (A) 150 (A)   BUN 15 14 16   Creatinine 0.64 (A) 0.59 (A) 0.57 (A)   eGFR 96.9 99.3 100.3   Sodium 139 139 139   Potassium 4.2 4.2 4.1   Chloride 104 102 104   Calcium 8.3 (A) 8.5 (A) 8.3 (A)   Total Protein 6.4  6.3   Albumin 2.4 (A)  2.4 (A)   Globulin 4.0  3.9   Total Bilirubin 0.9  0.9   Alkaline Phosphatase 113  112   AST (SGOT) 35  27   ALT (SGPT) 25  21   Albumin/Globulin Ratio 0.6  0.6   BUN/Creatinine Ratio 23.4 23.7 28.1 (A)   Anion Gap 6.3 7.3 4.8 (A)   (A) Abnormal value             CARDIAC LABS:      Lab 03/24/23  1942 03/24/23  1715 03/22/23  1545   PROBNP  --   --  232.7   HSTROP T 18* 19*  --         No results found for: DIGOXIN   Lab Results   Component Value Date    TSH 2.410 03/24/2023           Invalid input(s): LDLCALC  No results found for: POCTROP  Lab Results   Component Value Date    TROPONINT 18 (H) 03/24/2023   (  Lab Results   Component Value Date    MG 1.9 03/24/2023     Results for orders placed during the hospital encounter of 03/22/23    Adult Transthoracic Echo Complete W/ Cont if Necessary Per Protocol    Interpretation Summary  •  Left ventricular ejection fraction appears to be 51 - 55%.  •  Estimated right ventricular systolic pressure from tricuspid regurgitation is mildly elevated (35-45 mmHg).  •  Mild pulmonary hypertension is present.  •  Mild mitral valve regurgitation is present           ASSESSMENT:  Atrial fibrillation with controlled rate  Hypertension  Diabetes mellitus  Pneumonia  Hyperglycemia  COPD      PLAN:   Continue Eliquis Coreg and Amelia Toledo MD  3/25/2023    11:18 EDT     Portions of this documentation were transcribed electronically from a voice recognition software.  I confirm all data accurately represents the service(s) I performed at today's  visit.

## 2023-03-25 NOTE — PROGRESS NOTES
Reason for consultation respiratory failure    Subjective  Patient with chronic obesity hypoventilation syndrome  Tolerated BiPAP  Feels breathing about at his baseline   Feeling better but does have worsening edema    Review of systems  Positive for shortness of breath  Negative for chest pain  Positive for lower extremity edema        Vitals:    03/25/23 0750 03/25/23 0759 03/25/23 1154 03/25/23 1200   BP:  151/64  131/76   BP Location:  Other (Comment)  Other (Comment)   Patient Position:  Lying  Lying   Pulse: 83 78 69 72   Resp: 22 28 24 20   Temp:  97.2 °F (36.2 °C)  97.5 °F (36.4 °C)   TempSrc:  Oral  Oral   SpO2: 92% (!) 89% 96% 95%   Weight:       Height:           Physical Exam  Right eyelid abnormality  Obese male  Bilateral lower extremity edema with venous stasis changes  Diminished breath sounds at the bases  Abdomen is obese  Does have lower extremity edema      Brief Patient Summary:  Stephen Aguero is a 77 y.o. male who in in the hospital with hypercapnic respiratory failure     Active Hospital Problems:       Active Hospital Problems     Diagnosis     • **Acute respiratory failure with hypercapnia (HCC)     • Hypoglycemia     • Obesity hypoventilation syndrome (HCC)     • Diabetes mellitus (HCC)     • COPD (chronic obstructive pulmonary disease) (HCC)     • Pneumonia     • Acute UTI (urinary tract infection)        Plan  Continue antibiotics for urinary tract infection    Continue bronchodilator therapies with DuoNebs every 4 hours    We will start patient on IV Lasix    Repeat renal panel in the morning to monitor electrolytes given the diuresis    Continue NIPPV to sleep      Electronically signed by Ramses Luis DO, 03/24/23, 6:02 PM EDT.

## 2023-03-25 NOTE — PLAN OF CARE
Goal Outcome Evaluation:              Outcome Evaluation: VSS.  NO complaints of pain or discomfort.  Resting throughout shift.

## 2023-03-25 NOTE — PROGRESS NOTES
[facility]     Progress Note    Patient Name: Stephen Aguero  : 1945  MRN: [id number]  Primary Care Physician:  Papi Vasquez MD  Date of admission: 3/22/2023      Subjective   Brief summary.  Patient admitted with respiratory failure and UTI along with decreased level of consciousness.  Patient improved and transferred out of ICU yesterday      HPI:  Follow-up on respiratory failure and UTI.  Patient feeling better, awake alert.  No new complaints heart rate improved with restarting Coreg, seen by cardiologist added Cardizem.  Recommended anticoagulation.  Patient denies any chest pain .  .  Gets out of breath with exertion only                 Review of Systems     No fever chills  No CP.  Short of breath with exertion  Denies increased heart rate or palpitations  Leg swelling.  No abdominal pain  No nausea vomiting      Objective     Vitals:   Temp:  [97.1 °F (36.2 °C)-98.2 °F (36.8 °C)] 97.2 °F (36.2 °C)  Heart Rate:  [] 78  Resp:  [16-28] 28  BP: (120-170)/(60-90) 151/64  Flow (L/min):  [1-3] 2    Physical Exam :     Elderly morbidly obese male not in acute distress  Awake alert and oriented,  Neck supple  Heart irregular and tachycardic  Lungs diminished breath sounds  Abdomen obese and soft, morbidly obese difficult to palpate  Extremities with 3+ edema with chronic venous stasis changes      Result Review:  I have personally reviewed the results from the time of this admission to 3/25/2023 10:02 EDT and agree with these findings:  [x]  Laboratory  [x]  Microbiology  [x]  Radiology  []  EKG/Telemetry   []  Cardiology/Vascular   []  Pathology  []  Old records  []  Other:       Blood sugar stable    Assessment / Plan       Active Hospital Problems:  Active Hospital Problems    Diagnosis    • **Acute respiratory failure with hypercapnia (HCC)    • New onset a-fib (HCC)    • Tachycardia    • Hypoglycemia    • Obesity hypoventilation syndrome (HCC)    • Diabetes mellitus (HCC)    • COPD  (chronic obstructive pulmonary disease) (HCC)    • Pneumonia    • Acute UTI (urinary tract infection)        Plan:   Patient improving.  Respiratory status better with BiPAP.  Heart rate improved.  Added Cardizem.  Cardiologist recommended anticoagulation.  We will start Eliquis.  Discontinue enoxaparin.  Continue antibiotic for UTI  Increase activity with assistance.  PT OT.  Evaluate for rehab place.       DVT prophylaxis:  Medical DVT prophylaxis orders are present.    CODE STATUS:   Code Status (Patient has no pulse and is not breathing): CPR (Attempt to Resuscitate)  Medical Interventions (Patient has pulse or is breathing): Full Support                Electronically signed by Marco Bishop MD, 03/25/23, 10:02 AM EDT.    .

## 2023-03-25 NOTE — PLAN OF CARE
Goal Outcome Evaluation:  Plan of Care Reviewed With: patient   Patient is alert and orientated with confusion noted at times. Vital signs within normal limits this shift. Denies any pain or discomfort at this time. Patient has been resting well this shift.     Progress: no change

## 2023-03-26 LAB
GLUCOSE BLDC GLUCOMTR-MCNC: 158 MG/DL (ref 70–99)
GLUCOSE BLDC GLUCOMTR-MCNC: 158 MG/DL (ref 70–99)
GLUCOSE BLDC GLUCOMTR-MCNC: 179 MG/DL (ref 70–99)
GLUCOSE BLDC GLUCOMTR-MCNC: 179 MG/DL (ref 70–99)
GLUCOSE BLDC GLUCOMTR-MCNC: 185 MG/DL (ref 70–99)
GLUCOSE BLDC GLUCOMTR-MCNC: 185 MG/DL (ref 70–99)

## 2023-03-26 PROCEDURE — 82962 GLUCOSE BLOOD TEST: CPT

## 2023-03-26 PROCEDURE — 25010000002 FUROSEMIDE PER 20 MG: Performed by: NURSE PRACTITIONER

## 2023-03-26 PROCEDURE — 63710000001 INSULIN REGULAR HUMAN PER 5 UNITS: Performed by: INTERNAL MEDICINE

## 2023-03-26 PROCEDURE — 25010000002 FUROSEMIDE PER 20 MG: Performed by: INTERNAL MEDICINE

## 2023-03-26 PROCEDURE — 94761 N-INVAS EAR/PLS OXIMETRY MLT: CPT

## 2023-03-26 PROCEDURE — 25010000002 CEFEPIME PER 500 MG: Performed by: INTERNAL MEDICINE

## 2023-03-26 PROCEDURE — 94760 N-INVAS EAR/PLS OXIMETRY 1: CPT

## 2023-03-26 PROCEDURE — 99232 SBSQ HOSP IP/OBS MODERATE 35: CPT | Performed by: INTERNAL MEDICINE

## 2023-03-26 PROCEDURE — 94799 UNLISTED PULMONARY SVC/PX: CPT

## 2023-03-26 PROCEDURE — 97165 OT EVAL LOW COMPLEX 30 MIN: CPT

## 2023-03-26 RX ORDER — FUROSEMIDE 10 MG/ML
40 INJECTION INTRAMUSCULAR; INTRAVENOUS EVERY 12 HOURS
Status: DISCONTINUED | OUTPATIENT
Start: 2023-03-26 | End: 2023-03-29

## 2023-03-26 RX ORDER — DILTIAZEM HYDROCHLORIDE 240 MG/1
240 CAPSULE, COATED, EXTENDED RELEASE ORAL 2 TIMES DAILY
Status: DISCONTINUED | OUTPATIENT
Start: 2023-03-26 | End: 2023-03-28

## 2023-03-26 RX ORDER — FUROSEMIDE 10 MG/ML
40 INJECTION INTRAMUSCULAR; INTRAVENOUS ONCE
Status: COMPLETED | OUTPATIENT
Start: 2023-03-26 | End: 2023-03-26

## 2023-03-26 RX ADMIN — INSULIN HUMAN 2 UNITS: 100 INJECTION, SOLUTION PARENTERAL at 12:25

## 2023-03-26 RX ADMIN — DILTIAZEM HYDROCHLORIDE 240 MG: 240 CAPSULE, COATED, EXTENDED RELEASE ORAL at 09:07

## 2023-03-26 RX ADMIN — FUROSEMIDE 40 MG: 10 INJECTION, SOLUTION INTRAMUSCULAR; INTRAVENOUS at 12:24

## 2023-03-26 RX ADMIN — APIXABAN 5 MG: 5 TABLET, FILM COATED ORAL at 09:06

## 2023-03-26 RX ADMIN — IPRATROPIUM BROMIDE AND ALBUTEROL SULFATE 3 ML: 2.5; .5 SOLUTION RESPIRATORY (INHALATION) at 17:18

## 2023-03-26 RX ADMIN — DILTIAZEM HYDROCHLORIDE 240 MG: 240 CAPSULE, COATED, EXTENDED RELEASE ORAL at 21:51

## 2023-03-26 RX ADMIN — DULOXETINE HYDROCHLORIDE 60 MG: 30 CAPSULE, DELAYED RELEASE ORAL at 09:07

## 2023-03-26 RX ADMIN — GLIPIZIDE 5 MG: 5 TABLET ORAL at 09:07

## 2023-03-26 RX ADMIN — CEFEPIME HYDROCHLORIDE 1 G: 1 INJECTION, POWDER, FOR SOLUTION INTRAMUSCULAR; INTRAVENOUS at 16:00

## 2023-03-26 RX ADMIN — IPRATROPIUM BROMIDE AND ALBUTEROL SULFATE 3 ML: 2.5; .5 SOLUTION RESPIRATORY (INHALATION) at 18:42

## 2023-03-26 RX ADMIN — Medication 10 ML: at 09:06

## 2023-03-26 RX ADMIN — IPRATROPIUM BROMIDE AND ALBUTEROL SULFATE 3 ML: 2.5; .5 SOLUTION RESPIRATORY (INHALATION) at 07:16

## 2023-03-26 RX ADMIN — FAMOTIDINE 40 MG: 20 TABLET ORAL at 09:06

## 2023-03-26 RX ADMIN — HYDROCODONE BITARTRATE AND ACETAMINOPHEN 1 TABLET: 10; 325 TABLET ORAL at 09:15

## 2023-03-26 RX ADMIN — APIXABAN 5 MG: 5 TABLET, FILM COATED ORAL at 21:50

## 2023-03-26 RX ADMIN — INSULIN HUMAN 2 UNITS: 100 INJECTION, SOLUTION PARENTERAL at 17:10

## 2023-03-26 RX ADMIN — CEFEPIME HYDROCHLORIDE 1 G: 1 INJECTION, POWDER, FOR SOLUTION INTRAMUSCULAR; INTRAVENOUS at 09:06

## 2023-03-26 RX ADMIN — IPRATROPIUM BROMIDE AND ALBUTEROL SULFATE 3 ML: 2.5; .5 SOLUTION RESPIRATORY (INHALATION) at 23:37

## 2023-03-26 RX ADMIN — SENNOSIDES AND DOCUSATE SODIUM 1 TABLET: 8.6; 5 TABLET ORAL at 21:50

## 2023-03-26 RX ADMIN — CARVEDILOL 25 MG: 25 TABLET, FILM COATED ORAL at 09:06

## 2023-03-26 RX ADMIN — CARVEDILOL 25 MG: 25 TABLET, FILM COATED ORAL at 21:50

## 2023-03-26 RX ADMIN — INSULIN HUMAN 2 UNITS: 100 INJECTION, SOLUTION PARENTERAL at 06:44

## 2023-03-26 RX ADMIN — FUROSEMIDE 40 MG: 10 INJECTION, SOLUTION INTRAMUSCULAR; INTRAVENOUS at 14:02

## 2023-03-26 RX ADMIN — IPRATROPIUM BROMIDE AND ALBUTEROL SULFATE 3 ML: 2.5; .5 SOLUTION RESPIRATORY (INHALATION) at 02:31

## 2023-03-26 RX ADMIN — IPRATROPIUM BROMIDE AND ALBUTEROL SULFATE 3 ML: 2.5; .5 SOLUTION RESPIRATORY (INHALATION) at 11:58

## 2023-03-26 RX ADMIN — SENNOSIDES AND DOCUSATE SODIUM 1 TABLET: 8.6; 5 TABLET ORAL at 09:07

## 2023-03-26 NOTE — PLAN OF CARE
Goal Outcome Evaluation:  Plan of Care Reviewed With: patient        Progress: no change  Outcome Evaluation: Patient presents with limitations that impede his/her ability to perform ADLS. The skills of a therapist are necessary to maximize independence with ADLs.  Recommend subacute rehab following hospital discharge

## 2023-03-26 NOTE — PLAN OF CARE
Goal Outcome Evaluation:              Outcome Evaluation: Patient has slept well this shift on BIPAP. No c/o pain, VSS, no significant changes.

## 2023-03-26 NOTE — THERAPY EVALUATION
Patient Name: Stephen Aguero  : 1945    MRN: [id number]                              Today's Date: 3/26/2023       Admit Date: 3/22/2023    Visit Dx:     ICD-10-CM ICD-9-CM   1. Hypoglycemia  E16.2 251.2   2. Pneumonia of both lungs due to infectious organism, unspecified part of lung  J18.9 483.8   3. Hypercapnia  R06.89 786.09   4. Somnolence  R40.0 780.09   5. Sepsis, due to unspecified organism, unspecified whether acute organ dysfunction present (HCC)  A41.9 038.9     995.91   6. Dysphagia, oropharyngeal  R13.12 787.22   7. Decreased activities of daily living (ADL)  Z78.9 V49.89     Patient Active Problem List   Diagnosis   • Acute respiratory failure with hypercapnia (HCC)   • Hypoglycemia   • Diabetes mellitus (HCC)   • COPD (chronic obstructive pulmonary disease) ([facility])   • Pneumonia   • Acute UTI (urinary tract infection)   • Obesity hypoventilation syndrome (HCC)   • Tachycardia   • New onset a-fib ([facility])     Past Medical History:   Diagnosis Date   • Diabetes mellitus (HCC)    • Elevated cholesterol    • GERD (gastroesophageal reflux disease)    • Hypertension      Past Surgical History:   Procedure Laterality Date   • ABDOMINAL SURGERY     • APPENDECTOMY     • EYE SURGERY        General Information     Row Name 23 0945          OT Time and Intention    Document Type evaluation  -AC     Mode of Treatment individual therapy;occupational therapy  -AC     Row Name 23          General Information    Patient Profile Reviewed yes  -AC     Prior Level of Function --  patient reports assist from wife for adls and transfers. patient states he uses a rolling walker but mostly sedentary.  Recently received a shower chair for bathing. no supplemental O2  -AC     Existing Precautions/Restrictions fall  -AC     Barriers to Rehab none identified  -AC     Row Name 2345          Occupational Profile    Reason for Services/Referral (Occupational Profile) Pt. is a 77year old male admitted  for the above diagnosis. Pt. referred to OT services to assess independence with ADLs and adl transfers/fx'l mobility. No previous OT services for current condition.  -     Row Name 03/26/23 0945          Living Environment    People in Home spouse  -     Row Name 03/26/23 0945          Cognition    Orientation Status (Cognition) oriented x 3  -     Row Name 03/26/23 0945          Safety Issues, Functional Mobility    Impairments Affecting Function (Mobility) balance;endurance/activity tolerance;shortness of breath  -           User Key  (r) = Recorded By, (t) = Taken By, (c) = Cosigned By    Initials Name Provider Type     Rosalva Pal, OT Occupational Therapist                 Mobility/ADL's     Row Name 03/26/23 0953          Bed Mobility    Bed Mobility bed mobility (all) activities  -     All Activities, [place] (Bed Mobility) moderate assist (50% patient effort);1 person assist  -     Bed Mobility, Safety Issues decreased use of arms for pushing/pulling;decreased use of legs for bridging/pushing  -     Assistive Device (Bed Mobility) bed rails;draw sheet;head of bed elevated  -     Row Name 03/26/23 0953          Transfers    Transfers sit-stand transfer;stand-sit transfer  -     Row Name 03/26/23 0953          Sit-Stand Transfer    Sit-Stand [place] (Transfers) minimum assist (75% patient effort);1 person assist;verbal cues  -     Comment, (Sit-Stand Transfer) use of bed rail  -     Row Name 03/26/23 0953          Stand-Sit Transfer    Stand-Sit [place] (Transfers) verbal cues;contact guard  -     Row Name 03/26/23 0953          Functional Mobility    Functional Mobility- Ind. Level contact guard assist;1 person  -     Functional Mobility- Comment patient was CGA with side stepping x2 toward HOB with use of bed rail.  -     Row Name 03/26/23 0953          Activities of Daily Living    BADL Assessment/Intervention --  patient is max A for lower body  bathing/dressing, max A for toileting, min A for upper body bathing/dressing, setup for self-feeding, setup for grooming.  -           User Key  (r) = Recorded By, (t) = Taken By, (c) = Cosigned By    Initials Name Provider Type    Rosalva Perez OT Occupational Therapist               Obj/Interventions     [facility] Name 03/26/23 0955          Sensory Assessment (Somatosensory)    Sensory Assessment (Somatosensory) sensation intact  -[facility] Name 03/26/23 0955          Vision Assessment/Intervention    Vision Assessment Comment Left eye blindness  -[facility] 03/26/23 0955          Range of Motion Comprehensive    General Range of Motion bilateral upper extremity ROM WFL  -[facility] Name 03/26/23 0955          Strength Comprehensive (MMT)    General Manual Muscle Testing (MMT) Assessment no strength deficits identified  -[facility] Name 03/26/23 0955          Motor Skills    Motor Skills coordination;functional endurance  -     Coordination WFL  -     Functional Endurance fair-/fair  -[facility] 03/26/23 0955          Balance    Balance Assessment standing dynamic balance  -     Dynamic Standing Balance minimal assist;contact guard  -     Position/Device Used, Standing Balance supported  -     Balance Interventions sit to stand;standing;supported;dynamic;minimal challenge;occupation based/functional task  -           User Key  (r) = Recorded By, (t) = Taken By, (c) = Cosigned By    Initials Name Provider Type    Rosalva Perez OT Occupational Therapist               Goals/Plan     [facility] Name 03/26/23 1002          Transfer Goal 1 (OT)    Activity/Assistive Device (Transfer Goal 1, OT) transfers, all  -AC     [place] Level/Cues Needed (Transfer Goal 1, OT) modified independence  -     Time Frame (Transfer Goal 1, OT) long term goal (LTG);10 days  -[facility] Name 03/26/23 1002          Bathing Goal 1 (OT)    Activity/Device (Bathing Goal 1, OT) bathing skills, all  -AC      [place] Level/Cues Needed (Bathing Goal 1, OT) modified independence  -AC     Time Frame (Bathing Goal 1, OT) long term goal (LTG);10 days  -AC     Row Name 03/26/23 1002          Dressing Goal 1 (OT)    Activity/Device (Dressing Goal 1, OT) dressing skills, all  -AC     [place]/Cues Needed (Dressing Goal 1, OT) modified independence  -AC     Time Frame (Dressing Goal 1, OT) long term goal (LTG);10 days  -AC     Row Name 03/26/23 1002          Toileting Goal 1 (OT)    Activity/Device (Toileting Goal 1, OT) toileting skills, all  -AC     [place] Level/Cues Needed (Toileting Goal 1, OT) modified independence  -AC     Time Frame (Toileting Goal 1, OT) long term goal (LTG);10 days  -AC     Row Name 03/26/23 1002          Grooming Goal 1 (OT)    Activity/Device (Grooming Goal 1, OT) grooming skills, all  -AC     [place] (Grooming Goal 1, OT) modified independence  -AC     Time Frame (Grooming Goal 1, OT) long term goal (LTG);10 days  -AC     Row Name 03/26/23 1002          Problem Specific Goal 1 (OT)    Problem Specific Goal 1 (OT) patient will improve endurance to good for adls  -AC     Time Frame (Problem Specific Goal 1, OT) long term goal (LTG);10 days  -AC     Row Name 03/26/23 1002          Therapy Assessment/Plan (OT)    Planned Therapy Interventions (OT) activity tolerance training;BADL retraining;functional balance retraining;occupation/activity based interventions;patient/caregiver education/training;transfer/mobility retraining;ROM/therapeutic exercise  -           User Key  (r) = Recorded By, (t) = Taken By, (c) = Cosigned By    Initials Name Provider Type    AC Rosalva Pal OT Occupational Therapist               Clinical Impression     Row Name 03/26/23 0959          Pain Assessment    Pretreatment Pain Rating 0/10 - no pain  -AC     Posttreatment Pain Rating 0/10 - no pain  -AC     Row Name 03/26/23 0959          Plan of Care Review    Plan of Care Reviewed With patient  -AC      Progress no change  -     Outcome Evaluation Patient presents with limitations that impede his/her ability to perform ADLS. The skills of a therapist are necessary to maximize independence with ADLs.  Recommend subacute rehab following hospital discharge  -     Row Name 03/26/23 0959          Therapy Assessment/Plan (OT)    Patient/Family Therapy Goal Statement (OT) Patient would like to maximize independence with adls.  -     Rehab Potential (OT) good, to achieve stated therapy goals  -     Criteria for Skilled Therapeutic Interventions Met (OT) yes;meets criteria;skilled treatment is necessary  -     Therapy Frequency (OT) 5 times/wk  -     Row Name 03/26/23 0959          Therapy Plan Review/Discharge Plan (OT)    Equipment Needs Upon Discharge (OT) walker, rolling;commode chair  -     Anticipated Discharge Disposition (OT) sub acute care setting  -     Row Name 03/26/23 0959          Positioning and Restraints    Pre-Treatment Position in bed  -     Post Treatment Position bed  -AC     In Bed fowlers;call light within reach;encouraged to call for assist;exit alarm on  -           User Key  (r) = Recorded By, (t) = Taken By, (c) = Cosigned By    Initials Name Provider Type     Rosalva Pal, [place] Occupational Therapist               Outcome Measures     Row Name 03/26/23 1008          How much help from another is currently needed...    Putting on and taking off regular lower body clothing? 1  -AC     Bathing (including washing, rinsing, and drying) 2  -AC     Toileting (which includes using toilet bed pan or urinal) 2  -AC     Putting on and taking off regular upper body clothing 3  -AC     Taking care of personal grooming (such as brushing teeth) 4  -AC     Eating meals 4  -AC     AM-PAC 6 Clicks Score (OT) 16  -     Row Name 03/26/23 1008          Functional Assessment    Outcome Measure Options AM-PAC 6 Clicks Daily Activity (OT);Optimal Instrument  -     Row Name 03/26/23 1008           Optimal Instrument    Optimal Instrument Optimal - 3  -AC     Bending/Stooping 4  -AC     Standing 2  -AC     Reaching 1  -AC     From the list, choose the 3 activities you would most like to be able to do without any difficulty Bending/stooping;Standing;Reaching  -AC     Total Score Optimal - 3 7  -AC           User Key  (r) = Recorded By, (t) = Taken By, (c) = Cosigned By    Initials Name Provider Type     Rosalva aPl OT Occupational Therapist                Occupational Therapy Education     Title: PT OT SLP Therapies (Done)     Topic: Occupational Therapy (Done)     Point: ADL training (Done)     Description:   Instruct learner(s) on proper safety adaptation and remediation techniques during self care or transfers.   Instruct in proper use of assistive devices.              Learning Progress Summary           Patient Acceptance, E, VU by  at 3/26/2023 1010                   Point: Home exercise program (Done)     Description:   Instruct learner(s) on appropriate technique for monitoring, assisting and/or progressing therapeutic exercises/activities.              Learning Progress Summary           Patient Acceptance, E, VU by  at 3/26/2023 1010                   Point: Precautions (Done)     Description:   Instruct learner(s) on prescribed precautions during self-care and functional transfers.              Learning Progress Summary           Patient Acceptance, E, VU by  at 3/26/2023 1010                   Point: Body mechanics (Done)     Description:   Instruct learner(s) on proper positioning and spine alignment during self-care, functional mobility activities and/or exercises.              Learning Progress Summary           Patient Acceptance, E, VU by  at 3/26/2023 1010                               User Key     Initials Effective Dates Name Provider Type Discipline     06/16/21 -  Rosalva Pal OT Occupational Therapist OT              OT Recommendation and Plan  Planned Therapy  Interventions (OT): activity tolerance training, BADL retraining, functional balance retraining, occupation/activity based interventions, patient/caregiver education/training, transfer/mobility retraining, ROM/therapeutic exercise  Therapy Frequency (OT): 5 times/wk  Plan of Care Review  Plan of Care Reviewed With: patient  Progress: no change  Outcome Evaluation: Patient presents with limitations that impede his/her ability to perform ADLS. The skills of a therapist are necessary to maximize independence with ADLs.  Recommend subacute rehab following hospital discharge     Time Calculation:    Time Calculation- OT     Row Name 03/26/23 1010             Time Calculation- OT    OT Received On 03/26/23  -AC      OT Goal Re-Cert Due Date 04/04/23  -AC         Untimed Charges    OT Eval/Re-eval Minutes 32  -AC         Total Minutes    Untimed Charges Total Minutes 32  -AC       Total Minutes 32  -AC            User Key  (r) = Recorded By, (t) = Taken By, (c) = Cosigned By    Initials Name Provider Type    AC Rosalva Pal OT Occupational Therapist              Therapy Charges for Today     Code Description Service Date Service Provider Modifiers Qty    09838183104 HC OT EVAL LOW COMPLEXITY 3 3/26/2023 Rosalva Pal OT GO 1               Rosalva Pal OT  3/26/2023

## 2023-03-26 NOTE — PLAN OF CARE
Goal Outcome Evaluation:   Patient on 2L off of v60 unit. Continues to wear for prn or qhs. No other issues or complaints.

## 2023-03-26 NOTE — PROGRESS NOTES
CARDIOLOGY  INPATIENT PROGRESS NOTE               [address]    3/26/2023      PATIENT IDENTIFICATION:   Name:  Stephen Aguero      MRN:  [id number]     77 y.o.  male                 SUBJECTIVE:   Patient still feels weak and tired  Atrial fibrillation with slightly rapid rate    OBJECTIVE:  Vitals:    03/26/23 0231 03/26/23 0343 03/26/23 0714 03/26/23 0723   BP:  127/60  151/68   BP Location:  Left arm  Left arm   Patient Position:  Lying  Lying   Pulse: 60 61  75   Resp: 20 21 17 18   Temp:  97.8 °F (36.6 °C)     TempSrc:  Oral     SpO2: 96% 96% 96% 97%   Weight:       Height:               Body mass index is 43.26 kg/m².    Intake/Output Summary (Last 24 hours) at 3/26/2023 1055  Last data filed at 3/25/2023 1839  Gross per 24 hour   Intake 240 ml   Output 1275 ml   Net -1035 ml       Telemetry:     Physical Exam  General Appearance:   · no acute distress  · Alert and oriented x3  HENT:   · lips not cyanotic  · Atraumatic  Neck:  · No JVD   · supple  Respiratory:  · no respiratory distress  · normal breath sounds  · no rales  Cardiovascular:    · regular rhythm  · no S3, no S4   · no murmur  · no rub  · lower extremity edema: none    Skin:   · warm, dry  · No rashes      Allergies   Allergen Reactions   • Sulfa Antibiotics Unknown - Low Severity       Scheduled meds:  apixaban, 5 mg, Oral, Q12H  carvedilol, 25 mg, Oral, BID  cefepime, 1 g, Intravenous, Q8H  dilTIAZem CD, 240 mg, Oral, BID  DULoxetine, 60 mg, Oral, Daily  famotidine, 40 mg, Oral, Daily  furosemide, 40 mg, Intravenous, Once  glipizide, 5 mg, Oral, QAM AC  insulin regular, 2-7 Units, Subcutaneous, Q6H  ipratropium-albuterol, 3 mL, Nebulization, Q4H - RT  senna-docusate sodium, 1 tablet, Oral, BID      IV meds:                      Pharmacy to Dose Cefepime,         Data Review:  CBC    CBC 3/23/23 3/24/23 3/25/23   WBC 9.64 8.95 7.35   RBC 4.47 4.43 4.14   Hemoglobin 14.6 14.5 13.6   Hematocrit 43.6 43.9 40.9   MCV 97.5 (A)  99.1 (A) 98.8 (A)   MCH 32.7 32.7 32.9   MCHC 33.5 33.0 33.3   RDW 15.8 (A) 16.2 (A) 15.9 (A)   Platelets 201 180 166   (A) Abnormal value            CMP    CMP 3/23/23 3/24/23 3/25/23   Glucose 108 (A) 177 (A) 150 (A)   BUN 15 14 16   Creatinine 0.64 (A) 0.59 (A) 0.57 (A)   eGFR 96.9 99.3 100.3   Sodium 139 139 139   Potassium 4.2 4.2 4.1   Chloride 104 102 104   Calcium 8.3 (A) 8.5 (A) 8.3 (A)   Total Protein 6.4  6.3   Albumin 2.4 (A)  2.4 (A)   Globulin 4.0  3.9   Total Bilirubin 0.9  0.9   Alkaline Phosphatase 113  112   AST (SGOT) 35  27   ALT (SGPT) 25  21   Albumin/Globulin Ratio 0.6  0.6   BUN/Creatinine Ratio 23.4 23.7 28.1 (A)   Anion Gap 6.3 7.3 4.8 (A)   (A) Abnormal value             CARDIAC LABS:      Lab 03/24/23  1942 03/24/23  1715 03/22/23  1545   PROBNP  --   --  232.7   HSTROP T 18* 19*  --         No results found for: DIGOXIN   Lab Results   Component Value Date    TSH 2.410 03/24/2023           Invalid input(s): LDLCALC  No results found for: POCTROP  Lab Results   Component Value Date    TROPONINT 18 (H) 03/24/2023   (  Lab Results   Component Value Date    MG 1.9 03/24/2023     Results for orders placed during the hospital encounter of 03/22/23    Adult Transthoracic Echo Complete W/ Cont if Necessary Per Protocol    Interpretation Summary  •  Left ventricular ejection fraction appears to be 51 - 55%.  •  Estimated right ventricular systolic pressure from tricuspid regurgitation is mildly elevated (35-45 mmHg).  •  Mild pulmonary hypertension is present.  •  Mild mitral valve regurgitation is present           ASSESSMENT:    Atrial fibrillation with controlled rate  Hypertension  Diabetes mellitus  Pneumonia  Hyperglycemia  COPD    PLAN:   Continue Eliquis and Coreg  Increase Cardizem CD  240 mg to twice daily to better control the rate      Yassine Toledo MD  3/26/2023    10:55 EDT     Portions of this documentation were transcribed electronically from a voice recognition  software.  I confirm all data accurately represents the service(s) I performed at today's visit.

## 2023-03-26 NOTE — PLAN OF CARE
Goal Outcome Evaluation:  Plan of Care Reviewed With: patient   Patient is alert and orientated x4. Vital signs within normal limits this shift. Denies any pain or discomfort at this time. Patient complained of pain earlier in shift, PRN pain medication effective at pain relief.      Progress: no change

## 2023-03-26 NOTE — PROGRESS NOTES
[facility]     Progress Note    Patient Name: Stephen Aguero  : 1945  MRN: [id number]  Primary Care Physician:  Papi Vasquez MD  Date of admission: 3/22/2023      Subjective   Brief summary.  Patient admitted with respiratory failure and UTI along with decreased level of consciousness.  Admitted to ICU and transferred out after 24 hours      HPI:    Patient feeling better still have some chest congestion  No cough  No wheezing, tired  Sleepy         Review of Systems     No fever chills  Short of breath  Denies any palpitation  Leg swelling.  No abdominal pain  No nausea vomiting      Objective     Vitals:   Temp:  [97.3 °F (36.3 °C)-97.8 °F (36.6 °C)] 97.8 °F (36.6 °C)  Heart Rate:  [54-75] 75  Resp:  [17-24] 18  BP: (113-151)/(60-76) 151/68  Flow (L/min):  [2] 2    Physical Exam :     Elderly morbidly obese male not in acute distress  Awake alert and oriented,  Neck supple  Heart irregular and tachycardic  Lungs diminished breath sounds few crackles  Abdomen obese and soft, morbidly obese difficult to palpate  Extremities with 3+ edema with chronic venous stasis changes      Result Review:  I have personally reviewed the results from the time of this admission to 3/26/2023 10:03 EDT and agree with these findings:  [x]  Laboratory  [x]  Microbiology  [x]  Radiology  []  EKG/Telemetry   []  Cardiology/Vascular   []  Pathology  []  Old records  []  Other:       Blood sugar stable no further hypoglycemia      Assessment / Plan       Active Hospital Problems:  Active Hospital Problems    Diagnosis    • **Acute respiratory failure with hypercapnia (HCC)    • New onset a-fib (HCC)    • Tachycardia    • Hypoglycemia    • Obesity hypoventilation syndrome (HCC)    • Diabetes mellitus (HCC)    • COPD (chronic obstructive pulmonary disease) (HCC)    • Pneumonia    • Acute UTI (urinary tract infection)        Plan:   Patient improving.  Lasix x1 for shortness of breath  Continue rest of the  treatment  Continue BiPAP and antibiotic  Possible discharge in 1 to 2 days to nursing home for inpatient rehab.       DVT prophylaxis:  Medical DVT prophylaxis orders are present.    CODE STATUS:   Code Status (Patient has no pulse and is not breathing): CPR (Attempt to Resuscitate)  Medical Interventions (Patient has pulse or is breathing): Full Support                  Electronically signed by Marco Bishop MD, 03/26/23, 10:31 AM EDT.      .

## 2023-03-26 NOTE — PROGRESS NOTES
Reason for consultation respiratory failure    Subjective  Patient with chronic obesity hypoventilation syndrome  Tolerated BiPAP  Feels breathing about at his baseline   Responding well to diuretics    Review of systems  Positive for shortness of breath  Negative for chest pain  Positive for lower extremity edema        Vitals:    03/26/23 0723 03/26/23 1101 03/26/23 1154 03/26/23 1454   BP: 151/68 126/62  120/62   BP Location: Left arm Left arm  Left arm   Patient Position: Lying Lying  Lying   Pulse: 75 72  54   Resp: 18 18 18 18   Temp:  97.4 °F (36.3 °C)  97.4 °F (36.3 °C)   TempSrc:  Oral  Oral   SpO2: 97% 97% 97% 94%   Weight:       Height:           Physical Exam  Right eyelid abnormality  Obese male  Bilateral lower extremity edema with venous stasis changes  Diminished breath sounds at the bases  Abdomen is obese  Does have lower extremity edema      Brief Patient Summary:  Stephen Aguero is a 77 y.o. male who in in the hospital with hypercapnic respiratory failure     Active Hospital Problems:       Active Hospital Problems     Diagnosis     • **Acute respiratory failure with hypercapnia (HCC)     • Hypoglycemia     • Obesity hypoventilation syndrome (HCC)     • Diabetes mellitus (HCC)     • COPD (chronic obstructive pulmonary disease) (HCC)     • Pneumonia     • Acute UTI (urinary tract infection)        Plan  Continue antibiotics for urinary tract infection    Continue bronchodilator therapies with DuoNebs every 4 hours    We will start patient on IV Lasix twice daily    Repeat renal panel in the morning and magnesium level      Continue NIPPV to sleep    We will ultimately need rehab    Electronically signed by Ramses Luis DO, 03/26/23, 3:12 PM EDT.

## 2023-03-27 LAB
ALBUMIN SERPL-MCNC: 2.8 G/DL (ref 3.5–5.2)
ALBUMIN SERPL-MCNC: 2.8 G/DL (ref 3.5–5.2)
ALBUMIN/GLOB SERPL: 0.7 G/DL
ALBUMIN/GLOB SERPL: 0.7 G/DL
ALP SERPL-CCNC: 125 U/L (ref 39–117)
ALP SERPL-CCNC: 125 U/L (ref 39–117)
ALT SERPL W P-5'-P-CCNC: 23 U/L (ref 1–41)
ALT SERPL W P-5'-P-CCNC: 23 U/L (ref 1–41)
ANION GAP SERPL CALCULATED.3IONS-SCNC: 4.7 MMOL/L (ref 5–15)
ANION GAP SERPL CALCULATED.3IONS-SCNC: 4.7 MMOL/L (ref 5–15)
AST SERPL-CCNC: 32 U/L (ref 1–40)
AST SERPL-CCNC: 32 U/L (ref 1–40)
BACTERIA SPEC AEROBE CULT: NORMAL
BASOPHILS # BLD AUTO: 0.11 10*3/MM3 (ref 0–0.2)
BASOPHILS # BLD AUTO: 0.11 10*3/MM3 (ref 0–0.2)
BASOPHILS NFR BLD AUTO: 1.2 % (ref 0–1.5)
BASOPHILS NFR BLD AUTO: 1.2 % (ref 0–1.5)
BILIRUB SERPL-MCNC: 0.7 MG/DL (ref 0–1.2)
BILIRUB SERPL-MCNC: 0.7 MG/DL (ref 0–1.2)
BUN SERPL-MCNC: 27 MG/DL (ref 8–23)
BUN SERPL-MCNC: 27 MG/DL (ref 8–23)
BUN/CREAT SERPL: 31.4 (ref 7–25)
BUN/CREAT SERPL: 31.4 (ref 7–25)
CALCIUM SPEC-SCNC: 8.6 MG/DL (ref 8.6–10.5)
CALCIUM SPEC-SCNC: 8.6 MG/DL (ref 8.6–10.5)
CHLORIDE SERPL-SCNC: 97 MMOL/L (ref 98–107)
CHLORIDE SERPL-SCNC: 97 MMOL/L (ref 98–107)
CO2 SERPL-SCNC: 34.3 MMOL/L (ref 22–29)
CO2 SERPL-SCNC: 34.3 MMOL/L (ref 22–29)
CREAT SERPL-MCNC: 0.86 MG/DL (ref 0.76–1.27)
CREAT SERPL-MCNC: 0.86 MG/DL (ref 0.76–1.27)
DEPRECATED RDW RBC AUTO: 60.9 FL (ref 37–54)
DEPRECATED RDW RBC AUTO: 60.9 FL (ref 37–54)
EGFRCR SERPLBLD CKD-EPI 2021: 88.6 ML/MIN/1.73
EGFRCR SERPLBLD CKD-EPI 2021: 88.6 ML/MIN/1.73
EOSINOPHIL # BLD AUTO: 0.4 10*3/MM3 (ref 0–0.4)
EOSINOPHIL # BLD AUTO: 0.4 10*3/MM3 (ref 0–0.4)
EOSINOPHIL NFR BLD AUTO: 4.4 % (ref 0.3–6.2)
EOSINOPHIL NFR BLD AUTO: 4.4 % (ref 0.3–6.2)
ERYTHROCYTE [DISTWIDTH] IN BLOOD BY AUTOMATED COUNT: 16.1 % (ref 12.3–15.4)
ERYTHROCYTE [DISTWIDTH] IN BLOOD BY AUTOMATED COUNT: 16.1 % (ref 12.3–15.4)
GLOBULIN UR ELPH-MCNC: 4.2 GM/DL
GLOBULIN UR ELPH-MCNC: 4.2 GM/DL
GLUCOSE BLDC GLUCOMTR-MCNC: 160 MG/DL (ref 70–99)
GLUCOSE BLDC GLUCOMTR-MCNC: 160 MG/DL (ref 70–99)
GLUCOSE BLDC GLUCOMTR-MCNC: 165 MG/DL (ref 70–99)
GLUCOSE BLDC GLUCOMTR-MCNC: 165 MG/DL (ref 70–99)
GLUCOSE BLDC GLUCOMTR-MCNC: 170 MG/DL (ref 70–99)
GLUCOSE BLDC GLUCOMTR-MCNC: 170 MG/DL (ref 70–99)
GLUCOSE BLDC GLUCOMTR-MCNC: 187 MG/DL (ref 70–99)
GLUCOSE BLDC GLUCOMTR-MCNC: 187 MG/DL (ref 70–99)
GLUCOSE BLDC GLUCOMTR-MCNC: 191 MG/DL (ref 70–99)
GLUCOSE BLDC GLUCOMTR-MCNC: 191 MG/DL (ref 70–99)
GLUCOSE SERPL-MCNC: 170 MG/DL (ref 65–99)
GLUCOSE SERPL-MCNC: 170 MG/DL (ref 65–99)
HCT VFR BLD AUTO: 45.2 % (ref 37.5–51)
HCT VFR BLD AUTO: 45.2 % (ref 37.5–51)
HGB BLD-MCNC: 14.6 G/DL (ref 13–17.7)
HGB BLD-MCNC: 14.6 G/DL (ref 13–17.7)
IMM GRANULOCYTES # BLD AUTO: 0.05 10*3/MM3 (ref 0–0.05)
IMM GRANULOCYTES # BLD AUTO: 0.05 10*3/MM3 (ref 0–0.05)
IMM GRANULOCYTES NFR BLD AUTO: 0.5 % (ref 0–0.5)
IMM GRANULOCYTES NFR BLD AUTO: 0.5 % (ref 0–0.5)
LYMPHOCYTES # BLD AUTO: 1.72 10*3/MM3 (ref 0.7–3.1)
LYMPHOCYTES # BLD AUTO: 1.72 10*3/MM3 (ref 0.7–3.1)
LYMPHOCYTES NFR BLD AUTO: 18.8 % (ref 19.6–45.3)
LYMPHOCYTES NFR BLD AUTO: 18.8 % (ref 19.6–45.3)
MCH RBC QN AUTO: 33 PG (ref 26.6–33)
MCH RBC QN AUTO: 33 PG (ref 26.6–33)
MCHC RBC AUTO-ENTMCNC: 32.3 G/DL (ref 31.5–35.7)
MCHC RBC AUTO-ENTMCNC: 32.3 G/DL (ref 31.5–35.7)
MCV RBC AUTO: 102 FL (ref 79–97)
MCV RBC AUTO: 102 FL (ref 79–97)
MONOCYTES # BLD AUTO: 1.37 10*3/MM3 (ref 0.1–0.9)
MONOCYTES # BLD AUTO: 1.37 10*3/MM3 (ref 0.1–0.9)
MONOCYTES NFR BLD AUTO: 15 % (ref 5–12)
MONOCYTES NFR BLD AUTO: 15 % (ref 5–12)
NEUTROPHILS NFR BLD AUTO: 5.5 10*3/MM3 (ref 1.7–7)
NEUTROPHILS NFR BLD AUTO: 5.5 10*3/MM3 (ref 1.7–7)
NEUTROPHILS NFR BLD AUTO: 60.1 % (ref 42.7–76)
NEUTROPHILS NFR BLD AUTO: 60.1 % (ref 42.7–76)
NRBC BLD AUTO-RTO: 0 /100 WBC (ref 0–0.2)
NRBC BLD AUTO-RTO: 0 /100 WBC (ref 0–0.2)
PLATELET # BLD AUTO: 149 10*3/MM3 (ref 140–450)
PLATELET # BLD AUTO: 149 10*3/MM3 (ref 140–450)
PMV BLD AUTO: 9.8 FL (ref 6–12)
PMV BLD AUTO: 9.8 FL (ref 6–12)
POTASSIUM SERPL-SCNC: 5.1 MMOL/L (ref 3.5–5.2)
POTASSIUM SERPL-SCNC: 5.1 MMOL/L (ref 3.5–5.2)
PROT SERPL-MCNC: 7 G/DL (ref 6–8.5)
PROT SERPL-MCNC: 7 G/DL (ref 6–8.5)
RBC # BLD AUTO: 4.43 10*6/MM3 (ref 4.14–5.8)
RBC # BLD AUTO: 4.43 10*6/MM3 (ref 4.14–5.8)
SODIUM SERPL-SCNC: 136 MMOL/L (ref 136–145)
SODIUM SERPL-SCNC: 136 MMOL/L (ref 136–145)
WBC NRBC COR # BLD: 9.15 10*3/MM3 (ref 3.4–10.8)
WBC NRBC COR # BLD: 9.15 10*3/MM3 (ref 3.4–10.8)
WHOLE BLOOD HOLD SPECIMEN: NORMAL
WHOLE BLOOD HOLD SPECIMEN: NORMAL

## 2023-03-27 PROCEDURE — 25010000002 CEFEPIME PER 500 MG: Performed by: INTERNAL MEDICINE

## 2023-03-27 PROCEDURE — 82962 GLUCOSE BLOOD TEST: CPT

## 2023-03-27 PROCEDURE — 94799 UNLISTED PULMONARY SVC/PX: CPT

## 2023-03-27 PROCEDURE — 99233 SBSQ HOSP IP/OBS HIGH 50: CPT | Performed by: STUDENT IN AN ORGANIZED HEALTH CARE EDUCATION/TRAINING PROGRAM

## 2023-03-27 PROCEDURE — 97161 PT EVAL LOW COMPLEX 20 MIN: CPT

## 2023-03-27 PROCEDURE — 94664 DEMO&/EVAL PT USE INHALER: CPT

## 2023-03-27 PROCEDURE — 94761 N-INVAS EAR/PLS OXIMETRY MLT: CPT

## 2023-03-27 PROCEDURE — 80053 COMPREHEN METABOLIC PANEL: CPT | Performed by: INTERNAL MEDICINE

## 2023-03-27 PROCEDURE — 25010000002 FUROSEMIDE PER 20 MG: Performed by: NURSE PRACTITIONER

## 2023-03-27 PROCEDURE — 63710000001 INSULIN REGULAR HUMAN PER 5 UNITS: Performed by: INTERNAL MEDICINE

## 2023-03-27 PROCEDURE — 85025 COMPLETE CBC W/AUTO DIFF WBC: CPT | Performed by: INTERNAL MEDICINE

## 2023-03-27 RX ADMIN — CEFEPIME HYDROCHLORIDE 1 G: 1 INJECTION, POWDER, FOR SOLUTION INTRAMUSCULAR; INTRAVENOUS at 09:48

## 2023-03-27 RX ADMIN — INSULIN HUMAN 2 UNITS: 100 INJECTION, SOLUTION PARENTERAL at 05:50

## 2023-03-27 RX ADMIN — SENNOSIDES AND DOCUSATE SODIUM 1 TABLET: 8.6; 5 TABLET ORAL at 21:27

## 2023-03-27 RX ADMIN — CARVEDILOL 25 MG: 25 TABLET, FILM COATED ORAL at 21:27

## 2023-03-27 RX ADMIN — FAMOTIDINE 40 MG: 20 TABLET ORAL at 09:48

## 2023-03-27 RX ADMIN — IPRATROPIUM BROMIDE AND ALBUTEROL SULFATE 3 ML: 2.5; .5 SOLUTION RESPIRATORY (INHALATION) at 15:42

## 2023-03-27 RX ADMIN — INSULIN HUMAN 2 UNITS: 100 INJECTION, SOLUTION PARENTERAL at 12:15

## 2023-03-27 RX ADMIN — IPRATROPIUM BROMIDE AND ALBUTEROL SULFATE 3 ML: 2.5; .5 SOLUTION RESPIRATORY (INHALATION) at 19:26

## 2023-03-27 RX ADMIN — APIXABAN 5 MG: 5 TABLET, FILM COATED ORAL at 21:27

## 2023-03-27 RX ADMIN — FUROSEMIDE 40 MG: 10 INJECTION, SOLUTION INTRAMUSCULAR; INTRAVENOUS at 00:31

## 2023-03-27 RX ADMIN — IPRATROPIUM BROMIDE AND ALBUTEROL SULFATE 3 ML: 2.5; .5 SOLUTION RESPIRATORY (INHALATION) at 06:32

## 2023-03-27 RX ADMIN — DILTIAZEM HYDROCHLORIDE 240 MG: 240 CAPSULE, COATED, EXTENDED RELEASE ORAL at 21:27

## 2023-03-27 RX ADMIN — DULOXETINE HYDROCHLORIDE 60 MG: 30 CAPSULE, DELAYED RELEASE ORAL at 09:48

## 2023-03-27 RX ADMIN — IPRATROPIUM BROMIDE AND ALBUTEROL SULFATE 3 ML: 2.5; .5 SOLUTION RESPIRATORY (INHALATION) at 03:16

## 2023-03-27 RX ADMIN — DILTIAZEM HYDROCHLORIDE 240 MG: 240 CAPSULE, COATED, EXTENDED RELEASE ORAL at 09:48

## 2023-03-27 RX ADMIN — FUROSEMIDE 40 MG: 10 INJECTION, SOLUTION INTRAMUSCULAR; INTRAVENOUS at 13:40

## 2023-03-27 RX ADMIN — INSULIN HUMAN 2 UNITS: 100 INJECTION, SOLUTION PARENTERAL at 17:54

## 2023-03-27 RX ADMIN — GLIPIZIDE 5 MG: 5 TABLET ORAL at 07:31

## 2023-03-27 RX ADMIN — IPRATROPIUM BROMIDE AND ALBUTEROL SULFATE 3 ML: 2.5; .5 SOLUTION RESPIRATORY (INHALATION) at 11:04

## 2023-03-27 RX ADMIN — TEMAZEPAM 15 MG: 15 CAPSULE ORAL at 21:27

## 2023-03-27 RX ADMIN — SENNOSIDES AND DOCUSATE SODIUM 1 TABLET: 8.6; 5 TABLET ORAL at 09:48

## 2023-03-27 RX ADMIN — CARVEDILOL 25 MG: 25 TABLET, FILM COATED ORAL at 09:48

## 2023-03-27 RX ADMIN — HYDROCODONE BITARTRATE AND ACETAMINOPHEN 1 TABLET: 10; 325 TABLET ORAL at 10:10

## 2023-03-27 RX ADMIN — TRIAMCINOLONE ACETONIDE 1 APPLICATION: 1 OINTMENT TOPICAL at 17:55

## 2023-03-27 RX ADMIN — INSULIN HUMAN 2 UNITS: 100 INJECTION, SOLUTION PARENTERAL at 00:31

## 2023-03-27 RX ADMIN — APIXABAN 5 MG: 5 TABLET, FILM COATED ORAL at 09:48

## 2023-03-27 RX ADMIN — CEFEPIME HYDROCHLORIDE 1 G: 1 INJECTION, POWDER, FOR SOLUTION INTRAMUSCULAR; INTRAVENOUS at 00:23

## 2023-03-27 NOTE — PROGRESS NOTES
[facility]     Progress Note    Patient Name: Stephen Aguero  : 1945  MRN: [id number]  Primary Care Physician:  Papi Vasquez MD  Date of admission: 3/22/2023      Subjective   Brief summary.  Patient admitted with respiratory failure and UTI along with decreased level of consciousness.  Patient admitted to ICU then subsequently transferred out of ICU    HPI:    Planes of tiredness and fatigue, still short of breath with minimal exertion.  No chest pain.  Using 1 to 2 L oxygen through nasal cannula       Review of Systems     No fever chills  Short of breath  Leg swelling.  No abdominal pain  No nausea vomiting  No burning with urination      Objective     Vitals:   Temp:  [97.3 °F (36.3 °C)-97.9 °F (36.6 °C)] 97.4 °F (36.3 °C)  Heart Rate:  [55-72] 65  Resp:  [16-22] 18  BP: (116-144)/(48-68) 127/67  Flow (L/min):  [2-5] 5    Physical Exam :     Elderly morbidly obese male not in acute distress  Awake alert and oriented,  Neck supple  Heart irregular and tachycardic  Lungs diminished breath sounds few crackles  Abdomen obese and soft, morbidly obese difficult to palpate  Extremities with 3+ edema with chronic venous stasis changes      Result Review:  I have personally reviewed the results from the time of this admission to 3/27/2023 17:18 EDT and agree with these findings:  [x]  Laboratory  [x]  Microbiology  [x]  Radiology  []  EKG/Telemetry   []  Cardiology/Vascular   []  Pathology  []  Old records  []  Other:       Blood sugar stable no further hypoglycemia      Assessment / Plan       Active Hospital Problems:  Active Hospital Problems    Diagnosis    • **Acute respiratory failure with hypercapnia (HCC)    • New onset a-fib (HCC)    • Tachycardia    • Hypoglycemia    • Obesity hypoventilation syndrome (HCC)    • Diabetes mellitus (HCC)    • COPD (chronic obstructive pulmonary disease) (HCC)    • Pneumonia    • Acute UTI (urinary tract infection)        Plan:   Continue Lasix and  diuresis  Finished antibiotics  Increase activity with PT OT  Discharge to rehab tomorrow.       DVT prophylaxis:  Medical DVT prophylaxis orders are present.    CODE STATUS:   Code Status (Patient has no pulse and is not breathing): CPR (Attempt to Resuscitate)  Medical Interventions (Patient has pulse or is breathing): Full Support                    Electronically signed by Marco Bishop MD, 03/27/23, 5:19 PM EDT.  .      .

## 2023-03-27 NOTE — PLAN OF CARE
Goal Outcome Evaluation:  Plan of Care Reviewed With: (P) patient           Outcome Evaluation: (P) Pt. presents with limited activity tolerance and shortness of breath that impact his safety with functional mobility. Recommend subacute rehab upon hospital discharge to improve mobility and activity tolerance deficits to optimize functional independence.

## 2023-03-27 NOTE — PROGRESS NOTES
Pulmonary / Critical Care Progress Note      Patient Name: Stephen Aguero  : 1945  MRN: [id number]  Attending:  Marco Bishop MD  Date of admission: 3/22/2023    Subjective   Subjective   Follow-up for hypoxic respiratory failure, JULITA/OHS    Over past 24 hours:  Doing well this morning  On 2.5 L nasal cannula, transitioned to 1 L nasal cannula  Reports no oxygen at baseline  No chest pain  Tolerated CPAP at night  Worked with PT this morning    Review of Systems  General: Denied complaints  Cardiovascular:  Denied complaints  Respiratory: Denied complaints  Gastrointestinal: Denied complaints        Objective   Objective     Vitals:   Temp:  [97.3 °F (36.3 °C)-97.9 °F (36.6 °C)] 97.9 °F (36.6 °C)  Heart Rate:  [54-71] 68  Resp:  [16-22] 20  BP: (116-144)/(48-68) 116/52  Flow (L/min):  [2] 2    Physical Exam   Vital Signs Reviewed   General:  WDWN, Alert, NAD.  Right eyelid closed.   HEENT:  PERRL, EOMI.  OP, nares clear  Chest:  good aeration, clear to auscultation bilaterally, no work of breathing noted on 1 L nasal cannula  CV: RRR, no MGR, pulses 2+, equal.  Abd:  Soft, NT, ND, + BS, obese  EXT:  no clubbing, no cyanosis, lower extremity edema with chronic venous stasis changes  Neuro:  A&Ox3, CN grossly intact, no focal deficits.  Skin: No rashes or lesions noted      Result Review    Result Review:  I have personally reviewed the results from the time of this admission to 3/27/2023 11:37 EDT and agree with these findings:  [x]  Laboratory  [x]  Microbiology  [x]  Radiology  []  EKG/Telemetry   []  Cardiology/Vascular   []  Pathology  []  Old records  []  Other:  Most notable findings include:   -     Assessment & Plan   Assessment / Plan     Active Hospital Problems:  Active Hospital Problems    Diagnosis    • **Acute respiratory failure with hypercapnia (HCC)    • New onset a-fib (HCC)    • Tachycardia    • Hypoglycemia    • Obesity hypoventilation syndrome (HCC)    • Diabetes mellitus (HCC)    • COPD  (chronic obstructive pulmonary disease) (HCC)    • Pneumonia    • Acute UTI (urinary tract infection)          Impression:  Acute hypoxic respiratory failure requiring oxygen supplementation  COPD exacerbation  Mycoplasma IgM positive  JULITA/OHS  Urinary tract infection  History of diabetes  Morbid obesity, BMI 43.2    Plan:  -Weaned patient to 1 L nasal cannula; continue to wean as tolerated to keep SPO2 greater than 90%; patient does not use oxygen at baseline  -6-minute walk test prior to discharge  -Continue CPAP for JULITA nightly  -Completed a course of cefepime for urinary tract infection and mycoplasma pneumonia  -Completed a course of prednisone  -Continue nebs and bronchopulmonary hygiene  -Encourage I-S and flutter valve usage  -Continue diuresis with Lasix as tolerated; monitor renal function, electrolytes,  -Recommend wound care for lower extremity chronic venous stasis wounds  -Continue PT/OT as tolerated; will likely need rehab    DVT prophylaxis:  Medical DVT prophylaxis orders are present.    CODE STATUS:   Code Status (Patient has no pulse and is not breathing): CPR (Attempt to Resuscitate)  Medical Interventions (Patient has pulse or is breathing): Full Support      Labs, images, and medications personally reviewed.    Discussed with patient    Electronically signed by Anca Beckford MD, 03/27/23, 11:37 AM EDT.

## 2023-03-27 NOTE — SIGNIFICANT NOTE
Wound Eval / Progress Noted    MIKE Alan     Patient Name: Stephen Aguero  : 1945  MRN: [id number]  Today's Date: 3/27/2023                 Admit Date: 3/22/2023    Visit Dx:    ICD-10-CM ICD-9-CM   1. Hypoglycemia  E16.2 251.2   2. Pneumonia of both lungs due to infectious organism, unspecified part of lung  J18.9 483.8   3. Hypercapnia  R06.89 786.09   4. Somnolence  R40.0 780.09   5. Sepsis, due to unspecified organism, unspecified whether acute organ dysfunction present (HCC)  A41.9 038.9     995.91   6. Dysphagia, oropharyngeal  R13.12 787.22   7. Decreased activities of daily living (ADL)  Z78.9 V49.89   8. Difficulty walking  R26.2 719.7       Patient Active Problem List   Diagnosis   • Acute respiratory failure with hypercapnia (HCC)   • Hypoglycemia   • Diabetes mellitus (HCC)   • COPD (chronic obstructive pulmonary disease) ([facility])   • Pneumonia   • Acute UTI (urinary tract infection)   • Obesity hypoventilation syndrome (HCC)   • Tachycardia   • New onset a-fib ([facility])        Past Medical History:   Diagnosis Date   • Diabetes mellitus (HCC)    • Elevated cholesterol    • GERD (gastroesophageal reflux disease)    • Hypertension         Past Surgical History:   Procedure Laterality Date   • ABDOMINAL SURGERY     • APPENDECTOMY     • EYE SURGERY           Physical Assessment:  Wound 23 1443 Bilateral groin (Active)   Dressing Appearance open to air 23 1021   Closure None 23 1021   Base red;moist;pink 23 1021   Periwound moist;intact 23 1021   Periwound Temperature warm 23 1021   Periwound Skin Turgor soft 23 1021   Edges open 23 1021   Drainage Characteristics/Odor serosanguineous 23 1021   Drainage Amount scant 23 1021   Care, Wound cleansed with;soap and water;barrier applied 23 1021   Dressing Care open to air 23 1021   Periwound Care barrier ointment applied 23 1021       Wound Left proximal arm Blisters (Active)    Dressing Appearance open to air 03/27/23 1021   Closure None 03/27/23 1021   Base moist;red;pink 03/27/23 1021   Periwound blistered;intact;redness;pink 03/27/23 1021   Periwound Temperature warm 03/27/23 1021   Periwound Skin Turgor soft 03/27/23 1021   Edges open 03/27/23 1021   Drainage Characteristics/Odor serosanguineous 03/27/23 1021   Drainage Amount small 03/27/23 1021   Care, Wound cleansed with;sterile normal saline 03/27/23 1021   Dressing Care dressing applied;petroleum-based;non-adherent;gauze;silicone;border dressing 03/27/23 1021   Periwound Care absorptive dressing applied 03/27/23 1021      Wound Check / Follow-up:  Patient seen today for wound consult. Patient with moisture and superifical tissue loss to bilateral groin folds and abdominal fold. Cleansed with cleansing foam and water. Applied calazime barrier cream. Recommending BID application. Left anterior lower arm with blistering and superficial tissue loss, suspect this is a previous IV infiltration site as patient had previous IV site in this area. Cleansed with normal saline and gauze. Applied non-adherent petroleum based gauze and secured with silicone border dressing. Recommending daily dressing changes. Blanchable redness to bilateral gluteal aspects. Recommending TID/PRN application of hydraguard barrier cream. Thickened, dry, scaly skin noted to bilateral lower legs and feet. Recommending BID application of triamcinolone ointment. Implement every two hour turns, offload heels, keep patient free from moisture.      Impression: MASD, likely previous IV infiltration site.      Short term goals:  Regain skin integrity, skin protection, moisture prevention, pressure reduction, daily dressing changes.      Kathi Moreira RN    3/27/2023    14:47 EDT

## 2023-03-27 NOTE — PLAN OF CARE
Goal Outcome Evaluation:   Pt did not tolerate well, wanted to take mask off only after a couple of hours. When pt was on bipap Vt was only mid 200's when asleep.

## 2023-03-27 NOTE — PLAN OF CARE
Goal Outcome Evaluation:               Slept off and on throughout night, was not able to tolerate BiPap.  Afib on Flex. Wound care consult, pending evaluation. VSS, no complaint of pain.

## 2023-03-27 NOTE — PLAN OF CARE
Goal Outcome Evaluation:  Plan of Care Reviewed With: patient           Outcome Evaluation: Patient wore Bipap 14/7 35% last night with no issues. Patient was given aerosolized treatment this morning with no advers effects. Patient is on 1 lpm NC tolerating well at this time. Patient does not wear oxygen at home. Respiratory Therapy will continue to monitor and make changes as needed.

## 2023-03-27 NOTE — PLAN OF CARE
Goal Outcome Evaluation:              Outcome Evaluation: Pt has had some intermittent confusion today, staff must frequently redirect pt to keep him from getting out of bed on his own and taking off his bipap. Pt has worn bipap this shift during naps. Wound care evaluated pt, see orders. PD RN

## 2023-03-27 NOTE — THERAPY EVALUATION
Acute Care - Physical Therapy Initial Evaluation   [facility]     Patient Name: Stephen Aguero  : 1945  MRN: [id number]  Today's Date: 3/27/2023      Visit Dx:     ICD-10-CM ICD-9-CM   1. Hypoglycemia  E16.2 251.2   2. Pneumonia of both lungs due to infectious organism, unspecified part of lung  J18.9 483.8   3. Hypercapnia  R06.89 786.09   4. Somnolence  R40.0 780.09   5. Sepsis, due to unspecified organism, unspecified whether acute organ dysfunction present (HCC)  A41.9 038.9     995.91   6. Dysphagia, oropharyngeal  R13.12 787.22   7. Decreased activities of daily living (ADL)  Z78.9 V49.89   8. Difficulty walking  R26.2 719.7     Patient Active Problem List   Diagnosis   • Acute respiratory failure with hypercapnia (HCC)   • Hypoglycemia   • Diabetes mellitus (HCC)   • COPD (chronic obstructive pulmonary disease) (HCC)   • Pneumonia   • Acute UTI (urinary tract infection)   • Obesity hypoventilation syndrome (HCC)   • Tachycardia   • New onset a-fib (HCC)     Past Medical History:   Diagnosis Date   • Diabetes mellitus (HCC)    • Elevated cholesterol    • GERD (gastroesophageal reflux disease)    • Hypertension      Past Surgical History:   Procedure Laterality Date   • ABDOMINAL SURGERY     • APPENDECTOMY     • EYE SURGERY       PT Assessment (last 12 hours)     PT Evaluation and Treatment     Row Name 23          Physical Therapy Time and Intention    Subjective Information no complaints (P)   -JL     Document Type evaluation (P)   -JL     Mode of Treatment individual therapy;physical therapy (P)   -JL     Patient Effort good (P)   -JL     Row Name 23          General Information    Patient Profile Reviewed yes (P)   -JL     Patient Observations alert;cooperative;agree to therapy (P)   -JL     Prior Level of Function min assist: (P)   Wife helps with mobility.  -JL     Equipment Currently Used at Home wheelchair, motorized;walker, rolling;rollator (P)   -JL     Existing  Precautions/Restrictions fall (P)   -JL     Barriers to Rehab none identified (P)   -JL     Row Name 03/27/23 0921          Living Environment    Current Living Arrangements home (P)   -JL     Home Accessibility stairs to enter home (P)   -JL     People in Home spouse (P)   -JL     Primary Care Provided by self;spouse/significant other (P)   -JL     Row Name 03/27/23 0921          Home Main Entrance    Number of Stairs, Main Entrance two (P)   -JL     Stair Railings, Main Entrance none (P)   -JL     Row Name 03/27/23 0921          Home Use of Assistive/Adaptive Equipment    Equipment Currently Used at Home walker, rolling;rollator (P)   -     Row Name 03/27/23 0921          Range of Motion (ROM)    Range of Motion ROM is WFL;bilateral lower extremities (P)   -     Row Name 03/27/23 0921          Strength (Manual Muscle Testing)    Strength (Manual Muscle Testing) bilateral lower extremities (P)   4/5 all planes  -     Row Name 03/27/23 0921          Bed Mobility    Bed Mobility bed mobility (all) activities (P)   -JL     All Activities, [place] (Bed Mobility) minimum assist (75% patient effort) (P)   -JL     Bed Mobility, Safety Issues decreased use of legs for bridging/pushing (P)   -JL     Assistive Device (Bed Mobility) bed rails;draw sheet;head of bed elevated (P)   -JL     Row Name 03/27/23 0921          Transfers    Transfers sit-stand transfer;stand-sit transfer (P)   -     Row Name 03/27/23 0921          Sit-Stand Transfer    Sit-Stand [place] (Transfers) minimum assist (75% patient effort);1 person assist;verbal cues (P)   -JL     Assistive Device (Sit-Stand Transfers) walker, front-wheeled (P)   -JL     Comment, (Sit-Stand Transfer) Min A marching in place, fatigued (P)   -     Row Name 03/27/23 0921          Stand-Sit Transfer    Stand-Sit [place] (Transfers) minimum assist (75% patient effort) (P)   -JL     Assistive Device (Stand-Sit Transfers) walker, front-wheeled (P)   -JL      Row Name 03/27/23 0921          Gait/Stairs (Locomotion)    Gait/Stairs Locomotion gait/ambulation assistive device (P)   -JL     [place] Level (Gait) minimum assist (75% patient effort);1 person assist (P)   -JL     Assistive Device (Gait) walker, front-wheeled (P)   -JL     Distance in Feet (Gait) 5 (P)   side-stepping at EOB  -JL     Pattern (Gait) step-to (P)   -JL     Deviations/Abnormal Patterns (Gait) gait speed decreased (P)   -JL     Row Name 03/27/23 0921          Safety Issues, Functional Mobility    Impairments Affecting Function (Mobility) balance;endurance/activity tolerance;shortness of breath (P)   -JL     Row Name 03/27/23 0921          Balance    Balance Assessment standing dynamic balance (P)   -JL     Dynamic Standing Balance minimal assist (P)   -JL     Position/Device Used, Standing Balance walker, front-wheeled (P)   -JL     Row Name             Wound 03/23/23 1441 coccyx    Wound - Properties Group Placement Date: 03/23/23  -DD Placement Time: 1441  -DD Location: coccyx  -DD    Retired Wound - Properties Group Placement Date: 03/23/23  -DD Placement Time: 1441  -DD Location: coccyx  -DD    Retired Wound - Properties Group Date first assessed: 03/23/23  -DD Time first assessed: 1441  -DD Location: coccyx  -DD    Row Name             Wound 03/23/23 1443 Bilateral groin    Wound - Properties Group Placement Date: 03/23/23  -DD Placement Time: 1443  -DD Side: Bilateral  -DD Orientation: --  -DD Location: groin  -DD    Retired Wound - Properties Group Placement Date: 03/23/23  -DD Placement Time: 1443  -DD Side: Bilateral  -DD Orientation: --  -DD Location: groin  -DD    Retired Wound - Properties Group Date first assessed: 03/23/23  -DD Time first assessed: 1443  -DD Side: Bilateral  -DD Location: groin  -DD    Row Name             Wound 03/23/23 1443 Bilateral lower leg    Wound - Properties Group Placement Date: 03/23/23  -DD Placement Time: 1443  -DD Side: Bilateral  -DD Orientation:  lower  -DD Location: leg  -DD    Retired Wound - Properties Group Placement Date: 03/23/23  -DD Placement Time: 1443  -DD Side: Bilateral  -DD Orientation: lower  -DD Location: leg  -DD    Retired Wound - Properties Group Date first assessed: 03/23/23  -DD Time first assessed: 1443  -DD Side: Bilateral  -DD Location: leg  -DD    Row Name             Wound 03/23/23 1444 Bilateral anterior foot    Wound - Properties Group Placement Date: 03/23/23  -DD Placement Time: 1444  -DD Side: Bilateral  -DD Orientation: anterior  -DD Location: foot  -DD    Retired Wound - Properties Group Placement Date: 03/23/23  -DD Placement Time: 1444  -DD Side: Bilateral  -DD Orientation: anterior  -DD Location: foot  -DD    Retired Wound - Properties Group Date first assessed: 03/23/23  -DD Time first assessed: 1444  -DD Side: Bilateral  -DD Location: foot  -DD    Row Name             Wound 03/23/23 1445 penis    Wound - Properties Group Placement Date: 03/23/23  -DD Placement Time: 1445  -DD Location: penis  -DD    Retired Wound - Properties Group Placement Date: 03/23/23  -DD Placement Time: 1445  -DD Location: penis  -DD    Retired Wound - Properties Group Date first assessed: 03/23/23  -DD Time first assessed: 1445  -DD Location: penis  -DD    Row Name             Wound 03/23/23 1454 Right eye    Wound - Properties Group Placement Date: 03/23/23  -DD Placement Time: 1454  -DD Side: Right  -DD Location: eye  -DD    Retired Wound - Properties Group Placement Date: 03/23/23  -DD Placement Time: 1454  -DD Side: Right  -DD Location: eye  -DD    Retired Wound - Properties Group Date first assessed: 03/23/23  -DD Time first assessed: 1454  -DD Side: Right  -DD Location: eye  -DD    Row Name             Wound Left proximal arm Blisters    Wound - Properties Group Side: Left  -MP Orientation: proximal  -MP Location: arm  -MP Primary Wound Type: Blisters  -MP Additional Comments: weeping  -MP    Retired Wound - Properties Group Side: Left  -MP  Orientation: proximal  -MP Location: arm  -MP Primary Wound Type: Blisters  -MP Additional Comments: amilcar TUTTLE    Retired Wound - Properties Group Side: Left  -MP Location: arm  -MP Primary Wound Type: Blisters  -MP Additional Comments: amilcar TUTTLE    Row Name 03/27/23 0921          Plan of Care Review    Plan of Care Reviewed With patient (P)   -JL     Outcome Evaluation Pt. presents with limited activity tolerance and shortness of breath that impact his safety with functional mobility. Recommend subacute rehab upon hospital discharge to improve mobility and activity tolerance deficits to optimize functional independence. (P)   -JL     Row Name 03/27/23 0921          Positioning and Restraints    Pre-Treatment Position in bed (P)   -JL     Post Treatment Position bed (P)   -JL     In Bed supine;with nsg;exit alarm on;call light within reach (P)   -JL     Row Name 03/27/23 0921          Therapy Assessment/Plan (PT)    Rehab Potential (PT) good, to achieve stated therapy goals (P)   -JL     Criteria for Skilled Interventions Met (PT) yes (P)   -JL     Therapy Frequency (PT) daily (P)   -JL     Predicted Duration of Therapy Intervention (PT) 10 days (P)   -JL     Problem List (PT) problems related to;balance;mobility;strength;other (see comments) (P)   activity tolerance and shortness of breath  -JL     Activity Limitations Related to Problem List (PT) unable to ambulate safely;unable to transfer safely (P)   -JL     Row Name 03/27/23 0921          Therapy Plan Review/Discharge Plan (PT)    Therapy Plan Review (PT) evaluation/treatment results reviewed;patient (P)   -JL     Row Name 03/27/23 0921          Physical Therapy Goals    Bed Mobility Goal Selection (PT) bed mobility, PT goal 1 (P)   -JL     Transfer Goal Selection (PT) transfer, PT goal 1 (P)   -JL     Gait Training Goal Selection (PT) gait training, PT goal 1 (P)   -JL     Row Name 03/27/23 0921          Bed Mobility Goal 1 (PT)    Activity/Assistive  Device (Bed Mobility Goal 1, PT) bed mobility activities, all (P)   -JL     [place] Level/Cues Needed (Bed Mobility Goal 1, PT) standby assist (P)   -JL     Time Frame (Bed Mobility Goal 1, PT) long term goal (LTG);10 days (P)   -JL     Row Name 03/27/23 0921          Transfer Goal 1 (PT)    Activity/Assistive Device (Transfer Goal 1, PT) sit-to-stand/stand-to-sit;walker, rolling (P)   -JL     [place] Level/Cues Needed (Transfer Goal 1, PT) contact guard required (P)   -JL     Time Frame (Transfer Goal 1, PT) long term goal (LTG);10 days (P)   -JL     Row Name 03/27/23 0921          Gait Training Goal 1 (PT)    Activity/Assistive Device (Gait Training Goal 1, PT) gait (walking locomotion);walker, rolling (P)   -JL     [place] Level (Gait Training Goal 1, PT) contact guard required (P)   -JL     Distance (Gait Training Goal 1, PT) 50 ft (P)   -JL     Time Frame (Gait Training Goal 1, PT) long term goal (LTG);10 days (P)   -JL           User Key  (r) = Recorded By, (t) = Taken By, (c) = Cosigned By    Initials Name Provider Type    Clara Ventura, KYLE Registered Nurse    Venessa Engel, RN Registered Nurse    Shaka Mathew, PT Student PT Student                Physical Therapy Education     Title: PT OT SLP Therapies (Done)     Topic: Physical Therapy (Done)     Point: Mobility training (Done)     Learning Progress Summary           Patient Acceptance, E, VU by KARINE at 3/27/2023 0928                               User Key     Initials Effective Dates Name Provider Type Discipline    KARINE 01/11/23 -  Shaka Iglesias, PT Student PT Student PT              PT Recommendation and Plan  Anticipated Discharge Disposition (PT): (P) sub acute care setting  Planned Therapy Interventions (PT): (P) balance training, bed mobility training, gait training, patient/family education, strengthening, transfer training  Therapy Frequency (PT): (P) daily  Plan of Care Reviewed With: (P) patient  Outcome Evaluation: (P)  Pt. presents with limited activity tolerance and shortness of breath that impact his safety with functional mobility. Recommend subacute rehab upon hospital discharge to improve mobility and activity tolerance deficits to optimize functional independence.   Outcome Measures     Row Name 03/27/23 0927             How much help from another person do you currently need...    Turning from your back to your side while in flat bed without using bedrails? 3 (P)   -JL      Moving from lying on back to sitting on the side of a flat bed without bedrails? 2 (P)   -JL      Moving to and from a bed to a chair (including a wheelchair)? 2 (P)   -JL      Standing up from a chair using your arms (e.g., wheelchair, bedside chair)? 3 (P)   -JL      Climbing 3-5 steps with a railing? 1 (P)   -JL      To walk in hospital room? 2 (P)   -JL      AM-PAC 6 Clicks Score (PT) 13 (P)   -JL         Functional Assessment    Outcome Measure Options AM-PAC 6 Clicks Basic Mobility (PT) (P)   -JL            User Key  (r) = Recorded By, (t) = Taken By, (c) = Cosigned By    Initials Name Provider Type    Shaka Mathew, PT Student PT Student                 Time Calculation:    PT Charges     Row Name 03/27/23 0921             Time Calculation    PT Received On 03/27/23  -PHYLLIS (r) JL (t) PHYLLIS (c)      PT Goal Re-Cert Due Date 04/05/23  -PHYLLIS (r) JL (t) PHYLLIS (c)         Untimed Charges    PT Eval/Re-eval Minutes 26  -PHYLLIS (r) JL (t) PHYLLIS (c)         Total Minutes    Untimed Charges Total Minutes 26  -PHYLLIS (r) JL (t)       Total Minutes 26  -PHYLLIS (r) JL (t)            User Key  (r) = Recorded By, (t) = Taken By, (c) = Cosigned By    Initials Name Provider Type    Micky Malave, PT Physical Therapist    Shaka Mathew, PT Student PT Student              Therapy Charges for Today     Code Description Service Date Service Provider Modifiers Qty    08325238776 HC PT EVAL LOW COMPLEXITY 2 3/27/2023 Shaka Iglesias, PT Student GP 1          PT G-Codes  Outcome  Measure Options: (P) AM-PAC 6 Clicks Basic Mobility (PT)  AM-PAC 6 Clicks Score (PT): (P) 13  AM-PAC 6 Clicks Score (OT): 16    Shaka Iglesias, PT Student  3/27/2023

## 2023-03-28 ENCOUNTER — APPOINTMENT (OUTPATIENT)
Dept: GENERAL RADIOLOGY | Facility: HOSPITAL | Age: 78
DRG: 871 | End: 2023-03-28
Payer: MEDICARE

## 2023-03-28 LAB
ANION GAP SERPL CALCULATED.3IONS-SCNC: 4.9 MMOL/L (ref 5–15)
ANION GAP SERPL CALCULATED.3IONS-SCNC: 4.9 MMOL/L (ref 5–15)
ARTERIAL PATENCY WRIST A: ABNORMAL
ARTERIAL PATENCY WRIST A: ABNORMAL
ARTERIAL PATENCY WRIST A: POSITIVE
BASE EXCESS BLDA CALC-SCNC: -0.1 MMOL/L (ref -2–2)
BASE EXCESS BLDA CALC-SCNC: -0.1 MMOL/L (ref -2–2)
BASE EXCESS BLDA CALC-SCNC: 4.3 MMOL/L (ref -2–2)
BASE EXCESS BLDA CALC-SCNC: 4.3 MMOL/L (ref -2–2)
BASE EXCESS BLDA CALC-SCNC: 4.8 MMOL/L (ref -2–2)
BASE EXCESS BLDA CALC-SCNC: 4.8 MMOL/L (ref -2–2)
BASOPHILS # BLD AUTO: 0.1 10*3/MM3 (ref 0–0.2)
BASOPHILS # BLD AUTO: 0.1 10*3/MM3 (ref 0–0.2)
BASOPHILS NFR BLD AUTO: 1 % (ref 0–1.5)
BASOPHILS NFR BLD AUTO: 1 % (ref 0–1.5)
BDY SITE: ABNORMAL
BUN SERPL-MCNC: 36 MG/DL (ref 8–23)
BUN SERPL-MCNC: 36 MG/DL (ref 8–23)
BUN/CREAT SERPL: 40 (ref 7–25)
BUN/CREAT SERPL: 40 (ref 7–25)
CA-I BLDA-SCNC: 1.15 MMOL/L (ref 1.13–1.32)
CA-I BLDA-SCNC: 1.15 MMOL/L (ref 1.13–1.32)
CALCIUM SPEC-SCNC: 8.9 MG/DL (ref 8.6–10.5)
CALCIUM SPEC-SCNC: 8.9 MG/DL (ref 8.6–10.5)
CHLORIDE BLDA-SCNC: 96 MMOL/L (ref 98–106)
CHLORIDE BLDA-SCNC: 96 MMOL/L (ref 98–106)
CHLORIDE SERPL-SCNC: 96 MMOL/L (ref 98–107)
CHLORIDE SERPL-SCNC: 96 MMOL/L (ref 98–107)
CO2 SERPL-SCNC: 34.1 MMOL/L (ref 22–29)
CO2 SERPL-SCNC: 34.1 MMOL/L (ref 22–29)
COHGB MFR BLD: 1.1 % (ref 0–1.5)
COHGB MFR BLD: 1.1 % (ref 0–1.5)
COHGB MFR BLD: 1.2 % (ref 0–1.5)
COHGB MFR BLD: 1.2 % (ref 0–1.5)
COHGB MFR BLD: 1.4 % (ref 0–1.5)
COHGB MFR BLD: 1.4 % (ref 0–1.5)
CREAT SERPL-MCNC: 0.9 MG/DL (ref 0.76–1.27)
CREAT SERPL-MCNC: 0.9 MG/DL (ref 0.76–1.27)
DEPRECATED RDW RBC AUTO: 59.7 FL (ref 37–54)
DEPRECATED RDW RBC AUTO: 59.7 FL (ref 37–54)
EGFRCR SERPLBLD CKD-EPI 2021: 87.4 ML/MIN/1.73
EGFRCR SERPLBLD CKD-EPI 2021: 87.4 ML/MIN/1.73
EOSINOPHIL # BLD AUTO: 0.35 10*3/MM3 (ref 0–0.4)
EOSINOPHIL # BLD AUTO: 0.35 10*3/MM3 (ref 0–0.4)
EOSINOPHIL NFR BLD AUTO: 3.4 % (ref 0.3–6.2)
EOSINOPHIL NFR BLD AUTO: 3.4 % (ref 0.3–6.2)
ERYTHROCYTE [DISTWIDTH] IN BLOOD BY AUTOMATED COUNT: 16.1 % (ref 12.3–15.4)
ERYTHROCYTE [DISTWIDTH] IN BLOOD BY AUTOMATED COUNT: 16.1 % (ref 12.3–15.4)
FHHB: 10 % (ref 0–5)
FHHB: 10 % (ref 0–5)
FHHB: 11 % (ref 0–5)
FHHB: 11 % (ref 0–5)
FHHB: 8.8 % (ref 0–5)
FHHB: 8.8 % (ref 0–5)
GAS FLOW AIRWAY: 0 LPM
GAS FLOW AIRWAY: ABNORMAL L/MIN
GAS FLOW AIRWAY: ABNORMAL L/MIN
GLUCOSE BLDA-MCNC: 260 MG/DL (ref 70–99)
GLUCOSE BLDA-MCNC: 260 MG/DL (ref 70–99)
GLUCOSE BLDC GLUCOMTR-MCNC: 111 MG/DL (ref 70–99)
GLUCOSE BLDC GLUCOMTR-MCNC: 148 MG/DL (ref 70–99)
GLUCOSE BLDC GLUCOMTR-MCNC: 148 MG/DL (ref 70–99)
GLUCOSE BLDC GLUCOMTR-MCNC: 155 MG/DL (ref 70–99)
GLUCOSE BLDC GLUCOMTR-MCNC: 155 MG/DL (ref 70–99)
GLUCOSE BLDC GLUCOMTR-MCNC: 161 MG/DL (ref 70–99)
GLUCOSE BLDC GLUCOMTR-MCNC: 253 MG/DL (ref 70–99)
GLUCOSE BLDC GLUCOMTR-MCNC: 253 MG/DL (ref 70–99)
GLUCOSE BLDC GLUCOMTR-MCNC: 53 MG/DL (ref 70–99)
GLUCOSE BLDC GLUCOMTR-MCNC: 53 MG/DL (ref 70–99)
GLUCOSE BLDC GLUCOMTR-MCNC: 98 MG/DL (ref 70–99)
GLUCOSE BLDC GLUCOMTR-MCNC: 98 MG/DL (ref 70–99)
GLUCOSE SERPL-MCNC: 256 MG/DL (ref 65–99)
GLUCOSE SERPL-MCNC: 256 MG/DL (ref 65–99)
HCO3 BLDA-SCNC: 27.7 MMOL/L (ref 22–26)
HCO3 BLDA-SCNC: 27.7 MMOL/L (ref 22–26)
HCO3 BLDA-SCNC: 32.3 MMOL/L (ref 22–26)
HCO3 BLDA-SCNC: 32.3 MMOL/L (ref 22–26)
HCO3 BLDA-SCNC: 33 MMOL/L (ref 22–26)
HCO3 BLDA-SCNC: 33 MMOL/L (ref 22–26)
HCT VFR BLD AUTO: 44.4 % (ref 37.5–51)
HCT VFR BLD AUTO: 44.4 % (ref 37.5–51)
HGB BLD-MCNC: 14.3 G/DL (ref 13–17.7)
HGB BLD-MCNC: 14.3 G/DL (ref 13–17.7)
HGB BLDA-MCNC: 14.9 G/DL (ref 13.8–16.4)
HGB BLDA-MCNC: 14.9 G/DL (ref 13.8–16.4)
HGB BLDA-MCNC: 15 G/DL (ref 13.8–16.4)
HGB BLDA-MCNC: 15 G/DL (ref 13.8–16.4)
HGB BLDA-MCNC: 15.1 G/DL (ref 13.8–16.4)
HGB BLDA-MCNC: 15.1 G/DL (ref 13.8–16.4)
IMM GRANULOCYTES # BLD AUTO: 0.04 10*3/MM3 (ref 0–0.05)
IMM GRANULOCYTES # BLD AUTO: 0.04 10*3/MM3 (ref 0–0.05)
IMM GRANULOCYTES NFR BLD AUTO: 0.4 % (ref 0–0.5)
IMM GRANULOCYTES NFR BLD AUTO: 0.4 % (ref 0–0.5)
INHALED O2 CONCENTRATION: 50 %
INHALED O2 CONCENTRATION: 50 %
INHALED O2 CONCENTRATION: 65 %
INHALED O2 CONCENTRATION: 65 %
INHALED O2 CONCENTRATION: 70 %
INHALED O2 CONCENTRATION: 70 %
LACTATE BLDA-SCNC: 1.97 MMOL/L (ref 0.5–2)
LACTATE BLDA-SCNC: 1.97 MMOL/L (ref 0.5–2)
LACTATE BLDA-SCNC: ABNORMAL MMOL/L
LYMPHOCYTES # BLD AUTO: 1.78 10*3/MM3 (ref 0.7–3.1)
LYMPHOCYTES # BLD AUTO: 1.78 10*3/MM3 (ref 0.7–3.1)
LYMPHOCYTES NFR BLD AUTO: 17.2 % (ref 19.6–45.3)
LYMPHOCYTES NFR BLD AUTO: 17.2 % (ref 19.6–45.3)
MCH RBC QN AUTO: 32.4 PG (ref 26.6–33)
MCH RBC QN AUTO: 32.4 PG (ref 26.6–33)
MCHC RBC AUTO-ENTMCNC: 32.2 G/DL (ref 31.5–35.7)
MCHC RBC AUTO-ENTMCNC: 32.2 G/DL (ref 31.5–35.7)
MCV RBC AUTO: 100.5 FL (ref 79–97)
MCV RBC AUTO: 100.5 FL (ref 79–97)
METHGB BLD QL: 0.2 % (ref 0–1.5)
MODALITY: ABNORMAL
MONOCYTES # BLD AUTO: 1.35 10*3/MM3 (ref 0.1–0.9)
MONOCYTES # BLD AUTO: 1.35 10*3/MM3 (ref 0.1–0.9)
MONOCYTES NFR BLD AUTO: 13.1 % (ref 5–12)
MONOCYTES NFR BLD AUTO: 13.1 % (ref 5–12)
NEUTROPHILS NFR BLD AUTO: 6.7 10*3/MM3 (ref 1.7–7)
NEUTROPHILS NFR BLD AUTO: 6.7 10*3/MM3 (ref 1.7–7)
NEUTROPHILS NFR BLD AUTO: 64.9 % (ref 42.7–76)
NEUTROPHILS NFR BLD AUTO: 64.9 % (ref 42.7–76)
NOTE: ABNORMAL
NRBC BLD AUTO-RTO: 0 /100 WBC (ref 0–0.2)
NRBC BLD AUTO-RTO: 0 /100 WBC (ref 0–0.2)
OXYHGB MFR BLDV: 87.7 % (ref 94–99)
OXYHGB MFR BLDV: 87.7 % (ref 94–99)
OXYHGB MFR BLDV: 88.6 % (ref 94–99)
OXYHGB MFR BLDV: 88.6 % (ref 94–99)
OXYHGB MFR BLDV: 89.6 % (ref 94–99)
OXYHGB MFR BLDV: 89.6 % (ref 94–99)
PCO2 BLDA: 58.1 MM HG (ref 35–45)
PCO2 BLDA: 58.1 MM HG (ref 35–45)
PCO2 BLDA: 59.9 MM HG (ref 35–45)
PCO2 BLDA: 59.9 MM HG (ref 35–45)
PCO2 BLDA: 67.8 MM HG (ref 35–45)
PCO2 BLDA: 67.8 MM HG (ref 35–45)
PH BLDA: 7.3 PH UNITS (ref 7.35–7.45)
PH BLDA: 7.35 PH UNITS (ref 7.35–7.45)
PH BLDA: 7.35 PH UNITS (ref 7.35–7.45)
PLATELET # BLD AUTO: 222 10*3/MM3 (ref 140–450)
PLATELET # BLD AUTO: 222 10*3/MM3 (ref 140–450)
PMV BLD AUTO: 10.1 FL (ref 6–12)
PMV BLD AUTO: 10.1 FL (ref 6–12)
PO2 BLD: 127 MM[HG] (ref 0–500)
PO2 BLD: 127 MM[HG] (ref 0–500)
PO2 BLD: 85 MM[HG] (ref 0–500)
PO2 BLD: 85 MM[HG] (ref 0–500)
PO2 BLD: 91 MM[HG] (ref 0–500)
PO2 BLD: 91 MM[HG] (ref 0–500)
PO2 BLDA: 58.9 MM HG (ref 80–100)
PO2 BLDA: 58.9 MM HG (ref 80–100)
PO2 BLDA: 59.3 MM HG (ref 80–100)
PO2 BLDA: 59.3 MM HG (ref 80–100)
PO2 BLDA: 63.4 MM HG (ref 80–100)
PO2 BLDA: 63.4 MM HG (ref 80–100)
POTASSIUM BLDA-SCNC: 4.91 MMOL/L (ref 3.5–5)
POTASSIUM BLDA-SCNC: 4.91 MMOL/L (ref 3.5–5)
POTASSIUM SERPL-SCNC: 5.1 MMOL/L (ref 3.5–5.2)
POTASSIUM SERPL-SCNC: 5.1 MMOL/L (ref 3.5–5.2)
RBC # BLD AUTO: 4.42 10*6/MM3 (ref 4.14–5.8)
RBC # BLD AUTO: 4.42 10*6/MM3 (ref 4.14–5.8)
SAO2 % BLDCOA: 88.9 % (ref 95–99)
SAO2 % BLDCOA: 88.9 % (ref 95–99)
SAO2 % BLDCOA: 89.9 % (ref 95–99)
SAO2 % BLDCOA: 89.9 % (ref 95–99)
SAO2 % BLDCOA: 91.1 % (ref 95–99)
SAO2 % BLDCOA: 91.1 % (ref 95–99)
SODIUM BLDA-SCNC: 134.1 MMOL/L (ref 136–146)
SODIUM BLDA-SCNC: 134.1 MMOL/L (ref 136–146)
SODIUM SERPL-SCNC: 135 MMOL/L (ref 136–145)
SODIUM SERPL-SCNC: 135 MMOL/L (ref 136–145)
WBC NRBC COR # BLD: 10.32 10*3/MM3 (ref 3.4–10.8)
WBC NRBC COR # BLD: 10.32 10*3/MM3 (ref 3.4–10.8)

## 2023-03-28 PROCEDURE — 94799 UNLISTED PULMONARY SVC/PX: CPT

## 2023-03-28 PROCEDURE — 99233 SBSQ HOSP IP/OBS HIGH 50: CPT | Performed by: STUDENT IN AN ORGANIZED HEALTH CARE EDUCATION/TRAINING PROGRAM

## 2023-03-28 PROCEDURE — 83050 HGB METHEMOGLOBIN QUAN: CPT | Performed by: INTERNAL MEDICINE

## 2023-03-28 PROCEDURE — 82375 ASSAY CARBOXYHB QUANT: CPT | Performed by: INTERNAL MEDICINE

## 2023-03-28 PROCEDURE — 25010000002 FUROSEMIDE PER 20 MG: Performed by: NURSE PRACTITIONER

## 2023-03-28 PROCEDURE — 80048 BASIC METABOLIC PNL TOTAL CA: CPT | Performed by: INTERNAL MEDICINE

## 2023-03-28 PROCEDURE — 36600 WITHDRAWAL OF ARTERIAL BLOOD: CPT | Performed by: STUDENT IN AN ORGANIZED HEALTH CARE EDUCATION/TRAINING PROGRAM

## 2023-03-28 PROCEDURE — 36600 WITHDRAWAL OF ARTERIAL BLOOD: CPT | Performed by: INTERNAL MEDICINE

## 2023-03-28 PROCEDURE — 94761 N-INVAS EAR/PLS OXIMETRY MLT: CPT

## 2023-03-28 PROCEDURE — 63710000001 INSULIN REGULAR HUMAN PER 5 UNITS: Performed by: INTERNAL MEDICINE

## 2023-03-28 PROCEDURE — 83050 HGB METHEMOGLOBIN QUAN: CPT | Performed by: STUDENT IN AN ORGANIZED HEALTH CARE EDUCATION/TRAINING PROGRAM

## 2023-03-28 PROCEDURE — 94660 CPAP INITIATION&MGMT: CPT

## 2023-03-28 PROCEDURE — 85025 COMPLETE CBC W/AUTO DIFF WBC: CPT | Performed by: INTERNAL MEDICINE

## 2023-03-28 PROCEDURE — 82805 BLOOD GASES W/O2 SATURATION: CPT | Performed by: INTERNAL MEDICINE

## 2023-03-28 PROCEDURE — 97110 THERAPEUTIC EXERCISES: CPT

## 2023-03-28 PROCEDURE — 71045 X-RAY EXAM CHEST 1 VIEW: CPT

## 2023-03-28 PROCEDURE — 82375 ASSAY CARBOXYHB QUANT: CPT | Performed by: STUDENT IN AN ORGANIZED HEALTH CARE EDUCATION/TRAINING PROGRAM

## 2023-03-28 PROCEDURE — 82805 BLOOD GASES W/O2 SATURATION: CPT | Performed by: STUDENT IN AN ORGANIZED HEALTH CARE EDUCATION/TRAINING PROGRAM

## 2023-03-28 PROCEDURE — 82962 GLUCOSE BLOOD TEST: CPT

## 2023-03-28 RX ORDER — DILTIAZEM HYDROCHLORIDE 240 MG/1
240 CAPSULE, COATED, EXTENDED RELEASE ORAL DAILY
Status: DISCONTINUED | OUTPATIENT
Start: 2023-03-29 | End: 2023-03-28

## 2023-03-28 RX ADMIN — FAMOTIDINE 40 MG: 20 TABLET ORAL at 09:42

## 2023-03-28 RX ADMIN — IPRATROPIUM BROMIDE AND ALBUTEROL SULFATE 3 ML: 2.5; .5 SOLUTION RESPIRATORY (INHALATION) at 23:24

## 2023-03-28 RX ADMIN — DULOXETINE HYDROCHLORIDE 60 MG: 30 CAPSULE, DELAYED RELEASE ORAL at 09:42

## 2023-03-28 RX ADMIN — IPRATROPIUM BROMIDE AND ALBUTEROL SULFATE 3 ML: 2.5; .5 SOLUTION RESPIRATORY (INHALATION) at 14:33

## 2023-03-28 RX ADMIN — Medication 10 ML: at 09:45

## 2023-03-28 RX ADMIN — IPRATROPIUM BROMIDE AND ALBUTEROL SULFATE 3 ML: 2.5; .5 SOLUTION RESPIRATORY (INHALATION) at 11:19

## 2023-03-28 RX ADMIN — APIXABAN 5 MG: 5 TABLET, FILM COATED ORAL at 09:43

## 2023-03-28 RX ADMIN — IPRATROPIUM BROMIDE AND ALBUTEROL SULFATE 3 ML: 2.5; .5 SOLUTION RESPIRATORY (INHALATION) at 00:18

## 2023-03-28 RX ADMIN — NYSTATIN 500000 UNITS: 100000 SUSPENSION ORAL at 19:31

## 2023-03-28 RX ADMIN — CARVEDILOL 25 MG: 25 TABLET, FILM COATED ORAL at 09:42

## 2023-03-28 RX ADMIN — DILTIAZEM HYDROCHLORIDE 240 MG: 240 CAPSULE, COATED, EXTENDED RELEASE ORAL at 09:43

## 2023-03-28 RX ADMIN — IPRATROPIUM BROMIDE AND ALBUTEROL SULFATE 3 ML: 2.5; .5 SOLUTION RESPIRATORY (INHALATION) at 07:29

## 2023-03-28 RX ADMIN — INSULIN HUMAN 2 UNITS: 100 INJECTION, SOLUTION PARENTERAL at 06:10

## 2023-03-28 RX ADMIN — FUROSEMIDE 40 MG: 10 INJECTION, SOLUTION INTRAMUSCULAR; INTRAVENOUS at 01:03

## 2023-03-28 RX ADMIN — INSULIN HUMAN 2 UNITS: 100 INJECTION, SOLUTION PARENTERAL at 17:30

## 2023-03-28 RX ADMIN — INSULIN HUMAN 2 UNITS: 100 INJECTION, SOLUTION PARENTERAL at 01:03

## 2023-03-28 RX ADMIN — TRIAMCINOLONE ACETONIDE 1 APPLICATION: 1 OINTMENT TOPICAL at 09:43

## 2023-03-28 RX ADMIN — IPRATROPIUM BROMIDE AND ALBUTEROL SULFATE 3 ML: 2.5; .5 SOLUTION RESPIRATORY (INHALATION) at 20:01

## 2023-03-28 RX ADMIN — FUROSEMIDE 40 MG: 10 INJECTION, SOLUTION INTRAMUSCULAR; INTRAVENOUS at 13:11

## 2023-03-28 RX ADMIN — IPRATROPIUM BROMIDE AND ALBUTEROL SULFATE 3 ML: 2.5; .5 SOLUTION RESPIRATORY (INHALATION) at 04:22

## 2023-03-28 RX ADMIN — SENNOSIDES AND DOCUSATE SODIUM 1 TABLET: 8.6; 5 TABLET ORAL at 09:44

## 2023-03-28 RX ADMIN — HYDROCODONE BITARTRATE AND ACETAMINOPHEN 1 TABLET: 10; 325 TABLET ORAL at 14:24

## 2023-03-28 RX ADMIN — GLIPIZIDE 5 MG: 5 TABLET ORAL at 06:09

## 2023-03-28 RX ADMIN — Medication 10 MG: at 19:31

## 2023-03-28 NOTE — PROGRESS NOTES
CARDIOLOGY  INPATIENT PROGRESS NOTE               [address]    3/28/2023      PATIENT IDENTIFICATION:   Name:  Stephen Aguero      MRN:  [id number]     77 y.o.  male                 SUBJECTIVE:   Very weak and tired  Atrial fibrillation with very slow rate dropping sometimes to 30 bpm    OBJECTIVE:  Vitals:    03/28/23 1119 03/28/23 1126 03/28/23 1154 03/28/23 1433   BP:  106/52     BP Location:  Right arm     Patient Position:  Sitting     Pulse: 61 54     Resp: 21 22  22   Temp:  97.6 °F (36.4 °C)     TempSrc:  Oral     SpO2: (!) 89% (!) 86% 94% 92%   Weight:       Height:               Body mass index is 43.26 kg/m².    Intake/Output Summary (Last 24 hours) at 3/28/2023 1825  Last data filed at 3/28/2023 0900  Gross per 24 hour   Intake 0 ml   Output 1000 ml   Net -1000 ml       Telemetry:     Physical Exam  General Appearance:   · no acute distress  · Alert and oriented x3  HENT:   · lips not cyanotic  · Atraumatic  Neck:  · No JVD   · supple  Respiratory:  · no respiratory distress  · normal breath sounds  · no rales  Cardiovascular:    · regular rhythm  · no S3, no S4   · no murmur  · no rub  · lower extremity edema: none    Skin:   · warm, dry  · No rashes      Allergies   Allergen Reactions   • Sulfa Antibiotics Unknown - Low Severity       Scheduled meds:  apixaban, 5 mg, Oral, Q12H  carvedilol, 25 mg, Oral, BID  DULoxetine, 60 mg, Oral, Daily  famotidine, 40 mg, Oral, Daily  furosemide, 40 mg, Intravenous, Q12H  glipizide, 5 mg, Oral, QAM AC  insulin regular, 2-7 Units, Subcutaneous, Q6H  ipratropium-albuterol, 3 mL, Nebulization, Q4H - RT  nystatin, 5 mL, Swish & Swallow, 4x Daily  senna-docusate sodium, 1 tablet, Oral, BID  triamcinolone, 1 application, Topical, Q12H      IV meds:                           Data Review:  CBC    CBC 3/24/23 3/25/23 3/27/23   WBC 8.95 7.35 9.15   RBC 4.43 4.14 4.43   Hemoglobin 14.5 13.6 14.6   Hematocrit 43.9 40.9 45.2   MCV 99.1 (A) 98.8 (A)  102.0 (A)   MCH 32.7 32.9 33.0   MCHC 33.0 33.3 32.3   RDW 16.2 (A) 15.9 (A) 16.1 (A)   Platelets 180 166 149   (A) Abnormal value            CMP    CMP 3/24/23 3/25/23 3/27/23   Glucose 177 (A) 150 (A) 170 (A)   BUN 14 16 27 (A)   Creatinine 0.59 (A) 0.57 (A) 0.86   eGFR 99.3 100.3 88.6   Sodium 139 139 136   Potassium 4.2 4.1 5.1   Chloride 102 104 97 (A)   Calcium 8.5 (A) 8.3 (A) 8.6   Total Protein  6.3 7.0   Albumin  2.4 (A) 2.8 (A)   Globulin  3.9 4.2   Total Bilirubin  0.9 0.7   Alkaline Phosphatase  112 125 (A)   AST (SGOT)  27 32   ALT (SGPT)  21 23   Albumin/Globulin Ratio  0.6 0.7   BUN/Creatinine Ratio 23.7 28.1 (A) 31.4 (A)   Anion Gap 7.3 4.8 (A) 4.7 (A)   (A) Abnormal value       Comments are available for some flowsheets but are not being displayed.            CARDIAC LABS:      Lab 03/24/23  1942 03/24/23  1715 03/22/23  1545   PROBNP  --   --  232.7   HSTROP T 18* 19*  --         No results found for: DIGOXIN   Lab Results   Component Value Date    TSH 2.410 03/24/2023           Invalid input(s): LDLCALC  No results found for: POCTROP  Lab Results   Component Value Date    TROPONINT 18 (H) 03/24/2023   (  Lab Results   Component Value Date    MG 1.9 03/24/2023     Results for orders placed during the hospital encounter of 03/22/23    Adult Transthoracic Echo Complete W/ Cont if Necessary Per Protocol    Interpretation Summary  •  Left ventricular ejection fraction appears to be 51 - 55%.  •  Estimated right ventricular systolic pressure from tricuspid regurgitation is mildly elevated (35-45 mmHg).  •  Mild pulmonary hypertension is present.  •  Mild mitral valve regurgitation is present           ASSESSMENT:  Atrial fibrillation with controlled rate  Hypertension  Diabetes mellitus  Pneumonia  Hyperglycemia  COPD      PLAN:   Continue Eliquis and Coreg  DC Amelia Toledo MD  3/28/2023    18:25 EDT     Portions of this documentation were transcribed electronically from a  voice recognition software.  I confirm all data accurately represents the service(s) I performed at today's visit.

## 2023-03-28 NOTE — SIGNIFICANT NOTE
03/28/23 1005   Coping/Psychosocial   Observed Emotional State calm   Verbalized Emotional State other (see comments)  (Pt can't talk, but she only can moved her head to answer.)   Trust Relationship/Rapport empathic listening provided   Involvement in Care interacting with patient   Additional Documentation Spiritual Care (Group)   Spiritual Care   Use of Spiritual Resources prayer;spirituality for coping, indicated strong use of   Spiritual Care Source  initiative   Spiritual Care Follow-Up follow-up, none required as presently assessed   Response to Spiritual Care receptive of support   Spiritual Care Interventions prayer support provided   Spiritual Care Visit Type initial   Receptivity to Spiritual Care visit welcomed

## 2023-03-28 NOTE — PROGRESS NOTES
Pulmonary / Critical Care Progress Note      Patient Name: Stephen Aguero  : 1945  MRN: [id number]  Attending:  Marco Bishop MD  Date of admission: 3/22/2023    Subjective   Subjective   Follow-up for hypoxic respiratory failure, JULITA/OHS    Over past 24 hours:  Patient had a hypoxic event this morning with turning  Per nursing patient became very hypoxic and almost turned blue  ABG done this morning   Back on BiPAP 16/7 at 50% FiO2; currently SPO2 87%  Patient feels much better now on the BiPAP  Urine output -1.5 L over the last 24 hours    Review of Systems  General: Denied complaints  Cardiovascular:  Denied complaints  Respiratory: Denied complaints  Gastrointestinal: Denied complaints        Objective   Objective     Vitals:   Temp:  [97.4 °F (36.3 °C)-97.8 °F (36.6 °C)] 97.8 °F (36.6 °C)  Heart Rate:  [53-75] 61  Resp:  [18-24] 21  BP: (124-151)/(54-78) 124/54  Flow (L/min):  [3-5] 3    Physical Exam   Vital Signs Reviewed   General:  WDWN, Alert, NAD.  Right eyelid closed.   HEENT:  PERRL, EOMI.  OP, nares clear  Chest:  good aeration, clear to auscultation bilaterally, no work of breathing noted on BiPAP  CV: RRR, no MGR, pulses 2+, equal.  Abd:  Soft, NT, ND, + BS, obese  EXT:  no clubbing, no cyanosis, lower extremity edema with chronic venous stasis changes  Neuro:  A&Ox3, CN grossly intact, no focal deficits.  Skin: No rashes or lesions noted      Result Review    Result Review:  I have personally reviewed the results from the time of this admission to 3/28/2023 11:24 EDT and agree with these findings:  [x]  Laboratory  [x]  Microbiology  [x]  Radiology  []  EKG/Telemetry   []  Cardiology/Vascular   []  Pathology  []  Old records  []  Other:  Most notable findings include:   -ABG      Assessment & Plan   Assessment / Plan     Active Hospital Problems:  Active Hospital Problems    Diagnosis    • **Acute respiratory failure with hypercapnia (HCC)    • New onset a-fib (HCC)    •  Tachycardia    • Hypoglycemia    • Obesity hypoventilation syndrome (HCC)    • Diabetes mellitus (HCC)    • COPD (chronic obstructive pulmonary disease) (HCC)    • Pneumonia    • Acute UTI (urinary tract infection)          Impression:  Acute hypoxic respiratory failure requiring oxygen supplementation  COPD exacerbation  Mycoplasma IgM positive  JULITA/OHS  Urinary tract infection  History of diabetes  Morbid obesity, BMI 43.2    Plan:  -Currently on BiPAP 16/7 FiO2 50%; continue this for a few hours; repeat ABG 1PM; off BiPAP, can continue to wean as tolerated to keep SPO2 greater than 90%; patient does not use oxygen at baseline  -6-minute walk test prior to discharge  -Continue BiPAP nightly and with naps  -Completed a course of cefepime for urinary tract infection and mycoplasma pneumonia  -Completed a course of prednisone  -Continue nebs and bronchopulmonary hygiene  -Encourage I-S and flutter valve usage  -Continue diuresis with Lasix as tolerated; monitor renal function, electrolytes,  -Continue wound care for lower extremity chronic venous stasis wounds  -Continue PT/OT as tolerated; will likely need rehab    DVT prophylaxis:  Medical DVT prophylaxis orders are present.    CODE STATUS:   Code Status (Patient has no pulse and is not breathing): CPR (Attempt to Resuscitate)  Medical Interventions (Patient has pulse or is breathing): Full Support      Labs, images, and medications personally reviewed.    Discussed with patient    Electronically signed by Anca Beckford MD, 03/28/23, 11:27 AM EDT.

## 2023-03-28 NOTE — PLAN OF CARE
Goal Outcome Evaluation:  Plan of Care Reviewed With: family, patient        Progress: declining  Outcome Evaluation: Patient short of air this am, O2 sats dropped to 50s when patient was turned in bed. O2 increased from 3L to 8L high flow NC to keep sats from 86 - 90%. Pt was put on BIPAP and did well. ABGs improved, see lab results for details. Patient remains confused. Patient still constipated, suppository administered this evening, no results yet. Patient is coughing when eating, speech and swallow consult ordered. Bradycardic this shift, HR at 38 at times, most of the time HR from 40-60. Cardiologist aware, cardiac meds adjusted. Will continue to assess.

## 2023-03-28 NOTE — PROGRESS NOTES
[facility]     Progress Note    Patient Name: Stephen Aguero  : 1945  MRN: [id number]  Primary Care Physician:  Papi Vasquez MD  Date of admission: 3/22/2023      Subjective   Brief summary.  Patient admitted with respiratory failure and UTI along with decreased level of consciousness.  Patient admitted to ICU then subsequently transferred out of ICU    HPI:    Patient more lethargic this morning, has not used BiPAP in the night pulled it all along and restless.  When I saw him this morning he is drowsy but answering questions.  No chest pain, some shortness of breath.  Using BiPAP now.  Stat ABG done showed elevated CO2 but better than last time.  BiPAP setting adjusted now.  Also sats dropping, on increasing oxygen now       Review of Systems     No fever chills  Short of breath and edematous  Leg swelling.  Sleepy and confused all night  No burning with urination      Objective     Vitals:   Temp:  [97.4 °F (36.3 °C)-97.9 °F (36.6 °C)] 97.8 °F (36.6 °C)  Heart Rate:  [53-75] 65  Resp:  [18-24] 22  BP: (116-151)/(52-78) 151/63  Flow (L/min):  [3-5] 3    Physical Exam :     Elderly morbidly obese male not in acute distress  Arousable but very lethargic today  Neck supple  Heart irregular, no tachycardia today  Lungs diminished breath sounds few crackles  Abdomen obese and soft, morbidly obese difficult to palpate  Extremities with 3+ edema with chronic venous stasis changes      Result Review:  I have personally reviewed the results from the time of this admission to 3/28/2023 09:45 EDT and agree with these findings:  [x]  Laboratory  [x]  Microbiology  [x]  Radiology  []  EKG/Telemetry   []  Cardiology/Vascular   []  Pathology  []  Old records  []  Other:       Blood sugar stable, ABGs reviewed      Assessment / Plan       Active Hospital Problems:  Active Hospital Problems    Diagnosis    • **Acute respiratory failure with hypercapnia (HCC)    • New onset a-fib (HCC)    • Tachycardia    •  Hypoglycemia    • Obesity hypoventilation syndrome (HCC)    • Diabetes mellitus (HCC)    • COPD (chronic obstructive pulmonary disease) (HCC)    • Pneumonia    • Acute UTI (urinary tract infection)        Plan:     Increase BiPAP setting to 17  Increased oxygen  ABGs reviewed  Expect improvement with increasing BiPAP setting  We will see pulmonary recommendations  Continue Lasix and diuresis  PT OT  Monitor labs  Hold discharge to rehab at this point as patient medically and stable.  We will discuss with patient's family       DVT prophylaxis:  Medical DVT prophylaxis orders are present.    CODE STATUS:   Code Status (Patient has no pulse and is not breathing): CPR (Attempt to Resuscitate)  Medical Interventions (Patient has pulse or is breathing): Full Support                      Electronically signed by Marco Bishop MD, 03/28/23, 9:48 AM EDT.    .      .

## 2023-03-28 NOTE — PLAN OF CARE
Goal Outcome Evaluation:  Plan of Care Reviewed With: patient        Progress: improving  Outcome Evaluation: Patient was off BIPAP this monring and desated and could not get patients sats to recover.  Placed pt on BIPAP and darin abg, co2 increased a bit, keeping patient on BIPAP now to let him get some rest and get CO2 levels down.  Will reasses when pt is feeling better.

## 2023-03-28 NOTE — PLAN OF CARE
Goal Outcome Evaluation:              Outcome Evaluation: Pt has rested well durign shfit. VSS. No complaints of pain or discomfort voiced. Will continue to monitor.

## 2023-03-28 NOTE — THERAPY TREATMENT NOTE
Patient Name: Stephen Aguero  : 1945    MRN: [id number]                              Today's Date: 3/28/2023       Admit Date: 3/22/2023    Visit Dx:     ICD-10-CM ICD-9-CM   1. Hypoglycemia  E16.2 251.2   2. Pneumonia of both lungs due to infectious organism, unspecified part of lung  J18.9 483.8   3. Hypercapnia  R06.89 786.09   4. Somnolence  R40.0 780.09   5. Sepsis, due to unspecified organism, unspecified whether acute organ dysfunction present (HCC)  A41.9 038.9     995.91   6. Dysphagia, oropharyngeal  R13.12 787.22   7. Decreased activities of daily living (ADL)  Z78.9 V49.89   8. Difficulty walking  R26.2 719.7     Patient Active Problem List   Diagnosis   • Acute respiratory failure with hypercapnia (HCC)   • Hypoglycemia   • Diabetes mellitus (HCC)   • COPD (chronic obstructive pulmonary disease) (HCC)   • Pneumonia   • Acute UTI (urinary tract infection)   • Obesity hypoventilation syndrome (HCC)   • Tachycardia   • New onset a-fib (HCC)     Past Medical History:   Diagnosis Date   • Diabetes mellitus (HCC)    • Elevated cholesterol    • GERD (gastroesophageal reflux disease)    • Hypertension      Past Surgical History:   Procedure Laterality Date   • ABDOMINAL SURGERY     • APPENDECTOMY     • EYE SURGERY        General Information     Row Name 23 1106          OT Time and Intention    Document Type therapy note (daily note)  -PG     Mode of Treatment individual therapy;occupational therapy  -PG           User Key  (r) = Recorded By, (t) = Taken By, (c) = Cosigned By    Initials Name Provider Type    PG Jonathan Hinson OT Occupational Therapist                 Mobility/ADL's    No documentation.                Obj/Interventions     Row Name 23 110          Shoulder (Therapeutic Exercise)    Shoulder (Therapeutic Exercise) strengthening exercise  -PG     Shoulder Strengthening (Therapeutic Exercise) 1 lb free weight;15 repititions  -PG     Row Name 23 110           Elbow/Forearm (Therapeutic Exercise)    Elbow/Forearm (Therapeutic Exercise) strengthening exercise  -PG     Elbow/Forearm Strengthening (Therapeutic Exercise) 1 lb free weight;15 repititions  -PG     Row Name 03/28/23 1107          Motor Skills    Therapeutic Exercise shoulder;elbow/forearm  -PG           User Key  (r) = Recorded By, (t) = Taken By, (c) = Cosigned By    Initials Name Provider Type    PG Jonathan Hinson OT Occupational Therapist               Goals/Plan    No documentation.                Clinical Impression     Row Name 03/28/23 1108          Plan of Care Review    Progress no change  -PG           User Key  (r) = Recorded By, (t) = Taken By, (c) = Cosigned By    Initials Name Provider Type    PG Jonathan Hinson OT Occupational Therapist               Outcome Measures     Row Name 03/28/23 1109          How much help from another is currently needed...    Putting on and taking off regular lower body clothing? 1  -PG     Bathing (including washing, rinsing, and drying) 1  -PG     Toileting (which includes using toilet bed pan or urinal) 1  -PG     Putting on and taking off regular upper body clothing 1  -PG     Taking care of personal grooming (such as brushing teeth) 2  -PG     Eating meals 3  -PG     AM-PAC 6 Clicks Score (OT) 9  -PG     Row Name 03/28/23 1109          Functional Assessment    Outcome Measure Options AM-PAC 6 Clicks Daily Activity (OT);Optimal Instrument  -PG     Row Name 03/28/23 1109          Optimal Instrument    Optimal Instrument Optimal - 3  -PG     Bending/Stooping 5  -PG     Standing 5  -PG     Reaching 2  -PG           User Key  (r) = Recorded By, (t) = Taken By, (c) = Cosigned By    Initials Name Provider Type    Jonathan Mitchell OT Occupational Therapist                Occupational Therapy Education     Title: PT OT SLP Therapies (Done)     Topic: Occupational Therapy (Done)     Point: ADL training (Done)     Description:   Instruct learner(s) on proper safety adaptation  and remediation techniques during self care or transfers.   Instruct in proper use of assistive devices.              Learning Progress Summary           Patient Acceptance, E, VU by  at 3/26/2023 1010                   Point: Home exercise program (Done)     Description:   Instruct learner(s) on appropriate technique for monitoring, assisting and/or progressing therapeutic exercises/activities.              Learning Progress Summary           Patient Acceptance, E, VU by  at 3/26/2023 1010                   Point: Precautions (Done)     Description:   Instruct learner(s) on prescribed precautions during self-care and functional transfers.              Learning Progress Summary           Patient Acceptance, E, VU by  at 3/26/2023 1010                   Point: Body mechanics (Done)     Description:   Instruct learner(s) on proper positioning and spine alignment during self-care, functional mobility activities and/or exercises.              Learning Progress Summary           Patient Acceptance, E, VU by  at 3/26/2023 1010                               User Key     Initials Effective Dates Name Provider Type Discipline     06/16/21 -  Rosalva Pal OT Occupational Therapist OT              OT Recommendation and Plan     Plan of Care Review  Progress: no change     Time Calculation:    Time Calculation- OT     Row Name 03/28/23 1111             Time Calculation- OT    OT Received On 03/28/23  -PG      OT Goal Re-Cert Due Date 04/04/23  -PG         Timed Charges    90923 - OT Therapeutic Exercise Minutes 10  -PG         Total Minutes    Timed Charges Total Minutes 10  -PG       Total Minutes 10  -PG            User Key  (r) = Recorded By, (t) = Taken By, (c) = Cosigned By    Initials Name Provider Type     Jonathan Hinson OT Occupational Therapist              Therapy Charges for Today     Code Description Service Date Service Provider Modifiers Qty    [id number]  OT THER PROC EA 15 MIN 3/28/2023 Sravan  Jonathan, OT GO 1               Jonathan Hinson, OT  3/28/2023

## 2023-03-29 ENCOUNTER — APPOINTMENT (OUTPATIENT)
Dept: CT IMAGING | Facility: HOSPITAL | Age: 78
DRG: 871 | End: 2023-03-29
Payer: MEDICARE

## 2023-03-29 ENCOUNTER — APPOINTMENT (OUTPATIENT)
Dept: GENERAL RADIOLOGY | Facility: HOSPITAL | Age: 78
DRG: 871 | End: 2023-03-29
Payer: MEDICARE

## 2023-03-29 LAB
ALBUMIN FLD-MCNC: 0.9 G/DL
ALBUMIN FLD-MCNC: 0.9 G/DL
ALBUMIN SERPL-MCNC: 2.7 G/DL (ref 3.5–5.2)
ALBUMIN SERPL-MCNC: 2.7 G/DL (ref 3.5–5.2)
ANION GAP SERPL CALCULATED.3IONS-SCNC: 8 MMOL/L (ref 5–15)
ANION GAP SERPL CALCULATED.3IONS-SCNC: 8 MMOL/L (ref 5–15)
APPEARANCE FLD: ABNORMAL
APPEARANCE FLD: ABNORMAL
BASOPHILS # BLD AUTO: 0.07 10*3/MM3 (ref 0–0.2)
BASOPHILS # BLD AUTO: 0.07 10*3/MM3 (ref 0–0.2)
BASOPHILS NFR BLD AUTO: 0.6 % (ref 0–1.5)
BASOPHILS NFR BLD AUTO: 0.6 % (ref 0–1.5)
BUN SERPL-MCNC: 36 MG/DL (ref 8–23)
BUN SERPL-MCNC: 36 MG/DL (ref 8–23)
BUN/CREAT SERPL: 42.4 (ref 7–25)
BUN/CREAT SERPL: 42.4 (ref 7–25)
CALCIUM SPEC-SCNC: 8.6 MG/DL (ref 8.6–10.5)
CALCIUM SPEC-SCNC: 8.6 MG/DL (ref 8.6–10.5)
CHLORIDE SERPL-SCNC: 94 MMOL/L (ref 98–107)
CHLORIDE SERPL-SCNC: 94 MMOL/L (ref 98–107)
CO2 SERPL-SCNC: 33 MMOL/L (ref 22–29)
CO2 SERPL-SCNC: 33 MMOL/L (ref 22–29)
COLOR FLD: YELLOW
COLOR FLD: YELLOW
CREAT SERPL-MCNC: 0.85 MG/DL (ref 0.76–1.27)
CREAT SERPL-MCNC: 0.85 MG/DL (ref 0.76–1.27)
DEPRECATED RDW RBC AUTO: 58.7 FL (ref 37–54)
DEPRECATED RDW RBC AUTO: 58.7 FL (ref 37–54)
EGFRCR SERPLBLD CKD-EPI 2021: 88.9 ML/MIN/1.73
EGFRCR SERPLBLD CKD-EPI 2021: 88.9 ML/MIN/1.73
EOSINOPHIL # BLD AUTO: 0.25 10*3/MM3 (ref 0–0.4)
EOSINOPHIL # BLD AUTO: 0.25 10*3/MM3 (ref 0–0.4)
EOSINOPHIL NFR BLD AUTO: 2 % (ref 0.3–6.2)
EOSINOPHIL NFR BLD AUTO: 2 % (ref 0.3–6.2)
ERYTHROCYTE [DISTWIDTH] IN BLOOD BY AUTOMATED COUNT: 16.1 % (ref 12.3–15.4)
ERYTHROCYTE [DISTWIDTH] IN BLOOD BY AUTOMATED COUNT: 16.1 % (ref 12.3–15.4)
GLUCOSE BLDC GLUCOMTR-MCNC: 123 MG/DL (ref 70–99)
GLUCOSE BLDC GLUCOMTR-MCNC: 123 MG/DL (ref 70–99)
GLUCOSE BLDC GLUCOMTR-MCNC: 154 MG/DL (ref 70–99)
GLUCOSE BLDC GLUCOMTR-MCNC: 154 MG/DL (ref 70–99)
GLUCOSE BLDC GLUCOMTR-MCNC: 158 MG/DL (ref 70–99)
GLUCOSE BLDC GLUCOMTR-MCNC: 158 MG/DL (ref 70–99)
GLUCOSE BLDC GLUCOMTR-MCNC: 212 MG/DL (ref 70–99)
GLUCOSE BLDC GLUCOMTR-MCNC: 212 MG/DL (ref 70–99)
GLUCOSE FLD-MCNC: 164 MG/DL
GLUCOSE FLD-MCNC: 164 MG/DL
GLUCOSE SERPL-MCNC: 176 MG/DL (ref 65–99)
GLUCOSE SERPL-MCNC: 176 MG/DL (ref 65–99)
HCT VFR BLD AUTO: 42.4 % (ref 37.5–51)
HCT VFR BLD AUTO: 42.4 % (ref 37.5–51)
HGB BLD-MCNC: 14 G/DL (ref 13–17.7)
HGB BLD-MCNC: 14 G/DL (ref 13–17.7)
IMM GRANULOCYTES # BLD AUTO: 0.04 10*3/MM3 (ref 0–0.05)
IMM GRANULOCYTES # BLD AUTO: 0.04 10*3/MM3 (ref 0–0.05)
IMM GRANULOCYTES NFR BLD AUTO: 0.3 % (ref 0–0.5)
IMM GRANULOCYTES NFR BLD AUTO: 0.3 % (ref 0–0.5)
L PNEUMO1 AG UR QL IA: NEGATIVE
L PNEUMO1 AG UR QL IA: NEGATIVE
LDH FLD-CCNC: 123 U/L
LDH FLD-CCNC: 123 U/L
LYMPHOCYTES # BLD AUTO: 1.57 10*3/MM3 (ref 0.7–3.1)
LYMPHOCYTES # BLD AUTO: 1.57 10*3/MM3 (ref 0.7–3.1)
LYMPHOCYTES NFR BLD AUTO: 12.7 % (ref 19.6–45.3)
LYMPHOCYTES NFR BLD AUTO: 12.7 % (ref 19.6–45.3)
LYMPHOCYTES NFR FLD MANUAL: 76 %
LYMPHOCYTES NFR FLD MANUAL: 76 %
MACROPHAGE FLUID: 6 %
MACROPHAGE FLUID: 6 %
MAGNESIUM SERPL-MCNC: 2 MG/DL (ref 1.6–2.4)
MAGNESIUM SERPL-MCNC: 2 MG/DL (ref 1.6–2.4)
MCH RBC QN AUTO: 32.8 PG (ref 26.6–33)
MCH RBC QN AUTO: 32.8 PG (ref 26.6–33)
MCHC RBC AUTO-ENTMCNC: 33 G/DL (ref 31.5–35.7)
MCHC RBC AUTO-ENTMCNC: 33 G/DL (ref 31.5–35.7)
MCV RBC AUTO: 99.3 FL (ref 79–97)
MCV RBC AUTO: 99.3 FL (ref 79–97)
MONOCYTES # BLD AUTO: 1.74 10*3/MM3 (ref 0.1–0.9)
MONOCYTES # BLD AUTO: 1.74 10*3/MM3 (ref 0.1–0.9)
MONOCYTES NFR BLD AUTO: 14.1 % (ref 5–12)
MONOCYTES NFR BLD AUTO: 14.1 % (ref 5–12)
MONOCYTES NFR FLD: 4 %
MONOCYTES NFR FLD: 4 %
MRSA DNA SPEC QL NAA+PROBE: NORMAL
MRSA DNA SPEC QL NAA+PROBE: NORMAL
NEUTROPHILS NFR BLD AUTO: 70.3 % (ref 42.7–76)
NEUTROPHILS NFR BLD AUTO: 70.3 % (ref 42.7–76)
NEUTROPHILS NFR BLD AUTO: 8.69 10*3/MM3 (ref 1.7–7)
NEUTROPHILS NFR BLD AUTO: 8.69 10*3/MM3 (ref 1.7–7)
NEUTROPHILS NFR FLD MANUAL: 14 %
NEUTROPHILS NFR FLD MANUAL: 14 %
NRBC BLD AUTO-RTO: 0 /100 WBC (ref 0–0.2)
NRBC BLD AUTO-RTO: 0 /100 WBC (ref 0–0.2)
NT-PROBNP SERPL-MCNC: 170 PG/ML (ref 0–1800)
NT-PROBNP SERPL-MCNC: 170 PG/ML (ref 0–1800)
NUC CELL # FLD: 1074 /MM3
NUC CELL # FLD: 1074 /MM3
PH FLD: 8 [PH]
PH FLD: 8 [PH]
PHOSPHATE SERPL-MCNC: 3.4 MG/DL (ref 2.5–4.5)
PHOSPHATE SERPL-MCNC: 3.4 MG/DL (ref 2.5–4.5)
PLATELET # BLD AUTO: 200 10*3/MM3 (ref 140–450)
PLATELET # BLD AUTO: 200 10*3/MM3 (ref 140–450)
PMV BLD AUTO: 10 FL (ref 6–12)
PMV BLD AUTO: 10 FL (ref 6–12)
POTASSIUM SERPL-SCNC: 4.8 MMOL/L (ref 3.5–5.2)
POTASSIUM SERPL-SCNC: 4.8 MMOL/L (ref 3.5–5.2)
PROCALCITONIN SERPL-MCNC: 0.15 NG/ML (ref 0–0.25)
PROCALCITONIN SERPL-MCNC: 0.15 NG/ML (ref 0–0.25)
PROT FLD-MCNC: 1.9 G/DL
PROT FLD-MCNC: 1.9 G/DL
RBC # BLD AUTO: 4.27 10*6/MM3 (ref 4.14–5.8)
RBC # BLD AUTO: 4.27 10*6/MM3 (ref 4.14–5.8)
RBC # FLD AUTO: 4000 /MM3
RBC # FLD AUTO: 4000 /MM3
S PNEUM AG SPEC QL LA: NEGATIVE
S PNEUM AG SPEC QL LA: NEGATIVE
SODIUM SERPL-SCNC: 135 MMOL/L (ref 136–145)
SODIUM SERPL-SCNC: 135 MMOL/L (ref 136–145)
WBC NRBC COR # BLD: 12.36 10*3/MM3 (ref 3.4–10.8)
WBC NRBC COR # BLD: 12.36 10*3/MM3 (ref 3.4–10.8)

## 2023-03-29 PROCEDURE — 94761 N-INVAS EAR/PLS OXIMETRY MLT: CPT

## 2023-03-29 PROCEDURE — 87102 FUNGUS ISOLATION CULTURE: CPT | Performed by: INTERNAL MEDICINE

## 2023-03-29 PROCEDURE — 85025 COMPLETE CBC W/AUTO DIFF WBC: CPT | Performed by: NURSE PRACTITIONER

## 2023-03-29 PROCEDURE — 25010000002 FUROSEMIDE PER 20 MG: Performed by: INTERNAL MEDICINE

## 2023-03-29 PROCEDURE — 83615 LACTATE (LD) (LDH) ENZYME: CPT | Performed by: INTERNAL MEDICINE

## 2023-03-29 PROCEDURE — 84145 PROCALCITONIN (PCT): CPT | Performed by: INTERNAL MEDICINE

## 2023-03-29 PROCEDURE — 87206 SMEAR FLUORESCENT/ACID STAI: CPT | Performed by: INTERNAL MEDICINE

## 2023-03-29 PROCEDURE — 84157 ASSAY OF PROTEIN OTHER: CPT | Performed by: INTERNAL MEDICINE

## 2023-03-29 PROCEDURE — 25010000002 AZITHROMYCIN PER 500 MG: Performed by: INTERNAL MEDICINE

## 2023-03-29 PROCEDURE — 94664 DEMO&/EVAL PT USE INHALER: CPT

## 2023-03-29 PROCEDURE — 94799 UNLISTED PULMONARY SVC/PX: CPT

## 2023-03-29 PROCEDURE — 0W993ZZ DRAINAGE OF RIGHT PLEURAL CAVITY, PERCUTANEOUS APPROACH: ICD-10-PCS | Performed by: INTERNAL MEDICINE

## 2023-03-29 PROCEDURE — 83735 ASSAY OF MAGNESIUM: CPT | Performed by: NURSE PRACTITIONER

## 2023-03-29 PROCEDURE — 25010000002 FUROSEMIDE PER 20 MG: Performed by: NURSE PRACTITIONER

## 2023-03-29 PROCEDURE — 83986 ASSAY PH BODY FLUID NOS: CPT | Performed by: INTERNAL MEDICINE

## 2023-03-29 PROCEDURE — 89051 BODY FLUID CELL COUNT: CPT | Performed by: INTERNAL MEDICINE

## 2023-03-29 PROCEDURE — 71045 X-RAY EXAM CHEST 1 VIEW: CPT

## 2023-03-29 PROCEDURE — 94667 MNPJ CHEST WALL 1ST: CPT

## 2023-03-29 PROCEDURE — 99291 CRITICAL CARE FIRST HOUR: CPT | Performed by: INTERNAL MEDICINE

## 2023-03-29 PROCEDURE — 84311 SPECTROPHOTOMETRY: CPT | Performed by: INTERNAL MEDICINE

## 2023-03-29 PROCEDURE — 87449 NOS EACH ORGANISM AG IA: CPT | Performed by: NURSE PRACTITIONER

## 2023-03-29 PROCEDURE — 88108 CYTOPATH CONCENTRATE TECH: CPT | Performed by: INTERNAL MEDICINE

## 2023-03-29 PROCEDURE — 87116 MYCOBACTERIA CULTURE: CPT | Performed by: INTERNAL MEDICINE

## 2023-03-29 PROCEDURE — 80069 RENAL FUNCTION PANEL: CPT | Performed by: NURSE PRACTITIONER

## 2023-03-29 PROCEDURE — 82042 OTHER SOURCE ALBUMIN QUAN EA: CPT | Performed by: INTERNAL MEDICINE

## 2023-03-29 PROCEDURE — 87899 AGENT NOS ASSAY W/OPTIC: CPT | Performed by: NURSE PRACTITIONER

## 2023-03-29 PROCEDURE — 87075 CULTR BACTERIA EXCEPT BLOOD: CPT | Performed by: INTERNAL MEDICINE

## 2023-03-29 PROCEDURE — 82945 GLUCOSE OTHER FLUID: CPT | Performed by: INTERNAL MEDICINE

## 2023-03-29 PROCEDURE — 63710000001 INSULIN REGULAR HUMAN PER 5 UNITS: Performed by: INTERNAL MEDICINE

## 2023-03-29 PROCEDURE — 92610 EVALUATE SWALLOWING FUNCTION: CPT

## 2023-03-29 PROCEDURE — 83880 ASSAY OF NATRIURETIC PEPTIDE: CPT | Performed by: INTERNAL MEDICINE

## 2023-03-29 PROCEDURE — 87205 SMEAR GRAM STAIN: CPT | Performed by: INTERNAL MEDICINE

## 2023-03-29 PROCEDURE — 87070 CULTURE OTHR SPECIMN AEROBIC: CPT | Performed by: INTERNAL MEDICINE

## 2023-03-29 PROCEDURE — 70450 CT HEAD/BRAIN W/O DYE: CPT

## 2023-03-29 PROCEDURE — 94760 N-INVAS EAR/PLS OXIMETRY 1: CPT

## 2023-03-29 PROCEDURE — 84478 ASSAY OF TRIGLYCERIDES: CPT | Performed by: INTERNAL MEDICINE

## 2023-03-29 PROCEDURE — 32555 ASPIRATE PLEURA W/ IMAGING: CPT | Performed by: INTERNAL MEDICINE

## 2023-03-29 PROCEDURE — 71250 CT THORAX DX C-: CPT

## 2023-03-29 PROCEDURE — 82962 GLUCOSE BLOOD TEST: CPT

## 2023-03-29 PROCEDURE — 94660 CPAP INITIATION&MGMT: CPT

## 2023-03-29 PROCEDURE — 87641 MR-STAPH DNA AMP PROBE: CPT | Performed by: NURSE PRACTITIONER

## 2023-03-29 PROCEDURE — 94668 MNPJ CHEST WALL SBSQ: CPT

## 2023-03-29 RX ORDER — FUROSEMIDE 10 MG/ML
40 INJECTION INTRAMUSCULAR; INTRAVENOUS EVERY 8 HOURS
Status: DISCONTINUED | OUTPATIENT
Start: 2023-03-29 | End: 2023-03-31

## 2023-03-29 RX ORDER — HALOPERIDOL 5 MG/ML
1 INJECTION INTRAMUSCULAR EVERY 6 HOURS PRN
Status: DISCONTINUED | OUTPATIENT
Start: 2023-03-29 | End: 2023-04-08 | Stop reason: HOSPADM

## 2023-03-29 RX ADMIN — IPRATROPIUM BROMIDE AND ALBUTEROL SULFATE 3 ML: 2.5; .5 SOLUTION RESPIRATORY (INHALATION) at 18:40

## 2023-03-29 RX ADMIN — APIXABAN 5 MG: 5 TABLET, FILM COATED ORAL at 21:19

## 2023-03-29 RX ADMIN — SENNOSIDES AND DOCUSATE SODIUM 1 TABLET: 8.6; 5 TABLET ORAL at 08:29

## 2023-03-29 RX ADMIN — FUROSEMIDE 40 MG: 10 INJECTION, SOLUTION INTRAMUSCULAR; INTRAVENOUS at 17:38

## 2023-03-29 RX ADMIN — NYSTATIN 500000 UNITS: 100000 SUSPENSION ORAL at 17:38

## 2023-03-29 RX ADMIN — FAMOTIDINE 40 MG: 20 TABLET ORAL at 08:29

## 2023-03-29 RX ADMIN — Medication 10 ML: at 21:19

## 2023-03-29 RX ADMIN — NYSTATIN 500000 UNITS: 100000 SUSPENSION ORAL at 12:33

## 2023-03-29 RX ADMIN — FUROSEMIDE 40 MG: 10 INJECTION, SOLUTION INTRAMUSCULAR; INTRAVENOUS at 12:26

## 2023-03-29 RX ADMIN — GLIPIZIDE 5 MG: 5 TABLET ORAL at 08:29

## 2023-03-29 RX ADMIN — CARVEDILOL 25 MG: 25 TABLET, FILM COATED ORAL at 08:29

## 2023-03-29 RX ADMIN — DULOXETINE HYDROCHLORIDE 60 MG: 30 CAPSULE, DELAYED RELEASE ORAL at 08:29

## 2023-03-29 RX ADMIN — FUROSEMIDE 40 MG: 10 INJECTION, SOLUTION INTRAMUSCULAR; INTRAVENOUS at 02:00

## 2023-03-29 RX ADMIN — NYSTATIN 500000 UNITS: 100000 SUSPENSION ORAL at 21:18

## 2023-03-29 RX ADMIN — IPRATROPIUM BROMIDE AND ALBUTEROL SULFATE 3 ML: 2.5; .5 SOLUTION RESPIRATORY (INHALATION) at 02:55

## 2023-03-29 RX ADMIN — IPRATROPIUM BROMIDE AND ALBUTEROL SULFATE 3 ML: 2.5; .5 SOLUTION RESPIRATORY (INHALATION) at 22:45

## 2023-03-29 RX ADMIN — TRIAMCINOLONE ACETONIDE 1 APPLICATION: 1 OINTMENT TOPICAL at 08:29

## 2023-03-29 RX ADMIN — IPRATROPIUM BROMIDE AND ALBUTEROL SULFATE 3 ML: 2.5; .5 SOLUTION RESPIRATORY (INHALATION) at 12:05

## 2023-03-29 RX ADMIN — AZITHROMYCIN DIHYDRATE 500 MG: 500 INJECTION, POWDER, LYOPHILIZED, FOR SOLUTION INTRAVENOUS at 17:41

## 2023-03-29 RX ADMIN — IPRATROPIUM BROMIDE AND ALBUTEROL SULFATE 3 ML: 2.5; .5 SOLUTION RESPIRATORY (INHALATION) at 15:07

## 2023-03-29 RX ADMIN — CARVEDILOL 25 MG: 25 TABLET, FILM COATED ORAL at 21:19

## 2023-03-29 RX ADMIN — ACETAMINOPHEN 650 MG: 325 TABLET ORAL at 21:31

## 2023-03-29 RX ADMIN — NYSTATIN 500000 UNITS: 100000 SUSPENSION ORAL at 08:30

## 2023-03-29 RX ADMIN — IPRATROPIUM BROMIDE AND ALBUTEROL SULFATE 3 ML: 2.5; .5 SOLUTION RESPIRATORY (INHALATION) at 06:30

## 2023-03-29 RX ADMIN — TRIAMCINOLONE ACETONIDE 1 APPLICATION: 1 OINTMENT TOPICAL at 21:19

## 2023-03-29 RX ADMIN — APIXABAN 5 MG: 5 TABLET, FILM COATED ORAL at 08:29

## 2023-03-29 RX ADMIN — INSULIN HUMAN 2 UNITS: 100 INJECTION, SOLUTION PARENTERAL at 12:26

## 2023-03-29 NOTE — PROGRESS NOTES
[facility]     Progress Note    Patient Name: Stephen Aguero  : 1945  MRN: [id number]  Primary Care Physician:  Papi Vasquez MD  Date of admission: 3/22/2023      Subjective   Brief summary.  Patient admitted with respiratory failure and UTI along with decreased level of consciousness.  Patient initially admitted to ICU subsequently transferred out of ICU and again transferred back to ICU last night for increased lethargy and CO2 retention      HPI:    More awake and alert this morning, used BiPAP all night.  Patient's daughter at bedside, RN also reports patient confused, unable to communicate well.  No significant shortness of breath     Review of Systems     No fever chills  Swelling of leg  No significant shortness  Sleepy and confused all night        Objective     Vitals:   Temp:  [97.3 °F (36.3 °C)-98 °F (36.7 °C)] 97.5 °F (36.4 °C)  Heart Rate:  [45-87] 73  Resp:  [14-32] 32  BP: (107-148)/() 107/75  Flow (L/min):  [7-8] 7    Physical Exam :     Elderly morbidly obese male not in acute distress  Not communicating well  Neck supple  Heart irregular, no tachycardia today  Lungs diminished breath sounds few crackles  Abdomen obese and soft, morbidly obese difficult to palpate  Extremities with 3+ edema with chronic venous stasis changes      Result Review:  I have personally reviewed the results from the time of this admission to 3/29/2023 16:28 EDT and agree with these findings:  [x]  Laboratory  [x]  Microbiology  [x]  Radiology  []  EKG/Telemetry   []  Cardiology/Vascular   []  Pathology  []  Old records  []  Other:       Blood sugar stable, ABGs reviewed      Assessment / Plan       Active Hospital Problems:  Active Hospital Problems    Diagnosis    • **Acute respiratory failure with hypercapnia (HCC)    • New onset a-fib (HCC)    • Tachycardia    • Hypoglycemia    • Obesity hypoventilation syndrome (HCC)    • Diabetes mellitus (HCC)    • COPD (chronic obstructive pulmonary disease)  ([facility])    • Pneumonia    • Acute UTI (urinary tract infection)        Plan:     Patient became more lethargic with respiratory failure and increasing CO2 transferred to ICU last night.  Doing better today.  Finished antibiotics for UTI, mycoplasma positive we will start Zithromax.  Intensivist following patient will see their recommendations.  Continue BiPAP for now.    DVT prophylaxis:  Medical DVT prophylaxis orders are present.    CODE STATUS:   Code Status (Patient has no pulse and is not breathing): CPR (Attempt to Resuscitate)  Medical Interventions (Patient has pulse or is breathing): Full Support                        Electronically signed by Marco Bishop MD, 03/29/23, 4:30 PM EDT.  .    .      .

## 2023-03-29 NOTE — PLAN OF CARE
"Goal Outcome Evaluation:      VSS, tolerated bipap overnight. Aoxself, more alert and responsive. Frequent complaints of having to use bedpan, 3 loose, foul smelling BM. Daughter states \"he has C. Diff, he has been on antibiotics for months, he has it I know he does\". I educated on process of testing for C.diff and [facility] protocol but family still insists that he needs to be tested.      Frequent call outs by family at bedside, educated daughter about avoiding the use of narcotics is important so staff can monitor his responsiveness and neuro status. Daughter was not pleased with not giving pain medication and kept requesting pain medication. Changed dressing on arm twice with optiform and adaptic, cleansed with NS and blotted dry.         "

## 2023-03-29 NOTE — PLAN OF CARE
Goal Outcome Evaluation:               Patient on bipap 20/7 and tolerating it well.  Patient resting comfortably at this time.

## 2023-03-29 NOTE — PROGRESS NOTES
Respiratory Therapist Broncho-Pulmonary Hygiene Progress Note      Patient Name:  Stephen Aguero  YOB: 1945    Stephen Aguero meets the qualification for Level 4 of the Bronco-Pulmonary Hygiene Protocol. This was based on my daily patient assessment and includes review of chest x-ray results, cough ability and quality, oxygenation, secretions or risk for secretion development and patient mobility.     Broncho-Pulmonary Hygiene Assessment:    Level of Movement: Inability or reluctance to change body positions   Obtunded    Breath Sounds: Very poor air exchange with or without rhonchi    Cough: Ineffective, weak or not cough efforts    Chest X-Ray: Severe atelectasis or severe consolidation    Sputum Productions: Thick, tenacious retained secretions with difffiulty clearing    History and Physical: Respiratory Failure due to mucus plugging or retained secretions    SpO2 to Oxygen Need: greater than 90% on  greater than 6L, Vapotherm, oxygen mask greater than 40% or ventilator    Current SpO2 is: 92 on BIPAP     Based on this information, I have completed the following interventions: CPT (precussor)      Electronically signed by Aline Hagen RRT, 03/29/23, 4:55 PM EDT.

## 2023-03-29 NOTE — PLAN OF CARE
Goal Outcome Evaluation:   Patient taken off bipap this AM and trialed on nasal cannula. Checked on patient shortly after and he was complaining of shortness of air, lips were cyanotic, and patient kept getting agitated trying to rip bipap back off. Placed back on bipap and increased fio2 on bipap. Took down for CT of chest and did a thoracentesis on the right side. Patient remains on bipap, O2 much improved, and patient expressed he is much more comfortable. manuela Lance.

## 2023-03-29 NOTE — PROCEDURES
Procedure name: ultrasound-guided right-sided thoracentesis     Indication - pleural effusion     This procedural risks were explained to the patient including pneumothorax requiring chest tube placement, significant bleeding requiring surgery, and infection , understanding of the risk and benefits acknowledged by the patient and family and he wish to proceed with the procedure.     Timeout performed     Procedure details  Ultrasound was use to visualize a  collection of pleural fluid, the diaphragm, and collapsed lung. At this point, the skin overlying the rib was anesthetized with 5 mL 1% lidocaine without epinephrine. A thoracentesis needle was then inserted into the pleural cavity just over top of the rib and pleural fluid was aspirated back. At this time the thoracentesis catheter was advanced to the pleural cavity over the needle.  Approximately 500 mL of cloudy yellow fluid was removed. Pleural fluid was sent for analysis. Chest x-ray has been ordered.      Patient tolerated procedure well     No immediate complications    Electronically signed by Mateus Ewing MD, 03/29/23, 3:54 PM EDT.

## 2023-03-29 NOTE — PROGRESS NOTES
Pulmonary / Critical Care Progress Note      Patient Name: Stephen Aguero  : 1945  MRN: [id number]  Attending:  Marco Bishop MD  Date of admission: 3/22/2023    Subjective   Subjective   Follow-up for hypoxic respiratory failure, JULITA/OHS    Over past 24 hours:  Patient remained on supplemental oxygen  Wore BiPAP 16/ overnight, 50% FiO2  Had increasing lethargy overnight was transferred to ICU  ABG 7.29/58/59/27  Afebrile    This morning  Patient on 8 L nasal cannula, O2 sat 91%  Sleepy, does open eyes and answer some questions  Patient does not speak full sentences  Weak cough  Chest x-ray reviewed, low lung volumes/atelectasis        Review of Systems  General: Denied complaints  Cardiovascular:  Denied complaints  Respiratory: Denied complaints  Gastrointestinal: Denied complaints        Objective   Objective     Vitals:   Temp:  [97.3 °F (36.3 °C)-98 °F (36.7 °C)] 97.8 °F (36.6 °C)  Heart Rate:  [45-79] 79  Resp:  [14-22] 19  BP: (106-148)/() 136/102  Flow (L/min):  [8] 8    Physical Exam   Vital Signs Reviewed   General:  WDWN, lethargic, NAD.  Right eyelid closed.   HEENT:  PERRL, EOMI.  OP, nares clear  Chest:  good aeration, coarse rhonchi bilaterally, no work of breathing noted on nasal cannula  CV: RRR, no MGR, pulses 2+, equal.  Abd:  Soft, NT, ND, + BS, obese  EXT:  no clubbing, no cyanosis, lower extremity edema with chronic venous stasis changes  Neuro:  A&Ox1, CN grossly intact, no focal deficits.  Skin: No rashes or lesions noted      Result Review    Result Review:  I have personally reviewed the results from the time of this admission to 3/29/2023 07:44 EDT and agree with these findings:  [x]  Laboratory  [x]  Microbiology  [x]  Radiology  []  EKG/Telemetry   []  Cardiology/Vascular   []  Pathology  []  Old records  []  Other:  Most notable findings include:       Lab 23  0739 23  0558 23  0440 23  0509 23  1111  03/23/23  0423 03/23/23  0413 03/22/23  1545   WBC 12.36*  --  10.32  --  9.15 7.35 8.95 9.64  --  9.13   HEMOGLOBIN 14.0  --  14.3  --  14.6 13.6 14.5 14.6  --  15.4   HEMATOCRIT 42.4  --  44.4  --  45.2 40.9 43.9 43.6  --  47.0   PLATELETS 200  --  222  --  149 166 180 201  --  212   SODIUM 135*  --  135* 136  --  139 139  --  139 138   SODIUM, ARTERIAL  --  134.1*  --   --   --   --   --   --   --   --    POTASSIUM 4.8  --  5.1 5.1  --  4.1 4.2  --  4.2 3.7   CHLORIDE 94*  --  96* 97*  --  104 102  --  104 100   CO2 33.0*  --  34.1* 34.3*  --  30.2* 29.7*  --  28.7 32.5*   BUN 36*  --  36* 27*  --  16 14  --  15 16   CREATININE 0.85  --  0.90 0.86  --  0.57* 0.59*  --  0.64* 0.69*   GLUCOSE 176*  --  256* 170*  --  150* 177*  --  108* 145*   GLUCOSE, ARTERIAL  --  260*  --   --   --   --   --   --   --   --    CALCIUM 8.6  --  8.9 8.6  --  8.3* 8.5*  --  8.3* 8.6   PHOSPHORUS 3.4  --   --   --   --   --   --   --   --   --    TOTAL PROTEIN  --   --   --  7.0  --  6.3  --   --  6.4 7.1   ALBUMIN 2.7*  --   --  2.8*  --  2.4*  --   --  2.4* 2.9*   GLOBULIN  --   --   --  4.2  --  3.9  --   --  4.0 4.2         Assessment & Plan   Assessment / Plan     Active Hospital Problems:  Active Hospital Problems    Diagnosis    • **Acute respiratory failure with hypercapnia (HCC)    • New onset a-fib (HCC)    • Tachycardia    • Hypoglycemia    • Obesity hypoventilation syndrome (HCC)    • Diabetes mellitus (HCC)    • COPD (chronic obstructive pulmonary disease) ([facility])    • Pneumonia    • Acute UTI (urinary tract infection)      Impression:  Acute hypoxic and hypercapnic respiratory failure requiring NIPPV  COPD exacerbation  Mycoplasma IgM positive  JULITA/OHS  Urinary tract infection secondary to Morganella  Bibasilar atelectasis  Altered mental status  Toxic/metabolic encephalopathy  Type 2 diabetes with hyperglycemia  Morbid obesity, BMI 43.2    Plan:  Continue supplemental oxygen with nasal cannula  Use BiPAP 20/10 with naps  and at night  Review chest x-ray, noted low lung volumes  We will check CT of chest to rule out infection/effusion  Initiate bronchopulmonary hygiene, aggressive airway clearance, incentive spirometry  Bronchodilator protocol  Family reports waxing and waning mental status, was treated for urinary tract infection/mycoplasma pneumoniawith cefepime  We will check CT of head while in radiology today  Check proBNP, Pro-[facility]  Send sputum culture if able to obtain  Continue Eliquis for A-fib  Continue IV diuresis with Lasix 40 mg twice daily  Trend renal function, monitor replace electrolytes as necessary  Speech therapy following, tolerating p.o. diet  Continue wound care for lower extremity chronic venous stasis wounds  Continue PT/OT as tolerated; will likely need rehab     DVT prophylaxis: Eliquis  Medical DVT prophylaxis orders are present.    CODE STATUS:   Code Status (Patient has no pulse and is not breathing): CPR (Attempt to Resuscitate)  Medical Interventions (Patient has pulse or is breathing): Full Support      Patient is critically ill with acute hypoxemic and hypercapnic respiratory failure requiring NIPPV, COPD exacerbation, ultimately status, bibasilar atelectasis, UTI. We have personally reviewed all pertinent labs, imaging, microbiology and documentation. We have discussed care with the primary service as well as at multidisciplinary critical care rounds with the bedside nurse, respiratory therapist, pharmacist and all other ancillary services.32 minutes of critical care time was spent managing this patient, excluding procedures. Of this time, I spent 20 minutes and CLIFTON Bailey spent 12 minutes in accordance with split shared billing.    Electronically signed by Mateus Ewing MD, 03/29/23, 1:01 PM EDT.

## 2023-03-29 NOTE — PROGRESS NOTES
CARDIOLOGY  INPATIENT PROGRESS NOTE               [facility] CORONARY CARE UNIT    3/29/2023      PATIENT IDENTIFICATION:   Name:  Stephen Aguero      MRN:  [id number]     77 y.o.  male                 SUBJECTIVE:   Patient is very weak and is sleepy  Atrial fibrillation with controlled rate      OBJECTIVE:  Vitals:    03/29/23 1206 03/29/23 1300 03/29/23 1507 03/29/23 1647   BP:  107/75     BP Location:       Patient Position:       Pulse: 75 74 73    Resp: 24  (!) 32    Temp: 97.5 °F (36.4 °C)   96.8 °F (36 °C)   TempSrc: Axillary   Axillary   SpO2: 96% 93% 97%    Weight:       Height:               Body mass index is 43.26 kg/m².    Intake/Output Summary (Last 24 hours) at 3/29/2023 1725  Last data filed at 3/29/2023 0611  Gross per 24 hour   Intake --   Output 950 ml   Net -950 ml       Telemetry:     Physical Exam  General Appearance:   · no acute distress  · Alert and oriented x3  HENT:   · lips not cyanotic  · Atraumatic  Neck:  · No JVD   · supple  Respiratory:  · no respiratory distress  · normal breath sounds  · no rales  Cardiovascular:    · regular rhythm  · no S3, no S4   · no murmur  · no rub  · lower extremity edema: none    Skin:   · warm, dry  · No rashes      Allergies   Allergen Reactions   • Sulfa Antibiotics Unknown - Low Severity       Scheduled meds:  apixaban, 5 mg, Oral, Q12H  azithromycin, 500 mg, Intravenous, Q24H  carvedilol, 25 mg, Oral, BID  DULoxetine, 60 mg, Oral, Daily  famotidine, 40 mg, Oral, Daily  furosemide, 40 mg, Intravenous, Q8H  glipizide, 5 mg, Oral, QAM AC  insulin regular, 2-7 Units, Subcutaneous, Q6H  ipratropium-albuterol, 3 mL, Nebulization, Q4H - RT  nystatin, 5 mL, Swish & Swallow, 4x Daily  senna-docusate sodium, 1 tablet, Oral, BID  triamcinolone, 1 application, Topical, Q12H      IV meds:                           Data Review:  CBC    CBC 3/27/23 3/28/23 3/29/23   WBC 9.15 10.32 12.36 (A)   RBC 4.43 4.42 4.27   Hemoglobin 14.6 14.3 14.0   Hematocrit  45.2 44.4 42.4   .0 (A) 100.5 (A) 99.3 (A)   MCH 33.0 32.4 32.8   MCHC 32.3 32.2 33.0   RDW 16.1 (A) 16.1 (A) 16.1 (A)   Platelets 149 222 200   (A) Abnormal value            CMP    CMP 3/27/23 3/28/23 3/28/23 3/29/23     2036 2038    Glucose 170 (A) 256 (A) 260 (A) 176 (A)   BUN 27 (A) 36 (A)  36 (A)   Creatinine 0.86 0.90  0.85   eGFR 88.6 87.4  88.9   Sodium 136 135 (A) 134.1 (A) 135 (A)   Potassium 5.1 5.1  4.8   Chloride 97 (A) 96 (A)  94 (A)   Calcium 8.6 8.9  8.6   Total Protein 7.0      Albumin 2.8 (A)   2.7 (A)   Globulin 4.2      Total Bilirubin 0.7      Alkaline Phosphatase 125 (A)      AST (SGOT) 32      ALT (SGPT) 23      Albumin/Globulin Ratio 0.7      BUN/Creatinine Ratio 31.4 (A) 40.0 (A)  42.4 (A)   Anion Gap 4.7 (A) 4.9 (A)  8.0   (A) Abnormal value       Comments are available for some flowsheets but are not being displayed.            CARDIAC LABS:      Lab 03/29/23  0739 03/24/23  1942 03/24/23  1715   PROBNP 170.0  --   --    HSTROP T  --  18* 19*        No results found for: DIGOXIN   Lab Results   Component Value Date    TSH 2.410 03/24/2023           Invalid input(s): LDLCALC  No results found for: POCTROP  Lab Results   Component Value Date    TROPONINT 18 (H) 03/24/2023   (  Lab Results   Component Value Date    MG 2.0 03/29/2023     Results for orders placed during the hospital encounter of 03/22/23    Adult Transthoracic Echo Complete W/ Cont if Necessary Per Protocol    Interpretation Summary  •  Left ventricular ejection fraction appears to be 51 - 55%.  •  Estimated right ventricular systolic pressure from tricuspid regurgitation is mildly elevated (35-45 mmHg).  •  Mild pulmonary hypertension is present.  •  Mild mitral valve regurgitation is present           ASSESSMENT:  Atrial fibrillation with controlled rate  Hypertension  Diabetes mellitus  Pneumonia  Hyperglycemia  COPD      PLAN:     Continue Eliquis and Coreg      Yassine Toledo MD  3/29/2023    17:25  EDT     Portions of this documentation were transcribed electronically from a voice recognition software.  I confirm all data accurately represents the service(s) I performed at today's visit.

## 2023-03-29 NOTE — PLAN OF CARE
Goal Outcome Evaluation:  Plan of Care Reviewed With: patient        Progress: improving  Outcome Evaluation: Patient taken off BIPAP after wearing all night. HFNC 8L on at this time.

## 2023-03-29 NOTE — NURSING NOTE
Pt confused, lethargic, slow to respond, pt able to vocalize name but with increased respiratory effort. Extremely sob, pt will be in  mid sentence and then stare into space or close eyes and not respond.Ashen color, diminished breath sounds, shallow breathing and use of abdominal accessory muscle. o2SAT 92% 8 L high flow. Unable to follow commands. Medication not given at this time due to lethargy.   BiPAP put on. RRT called at 2019-Labs, ABG, , xray, complete per Mateus WRIGHT RRT nurse and Dr. Bishop. Order given to transfer pt to CCU-2 - 2114 LMOR for Daughter to call back. 2122 daughter cindy called back and was updated on pt decline and order to transfer to CCU-2. 2137 Report given to Mary on CCU- transferred pt via bed by RRT nurse Mateus, , Marysol RT and this nurse to CCU-2

## 2023-03-29 NOTE — CONSULTS
"Nutrition Services    Patient Name: Stephen Aguero  YOB: 1945  MRN: [id number]  Admission date: 3/22/2023      CLINICAL NUTRITION ASSESSMENT      Reason for Assessment  LOS     H&P:    Past Medical History:   Diagnosis Date   • Diabetes mellitus (HCC)    • Elevated cholesterol    • GERD (gastroesophageal reflux disease)    • Hypertension         Current Problems:   Active Hospital Problems    Diagnosis    • **Acute respiratory failure with hypercapnia (HCC)    • New onset a-fib (HCC)    • Tachycardia    • Hypoglycemia    • Obesity hypoventilation syndrome (HCC)    • Diabetes mellitus (HCC)    • COPD (chronic obstructive pulmonary disease) (HCC)    • Pneumonia    • Acute UTI (urinary tract infection)         Nutrition/Diet History         Narrative     Patient reviewed related to LOS x 7 days.  Has had variable intake throughout admission.  Seen by SLP today and diet was downgraded to mechanical ground.      Patient is noted to have confusion and lethargy, transferred to CCU overnight.  Not appropriate for diet education or interview at this time.      Will add oral nutrition supplement to encourage increased kcal/pro intake and follow as mental status improves.       Anthropometrics        Current Height, Weight Height: 180.3 cm (71\")  Weight: (!) 141 kg (310 lb 3 oz)   Current BMI Body mass index is 43.26 kg/m².       Weight Hx  Wt Readings from Last 30 Encounters:   03/22/23 2112 (!) 141 kg (310 lb 3 oz)   03/22/23 1611 (!) 140 kg (308 lb 13.8 oz)            Wt Change Observation Unable to determine.       Estimated/Assessed Needs       Energy Requirements 30 kcal/kg IBW   EST Needs (kcal/day) 2259 kcal        Protein Requirements 1.0 g/kg IBW   EST Daily Needs (g/day) 75       Fluid Requirements 25 ml/kg IBW    Estimated Needs (mL/day) 1883 ml      Labs/Medications         Pertinent Labs Reviewed.   Results from last 7 days   Lab Units 03/29/23  0739 03/28/23 2038 03/28/23 2036 03/27/23  0558 " 03/25/23  0509 03/24/23  1110 03/23/23  0413   SODIUM mmol/L 135*  --  135* 136 139   < > 139   SODIUM, ARTERIAL mmol/L  --  134.1*  --   --   --   --   --    POTASSIUM mmol/L 4.8  --  5.1 5.1 4.1   < > 4.2   CHLORIDE mmol/L 94*  --  96* 97* 104   < > 104   CO2 mmol/L 33.0*  --  34.1* 34.3* 30.2*   < > 28.7   BUN mg/dL 36*  --  36* 27* 16   < > 15   CREATININE mg/dL 0.85  --  0.90 0.86 0.57*   < > 0.64*   CALCIUM mg/dL 8.6  --  8.9 8.6 8.3*   < > 8.3*   BILIRUBIN mg/dL  --   --   --  0.7 0.9  --  0.9   ALK PHOS U/L  --   --   --  125* 112  --  113   ALT (SGPT) U/L  --   --   --  23 21  --  25   AST (SGOT) U/L  --   --   --  32 27  --  35   GLUCOSE mg/dL 176*  --  256* 170* 150*   < > 108*   GLUCOSE, ARTERIAL mg/dL  --  260*  --   --   --   --   --     < > = values in this interval not displayed.     Results from last 7 days   Lab Units 03/29/23  0739 03/25/23  0509 03/24/23  1110   MAGNESIUM mg/dL 2.0  --  1.9   PHOSPHORUS mg/dL 3.4  --   --    HEMOGLOBIN g/dL 14.0   < > 14.5   HEMATOCRIT % 42.4   < > 43.9    < > = values in this interval not displayed.     COVID19   Date Value Ref Range Status   03/22/2023 Not Detected Not Detected - Ref. Range Final     No results found for: HGBA1C      Pertinent Medications Reviewed.     Current Nutrition Orders & Evaluation of Intake       Oral Nutrition     Current PO Diet Diet: Cardiac Diets; Healthy Heart (2-3 Na+); Texture: Mechanical Ground (NDD 2); Fluid Consistency: Thin (IDDSI 0)   Supplement No active supplement orders       Malnutrition Severity Assessment                Nutrition Diagnosis         Nutrition Dx Problem 1 Inadequate energy Intake related to decreased ability to consume sufficient energy as evidenced by report of minimal PO intake.       Nutrition Intervention         Boost Glucose Control TID  +570 kcal, 48 g pro per day      Medical Nutrition Therapy/Nutrition Education          Learner     Readiness Patient  Education not appropriate at this time      Method     Response N/A  N/A     Monitor/Evaluation        Monitor Per protocol, PO intake, Supplement intake, Weight, POC/GOC       Nutrition Discharge Plan         To be determined       Electronically signed by:  Justina Dawson RD  03/29/23 10:40 EDT

## 2023-03-29 NOTE — PROGRESS NOTES
Respiratory Therapist Broncho-Pulmonary Hygiene Progress Note      Patient Name:  Stephen Aguero  YOB: 1945    Stephen Aguero meets the qualification for Level 4 of the Bronco-Pulmonary Hygiene Protocol. This was based on my daily patient assessment and includes review of chest x-ray results, cough ability and quality, oxygenation, secretions or risk for secretion development and patient mobility.     Broncho-Pulmonary Hygiene Assessment:    Level of Movement: Low level of mobility  Lethargic uncoorperative    Breath Sounds: Bilateral localized decreased air exchange and/or coarse rhonchi    Cough: Ineffective, weak, frequent    Chest X-Ray: Severe atelectasis or severe consolidation    Sputum Productions: Thick, tenacious retained secretions with difffiulty clearing    History and Physical: Respiratory Failure due to mucus plugging or retained secretions    SpO2 to Oxygen Need: greater than 90% on  greater than 6L, Vapotherm, oxygen mask greater than 40% or ventilator    Current SpO2 is: 94 on 8L HFNC    Based on this information, I have completed the following interventions: Nneka       Electronically signed by Aline Hagen, B[place], 03/29/23, 12:11 PM EDT.

## 2023-03-29 NOTE — THERAPY EVALUATION
Acute Care - Speech Language Pathology   Swallow Initial Evaluation [facility]KE [facility]     Patient Name: Stephen Aguero  : 1945  MRN: [id number]  Today's Date: 3/29/2023               Admit Date: 3/22/2023    Visit Dx:     ICD-10-CM ICD-9-CM   1. Hypoglycemia  E16.2 251.2   2. Pneumonia of both lungs due to infectious organism, unspecified part of lung  J18.9 483.8   3. Hypercapnia  R06.89 786.09   4. Somnolence  R40.0 780.09   5. Sepsis, due to unspecified organism, unspecified whether acute organ dysfunction present (HCC)  A41.9 038.9     995.91   6. Dysphagia, oropharyngeal  R13.12 787.22   7. Decreased activities of daily living (ADL)  Z78.9 V49.89   8. Difficulty walking  R26.2 719.7     Patient Active Problem List   Diagnosis   • Acute respiratory failure with hypercapnia ([facility])   • Hypoglycemia   • Diabetes mellitus ([facility])   • COPD (chronic obstructive pulmonary disease) ([facility])   • Pneumonia   • Acute UTI (urinary tract infection)   • Obesity hypoventilation syndrome ([facility])   • Tachycardia   • New onset a-fib ([facility])     Past Medical History:   Diagnosis Date   • Diabetes mellitus (HCC)    • Elevated cholesterol    • GERD (gastroesophageal reflux disease)    • Hypertension      Past Surgical History:   Procedure Laterality Date   • ABDOMINAL SURGERY     • APPENDECTOMY     • EYE SURGERY         PAIN SCALE: None noted    PRECAUTIONS/CONTRAINDICATIONS: None noted    SUSPECTED ABUSE/NEGLECT/EXPLOITATION: None noted    SOCIAL/PSYCHOLOGICAL NEEDS/BARRIERS: None noted    PAST SOCIAL HISTORY: Lives at home    PRIOR FUNCTION: Some recent decline in function    PATIENT GOALS/EXPECTATIONS: Did not report    HISTORY: This patient is a 77-year-old male admitted with the above diagnosis.  Patient denies any difficulty swallowing at home.  Dgt at bedside and denies any prior h/o dysphagia   CURRENT DIET LEVEL: Regular diet-thin liquids    OBJECTIVE:    TEST ADMINISTERED: Clinical dysphagia evaluation    COGNITION/SAFETY AWARENESS:  Alert    BEHAVIORAL OBSERVATIONS: Cooperative    ORAL MOTOR EXAM: Generalized oral motor weakness is noted.    VOICE QUALITY: Hoarse    REFLEX EXAM: Deferred    POSTURE: 90 degrees upright    FEEDING/SWALLOWING FUNCTION: Assessed with thin liquids, purée consistencies and soft solids    CLINICAL OBSERVATIONS: Oral stage is characterized by prolonged mastication with soft solids.  No significant oral residue noted after the swallow.  Pharyngeal phase is timely with good laryngeal elevation per palpation.  Denies globus sensation after completion of swallow.  Patient does require strategies with O2 sats dropping into the upper 80s at times with progressive trials, fatigues easily    DYSPHAGIA CRITERIA: Risk of aspiration     FUNCTIONAL ASSESSMENT INSTRUMENT: Patient currently scored a level 5 of 7 on Functional Communication Measures for swallowing indicating a 20-39% limitation in function.    ASSESSMENT/ PLAN OF CARE:  Pt presents with limitations, noted below, that impede his ability to swallow safely. The skills of a therapist will be required to safely and effectively implement the following treatment plan to restore maximal level of function.    PROBLEMS:  1.  Dysphagia   LTG 1: (30 days) patient will increase ability to tolerate least restrictive diet and improve functional communication measure for swallowing to a 6 of 7   STG 1a: (14 days) patient/family teaching regarding diet recommendations, safe swallow strategies and signs and symptoms of aspiration.   STG 1b: (14 days) patient will tolerate mechanical soft diet and thin liquids without clinical sign or symptom of aspiration with 8 of 10 trials.   STG 1c: (14 days) patient will tolerate therapeutic trials of regular solids without clinical sign or symptom of aspiration with 8 of 10 trials.     TREATMENT: Dysphagia therapy to ensure diet tolerance, advance to least restrictive diet and analyze for aspiration risk.    FREQUENCY/DURATION: Daily 5 times a  week    REHAB POTENTIAL:  Pt has good rehab potential.  The following limitations may influence improvement/ length of tx medical status.    RECOMMENDATIONS:   1.   DIET: Mechanical soft diet-thin liquids  - Fork mash solids  2.  POSITION: 90 degrees upright for all intake  3.  COMPENSATORY STRATEGIES: Small single sips of liquids, encourage frequent rest periods of short of breath or if O2 sats drop below 90, oral meds in applesauce whole,  Hold tray if not fully awake/alert.     Pt/responsible party agrees with plan of care and has been informed of all alternatives, risks and benefits.    EDUCATION  The patient has been educated in the following areas:   Dysphagia (Swallowing Impairment).           Shyanne Moss, SLP  3/29/2023

## 2023-03-29 NOTE — PROCEDURES
Bedside thoracic ultrasound:     Left side: Spleen, left hemidiaphragm, left lower lobe of lung identified.  Moderate pleural effusion identified.  Images saved  Right side: Liver, right hemidiaphragm, right lower lobe of lung identified.  Moderate pleural effusion identified.  Images saved     Site marked for right-sided thoracentesis.  We will plan a left-sided thoracentesis tomorrow.        CPT 58185 with thoracentesis note    Electronically signed by Mateus Ewing MD, 03/29/23, 3:53 PM EDT.

## 2023-03-30 ENCOUNTER — APPOINTMENT (OUTPATIENT)
Dept: GENERAL RADIOLOGY | Facility: HOSPITAL | Age: 78
DRG: 871 | End: 2023-03-30
Payer: MEDICARE

## 2023-03-30 LAB
ALBUMIN FLD-MCNC: 1 G/DL
ALBUMIN FLD-MCNC: 1 G/DL
ALBUMIN SERPL-MCNC: 2.4 G/DL (ref 3.5–5.2)
ALBUMIN SERPL-MCNC: 2.4 G/DL (ref 3.5–5.2)
ANION GAP SERPL CALCULATED.3IONS-SCNC: 8.9 MMOL/L (ref 5–15)
ANION GAP SERPL CALCULATED.3IONS-SCNC: 8.9 MMOL/L (ref 5–15)
APPEARANCE FLD: ABNORMAL
APPEARANCE FLD: ABNORMAL
ARTERIAL PATENCY WRIST A: POSITIVE
ARTERIAL PATENCY WRIST A: POSITIVE
BASE EXCESS BLDA CALC-SCNC: 9.3 MMOL/L (ref -2–2)
BASE EXCESS BLDA CALC-SCNC: 9.3 MMOL/L (ref -2–2)
BDY SITE: ABNORMAL
BDY SITE: ABNORMAL
BUN SERPL-MCNC: 36 MG/DL (ref 8–23)
BUN SERPL-MCNC: 36 MG/DL (ref 8–23)
BUN/CREAT SERPL: 43.4 (ref 7–25)
BUN/CREAT SERPL: 43.4 (ref 7–25)
CA-I BLDA-SCNC: 1.13 MMOL/L (ref 1.13–1.32)
CA-I BLDA-SCNC: 1.13 MMOL/L (ref 1.13–1.32)
CALCIUM SPEC-SCNC: 8.6 MG/DL (ref 8.6–10.5)
CALCIUM SPEC-SCNC: 8.6 MG/DL (ref 8.6–10.5)
CHLORIDE BLDA-SCNC: 96 MMOL/L (ref 98–106)
CHLORIDE BLDA-SCNC: 96 MMOL/L (ref 98–106)
CHLORIDE SERPL-SCNC: 95 MMOL/L (ref 98–107)
CHLORIDE SERPL-SCNC: 95 MMOL/L (ref 98–107)
CO2 SERPL-SCNC: 31.1 MMOL/L (ref 22–29)
CO2 SERPL-SCNC: 31.1 MMOL/L (ref 22–29)
COHGB MFR BLD: 0.7 % (ref 0–1.5)
COHGB MFR BLD: 0.7 % (ref 0–1.5)
COLOR FLD: YELLOW
COLOR FLD: YELLOW
CREAT SERPL-MCNC: 0.83 MG/DL (ref 0.76–1.27)
CREAT SERPL-MCNC: 0.83 MG/DL (ref 0.76–1.27)
CYTO UR: NORMAL
CYTO UR: NORMAL
DEPRECATED RDW RBC AUTO: 58.8 FL (ref 37–54)
DEPRECATED RDW RBC AUTO: 58.8 FL (ref 37–54)
EGFRCR SERPLBLD CKD-EPI 2021: 89.6 ML/MIN/1.73
EGFRCR SERPLBLD CKD-EPI 2021: 89.6 ML/MIN/1.73
ERYTHROCYTE [DISTWIDTH] IN BLOOD BY AUTOMATED COUNT: 15.9 % (ref 12.3–15.4)
ERYTHROCYTE [DISTWIDTH] IN BLOOD BY AUTOMATED COUNT: 15.9 % (ref 12.3–15.4)
FHHB: 10.7 % (ref 0–5)
FHHB: 10.7 % (ref 0–5)
GAS FLOW AIRWAY: 10 LPM
GAS FLOW AIRWAY: 10 LPM
GLUCOSE BLDA-MCNC: 211 MG/DL (ref 70–99)
GLUCOSE BLDA-MCNC: 211 MG/DL (ref 70–99)
GLUCOSE BLDC GLUCOMTR-MCNC: 133 MG/DL (ref 70–99)
GLUCOSE BLDC GLUCOMTR-MCNC: 133 MG/DL (ref 70–99)
GLUCOSE BLDC GLUCOMTR-MCNC: 170 MG/DL (ref 70–99)
GLUCOSE BLDC GLUCOMTR-MCNC: 170 MG/DL (ref 70–99)
GLUCOSE BLDC GLUCOMTR-MCNC: 175 MG/DL (ref 70–99)
GLUCOSE BLDC GLUCOMTR-MCNC: 175 MG/DL (ref 70–99)
GLUCOSE BLDC GLUCOMTR-MCNC: 182 MG/DL (ref 70–99)
GLUCOSE BLDC GLUCOMTR-MCNC: 182 MG/DL (ref 70–99)
GLUCOSE BLDC GLUCOMTR-MCNC: 195 MG/DL (ref 70–99)
GLUCOSE BLDC GLUCOMTR-MCNC: 195 MG/DL (ref 70–99)
GLUCOSE FLD-MCNC: 204 MG/DL
GLUCOSE FLD-MCNC: 204 MG/DL
GLUCOSE SERPL-MCNC: 200 MG/DL (ref 65–99)
GLUCOSE SERPL-MCNC: 200 MG/DL (ref 65–99)
HCO3 BLDA-SCNC: 36 MMOL/L (ref 22–26)
HCO3 BLDA-SCNC: 36 MMOL/L (ref 22–26)
HCT VFR BLD AUTO: 39.6 % (ref 37.5–51)
HCT VFR BLD AUTO: 39.6 % (ref 37.5–51)
HGB BLD-MCNC: 12.8 G/DL (ref 13–17.7)
HGB BLD-MCNC: 12.8 G/DL (ref 13–17.7)
HGB BLDA-MCNC: 14 G/DL (ref 13.8–16.4)
HGB BLDA-MCNC: 14 G/DL (ref 13.8–16.4)
INHALED O2 CONCENTRATION: ABNORMAL %
INHALED O2 CONCENTRATION: ABNORMAL %
LAB AP CASE REPORT: NORMAL
LAB AP CASE REPORT: NORMAL
LAB AP CLINICAL INFORMATION: NORMAL
LAB AP CLINICAL INFORMATION: NORMAL
LACTATE BLDA-SCNC: 1.81 MMOL/L (ref 0.5–2)
LACTATE BLDA-SCNC: 1.81 MMOL/L (ref 0.5–2)
LDH FLD-CCNC: 107 U/L
LDH FLD-CCNC: 107 U/L
LYMPHOCYTES NFR FLD MANUAL: 73 %
LYMPHOCYTES NFR FLD MANUAL: 73 %
MACROPHAGE FLUID: 3 %
MACROPHAGE FLUID: 3 %
MAGNESIUM SERPL-MCNC: 2 MG/DL (ref 1.6–2.4)
MAGNESIUM SERPL-MCNC: 2 MG/DL (ref 1.6–2.4)
MCH RBC QN AUTO: 32.3 PG (ref 26.6–33)
MCH RBC QN AUTO: 32.3 PG (ref 26.6–33)
MCHC RBC AUTO-ENTMCNC: 32.3 G/DL (ref 31.5–35.7)
MCHC RBC AUTO-ENTMCNC: 32.3 G/DL (ref 31.5–35.7)
MCV RBC AUTO: 100 FL (ref 79–97)
MCV RBC AUTO: 100 FL (ref 79–97)
MESOTHL CELL NFR FLD MANUAL: 10 %
MESOTHL CELL NFR FLD MANUAL: 10 %
METHGB BLD QL: 0.2 % (ref 0–1.5)
METHGB BLD QL: 0.2 % (ref 0–1.5)
MODALITY: ABNORMAL
MODALITY: ABNORMAL
MONOCYTES NFR FLD: 3 %
MONOCYTES NFR FLD: 3 %
NEUTROPHILS NFR FLD MANUAL: 11 %
NEUTROPHILS NFR FLD MANUAL: 11 %
NOTE: ABNORMAL
NOTE: ABNORMAL
NUC CELL # FLD: 774 /MM3
NUC CELL # FLD: 774 /MM3
OXYHGB MFR BLDV: 88.4 % (ref 94–99)
OXYHGB MFR BLDV: 88.4 % (ref 94–99)
PATH REPORT.FINAL DX SPEC: NORMAL
PATH REPORT.FINAL DX SPEC: NORMAL
PATH REPORT.GROSS SPEC: NORMAL
PATH REPORT.GROSS SPEC: NORMAL
PCO2 BLDA: 57.6 MM HG (ref 35–45)
PCO2 BLDA: 57.6 MM HG (ref 35–45)
PH BLDA: 7.41 PH UNITS (ref 7.35–7.45)
PH BLDA: 7.41 PH UNITS (ref 7.35–7.45)
PH FLD: 8 [PH]
PH FLD: 8 [PH]
PHOSPHATE SERPL-MCNC: 3.2 MG/DL (ref 2.5–4.5)
PHOSPHATE SERPL-MCNC: 3.2 MG/DL (ref 2.5–4.5)
PLATELET # BLD AUTO: 185 10*3/MM3 (ref 140–450)
PLATELET # BLD AUTO: 185 10*3/MM3 (ref 140–450)
PMV BLD AUTO: 10.5 FL (ref 6–12)
PMV BLD AUTO: 10.5 FL (ref 6–12)
PO2 BLD: ABNORMAL MM[HG]
PO2 BLD: ABNORMAL MM[HG]
PO2 BLDA: 51.2 MM HG (ref 80–100)
PO2 BLDA: 51.2 MM HG (ref 80–100)
POTASSIUM BLDA-SCNC: 3.95 MMOL/L (ref 3.5–5)
POTASSIUM BLDA-SCNC: 3.95 MMOL/L (ref 3.5–5)
POTASSIUM SERPL-SCNC: 4.6 MMOL/L (ref 3.5–5.2)
POTASSIUM SERPL-SCNC: 4.6 MMOL/L (ref 3.5–5.2)
PROT FLD-MCNC: 1.9 G/DL
PROT FLD-MCNC: 1.9 G/DL
RBC # BLD AUTO: 3.96 10*6/MM3 (ref 4.14–5.8)
RBC # BLD AUTO: 3.96 10*6/MM3 (ref 4.14–5.8)
RBC # FLD AUTO: 3000 /MM3
RBC # FLD AUTO: 3000 /MM3
SAO2 % BLDCOA: 89.2 % (ref 95–99)
SAO2 % BLDCOA: 89.2 % (ref 95–99)
SODIUM BLDA-SCNC: 136.3 MMOL/L (ref 136–146)
SODIUM BLDA-SCNC: 136.3 MMOL/L (ref 136–146)
SODIUM SERPL-SCNC: 135 MMOL/L (ref 136–145)
SODIUM SERPL-SCNC: 135 MMOL/L (ref 136–145)
STAT OF ADQ CVX/VAG CYTO-IMP: NORMAL
STAT OF ADQ CVX/VAG CYTO-IMP: NORMAL
TRIGL FLD-MCNC: 42 MG/DL
TRIGL FLD-MCNC: 42 MG/DL
WBC NRBC COR # BLD: 9.56 10*3/MM3 (ref 3.4–10.8)
WBC NRBC COR # BLD: 9.56 10*3/MM3 (ref 3.4–10.8)

## 2023-03-30 PROCEDURE — 94799 UNLISTED PULMONARY SVC/PX: CPT

## 2023-03-30 PROCEDURE — 83735 ASSAY OF MAGNESIUM: CPT | Performed by: NURSE PRACTITIONER

## 2023-03-30 PROCEDURE — 99291 CRITICAL CARE FIRST HOUR: CPT | Performed by: INTERNAL MEDICINE

## 2023-03-30 PROCEDURE — 82375 ASSAY CARBOXYHB QUANT: CPT | Performed by: INTERNAL MEDICINE

## 2023-03-30 PROCEDURE — 89051 BODY FLUID CELL COUNT: CPT | Performed by: INTERNAL MEDICINE

## 2023-03-30 PROCEDURE — 36600 WITHDRAWAL OF ARTERIAL BLOOD: CPT | Performed by: INTERNAL MEDICINE

## 2023-03-30 PROCEDURE — 32555 ASPIRATE PLEURA W/ IMAGING: CPT | Performed by: INTERNAL MEDICINE

## 2023-03-30 PROCEDURE — 97110 THERAPEUTIC EXERCISES: CPT

## 2023-03-30 PROCEDURE — 87102 FUNGUS ISOLATION CULTURE: CPT | Performed by: INTERNAL MEDICINE

## 2023-03-30 PROCEDURE — 82962 GLUCOSE BLOOD TEST: CPT

## 2023-03-30 PROCEDURE — 63710000001 INSULIN REGULAR HUMAN PER 5 UNITS: Performed by: INTERNAL MEDICINE

## 2023-03-30 PROCEDURE — 88184 FLOWCYTOMETRY/ TC 1 MARKER: CPT

## 2023-03-30 PROCEDURE — 80069 RENAL FUNCTION PANEL: CPT | Performed by: NURSE PRACTITIONER

## 2023-03-30 PROCEDURE — 84157 ASSAY OF PROTEIN OTHER: CPT | Performed by: INTERNAL MEDICINE

## 2023-03-30 PROCEDURE — 87070 CULTURE OTHR SPECIMN AEROBIC: CPT | Performed by: INTERNAL MEDICINE

## 2023-03-30 PROCEDURE — 97530 THERAPEUTIC ACTIVITIES: CPT

## 2023-03-30 PROCEDURE — 25010000002 FUROSEMIDE PER 20 MG: Performed by: INTERNAL MEDICINE

## 2023-03-30 PROCEDURE — 84478 ASSAY OF TRIGLYCERIDES: CPT | Performed by: INTERNAL MEDICINE

## 2023-03-30 PROCEDURE — 88185 FLOWCYTOMETRY/TC ADD-ON: CPT

## 2023-03-30 PROCEDURE — 85027 COMPLETE CBC AUTOMATED: CPT | Performed by: NURSE PRACTITIONER

## 2023-03-30 PROCEDURE — 94664 DEMO&/EVAL PT USE INHALER: CPT

## 2023-03-30 PROCEDURE — 84311 SPECTROPHOTOMETRY: CPT | Performed by: INTERNAL MEDICINE

## 2023-03-30 PROCEDURE — 71045 X-RAY EXAM CHEST 1 VIEW: CPT

## 2023-03-30 PROCEDURE — 25010000002 CEFEPIME PER 500 MG: Performed by: INTERNAL MEDICINE

## 2023-03-30 PROCEDURE — 87116 MYCOBACTERIA CULTURE: CPT | Performed by: INTERNAL MEDICINE

## 2023-03-30 PROCEDURE — 87206 SMEAR FLUORESCENT/ACID STAI: CPT | Performed by: INTERNAL MEDICINE

## 2023-03-30 PROCEDURE — 94761 N-INVAS EAR/PLS OXIMETRY MLT: CPT

## 2023-03-30 PROCEDURE — 82805 BLOOD GASES W/O2 SATURATION: CPT | Performed by: INTERNAL MEDICINE

## 2023-03-30 PROCEDURE — 87205 SMEAR GRAM STAIN: CPT | Performed by: INTERNAL MEDICINE

## 2023-03-30 PROCEDURE — 87075 CULTR BACTERIA EXCEPT BLOOD: CPT | Performed by: INTERNAL MEDICINE

## 2023-03-30 PROCEDURE — 83050 HGB METHEMOGLOBIN QUAN: CPT | Performed by: INTERNAL MEDICINE

## 2023-03-30 PROCEDURE — 82945 GLUCOSE OTHER FLUID: CPT | Performed by: INTERNAL MEDICINE

## 2023-03-30 PROCEDURE — 83986 ASSAY PH BODY FLUID NOS: CPT | Performed by: INTERNAL MEDICINE

## 2023-03-30 PROCEDURE — 94668 MNPJ CHEST WALL SBSQ: CPT

## 2023-03-30 PROCEDURE — 82042 OTHER SOURCE ALBUMIN QUAN EA: CPT | Performed by: INTERNAL MEDICINE

## 2023-03-30 PROCEDURE — 25010000002 AZITHROMYCIN PER 500 MG: Performed by: INTERNAL MEDICINE

## 2023-03-30 PROCEDURE — 0W9B3ZZ DRAINAGE OF LEFT PLEURAL CAVITY, PERCUTANEOUS APPROACH: ICD-10-PCS | Performed by: INTERNAL MEDICINE

## 2023-03-30 PROCEDURE — 92526 ORAL FUNCTION THERAPY: CPT

## 2023-03-30 PROCEDURE — 88108 CYTOPATH CONCENTRATE TECH: CPT | Performed by: INTERNAL MEDICINE

## 2023-03-30 PROCEDURE — 83615 LACTATE (LD) (LDH) ENZYME: CPT | Performed by: INTERNAL MEDICINE

## 2023-03-30 RX ORDER — HYDROCODONE BITARTRATE AND ACETAMINOPHEN 10; 325 MG/1; MG/1
1 TABLET ORAL EVERY 6 HOURS PRN
Status: DISPENSED | OUTPATIENT
Start: 2023-03-30 | End: 2023-04-06

## 2023-03-30 RX ADMIN — NYSTATIN 500000 UNITS: 100000 SUSPENSION ORAL at 11:56

## 2023-03-30 RX ADMIN — TRIAMCINOLONE ACETONIDE 1 APPLICATION: 1 OINTMENT TOPICAL at 08:44

## 2023-03-30 RX ADMIN — FUROSEMIDE 40 MG: 10 INJECTION, SOLUTION INTRAMUSCULAR; INTRAVENOUS at 08:17

## 2023-03-30 RX ADMIN — ACETAMINOPHEN 650 MG: 325 TABLET ORAL at 11:56

## 2023-03-30 RX ADMIN — IPRATROPIUM BROMIDE AND ALBUTEROL SULFATE 3 ML: 2.5; .5 SOLUTION RESPIRATORY (INHALATION) at 23:05

## 2023-03-30 RX ADMIN — FUROSEMIDE 40 MG: 10 INJECTION, SOLUTION INTRAMUSCULAR; INTRAVENOUS at 00:36

## 2023-03-30 RX ADMIN — SENNOSIDES AND DOCUSATE SODIUM 1 TABLET: 8.6; 5 TABLET ORAL at 08:17

## 2023-03-30 RX ADMIN — IPRATROPIUM BROMIDE AND ALBUTEROL SULFATE 3 ML: 2.5; .5 SOLUTION RESPIRATORY (INHALATION) at 15:08

## 2023-03-30 RX ADMIN — GLIPIZIDE 5 MG: 5 TABLET ORAL at 08:17

## 2023-03-30 RX ADMIN — FUROSEMIDE 40 MG: 10 INJECTION, SOLUTION INTRAMUSCULAR; INTRAVENOUS at 16:39

## 2023-03-30 RX ADMIN — HYDROCODONE BITARTRATE AND ACETAMINOPHEN 1 TABLET: 10; 325 TABLET ORAL at 20:50

## 2023-03-30 RX ADMIN — APIXABAN 5 MG: 5 TABLET, FILM COATED ORAL at 08:17

## 2023-03-30 RX ADMIN — CARVEDILOL 25 MG: 25 TABLET, FILM COATED ORAL at 20:50

## 2023-03-30 RX ADMIN — TRIAMCINOLONE ACETONIDE 1 APPLICATION: 1 OINTMENT TOPICAL at 20:50

## 2023-03-30 RX ADMIN — NYSTATIN 500000 UNITS: 100000 SUSPENSION ORAL at 20:49

## 2023-03-30 RX ADMIN — INSULIN HUMAN 2 UNITS: 100 INJECTION, SOLUTION PARENTERAL at 11:55

## 2023-03-30 RX ADMIN — INSULIN HUMAN 2 UNITS: 100 INJECTION, SOLUTION PARENTERAL at 00:36

## 2023-03-30 RX ADMIN — INSULIN HUMAN 2 UNITS: 100 INJECTION, SOLUTION PARENTERAL at 05:17

## 2023-03-30 RX ADMIN — APIXABAN 5 MG: 5 TABLET, FILM COATED ORAL at 20:50

## 2023-03-30 RX ADMIN — IPRATROPIUM BROMIDE AND ALBUTEROL SULFATE 3 ML: 2.5; .5 SOLUTION RESPIRATORY (INHALATION) at 11:18

## 2023-03-30 RX ADMIN — IPRATROPIUM BROMIDE AND ALBUTEROL SULFATE 3 ML: 2.5; .5 SOLUTION RESPIRATORY (INHALATION) at 19:00

## 2023-03-30 RX ADMIN — CEFEPIME 2 G: 2 INJECTION, POWDER, FOR SOLUTION INTRAVENOUS at 09:21

## 2023-03-30 RX ADMIN — DULOXETINE HYDROCHLORIDE 60 MG: 30 CAPSULE, DELAYED RELEASE ORAL at 08:44

## 2023-03-30 RX ADMIN — AZITHROMYCIN DIHYDRATE 500 MG: 500 INJECTION, POWDER, LYOPHILIZED, FOR SOLUTION INTRAVENOUS at 16:40

## 2023-03-30 RX ADMIN — CEFEPIME 2 G: 2 INJECTION, POWDER, FOR SOLUTION INTRAVENOUS at 16:39

## 2023-03-30 RX ADMIN — CARVEDILOL 25 MG: 25 TABLET, FILM COATED ORAL at 08:17

## 2023-03-30 RX ADMIN — FAMOTIDINE 40 MG: 20 TABLET ORAL at 08:17

## 2023-03-30 RX ADMIN — NYSTATIN 500000 UNITS: 100000 SUSPENSION ORAL at 08:16

## 2023-03-30 RX ADMIN — IPRATROPIUM BROMIDE AND ALBUTEROL SULFATE 3 ML: 2.5; .5 SOLUTION RESPIRATORY (INHALATION) at 06:48

## 2023-03-30 NOTE — PLAN OF CARE
Goal Outcome Evaluation:  Plan of Care Reviewed With: patient, family        Progress: improving  Outcome Evaluation: Patient has tolerated being on nasal canula all day. Vitals stable. Patient sat up on side of bed for a few minutes with PT. Per daughter is a little clearer today. Tolerated Thoracentesis well

## 2023-03-30 NOTE — THERAPY TREATMENT NOTE
Acute Care - Physical Therapy Treatment Note  MIKE Alan     Patient Name: Stephen Aguero  : 1945  MRN: [id number]  Today's Date: 3/30/2023      Visit Dx:     ICD-10-CM ICD-9-CM   1. Hypoglycemia  E16.2 251.2   2. Pneumonia of both lungs due to infectious organism, unspecified part of lung  J18.9 483.8   3. Hypercapnia  R06.89 786.09   4. Somnolence  R40.0 780.09   5. Sepsis, due to unspecified organism, unspecified whether acute organ dysfunction present (HCC)  A41.9 038.9     995.91   6. Dysphagia, oropharyngeal  R13.12 787.22   7. Decreased activities of daily living (ADL)  Z78.9 V49.89   8. Difficulty walking  R26.2 719.7     Patient Active Problem List   Diagnosis   • Acute respiratory failure with hypercapnia (HCC)   • Hypoglycemia   • Diabetes mellitus (HCC)   • COPD (chronic obstructive pulmonary disease) (HCC)   • Pneumonia   • Acute UTI (urinary tract infection)   • Obesity hypoventilation syndrome (HCC)   • Tachycardia   • New onset a-fib (HCC)     Past Medical History:   Diagnosis Date   • Diabetes mellitus (HCC)    • Elevated cholesterol    • GERD (gastroesophageal reflux disease)    • Hypertension      Past Surgical History:   Procedure Laterality Date   • ABDOMINAL SURGERY     • APPENDECTOMY     • EYE SURGERY       PT Assessment (last 12 hours)     PT Evaluation and Treatment     Row Name 23 1223          Physical Therapy Time and Intention    Subjective Information complains of;fatigue  -DK     Document Type therapy note (daily note)  -DK     Mode of Treatment individual therapy;physical therapy  -DK     Patient Effort good  -DK     Symptoms Noted During/After Treatment fatigue  -DK     Comment Pt was very lethargic, difficult to awaken for treatment.  -DK     Row Name 23 1223          Pain    Pretreatment Pain Rating 0/10 - no pain  -DK     Posttreatment Pain Rating 0/10 - no pain  -DK     Row Name 23 1223          Cognition    Affect/Mental Status (Cognition) confused;low  arousal/lethargic  -DK     Orientation Status (Cognition) oriented to;person;situation  -DK     Follows Commands (Cognition) physical/tactile prompts required;repetition of directions required;verbal cues/prompting required  -DK     Cognitive Function WFL  -DK     Personal Safety Interventions nonskid shoes/slippers when out of bed;supervised activity  -DK     Row Name 03/30/23 1223          Bed Mobility    Bed Mobility scooting/bridging;supine-sit-supine  -DK     All Activities, Carver (Bed Mobility) maximum assist (25% patient effort);1 person assist  -DK     Scooting/Bridging Carver (Bed Mobility) maximum assist (25% patient effort);2 person assist  -DK     Supine-Sit-Supine Carver (Bed Mobility) maximum assist (25% patient effort);1 person assist  -DK     Bed Mobility, Safety Issues decreased use of arms for pushing/pulling;decreased use of legs for bridging/pushing  -DK     Assistive Device (Bed Mobility) bed rails;draw sheet  -DK     Comment, (Bed Mobility) Pt was very lethargic.  He sat EOB x 3-4 minutes with HO/min.  He returned to bed on alert post treatment.  -DK     Row Name 03/30/23 1223          Safety Issues, Functional Mobility    Safety Issues Affecting Function (Mobility) ability to follow commands;awareness of need for assistance;impulsivity;judgment;safety precaution awareness  -DK     Impairments Affecting Function (Mobility) balance;endurance/activity tolerance;strength;shortness of breath  -DK     Row Name 03/30/23 1223          Balance    Balance Assessment sitting static balance  -DK     Static Sitting Balance contact guard;minimal assist;1-person assist  -DK     Position, Sitting Balance supported;sitting edge of bed  -DK     Balance Interventions sitting;supported;static  -DK     Row Name 03/30/23 1223          Motor Skills    Motor Skills --  therapeutic exercises  -DK     Coordination WF  -DK     Therapeutic Exercise hip;knee;ankle  -DK     Row Name 03/30/23 1223           Hip (Therapeutic Exercise)    Hip (Therapeutic Exercise) AAROM (active assistive range of motion)  -     Hip AAROM (Therapeutic Exercise) bilateral;flexion;extension;aBduction;aDduction;supine;10 repetitions;2 sets  -DK     Row Name 03/30/23 1223          Knee (Therapeutic Exercise)    Knee (Therapeutic Exercise) AAROM (active assistive range of motion)  -DK     Knee AAROM (Therapeutic Exercise) bilateral;flexion;extension;supine;10 repetitions;2 sets  -DK     Row Name 03/30/23 1223          Ankle (Therapeutic Exercise)    Ankle (Therapeutic Exercise) AAROM (active assistive range of motion)  -DK     Ankle AAROM (Therapeutic Exercise) bilateral;dorsiflexion;plantarflexion;supine;10 repetitions;2 sets  -DK     Row Name             Wound 03/23/23 1443 Bilateral groin    Wound - Properties Group Placement Date: 03/23/23  -DD Placement Time: 1443  -DD Side: Bilateral  -DD Orientation: --  -DD Location: groin  -DD    Retired Wound - Properties Group Placement Date: 03/23/23  -DD Placement Time: 1443  -DD Side: Bilateral  -DD Orientation: --  -DD Location: groin  -DD    Retired Wound - Properties Group Date first assessed: 03/23/23  -DD Time first assessed: 1443  -DD Side: Bilateral  -DD Location: groin  -DD    Row Name             Wound 03/23/23 1443 Bilateral lower leg    Wound - Properties Group Placement Date: 03/23/23  -DD Placement Time: 1443  -DD Side: Bilateral  -DD Orientation: lower  -DD Location: leg  -DD    Retired Wound - Properties Group Placement Date: 03/23/23  -DD Placement Time: 1443  -DD Side: Bilateral  -DD Orientation: lower  -DD Location: leg  -DD    Retired Wound - Properties Group Date first assessed: 03/23/23  -DD Time first assessed: 1443  -DD Side: Bilateral  -DD Location: leg  -DD    Row Name             Wound 03/23/23 1444 Bilateral anterior foot    Wound - Properties Group Placement Date: 03/23/23  -DD Placement Time: 1444  -DD Side: Bilateral  -DD Orientation: anterior  -DD Location:  foot  -DD    Retired Wound - Properties Group Placement Date: 03/23/23  -DD Placement Time: 1444  -DD Side: Bilateral  -DD Orientation: anterior  -DD Location: foot  -DD    Retired Wound - Properties Group Date first assessed: 03/23/23 -DD Time first assessed: 1444  -DD Side: Bilateral  -DD Location: foot  -DD    Row Name             Wound 03/23/23 1445 penis    Wound - Properties Group Placement Date: 03/23/23 -DD Placement Time: 1445  -DD Location: penis  -DD    Retired Wound - Properties Group Placement Date: 03/23/23 -DD Placement Time: 1445  -DD Location: penis  -DD    Retired Wound - Properties Group Date first assessed: 03/23/23 -DD Time first assessed: 1445 -DD Location: penis  -DD    Row Name             Wound Left proximal arm Blisters    Wound - Properties Group Side: Left  -MP Orientation: proximal  -MP Location: arm  -MP Primary Wound Type: Blisters  -MP Additional Comments: weeping  -MP    Retired Wound - Properties Group Side: Left  -MP Orientation: proximal  -MP Location: arm  -MP Primary Wound Type: Blisters  -MP Additional Comments: weeping  -MP    Retired Wound - Properties Group Side: Left  -MP Location: arm  -MP Primary Wound Type: Blisters  -MP Additional Comments: weeping  -MP    Row Name 03/30/23 1223          Plan of Care Review    Plan of Care Reviewed With patient;family  -DK     Progress no change  -DK     Row Name 03/30/23 1223          Positioning and Restraints    Pre-Treatment Position in bed  -DK     Post Treatment Position bed  -DK     In Bed supine;call light within reach;encouraged to call for assist;exit alarm on;with family/caregiver;with nsg;side rails up x2;legs elevated;heels elevated  -DK     Row Name 03/30/23 1223          Therapy Assessment/Plan (PT)    Rehab Potential (PT) fair, will monitor progress closely  -DK     Criteria for Skilled Interventions Met (PT) skilled treatment is necessary  -DK     Therapy Frequency (PT) daily  -DK     Problem List (PT) problems  related to;balance;mobility;strength  -DK     Activity Limitations Related to Problem List (PT) unable to ambulate safely;unable to transfer safely  -DK     Row Name 03/30/23 1223          Progress Summary (PT)    Progress Toward Functional Goals (PT) progress toward functional goals is fair  -DK           User Key  (r) = Recorded By, (t) = Taken By, (c) = Cosigned By    Initials Name Provider Type    Clara Ventura, KYLE Registered Nurse    Magnolia Jiménez PTA Physical Therapist Assistant    Venessa Engel, RN Registered Nurse                Physical Therapy Education     Title: PT OT SLP Therapies (Done)     Topic: Physical Therapy (Done)     Point: Mobility training (Done)     Learning Progress Summary           Patient Acceptance, E, VU by KARINE at 3/27/2023 0928                               User Key     Initials Effective Dates Name Provider Type Discipline    KARINE 01/11/23 -  Shaka Iglesias, PT Student PT Student PT              PT Recommendation and Plan  Planned Therapy Interventions (PT): balance training, bed mobility training, gait training, strengthening, transfer training  Therapy Frequency (PT): daily  Progress Summary (PT)  Progress Toward Functional Goals (PT): progress toward functional goals is fair  Plan of Care Reviewed With: patient, family  Progress: no change   Outcome Measures     Row Name 03/30/23 1222             How much help from another person do you currently need...    Turning from your back to your side while in flat bed without using bedrails? 2  -DK      Moving from lying on back to sitting on the side of a flat bed without bedrails? 2  -DK      Moving to and from a bed to a chair (including a wheelchair)? 1  -DK      Standing up from a chair using your arms (e.g., wheelchair, bedside chair)? 1  -DK      Climbing 3-5 steps with a railing? 1  -DK      To walk in hospital room? 1  -DK      AM-PAC 6 Clicks Score (PT) 8  -DK         Functional Assessment    Outcome Measure Options  AM-PAC 6 Clicks Basic Mobility (PT)  -DK            User Key  (r) = Recorded By, (t) = Taken By, (c) = Cosigned By    Initials Name Provider Type    Magnolia Jiménez PTA Physical Therapist Assistant                 Time Calculation:    PT Charges     Row Name 03/30/23 1229             Time Calculation    PT Received On 03/30/23  -DK      PT Goal Re-Cert Due Date 04/05/23  -DK         Timed Charges    65047 - PT Therapeutic Exercise Minutes 14  -DK      27496 - PT Therapeutic Activity Minutes 12  -DK         Total Minutes    Timed Charges Total Minutes 26  -DK       Total Minutes 26  -DK            User Key  (r) = Recorded By, (t) = Taken By, (c) = Cosigned By    Initials Name Provider Type    Magnolia Jiménez PTA Physical Therapist Assistant              Therapy Charges for Today     Code Description Service Date Service Provider Modifiers Qty    18598250637 HC PT THER PROC EA 15 MIN 3/30/2023 Magnolia Go PTA GP 1    70184577858 HC PT THERAPEUTIC ACT EA 15 MIN 3/30/2023 Magnolia Go PTA GP 1          PT G-Codes  Outcome Measure Options: AM-PAC 6 Clicks Basic Mobility (PT)  AM-PAC 6 Clicks Score (PT): 8  AM-PAC 6 Clicks Score (OT): 9    Magnolia Go PTA  3/30/2023

## 2023-03-30 NOTE — PROCEDURES
Bedside thoracic ultrasound:     Left side: Spleen, left hemidiaphragm, left lower lobe of lung identified.  Moderate pleural effusion identified.  Images saved    Site marked for left-sided thoracentesis         CPT 47175 with thoracentesis note    Electronically signed by Mateus Ewing MD, 03/30/23, 2:44 PM EDT.

## 2023-03-30 NOTE — PLAN OF CARE
Goal Outcome Evaluation:   Pt got confused at one point last night and said he was going home and took his bipap off. Placed pt on HFNC to give him a break from bipap. Pt ate dinner and a couple of snacks through out the night. Encouraged deep breathing and coughing.

## 2023-03-30 NOTE — PROGRESS NOTES
[facility]     Progress Note    Patient Name: Stephen Agueor  : 1945  MRN: [id number]  Primary Care Physician:  Papi Vasquez MD  Date of admission: 3/22/2023      Subjective   Brief summary.  Patient admitted with respiratory failure and UTI along with decreased level of consciousness.  Patient initially admitted to ICU subsequently transferred out of ICU and again transferred back to ICU  for increased lethargy and CO2 retention      HPI:    Follow-up on shortness of breath, more comfortable patient status postthoracentesis yesterday.  No fever chills     Review of Systems     No fever chills  Generalized swelling  No significant shortness of breath  Denies any pain        Objective     Vitals:   Temp:  [96.8 °F (36 °C)-98.2 °F (36.8 °C)] 97.6 °F (36.4 °C)  Heart Rate:  [69-88] 73  Resp:  [19-32] 24  BP: (101-137)/(50-96) 128/59  Flow (L/min):  [5-10] 10    Physical Exam :     Elderly morbidly obese male not in acute distress  More alert and talking and responding  Neck supple  Heart irregular, no tachycardia today  Lungs diminished breath sounds few crackles  Abdomen obese and soft, morbidly obese difficult to palpate  Extremities with 3+ edema with chronic venous stasis changes      Result Review:  I have personally reviewed the results from the time of this admission to 3/30/2023 08:57 EDT and agree with these findings:  [x]  Laboratory  [x]  Microbiology  [x]  Radiology  []  EKG/Telemetry   []  Cardiology/Vascular   []  Pathology  []  Old records  []  Other:       Blood sugar stable, ABGs reviewed      Assessment / Plan       Active Hospital Problems:  Active Hospital Problems    Diagnosis    • **Acute respiratory failure with hypercapnia (HCC)    • New onset a-fib (HCC)    • Tachycardia    • Hypoglycemia    • Obesity hypoventilation syndrome (HCC)    • Diabetes mellitus (HCC)    • COPD (chronic obstructive pulmonary disease) (HCC)    • Pneumonia    • Acute UTI (urinary tract infection)         Plan:   Stable and improving.  Plan for another thoracentesis today  Continue diuretics  Heart rate stable  Appreciate cardiologist and intensivist help  Monitor labs      DVT prophylaxis:  Medical DVT prophylaxis orders are present.    CODE STATUS:   Code Status (Patient has no pulse and is not breathing): CPR (Attempt to Resuscitate)  Medical Interventions (Patient has pulse or is breathing): Full Support                          Electronically signed by Marco Bishop MD, 03/30/23, 4:56 PM EDT.    .    .      .

## 2023-03-30 NOTE — THERAPY TREATMENT NOTE
Acute Care - Speech Language Pathology   Swallow Treatment Note MIKE Alan     Patient Name: Stephen Aguero  : 1945  MRN: [id number]  Today's Date: 3/30/2023               Admit Date: 3/22/2023    Visit Dx:     ICD-10-CM ICD-9-CM   1. Hypoglycemia  E16.2 251.2   2. Pneumonia of both lungs due to infectious organism, unspecified part of lung  J18.9 483.8   3. Hypercapnia  R06.89 786.09   4. Somnolence  R40.0 780.09   5. Sepsis, due to unspecified organism, unspecified whether acute organ dysfunction present (HCC)  A41.9 038.9     995.91   6. Dysphagia, oropharyngeal  R13.12 787.22   7. Decreased activities of daily living (ADL)  Z78.9 V49.89   8. Difficulty walking  R26.2 719.7     Patient Active Problem List   Diagnosis   • Acute respiratory failure with hypercapnia (HCC)   • Hypoglycemia   • Diabetes mellitus (HCC)   • COPD (chronic obstructive pulmonary disease) (HCC)   • Pneumonia   • Acute UTI (urinary tract infection)   • Obesity hypoventilation syndrome (HCC)   • Tachycardia   • New onset a-fib (HCC)     Past Medical History:   Diagnosis Date   • Diabetes mellitus (HCC)    • Elevated cholesterol    • GERD (gastroesophageal reflux disease)    • Hypertension      Past Surgical History:   Procedure Laterality Date   • ABDOMINAL SURGERY     • APPENDECTOMY     • EYE SURGERY     SPEECH PATHOLOGY DYSPHAGIA TREATMENT    Subjective/Behavioral Observations: Pt seen at bedside. More awake/alert.  Improved voicing    Current Diet:Mechanical soft diet - thin liquids    Current Strategies: Slow rate, small bites/drinks, fork mash solids        Treatment received: Patient seen at bedside.  Patient reports choking episode this morning on a large piece of sausage.  Reinforced diet recommendations and safe swallow strategies and encouraged patient to fork mash solids as needed.  Meats are to be ground and sauce/gravy.  Tolerated thin liquids without clinical sign or symptom of aspiration.  Vocal quality remained clear to  auscultation.        Results of treatment: As stated        Progress toward goals: Progress noted        Barriers to Achieving goals: Medical status        Plan of care:/changes in plan: Continue with mechanical soft diet-ground meats with sauce/gravy, fork mash solids, single sips of thin liquids when seated fully upright, oral meds whole in applesauce      EDUCATION  The patient has been educated in the following areas:   Dysphagia (Swallowing Impairment).         Shyanne Moss, SLP  3/30/2023

## 2023-03-30 NOTE — PROCEDURES
Procedure name: ultrasound-guided left-sided thoracentesis     Indication - pleural effusion     This procedural risks were explained to the patient including pneumothorax requiring chest tube placement, significant bleeding requiring surgery, and infection , understanding of the risk and benefits acknowledged by the patient and family and he wish to proceed with the procedure.     Timeout performed     Procedure details  Ultrasound was use to visualize a  collection of pleural fluid, the diaphragm, and collapsed lung. At this point, the skin overlying the rib was anesthetized with 5 mL 1% lidocaine without epinephrine. A thoracentesis needle was then inserted into the pleural cavity just over top of the rib and pleural fluid was aspirated back. At this time the thoracentesis catheter was advanced to the pleural cavity over the needle.  Approximately 900 mL of turbid orange fluid was removed. Pleural fluid was sent for analysis. Chest x-ray has been ordered.      Patient tolerated procedure well     No immediate complications    Electronically signed by Mateus Ewing MD, 03/30/23, 2:44 PM EDT.

## 2023-03-30 NOTE — PROGRESS NOTES
Pulmonary / Critical Care Progress Note      Patient Name: Stephen Aguero  : 1945  MRN: [id number]  Attending:  Marco Bishop MD  Date of admission: 3/22/2023    Subjective   Subjective   Follow-up for hypoxic respiratory failure, JULITA/OHS    Over past 24 hours:  Patient remained on supplemental oxygen, required BiPAP 60% FiO2  Had right-sided thoracentesis, 600 mL drained  CT of chest showed bilateral pleural effusions, and lung infiltrate  Initiated on antibiotics    This morning  Patient resting comfortably in bed, on 5 L nasal cannula  Overall respiratory status and mental status improving  Weak and dry cough  Receiving IV diuresis  Urine output 1800/24 hours      Review of Systems  Cannot obtain due to confusion with altered mental status        Objective   Objective     Vitals:   Temp:  [96.8 °F (36 °C)-98.2 °F (36.8 °C)] 97.5 °F (36.4 °C)  Heart Rate:  [66-88] 66  Resp:  [19-32] 20  BP: (101-137)/(50-79) 112/60  Flow (L/min):  [5-6] 5    Physical Exam   Vital Signs Reviewed   General:  WDWN, alert, NAD.  Right eyelid closed.   HEENT:  PERRL, EOMI.  OP, nares clear  Chest:  good aeration, coarse rhonchi bilaterally, dull to percussion left base, no work of breathing noted on nasal cannula  CV: RRR, no MGR, pulses 2+, equal.  Abd:  Soft, NT, ND, + BS, obese  EXT:  no clubbing, no cyanosis, lower extremity edema with chronic venous stasis changes  Neuro:  A&Ox1, lethargic and mouthing words, more awake but still confused CN grossly intact, no focal deficits.  Skin: No rashes or lesions noted      Result Review    Result Review:  I have personally reviewed the results from the time of this admission to 3/30/2023 13:50 EDT and agree with these findings:  [x]  Laboratory  [x]  Microbiology  [x]  Radiology  [x]  EKG/Telemetry   [x]  Cardiology/Vascular   []  Pathology  []  Old records  []  Other:  Most notable findings include:       Lab 23  0337 23  0331 23  0739 23  03/28/23 2036 03/27/23  0558 03/27/23  0440 03/25/23  0509 03/24/23  1110   WBC 9.56  --  12.36*  --  10.32  --  9.15 7.35 8.95   HEMOGLOBIN 12.8*  --  14.0  --  14.3  --  14.6 13.6 14.5   HEMATOCRIT 39.6  --  42.4  --  44.4  --  45.2 40.9 43.9   PLATELETS 185  --  200  --  222  --  149 166 180   SODIUM 135*  --  135*  --  135* 136  --  139 139   SODIUM, ARTERIAL  --  136.3  --  134.1*  --   --   --   --   --    POTASSIUM 4.6  --  4.8  --  5.1 5.1  --  4.1 4.2   CHLORIDE 95*  --  94*  --  96* 97*  --  104 102   CO2 31.1*  --  33.0*  --  34.1* 34.3*  --  30.2* 29.7*   BUN 36*  --  36*  --  36* 27*  --  16 14   CREATININE 0.83  --  0.85  --  0.90 0.86  --  0.57* 0.59*   GLUCOSE 200*  --  176*  --  256* 170*  --  150* 177*   GLUCOSE, ARTERIAL  --  211*  --  260*  --   --   --   --   --    CALCIUM 8.6  --  8.6  --  8.9 8.6  --  8.3* 8.5*   PHOSPHORUS 3.2  --  3.4  --   --   --   --   --   --    TOTAL PROTEIN  --   --   --   --   --  7.0  --  6.3  --    ALBUMIN 2.4*  --  2.7*  --   --  2.8*  --  2.4*  --    GLOBULIN  --   --   --   --   --  4.2  --  3.9  --          Assessment & Plan   Assessment / Plan     Active Hospital Problems:  Active Hospital Problems    Diagnosis    • **Acute respiratory failure with hypercapnia (HCC)    • New onset a-fib (HCC)    • Tachycardia    • Hypoglycemia    • Obesity hypoventilation syndrome (HCC)    • Diabetes mellitus (HCC)    • COPD (chronic obstructive pulmonary disease) (HCC)    • Pneumonia    • Acute UTI (urinary tract infection)      Impression:  Acute hypoxic and hypercapnic respiratory failure requiring NIPPV  COPD exacerbation  Mycoplasma IgM positive  Nosocomial pneumonia for unspecified organism bilateral pleural effusions  Bibasilar atelectasis  Volume overload  Acute decompensated diastolic congestive heart failure  JULITA/OHS  Urinary tract infection secondary to Morganella  Bibasilar atelectasis  Altered mental status  Toxic/metabolic encephalopathy  Type 2 diabetes with  hyperglycemia  Morbid obesity, BMI 43.2 hyponatremia, clinically insignificant      Plan:  Continue supplemental oxygen with nasal cannula  Use BiPAP 20/10 with naps and at night  Reviewed chest CT, right-sided thoracentesis done 3/29, will plan for left-sided thoracentesis today  Continue bronchopulmonary hygiene, aggressive airway clearance, incentive spirometry  Bronchodilator protocol  Continue cefepime, day 1/7.  Azithromycin per primary  Send sputum culture if able to obtain  Continue Eliquis for A-fib  Continue IV diuresis with Lasix 40 mg every 8 hours  Trend renal function, monitor replace electrolytes as necessary  Speech therapy following, tolerating p.o. diet  Continue wound care for lower extremity chronic venous stasis wounds  Continue PT/OT as tolerated; will likely need rehab     DVT prophylaxis: Eliquis  Medical DVT prophylaxis orders are present.    CODE STATUS:   Code Status (Patient has no pulse and is not breathing): CPR (Attempt to Resuscitate)  Medical Interventions (Patient has pulse or is breathing): Full Support      Patient is critically ill with acute hypoxemic and hypercapnic respiratory failure requiring NIPPV, COPD exacerbation, ultimately status, bibasilar atelectasis, UTI. We have personally reviewed all pertinent labs, imaging, microbiology and documentation. We have discussed care with the primary service as well as at multidisciplinary critical care rounds with the bedside nurse, respiratory therapist, pharmacist and all other ancillary services. 31 minutes of critical care time was spent managing this patient, excluding procedures. Of this time, I spent 19 minutes and CLIFTON Bailey spent 12 minutes in accordance with split shared billing.    Electronically signed by Mateus Ewing MD, 03/30/23, 1:51 PM EDT.

## 2023-03-31 PROBLEM — J15.7 MYCOPLASMA PNEUMONIA: Status: ACTIVE | Noted: 2023-03-31

## 2023-03-31 LAB
ALBUMIN SERPL-MCNC: 2.4 G/DL (ref 3.5–5.2)
ALBUMIN SERPL-MCNC: 2.4 G/DL (ref 3.5–5.2)
ANION GAP SERPL CALCULATED.3IONS-SCNC: 2.6 MMOL/L (ref 5–15)
ANION GAP SERPL CALCULATED.3IONS-SCNC: 2.6 MMOL/L (ref 5–15)
BACTERIA SPEC RESP CULT: NORMAL
BACTERIA SPEC RESP CULT: NORMAL
BUN SERPL-MCNC: 33 MG/DL (ref 8–23)
BUN SERPL-MCNC: 33 MG/DL (ref 8–23)
BUN/CREAT SERPL: 46.5 (ref 7–25)
BUN/CREAT SERPL: 46.5 (ref 7–25)
CALCIUM SPEC-SCNC: 8.4 MG/DL (ref 8.6–10.5)
CALCIUM SPEC-SCNC: 8.4 MG/DL (ref 8.6–10.5)
CHLORIDE SERPL-SCNC: 96 MMOL/L (ref 98–107)
CHLORIDE SERPL-SCNC: 96 MMOL/L (ref 98–107)
CO2 SERPL-SCNC: 38.4 MMOL/L (ref 22–29)
CO2 SERPL-SCNC: 38.4 MMOL/L (ref 22–29)
CREAT SERPL-MCNC: 0.71 MG/DL (ref 0.76–1.27)
CREAT SERPL-MCNC: 0.71 MG/DL (ref 0.76–1.27)
DEPRECATED RDW RBC AUTO: 58.4 FL (ref 37–54)
DEPRECATED RDW RBC AUTO: 58.4 FL (ref 37–54)
EGFRCR SERPLBLD CKD-EPI 2021: 93.9 ML/MIN/1.73
EGFRCR SERPLBLD CKD-EPI 2021: 93.9 ML/MIN/1.73
ERYTHROCYTE [DISTWIDTH] IN BLOOD BY AUTOMATED COUNT: 16 % (ref 12.3–15.4)
ERYTHROCYTE [DISTWIDTH] IN BLOOD BY AUTOMATED COUNT: 16 % (ref 12.3–15.4)
GLUCOSE BLDC GLUCOMTR-MCNC: 147 MG/DL (ref 70–99)
GLUCOSE BLDC GLUCOMTR-MCNC: 147 MG/DL (ref 70–99)
GLUCOSE BLDC GLUCOMTR-MCNC: 172 MG/DL (ref 70–99)
GLUCOSE BLDC GLUCOMTR-MCNC: 172 MG/DL (ref 70–99)
GLUCOSE BLDC GLUCOMTR-MCNC: 189 MG/DL (ref 70–99)
GLUCOSE BLDC GLUCOMTR-MCNC: 189 MG/DL (ref 70–99)
GLUCOSE BLDC GLUCOMTR-MCNC: 202 MG/DL (ref 70–99)
GLUCOSE BLDC GLUCOMTR-MCNC: 202 MG/DL (ref 70–99)
GLUCOSE SERPL-MCNC: 165 MG/DL (ref 65–99)
GLUCOSE SERPL-MCNC: 165 MG/DL (ref 65–99)
GRAM STN SPEC: NORMAL
HCT VFR BLD AUTO: 40.5 % (ref 37.5–51)
HCT VFR BLD AUTO: 40.5 % (ref 37.5–51)
HGB BLD-MCNC: 13.3 G/DL (ref 13–17.7)
HGB BLD-MCNC: 13.3 G/DL (ref 13–17.7)
MAGNESIUM SERPL-MCNC: 2 MG/DL (ref 1.6–2.4)
MAGNESIUM SERPL-MCNC: 2 MG/DL (ref 1.6–2.4)
MCH RBC QN AUTO: 32.4 PG (ref 26.6–33)
MCH RBC QN AUTO: 32.4 PG (ref 26.6–33)
MCHC RBC AUTO-ENTMCNC: 32.8 G/DL (ref 31.5–35.7)
MCHC RBC AUTO-ENTMCNC: 32.8 G/DL (ref 31.5–35.7)
MCV RBC AUTO: 98.8 FL (ref 79–97)
MCV RBC AUTO: 98.8 FL (ref 79–97)
PHOSPHATE SERPL-MCNC: 3.3 MG/DL (ref 2.5–4.5)
PHOSPHATE SERPL-MCNC: 3.3 MG/DL (ref 2.5–4.5)
PLATELET # BLD AUTO: 183 10*3/MM3 (ref 140–450)
PLATELET # BLD AUTO: 183 10*3/MM3 (ref 140–450)
PMV BLD AUTO: 9.9 FL (ref 6–12)
PMV BLD AUTO: 9.9 FL (ref 6–12)
POTASSIUM SERPL-SCNC: 4 MMOL/L (ref 3.5–5.2)
POTASSIUM SERPL-SCNC: 4 MMOL/L (ref 3.5–5.2)
RBC # BLD AUTO: 4.1 10*6/MM3 (ref 4.14–5.8)
RBC # BLD AUTO: 4.1 10*6/MM3 (ref 4.14–5.8)
SODIUM SERPL-SCNC: 137 MMOL/L (ref 136–145)
SODIUM SERPL-SCNC: 137 MMOL/L (ref 136–145)
TRIGL FLD-MCNC: 52 MG/DL
TRIGL FLD-MCNC: 52 MG/DL
WBC NRBC COR # BLD: 6.93 10*3/MM3 (ref 3.4–10.8)
WBC NRBC COR # BLD: 6.93 10*3/MM3 (ref 3.4–10.8)

## 2023-03-31 PROCEDURE — 94668 MNPJ CHEST WALL SBSQ: CPT

## 2023-03-31 PROCEDURE — 63710000001 INSULIN REGULAR HUMAN PER 5 UNITS: Performed by: INTERNAL MEDICINE

## 2023-03-31 PROCEDURE — 94799 UNLISTED PULMONARY SVC/PX: CPT

## 2023-03-31 PROCEDURE — 85027 COMPLETE CBC AUTOMATED: CPT | Performed by: NURSE PRACTITIONER

## 2023-03-31 PROCEDURE — 80069 RENAL FUNCTION PANEL: CPT | Performed by: NURSE PRACTITIONER

## 2023-03-31 PROCEDURE — 92526 ORAL FUNCTION THERAPY: CPT

## 2023-03-31 PROCEDURE — 83735 ASSAY OF MAGNESIUM: CPT | Performed by: NURSE PRACTITIONER

## 2023-03-31 PROCEDURE — 82962 GLUCOSE BLOOD TEST: CPT

## 2023-03-31 PROCEDURE — 94761 N-INVAS EAR/PLS OXIMETRY MLT: CPT

## 2023-03-31 PROCEDURE — 25010000002 FUROSEMIDE PER 20 MG: Performed by: NURSE PRACTITIONER

## 2023-03-31 PROCEDURE — 25010000002 CEFEPIME PER 500 MG: Performed by: INTERNAL MEDICINE

## 2023-03-31 PROCEDURE — 87205 SMEAR GRAM STAIN: CPT | Performed by: NURSE PRACTITIONER

## 2023-03-31 PROCEDURE — 94664 DEMO&/EVAL PT USE INHALER: CPT

## 2023-03-31 PROCEDURE — 99291 CRITICAL CARE FIRST HOUR: CPT | Performed by: INTERNAL MEDICINE

## 2023-03-31 PROCEDURE — 25010000002 FUROSEMIDE PER 20 MG: Performed by: INTERNAL MEDICINE

## 2023-03-31 RX ORDER — ACETAZOLAMIDE 250 MG/1
500 TABLET ORAL 2 TIMES DAILY
Status: COMPLETED | OUTPATIENT
Start: 2023-03-31 | End: 2023-03-31

## 2023-03-31 RX ORDER — FUROSEMIDE 10 MG/ML
40 INJECTION INTRAMUSCULAR; INTRAVENOUS EVERY 12 HOURS
Status: DISCONTINUED | OUTPATIENT
Start: 2023-03-31 | End: 2023-04-08 | Stop reason: HOSPADM

## 2023-03-31 RX ADMIN — NYSTATIN 500000 UNITS: 100000 SUSPENSION ORAL at 18:26

## 2023-03-31 RX ADMIN — DULOXETINE HYDROCHLORIDE 60 MG: 30 CAPSULE, DELAYED RELEASE ORAL at 09:06

## 2023-03-31 RX ADMIN — HYDROCODONE BITARTRATE AND ACETAMINOPHEN 1 TABLET: 10; 325 TABLET ORAL at 11:09

## 2023-03-31 RX ADMIN — NYSTATIN 500000 UNITS: 100000 SUSPENSION ORAL at 10:45

## 2023-03-31 RX ADMIN — Medication 10 ML: at 21:59

## 2023-03-31 RX ADMIN — GLIPIZIDE 5 MG: 5 TABLET ORAL at 09:06

## 2023-03-31 RX ADMIN — APIXABAN 5 MG: 5 TABLET, FILM COATED ORAL at 21:59

## 2023-03-31 RX ADMIN — CARVEDILOL 25 MG: 25 TABLET, FILM COATED ORAL at 21:59

## 2023-03-31 RX ADMIN — NYSTATIN 500000 UNITS: 100000 SUSPENSION ORAL at 23:25

## 2023-03-31 RX ADMIN — IPRATROPIUM BROMIDE AND ALBUTEROL SULFATE 3 ML: 2.5; .5 SOLUTION RESPIRATORY (INHALATION) at 16:10

## 2023-03-31 RX ADMIN — IPRATROPIUM BROMIDE AND ALBUTEROL SULFATE 3 ML: 2.5; .5 SOLUTION RESPIRATORY (INHALATION) at 06:45

## 2023-03-31 RX ADMIN — INSULIN HUMAN 2 UNITS: 100 INJECTION, SOLUTION PARENTERAL at 11:09

## 2023-03-31 RX ADMIN — INSULIN HUMAN 2 UNITS: 100 INJECTION, SOLUTION PARENTERAL at 00:04

## 2023-03-31 RX ADMIN — FUROSEMIDE 40 MG: 10 INJECTION, SOLUTION INTRAMUSCULAR; INTRAVENOUS at 09:07

## 2023-03-31 RX ADMIN — ACETAZOLAMIDE 500 MG: 250 TABLET ORAL at 11:08

## 2023-03-31 RX ADMIN — TRIAMCINOLONE ACETONIDE 1 APPLICATION: 1 OINTMENT TOPICAL at 09:06

## 2023-03-31 RX ADMIN — CEFEPIME 2 G: 2 INJECTION, POWDER, FOR SOLUTION INTRAVENOUS at 09:06

## 2023-03-31 RX ADMIN — IPRATROPIUM BROMIDE AND ALBUTEROL SULFATE 3 ML: 2.5; .5 SOLUTION RESPIRATORY (INHALATION) at 19:19

## 2023-03-31 RX ADMIN — FUROSEMIDE 40 MG: 10 INJECTION, SOLUTION INTRAMUSCULAR; INTRAVENOUS at 21:59

## 2023-03-31 RX ADMIN — IPRATROPIUM BROMIDE AND ALBUTEROL SULFATE 3 ML: 2.5; .5 SOLUTION RESPIRATORY (INHALATION) at 03:14

## 2023-03-31 RX ADMIN — INSULIN HUMAN 3 UNITS: 100 INJECTION, SOLUTION PARENTERAL at 17:29

## 2023-03-31 RX ADMIN — IPRATROPIUM BROMIDE AND ALBUTEROL SULFATE 3 ML: 2.5; .5 SOLUTION RESPIRATORY (INHALATION) at 23:29

## 2023-03-31 RX ADMIN — CEFEPIME 2 G: 2 INJECTION, POWDER, FOR SOLUTION INTRAVENOUS at 00:05

## 2023-03-31 RX ADMIN — NYSTATIN 500000 UNITS: 100000 SUSPENSION ORAL at 11:08

## 2023-03-31 RX ADMIN — IPRATROPIUM BROMIDE AND ALBUTEROL SULFATE 3 ML: 2.5; .5 SOLUTION RESPIRATORY (INHALATION) at 12:13

## 2023-03-31 RX ADMIN — CARVEDILOL 25 MG: 25 TABLET, FILM COATED ORAL at 09:06

## 2023-03-31 RX ADMIN — FAMOTIDINE 40 MG: 20 TABLET ORAL at 09:06

## 2023-03-31 RX ADMIN — FUROSEMIDE 40 MG: 10 INJECTION, SOLUTION INTRAMUSCULAR; INTRAVENOUS at 00:05

## 2023-03-31 RX ADMIN — CEFEPIME 2 G: 2 INJECTION, POWDER, FOR SOLUTION INTRAVENOUS at 16:31

## 2023-03-31 RX ADMIN — APIXABAN 5 MG: 5 TABLET, FILM COATED ORAL at 09:06

## 2023-03-31 RX ADMIN — ACETAZOLAMIDE 500 MG: 250 TABLET ORAL at 23:24

## 2023-03-31 NOTE — PROGRESS NOTES
[facility]     Progress Note    Patient Name: Stephen Aguero  : 1945  MRN: [id number]  Primary Care Physician:  Papi Vasquez MD  Date of admission: 3/22/2023      Subjective   Brief summary.  Patient admitted with respiratory failure and UTI along with decreased level of consciousness.  Patient initially admitted to ICU subsequently transferred out of ICU and again transferred back to ICU  for increased lethargy and CO2 retention      HPI:    Patient feeling better today, less short of breath, on supplemental 3 to 4 L oxygen  Transfer out of ICU  No chest pain or shortness of breath reported, lethargic and weak     Review of Systems     Extremely weak and lethargic  Generalized swelling  No significant shortness of breath  Denies any pain        Objective     Vitals:   Temp:  [96.7 °F (35.9 °C)-98.2 °F (36.8 °C)] 97.4 °F (36.3 °C)  Heart Rate:  [70-87] 73  Resp:  [18-24] 20  BP: (100-146)/() 117/46  Flow (L/min):  [4-6] 4    Physical Exam :     Elderly morbidly obese male, tired looking  Heart irregular,  Lungs diminished breath sounds few crackles  Abdomen obese and soft, morbidly obese difficult to palpate  Extremities with 3+ edema with chronic venous stasis changes      Result Review:  I have personally reviewed the results from the time of this admission to 3/31/2023 18:50 EDT and agree with these findings:  [x]  Laboratory  [x]  Microbiology  [x]  Radiology  []  EKG/Telemetry   []  Cardiology/Vascular   []  Pathology  []  Old records  []  Other:       Blood sugar stable, ABGs reviewed      Assessment / Plan       Active Hospital Problems:  Active Hospital Problems    Diagnosis    • **Acute respiratory failure with hypercapnia (HCC)    • Mycoplasma pneumonia    • New onset a-fib (HCC)    • Tachycardia    • Hypoglycemia    • Obesity hypoventilation syndrome (HCC)    • Diabetes mellitus (HCC)    • COPD (chronic obstructive pulmonary disease) (HCC)    • Pneumonia    • Acute UTI (urinary  tract infection)        Plan:   Stable and improving.  Patient post thoracentesis bilaterally.  Continue diuresis.  On antibiotics cefepime as well as Zithromax for pneumonia.  PT OT  Monitor electrolytes.          DVT prophylaxis:  Medical DVT prophylaxis orders are present.    CODE STATUS:   Code Status (Patient has no pulse and is not breathing): CPR (Attempt to Resuscitate)  Medical Interventions (Patient has pulse or is breathing): Full Support                            Electronically signed by Marco Bishop MD, 03/31/23, 6:50 PM EDT.      .    .      .

## 2023-03-31 NOTE — THERAPY TREATMENT NOTE
Acute Care - Speech Language Pathology   Swallow Treatment Note MIKE Alan     Patient Name: Stephen Aguero  : 1945  MRN: [id number]  Today's Date: 3/31/2023               Admit Date: 3/22/2023    Visit Dx:     ICD-10-CM ICD-9-CM   1. Hypoglycemia  E16.2 251.2   2. Pneumonia of both lungs due to infectious organism, unspecified part of lung  J18.9 483.8   3. Hypercapnia  R06.89 786.09   4. Somnolence  R40.0 780.09   5. Sepsis, due to unspecified organism, unspecified whether acute organ dysfunction present (HCC)  A41.9 038.9     995.91   6. Dysphagia, oropharyngeal  R13.12 787.22   7. Decreased activities of daily living (ADL)  Z78.9 V49.89   8. Difficulty walking  R26.2 719.7     Patient Active Problem List   Diagnosis   • Acute respiratory failure with hypercapnia ([facility])   • Hypoglycemia   • Diabetes mellitus ([facility])   • COPD (chronic obstructive pulmonary disease) ([facility])   • Pneumonia   • Acute UTI (urinary tract infection)   • Obesity hypoventilation syndrome (HCC)   • Tachycardia   • New onset a-fib ([facility])     Past Medical History:   Diagnosis Date   • Diabetes mellitus (HCC)    • Elevated cholesterol    • GERD (gastroesophageal reflux disease)    • Hypertension      Past Surgical History:   Procedure Laterality Date   • ABDOMINAL SURGERY     • APPENDECTOMY     • EYE SURGERY       SPEECH PATHOLOGY DYSPHAGIA TREATMENT    Subjective/Behavioral Observations: Patient seen during morning meal.  Awake and alert      Current Diet: Mechanical soft diet-thin liquids    Current Strategies: Slow rate, small bites/drinks    Treatment received: Patient with mechanical soft diet and single sips of thin liquids.  Good bolus preparation and control noted.  Timely oral transit.  Pharyngeal phase appears timely.  Swallow completed without clinical sign or symptom of aspiration.  Good intake.  Denies globus sensation after completion of swallow.  Tolerated oral meds without difficulty.    Results of treatment: As  stated    Progress toward goals: Progress noted    Barriers to Achieving goals: Medical status    Plan of care:/changes in plan: Continue with current diet  90 degrees upright for all intake, use pillows behind back to support upright positioning.    EDUCATION  The patient has been educated in the following areas:   Dysphagia (Swallowing Impairment).            Shyanne Moss, SLP  3/31/2023

## 2023-03-31 NOTE — PLAN OF CARE
Goal Outcome Evaluation:      Pt continues to wear bipap. Wore bipap all night. Pt is resting well at this time. No soa noted

## 2023-03-31 NOTE — PROGRESS NOTES
Pulmonary / Critical Care Progress Note      Patient Name: Stephen Aguero  : 1945  MRN: [id number]  Attending:  Marco Bishop MD  Date of admission: 3/22/2023    Subjective   Subjective   Follow-up for hypoxic respiratory failure, JULITA/OHS    Over past 24 hours:  Patient remained on supplemental oxygen, overall decreasing demand  Had left-sided thoracentesis, 800 removed  Remain on antibiotics  wore BiPAP overnight    This morning  Patient awake, resting comfortably on 4 L nasal cannula  Mental status, respiratory status improving  Tolerating p.o. diet  Urine output 2100/24 hours  Receiving IV diuresis    Review of Systems  Constitutional symptoms:  Denied complaints   Ear, nose, throat: Denied complaints  Cardiovascular:  Denied complaints  Respiratory: Some dyspnea  Hematologic: Denied complaints  Neurologic: Denied complaints  Skin: Denied complaints          Objective   Objective     Vitals:   Temp:  [96.7 °F (35.9 °C)-98.2 °F (36.8 °C)] 98.2 °F (36.8 °C)  Heart Rate:  [63-87] 87  Resp:  [20-24] 24  BP: ()/() 119/67  Flow (L/min):  [4-10] 4    Physical Exam   Vital Signs Reviewed   General:  WDWN, alert, NAD.  Right eyelid closed.   HEENT:  PERRL, EOMI.  OP, nares clear  Chest:  good aeration, decreasing crackles and rhonchi i bilaterally, tympanic to percussion left base, no work of breathing noted on nasal cannula  CV: RRR, no MGR, pulses 2+, equal.  Abd:  Soft, NT, ND, + BS, obese  EXT:  no clubbing, no cyanosis, decreasing bilateral lower extremity edema with chronic venous stasis changes  Neuro:  A&Ox3, CN grossly intact, no focal deficits.  Skin: No rashes or lesions noted      Result Review    Result Review:  I have personally reviewed the results from the time of this admission to 3/31/2023 12:39 EDT and agree with these findings:  [x]  Laboratory  [x]  Microbiology  [x]  Radiology  [x]  EKG/Telemetry   [x]  Cardiology/Vascular   []  Pathology  []  Old records  []  Other:  Most notable  findings include:       Lab 03/31/23  0326 03/30/23  0337 03/30/23  0331 03/29/23  0739 03/28/23 2038 03/28/23 2036 03/27/23  0558 03/27/23  0440 03/25/23  0509   WBC 6.93 9.56  --  12.36*  --  10.32  --  9.15 7.35   HEMOGLOBIN 13.3 12.8*  --  14.0  --  14.3  --  14.6 13.6   HEMATOCRIT 40.5 39.6  --  42.4  --  44.4  --  45.2 40.9   PLATELETS 183 185  --  200  --  222  --  149 166   SODIUM 137 135*  --  135*  --  135* 136  --  139   SODIUM, ARTERIAL  --   --  136.3  --  134.1*  --   --   --   --    POTASSIUM 4.0 4.6  --  4.8  --  5.1 5.1  --  4.1   CHLORIDE 96* 95*  --  94*  --  96* 97*  --  104   CO2 38.4* 31.1*  --  33.0*  --  34.1* 34.3*  --  30.2*   BUN 33* 36*  --  36*  --  36* 27*  --  16   CREATININE 0.71* 0.83  --  0.85  --  0.90 0.86  --  0.57*   GLUCOSE 165* 200*  --  176*  --  256* 170*  --  150*   GLUCOSE, ARTERIAL  --   --  211*  --  260*  --   --   --   --    CALCIUM 8.4* 8.6  --  8.6  --  8.9 8.6  --  8.3*   PHOSPHORUS 3.3 3.2  --  3.4  --   --   --   --   --    TOTAL PROTEIN  --   --   --   --   --   --  7.0  --  6.3   ALBUMIN 2.4* 2.4*  --  2.7*  --   --  2.8*  --  2.4*   GLOBULIN  --   --   --   --   --   --  4.2  --  3.9         Assessment & Plan   Assessment / Plan     Active Hospital Problems:  Active Hospital Problems    Diagnosis    • **Acute respiratory failure with hypercapnia (HCC)    • New onset a-fib ([facility])    • Tachycardia    • Hypoglycemia    • Obesity hypoventilation syndrome ([facility])    • Diabetes mellitus ([facility])    • COPD (chronic obstructive pulmonary disease) ([facility])    • Pneumonia    • Acute UTI (urinary tract infection)      Impression:  Acute hypoxic and hypercapnic respiratory failure requiring NIPPV  COPD exacerbation  Like a plasma pneumonia  Nosocomial pneumonia for unspecified organism  Bilateral pleural effusions  Bibasilar atelectasis  Volume overload  Acute decompensated diastolic congestive heart failure  JULITA/OHS  Urinary tract infection secondary to Morganella  Bibasilar  atelectasis  Altered mental status  Toxic/metabolic encephalopathy  Type 2 diabetes with hyperglycemia  Morbid obesity, BMI 43.2 hyponatremia, clinically insignificant     Plan:  Continue supplemental oxygen with nasal cannula  Use BiPAP 20/12 with naps and at night  Reviewed chest CT, right-sided thoracentesis done 3/29,  left-sided thoracentesis 3/30.  Both sides are transudate and consistent with volume overload  Continue bronchopulmonary hygiene, aggressive airway clearance, incentive spirometry  Bronchodilator protocol  Continue cefepime for pneumonia with lung infiltrate, day 2/5.  Can DC azithromycin  Send sputum culture if able to obtain  Continue Eliquis for A-fib  Reduce IV diuresis Lasix 40 twice daily, give Diamox 500 mg x 2  Trend renal function, monitor replace electrolytes as necessary  Speech therapy following, tolerating p.o. diet  Continue wound care for lower extremity chronic venous stasis wounds  Continue PT/OT as tolerated; will likely need rehab  Dangle on side of bed today     Okay for patient to transfer out of ICU     DVT prophylaxis: Eliquis  Medical DVT prophylaxis orders are present.    CODE STATUS:   Code Status (Patient has no pulse and is not breathing): CPR (Attempt to Resuscitate)  Medical Interventions (Patient has pulse or is breathing): Full Support    Okay from my perspective for patient to transfer out of ICU      Patient is critically ill with acute hypoxemic and hypercapnic respiratory failure requiring NIPPV, COPD exacerbation, ultimately status, bibasilar atelectasis, UTI. We have personally reviewed all pertinent labs, imaging, microbiology and documentation. We have discussed care with the primary service as well as at multidisciplinary critical care rounds with the bedside nurse, respiratory therapist, pharmacist and all other ancillary services. 30 minutes of critical care time was spent managing this patient, excluding procedures. Of this time, I spent 18 minutes and  CLIFTON Bailey spent 12 minutes in accordance with split shared billing.    Electronically signed by Mateus Ewing MD, 03/31/23, 12:40 PM EDT.

## 2023-03-31 NOTE — PLAN OF CARE
Goal Outcome Evaluation:  Patient wore the BiPAP last night. Patient wore the BiPAP with the settings of 20/12 and 50% FiO2. Patient resting in bed with eyes close. Patients awakens to voice. No signs of distress. Patient wears 4LNC when off of BiPAP.

## 2023-04-01 ENCOUNTER — APPOINTMENT (OUTPATIENT)
Dept: GENERAL RADIOLOGY | Facility: HOSPITAL | Age: 78
DRG: 871 | End: 2023-04-01
Payer: MEDICARE

## 2023-04-01 LAB
ALBUMIN SERPL-MCNC: 2.4 G/DL (ref 3.5–5.2)
ALBUMIN SERPL-MCNC: 2.4 G/DL (ref 3.5–5.2)
ALBUMIN/GLOB SERPL: 0.6 G/DL
ALBUMIN/GLOB SERPL: 0.6 G/DL
ALP SERPL-CCNC: 106 U/L (ref 39–117)
ALP SERPL-CCNC: 106 U/L (ref 39–117)
ALT SERPL W P-5'-P-CCNC: 18 U/L (ref 1–41)
ALT SERPL W P-5'-P-CCNC: 18 U/L (ref 1–41)
ANION GAP SERPL CALCULATED.3IONS-SCNC: -1.3 MMOL/L (ref 5–15)
ANION GAP SERPL CALCULATED.3IONS-SCNC: -1.3 MMOL/L (ref 5–15)
ARTERIAL PATENCY WRIST A: POSITIVE
ARTERIAL PATENCY WRIST A: POSITIVE
AST SERPL-CCNC: 23 U/L (ref 1–40)
AST SERPL-CCNC: 23 U/L (ref 1–40)
BACTERIA FLD CULT: NORMAL
BACTERIA FLD CULT: NORMAL
BASE EXCESS BLDA CALC-SCNC: 9.4 MMOL/L (ref -2–2)
BASE EXCESS BLDA CALC-SCNC: 9.4 MMOL/L (ref -2–2)
BASOPHILS # BLD AUTO: 0.07 10*3/MM3 (ref 0–0.2)
BASOPHILS # BLD AUTO: 0.07 10*3/MM3 (ref 0–0.2)
BASOPHILS NFR BLD AUTO: 0.9 % (ref 0–1.5)
BASOPHILS NFR BLD AUTO: 0.9 % (ref 0–1.5)
BDY SITE: ABNORMAL
BDY SITE: ABNORMAL
BILIRUB SERPL-MCNC: 0.7 MG/DL (ref 0–1.2)
BILIRUB SERPL-MCNC: 0.7 MG/DL (ref 0–1.2)
BUN SERPL-MCNC: 29 MG/DL (ref 8–23)
BUN SERPL-MCNC: 29 MG/DL (ref 8–23)
BUN/CREAT SERPL: 43.9 (ref 7–25)
BUN/CREAT SERPL: 43.9 (ref 7–25)
CALCIUM SPEC-SCNC: 8.2 MG/DL (ref 8.6–10.5)
CALCIUM SPEC-SCNC: 8.2 MG/DL (ref 8.6–10.5)
CHLORIDE SERPL-SCNC: 100 MMOL/L (ref 98–107)
CHLORIDE SERPL-SCNC: 100 MMOL/L (ref 98–107)
CHOLEST FLD-MCNC: 25 MG/DL
CHOLEST FLD-MCNC: 25 MG/DL
CO2 SERPL-SCNC: 38.3 MMOL/L (ref 22–29)
CO2 SERPL-SCNC: 38.3 MMOL/L (ref 22–29)
COHGB MFR BLD: 1 % (ref 0–1.5)
COHGB MFR BLD: 1 % (ref 0–1.5)
CREAT SERPL-MCNC: 0.66 MG/DL (ref 0.76–1.27)
CREAT SERPL-MCNC: 0.66 MG/DL (ref 0.76–1.27)
DEPRECATED RDW RBC AUTO: 58.8 FL (ref 37–54)
DEPRECATED RDW RBC AUTO: 58.8 FL (ref 37–54)
EGFRCR SERPLBLD CKD-EPI 2021: 96 ML/MIN/1.73
EGFRCR SERPLBLD CKD-EPI 2021: 96 ML/MIN/1.73
EOSINOPHIL # BLD AUTO: 0.31 10*3/MM3 (ref 0–0.4)
EOSINOPHIL # BLD AUTO: 0.31 10*3/MM3 (ref 0–0.4)
EOSINOPHIL NFR BLD AUTO: 4 % (ref 0.3–6.2)
EOSINOPHIL NFR BLD AUTO: 4 % (ref 0.3–6.2)
ERYTHROCYTE [DISTWIDTH] IN BLOOD BY AUTOMATED COUNT: 15.8 % (ref 12.3–15.4)
ERYTHROCYTE [DISTWIDTH] IN BLOOD BY AUTOMATED COUNT: 15.8 % (ref 12.3–15.4)
FHHB: 9.5 % (ref 0–5)
FHHB: 9.5 % (ref 0–5)
GAS FLOW AIRWAY: 4 LPM
GAS FLOW AIRWAY: 4 LPM
GLOBULIN UR ELPH-MCNC: 3.9 GM/DL
GLOBULIN UR ELPH-MCNC: 3.9 GM/DL
GLUCOSE BLDC GLUCOMTR-MCNC: 145 MG/DL (ref 70–99)
GLUCOSE BLDC GLUCOMTR-MCNC: 145 MG/DL (ref 70–99)
GLUCOSE BLDC GLUCOMTR-MCNC: 155 MG/DL (ref 70–99)
GLUCOSE BLDC GLUCOMTR-MCNC: 155 MG/DL (ref 70–99)
GLUCOSE BLDC GLUCOMTR-MCNC: 161 MG/DL (ref 70–99)
GLUCOSE BLDC GLUCOMTR-MCNC: 161 MG/DL (ref 70–99)
GLUCOSE BLDC GLUCOMTR-MCNC: 192 MG/DL (ref 70–99)
GLUCOSE BLDC GLUCOMTR-MCNC: 192 MG/DL (ref 70–99)
GLUCOSE SERPL-MCNC: 152 MG/DL (ref 65–99)
GLUCOSE SERPL-MCNC: 152 MG/DL (ref 65–99)
GRAM STN SPEC: NORMAL
GRAM STN SPEC: NORMAL
HCO3 BLDA-SCNC: 38.2 MMOL/L (ref 22–26)
HCO3 BLDA-SCNC: 38.2 MMOL/L (ref 22–26)
HCT VFR BLD AUTO: 42 % (ref 37.5–51)
HCT VFR BLD AUTO: 42 % (ref 37.5–51)
HGB BLD-MCNC: 13.6 G/DL (ref 13–17.7)
HGB BLD-MCNC: 13.6 G/DL (ref 13–17.7)
HGB BLDA-MCNC: 15.5 G/DL (ref 13.8–16.4)
HGB BLDA-MCNC: 15.5 G/DL (ref 13.8–16.4)
IMM GRANULOCYTES # BLD AUTO: 0.03 10*3/MM3 (ref 0–0.05)
IMM GRANULOCYTES # BLD AUTO: 0.03 10*3/MM3 (ref 0–0.05)
IMM GRANULOCYTES NFR BLD AUTO: 0.4 % (ref 0–0.5)
IMM GRANULOCYTES NFR BLD AUTO: 0.4 % (ref 0–0.5)
LACTATE BLDA-SCNC: ABNORMAL MMOL/L
LACTATE BLDA-SCNC: ABNORMAL MMOL/L
LYMPHOCYTES # BLD AUTO: 1.74 10*3/MM3 (ref 0.7–3.1)
LYMPHOCYTES # BLD AUTO: 1.74 10*3/MM3 (ref 0.7–3.1)
LYMPHOCYTES NFR BLD AUTO: 22.5 % (ref 19.6–45.3)
LYMPHOCYTES NFR BLD AUTO: 22.5 % (ref 19.6–45.3)
Lab: NORMAL
Lab: NORMAL
MAGNESIUM SERPL-MCNC: 2.1 MG/DL (ref 1.6–2.4)
MAGNESIUM SERPL-MCNC: 2.1 MG/DL (ref 1.6–2.4)
MCH RBC QN AUTO: 32.8 PG (ref 26.6–33)
MCH RBC QN AUTO: 32.8 PG (ref 26.6–33)
MCHC RBC AUTO-ENTMCNC: 32.4 G/DL (ref 31.5–35.7)
MCHC RBC AUTO-ENTMCNC: 32.4 G/DL (ref 31.5–35.7)
MCV RBC AUTO: 101.2 FL (ref 79–97)
MCV RBC AUTO: 101.2 FL (ref 79–97)
METHGB BLD QL: 0.2 % (ref 0–1.5)
METHGB BLD QL: 0.2 % (ref 0–1.5)
MODALITY: ABNORMAL
MODALITY: ABNORMAL
MONOCYTES # BLD AUTO: 1.2 10*3/MM3 (ref 0.1–0.9)
MONOCYTES # BLD AUTO: 1.2 10*3/MM3 (ref 0.1–0.9)
MONOCYTES NFR BLD AUTO: 15.5 % (ref 5–12)
MONOCYTES NFR BLD AUTO: 15.5 % (ref 5–12)
NEUTROPHILS NFR BLD AUTO: 4.37 10*3/MM3 (ref 1.7–7)
NEUTROPHILS NFR BLD AUTO: 4.37 10*3/MM3 (ref 1.7–7)
NEUTROPHILS NFR BLD AUTO: 56.7 % (ref 42.7–76)
NEUTROPHILS NFR BLD AUTO: 56.7 % (ref 42.7–76)
NRBC BLD AUTO-RTO: 0 /100 WBC (ref 0–0.2)
NRBC BLD AUTO-RTO: 0 /100 WBC (ref 0–0.2)
OXYHGB MFR BLDV: 89.3 % (ref 94–99)
OXYHGB MFR BLDV: 89.3 % (ref 94–99)
PCO2 BLDA: 70.2 MM HG (ref 35–45)
PCO2 BLDA: 70.2 MM HG (ref 35–45)
PH BLDA: 7.35 PH UNITS (ref 7.35–7.45)
PH BLDA: 7.35 PH UNITS (ref 7.35–7.45)
PHOSPHATE SERPL-MCNC: 2.8 MG/DL (ref 2.5–4.5)
PHOSPHATE SERPL-MCNC: 2.8 MG/DL (ref 2.5–4.5)
PLATELET # BLD AUTO: 191 10*3/MM3 (ref 140–450)
PLATELET # BLD AUTO: 191 10*3/MM3 (ref 140–450)
PMV BLD AUTO: 10.1 FL (ref 6–12)
PMV BLD AUTO: 10.1 FL (ref 6–12)
PO2 BLDA: 64.2 MM HG (ref 80–100)
PO2 BLDA: 64.2 MM HG (ref 80–100)
POTASSIUM SERPL-SCNC: 3.9 MMOL/L (ref 3.5–5.2)
POTASSIUM SERPL-SCNC: 3.9 MMOL/L (ref 3.5–5.2)
PROT SERPL-MCNC: 6.3 G/DL (ref 6–8.5)
PROT SERPL-MCNC: 6.3 G/DL (ref 6–8.5)
RBC # BLD AUTO: 4.15 10*6/MM3 (ref 4.14–5.8)
RBC # BLD AUTO: 4.15 10*6/MM3 (ref 4.14–5.8)
SAO2 % BLDCOA: 90.4 % (ref 95–99)
SAO2 % BLDCOA: 90.4 % (ref 95–99)
SODIUM SERPL-SCNC: 137 MMOL/L (ref 136–145)
SODIUM SERPL-SCNC: 137 MMOL/L (ref 136–145)
WBC NRBC COR # BLD: 7.72 10*3/MM3 (ref 3.4–10.8)
WBC NRBC COR # BLD: 7.72 10*3/MM3 (ref 3.4–10.8)

## 2023-04-01 PROCEDURE — 94799 UNLISTED PULMONARY SVC/PX: CPT

## 2023-04-01 PROCEDURE — 63710000001 INSULIN REGULAR HUMAN PER 5 UNITS: Performed by: INTERNAL MEDICINE

## 2023-04-01 PROCEDURE — 82962 GLUCOSE BLOOD TEST: CPT

## 2023-04-01 PROCEDURE — 99233 SBSQ HOSP IP/OBS HIGH 50: CPT | Performed by: INTERNAL MEDICINE

## 2023-04-01 PROCEDURE — 25010000002 CEFEPIME PER 500 MG: Performed by: INTERNAL MEDICINE

## 2023-04-01 PROCEDURE — 94664 DEMO&/EVAL PT USE INHALER: CPT

## 2023-04-01 PROCEDURE — 36600 WITHDRAWAL OF ARTERIAL BLOOD: CPT | Performed by: INTERNAL MEDICINE

## 2023-04-01 PROCEDURE — 71045 X-RAY EXAM CHEST 1 VIEW: CPT

## 2023-04-01 PROCEDURE — 82805 BLOOD GASES W/O2 SATURATION: CPT | Performed by: INTERNAL MEDICINE

## 2023-04-01 PROCEDURE — 83050 HGB METHEMOGLOBIN QUAN: CPT | Performed by: INTERNAL MEDICINE

## 2023-04-01 PROCEDURE — 82375 ASSAY CARBOXYHB QUANT: CPT | Performed by: INTERNAL MEDICINE

## 2023-04-01 PROCEDURE — 85025 COMPLETE CBC W/AUTO DIFF WBC: CPT | Performed by: INTERNAL MEDICINE

## 2023-04-01 PROCEDURE — 94668 MNPJ CHEST WALL SBSQ: CPT

## 2023-04-01 PROCEDURE — 94660 CPAP INITIATION&MGMT: CPT

## 2023-04-01 PROCEDURE — 80053 COMPREHEN METABOLIC PANEL: CPT | Performed by: INTERNAL MEDICINE

## 2023-04-01 PROCEDURE — 83735 ASSAY OF MAGNESIUM: CPT | Performed by: NURSE PRACTITIONER

## 2023-04-01 PROCEDURE — 84100 ASSAY OF PHOSPHORUS: CPT | Performed by: NURSE PRACTITIONER

## 2023-04-01 PROCEDURE — 25010000002 FUROSEMIDE PER 20 MG: Performed by: NURSE PRACTITIONER

## 2023-04-01 RX ORDER — POTASSIUM CHLORIDE 750 MG/1
40 CAPSULE, EXTENDED RELEASE ORAL ONCE
Status: COMPLETED | OUTPATIENT
Start: 2023-04-01 | End: 2023-04-01

## 2023-04-01 RX ORDER — ACETAZOLAMIDE 250 MG/1
500 TABLET ORAL 2 TIMES DAILY
Status: COMPLETED | OUTPATIENT
Start: 2023-04-01 | End: 2023-04-01

## 2023-04-01 RX ADMIN — CARVEDILOL 25 MG: 25 TABLET, FILM COATED ORAL at 21:23

## 2023-04-01 RX ADMIN — IPRATROPIUM BROMIDE AND ALBUTEROL SULFATE 3 ML: 2.5; .5 SOLUTION RESPIRATORY (INHALATION) at 16:01

## 2023-04-01 RX ADMIN — FUROSEMIDE 40 MG: 10 INJECTION, SOLUTION INTRAMUSCULAR; INTRAVENOUS at 08:27

## 2023-04-01 RX ADMIN — CEFEPIME 2 G: 2 INJECTION, POWDER, FOR SOLUTION INTRAVENOUS at 08:28

## 2023-04-01 RX ADMIN — IPRATROPIUM BROMIDE AND ALBUTEROL SULFATE 3 ML: 2.5; .5 SOLUTION RESPIRATORY (INHALATION) at 06:43

## 2023-04-01 RX ADMIN — TRIAMCINOLONE ACETONIDE 1 APPLICATION: 1 OINTMENT TOPICAL at 02:32

## 2023-04-01 RX ADMIN — APIXABAN 5 MG: 5 TABLET, FILM COATED ORAL at 08:11

## 2023-04-01 RX ADMIN — NYSTATIN 500000 UNITS: 100000 SUSPENSION ORAL at 08:11

## 2023-04-01 RX ADMIN — SENNOSIDES AND DOCUSATE SODIUM 1 TABLET: 8.6; 5 TABLET ORAL at 21:24

## 2023-04-01 RX ADMIN — IPRATROPIUM BROMIDE AND ALBUTEROL SULFATE 3 ML: 2.5; .5 SOLUTION RESPIRATORY (INHALATION) at 04:10

## 2023-04-01 RX ADMIN — CEFEPIME 2 G: 2 INJECTION, POWDER, FOR SOLUTION INTRAVENOUS at 02:08

## 2023-04-01 RX ADMIN — FUROSEMIDE 40 MG: 10 INJECTION, SOLUTION INTRAMUSCULAR; INTRAVENOUS at 21:24

## 2023-04-01 RX ADMIN — IPRATROPIUM BROMIDE AND ALBUTEROL SULFATE 3 ML: 2.5; .5 SOLUTION RESPIRATORY (INHALATION) at 18:30

## 2023-04-01 RX ADMIN — TRIAMCINOLONE ACETONIDE 1 APPLICATION: 1 OINTMENT TOPICAL at 21:24

## 2023-04-01 RX ADMIN — TRIAMCINOLONE ACETONIDE 1 APPLICATION: 1 OINTMENT TOPICAL at 08:12

## 2023-04-01 RX ADMIN — GLIPIZIDE 5 MG: 5 TABLET ORAL at 08:11

## 2023-04-01 RX ADMIN — NYSTATIN 500000 UNITS: 100000 SUSPENSION ORAL at 12:03

## 2023-04-01 RX ADMIN — INSULIN HUMAN 2 UNITS: 100 INJECTION, SOLUTION PARENTERAL at 02:05

## 2023-04-01 RX ADMIN — APIXABAN 5 MG: 5 TABLET, FILM COATED ORAL at 21:24

## 2023-04-01 RX ADMIN — DULOXETINE HYDROCHLORIDE 60 MG: 30 CAPSULE, DELAYED RELEASE ORAL at 08:11

## 2023-04-01 RX ADMIN — INSULIN HUMAN 2 UNITS: 100 INJECTION, SOLUTION PARENTERAL at 13:11

## 2023-04-01 RX ADMIN — CARVEDILOL 25 MG: 25 TABLET, FILM COATED ORAL at 08:11

## 2023-04-01 RX ADMIN — ACETAZOLAMIDE 500 MG: 250 TABLET ORAL at 21:24

## 2023-04-01 RX ADMIN — FAMOTIDINE 40 MG: 20 TABLET ORAL at 08:10

## 2023-04-01 RX ADMIN — Medication 10 ML: at 21:25

## 2023-04-01 RX ADMIN — CEFEPIME 2 G: 2 INJECTION, POWDER, FOR SOLUTION INTRAVENOUS at 17:31

## 2023-04-01 RX ADMIN — NYSTATIN 500000 UNITS: 100000 SUSPENSION ORAL at 17:31

## 2023-04-01 RX ADMIN — IPRATROPIUM BROMIDE AND ALBUTEROL SULFATE 3 ML: 2.5; .5 SOLUTION RESPIRATORY (INHALATION) at 23:30

## 2023-04-01 RX ADMIN — POTASSIUM CHLORIDE 40 MEQ: 10 CAPSULE, COATED, EXTENDED RELEASE ORAL at 08:11

## 2023-04-01 RX ADMIN — ACETAZOLAMIDE 500 MG: 250 TABLET ORAL at 08:11

## 2023-04-01 RX ADMIN — NYSTATIN 500000 UNITS: 100000 SUSPENSION ORAL at 21:24

## 2023-04-01 RX ADMIN — HYDROCODONE BITARTRATE AND ACETAMINOPHEN 1 TABLET: 10; 325 TABLET ORAL at 12:04

## 2023-04-01 RX ADMIN — IPRATROPIUM BROMIDE AND ALBUTEROL SULFATE 3 ML: 2.5; .5 SOLUTION RESPIRATORY (INHALATION) at 11:00

## 2023-04-01 NOTE — PLAN OF CARE
Goal Outcome Evaluation:      Pt. Placed on bipap at 2150. He has tolerated it well. At this time he is sating 94% on 40%02. On 4l HFNC when not on bipap.

## 2023-04-01 NOTE — PLAN OF CARE
Goal Outcome Evaluation:    Pt frequently incontinent, turning q 2 hours, 4 L 02 via N/C, Bipap at night, no distress noted.

## 2023-04-01 NOTE — PROGRESS NOTES
[facility]     Progress Note    Patient Name: Stephen Aguero  : 1945  MRN: [id number]  Primary Care Physician:  Papi Vasquez MD  Date of admission: 3/22/2023    Subjective   Subjective     Chief Complaint: Patient with history of respiratory failure and UTI.  He was in the ICU earlier and now in the PCU    HPI:  Patient Reports no complaints.  He does not appear to be short of breath.  Obese male lying in bed.    Review of Systems   Review of Systems   Constitutional: Activity change: in bed.   HENT: Negative.    Eyes: Negative.    Respiratory: Positive for shortness of breath.    Cardiovascular: Negative.    Gastrointestinal: Negative.    Endocrine: Negative.    Genitourinary: Negative.    Musculoskeletal: Negative.    Skin: Negative.    Allergic/Immunologic: Negative.    Neurological: Positive for weakness.   Hematological: Negative.    Psychiatric/Behavioral: Negative.        Objective   Objective     Vitals:   Temp:  [97.3 °F (36.3 °C)-98.2 °F (36.8 °C)] 98.1 °F (36.7 °C)  Heart Rate:  [70-93] 81  Resp:  [17-22] 18  BP: (112-121)/(46-71) 121/67  Flow (L/min):  [4] 4  Physical Exam    Constitutional: Awake, alert   Eyes: PERRLA, sclerae anicteric, no conjunctival injection   HENT: NCAT, mucous membranes moist   Neck: Supple, no thyromegaly, no lymphadenopathy, trachea midline   Respiratory: Clear to auscultation bilaterally, nonlabored respirations    Cardiovascular: RRR, no murmurs, rubs, or gallops, palpable pedal pulses bilaterally   Gastrointestinal: Positive bowel sounds, soft, nontender, nondistended   Musculoskeletal: No bilateral ankle edema, no clubbing or cyanosis to extremities   Psychiatric: Appropriate affect, cooperative   Neurologic: Oriented x 3, strength symmetric in all extremities, Cranial Nerves grossly intact to confrontation, speech clear   Skin: No rashes     Result Review    Result Review:  I have personally reviewed the results from the time of this admission to 2023  13:25 EDT and agree with these findings:  [x]  Laboratory  []  Microbiology  []  Radiology  []  EKG/Telemetry   []  Cardiology/Vascular   []  Pathology  []  Old records  []  Other:  Most notable findings include: Remains weak and fatigued and short of breath  Assessment & Plan   Assessment / Plan     Brief Patient Summary:  Stephen Aguero is a 77 y.o. male who has respiratory failure on oxygen and BiPAP    Active Hospital Problems:  Active Hospital Problems    Diagnosis    • **Acute respiratory failure with hypercapnia    • Mycoplasma pneumonia    • New onset a-fib    • Tachycardia    • Hypoglycemia    • Obesity hypoventilation syndrome    • Diabetes mellitus    • COPD (chronic obstructive pulmonary disease)    • Pneumonia    • Acute UTI (urinary tract infection)      Plan:   On IV diuretics  PT OT  Adjust medications  On oxygen and BiPAP  Seen by pulmonary    DVT prophylaxis:  Medical DVT prophylaxis orders are present.    CODE STATUS:   Code Status (Patient has no pulse and is not breathing): CPR (Attempt to Resuscitate)  Medical Interventions (Patient has pulse or is breathing): Full Support      Electronically signed by Candace Wilkinson MD, 04/01/23, 1:25 PM EDT.

## 2023-04-01 NOTE — PLAN OF CARE
Goal Outcome Evaluation:               Pt became more lethargic and desatted during breakfast. MD notified and orders placed. Pt on Bipap and will continue to be on bipap as much as possible. Continues on ABX as well.

## 2023-04-01 NOTE — PROGRESS NOTES
Pulmonary / Critical Care Progress Note      Patient Name: Stephen Aguero  : 1945  MRN: [id number]  Attending:  Marco Bishop MD  Date of admission: 3/22/2023    Subjective   Subjective   Follow-up for hypoxic respiratory failure, JULITA/OHS    Transferred out of ICU yesterday  On BiPAP overnight and 4 L of oxygen in the morning  Continues to diurese well  Potassium low  Is weak and fatigued  Is short of breath with weak cough with thick secretions  No nausea, fevers or chills    Was notified by nurse later in the day that patient was more confused.  ABG and chest x-ray are pending.  Reportedly desaturated into the low 80s during breakfast    This morning  Patient awake, resting comfortably on 4 L nasal cannula  Mental status, respiratory status improving  Tolerating p.o. diet  Urine output 2100/24 hours  Receiving IV diuresis    Review of Systems  Constitutional symptoms: Fatigue and weakness, otherwise denied complaints   Ear, nose, throat: Denied complaints  Cardiovascular:  Denied complaints  Respiratory: Dyspnea and cough, otherwise some dyspnea  Hematologic: Denied complaints  Neurologic: Denied complaints  Skin: Denied complaints          Objective   Objective     Vitals:   Temp:  [97.3 °F (36.3 °C)-98.2 °F (36.8 °C)] 97.3 °F (36.3 °C)  Heart Rate:  [70-93] 82  Resp:  [17-24] 18  BP: (100-146)/() 120/55  Flow (L/min):  [4-5] 4    Physical Exam   Vital Signs Reviewed   General:  WDWN, alert, NAD.  Right eyelid closed.   HEENT:  PERRL, EOMI.  OP, nares clear  Chest:  good aeration, decreasing crackles and rhonchi bilaterally, tympanic to percussion left base, no work of breathing noted on nasal cannula  CV: RRR, no MGR, pulses 2+, equal.  Abd:  Soft, NT, ND, + BS, obese  EXT:  no clubbing, no cyanosis, decreasing bilateral lower extremity edema with chronic venous stasis changes  Neuro:  A&Ox3, CN grossly intact, no focal deficits.  Skin: No rashes or lesions noted      Result Review    Result  Review:  I have personally reviewed the results from the time of this admission to 4/1/2023 07:48 EDT and agree with these findings:  [x]  Laboratory  [x]  Microbiology  [x]  Radiology  [x]  EKG/Telemetry   [x]  Cardiology/Vascular   []  Pathology  []  Old records  []  Other:  Most notable findings include:       Lab 04/01/23  0438 03/31/23  0326 03/30/23  0337 03/30/23  0331 03/29/23  0739 03/28/23 2038 03/28/23 2036 03/27/23  0558 03/27/23  0558 03/27/23  0440   WBC 7.72 6.93 9.56  --  12.36*  --  10.32  --   --  9.15   HEMOGLOBIN 13.6 13.3 12.8*  --  14.0  --  14.3  --   --  14.6   HEMATOCRIT 42.0 40.5 39.6  --  42.4  --  44.4  --   --  45.2   PLATELETS 191 183 185  --  200  --  222  --   --  149   SODIUM 137 137 135*  --  135*  --  135*   < > 136  --    SODIUM, ARTERIAL  --   --   --  136.3  --  134.1*  --   --   --   --    POTASSIUM 3.9 4.0 4.6  --  4.8  --  5.1  --  5.1  --    CHLORIDE 100 96* 95*  --  94*  --  96*  --  97*  --    CO2 38.3* 38.4* 31.1*  --  33.0*  --  34.1*  --  34.3*  --    BUN 29* 33* 36*  --  36*  --  36*  --  27*  --    CREATININE 0.66* 0.71* 0.83  --  0.85  --  0.90  --  0.86  --    GLUCOSE 152* 165* 200*  --  176*  --  256*   < > 170*  --    GLUCOSE, ARTERIAL  --   --   --  211*  --  260*  --   --   --   --    CALCIUM 8.2* 8.4* 8.6  --  8.6  --  8.9  --  8.6  --    PHOSPHORUS 2.8 3.3 3.2  --  3.4  --   --   --   --   --    TOTAL PROTEIN 6.3  --   --   --   --   --   --   --  7.0  --    ALBUMIN 2.4* 2.4* 2.4*  --  2.7*  --   --   --  2.8*  --    GLOBULIN 3.9  --   --   --   --   --   --   --  4.2  --     < > = values in this interval not displayed.         Assessment & Plan   Assessment / Plan     Active Hospital Problems:  Active Hospital Problems    Diagnosis    • **Acute respiratory failure with hypercapnia    • Mycoplasma pneumonia    • New onset a-fib    • Tachycardia    • Hypoglycemia    • Obesity hypoventilation syndrome    • Diabetes mellitus    • COPD (chronic obstructive  pulmonary disease)    • Pneumonia    • Acute UTI (urinary tract infection)      Impression:  Acute hypoxic and hypercapnic respiratory failure requiring NIPPV  COPD exacerbation  Like a plasma pneumonia  Nosocomial pneumonia for unspecified organism  Bilateral pleural effusions  Bibasilar atelectasis  Volume overload  Acute decompensated diastolic congestive heart failure  JULITA/OHS  Urinary tract infection secondary to Morganella  Bibasilar atelectasis  Altered mental status  Toxic/metabolic encephalopathy  Type 2 diabetes with hyperglycemia  Morbid obesity, BMI 43.2  Hyponatremia, clinically insignificant  Hypokalemia     Plan:  Given change in mental status this morning we will check chest x-ray and ABG now to see if patient needs BiPAP today or any adjustments.  We will also evaluate for aspiration given desaturated noted during breakfast  Wean O2 to keep SPO2 greater than 90%  Use BiPAP 20/12 with naps and at night  Reviewed chest CT, right-sided thoracentesis done 3/29,  left-sided thoracentesis 3/30.  Both sides are transudate and consistent with volume overload  Continue bronchopulmonary hygiene, aggressive airway clearance, incentive spirometry  Has bronchodilator protocol  On day 3/5 of cefepime.  Cultures so far all negative  Continue Eliquis for A-fib  Reduce IV diuresis Lasix 40 twice daily, redose Diamox 500 mg x 2  Trend renal function and electrolytes.  Replace potassium orally  Speech therapy following, tolerating p.o. diet  Continue wound care for lower extremity chronic venous stasis wounds  Continue PT/OT as tolerated; will need rehab     DVT prophylaxis: Eliquis  Medical DVT prophylaxis orders are present.    CODE STATUS:   Code Status (Patient has no pulse and is not breathing): CPR (Attempt to Resuscitate)  Medical Interventions (Patient has pulse or is breathing): Full Support      Labs, imaging, microbiology, notes and medications personally reviewed  Discussed with primary    Electronically  signed by Mateus Ewing MD, 04/01/23, 12:04 PM EDT.

## 2023-04-01 NOTE — PLAN OF CARE
Goal Outcome Evaluation:               Patient tolerating Bipap well at this time.  Patient had elevated CO2 therefore is currently wearing continuously.

## 2023-04-02 LAB
ALBUMIN SERPL-MCNC: 2.6 G/DL (ref 3.5–5.2)
ALBUMIN SERPL-MCNC: 2.6 G/DL (ref 3.5–5.2)
ANION GAP SERPL CALCULATED.3IONS-SCNC: 4.9 MMOL/L (ref 5–15)
ANION GAP SERPL CALCULATED.3IONS-SCNC: 4.9 MMOL/L (ref 5–15)
BACTERIA FLD CULT: NORMAL
BACTERIA FLD CULT: NORMAL
BUN SERPL-MCNC: 27 MG/DL (ref 8–23)
BUN SERPL-MCNC: 27 MG/DL (ref 8–23)
BUN/CREAT SERPL: 39.7 (ref 7–25)
BUN/CREAT SERPL: 39.7 (ref 7–25)
CALCIUM SPEC-SCNC: 8.8 MG/DL (ref 8.6–10.5)
CALCIUM SPEC-SCNC: 8.8 MG/DL (ref 8.6–10.5)
CHLORIDE SERPL-SCNC: 98 MMOL/L (ref 98–107)
CHLORIDE SERPL-SCNC: 98 MMOL/L (ref 98–107)
CHOLEST FLD-MCNC: 28 MG/DL
CHOLEST FLD-MCNC: 28 MG/DL
CO2 SERPL-SCNC: 34.1 MMOL/L (ref 22–29)
CO2 SERPL-SCNC: 34.1 MMOL/L (ref 22–29)
CREAT SERPL-MCNC: 0.68 MG/DL (ref 0.76–1.27)
CREAT SERPL-MCNC: 0.68 MG/DL (ref 0.76–1.27)
DEPRECATED RDW RBC AUTO: 58 FL (ref 37–54)
DEPRECATED RDW RBC AUTO: 58 FL (ref 37–54)
EGFRCR SERPLBLD CKD-EPI 2021: 95.1 ML/MIN/1.73
EGFRCR SERPLBLD CKD-EPI 2021: 95.1 ML/MIN/1.73
ERYTHROCYTE [DISTWIDTH] IN BLOOD BY AUTOMATED COUNT: 15.7 % (ref 12.3–15.4)
ERYTHROCYTE [DISTWIDTH] IN BLOOD BY AUTOMATED COUNT: 15.7 % (ref 12.3–15.4)
GLUCOSE BLDC GLUCOMTR-MCNC: 135 MG/DL (ref 70–99)
GLUCOSE BLDC GLUCOMTR-MCNC: 135 MG/DL (ref 70–99)
GLUCOSE BLDC GLUCOMTR-MCNC: 148 MG/DL (ref 70–99)
GLUCOSE BLDC GLUCOMTR-MCNC: 148 MG/DL (ref 70–99)
GLUCOSE BLDC GLUCOMTR-MCNC: 158 MG/DL (ref 70–99)
GLUCOSE BLDC GLUCOMTR-MCNC: 158 MG/DL (ref 70–99)
GLUCOSE SERPL-MCNC: 151 MG/DL (ref 65–99)
GLUCOSE SERPL-MCNC: 151 MG/DL (ref 65–99)
GRAM STN SPEC: NORMAL
GRAM STN SPEC: NORMAL
HCT VFR BLD AUTO: 43.2 % (ref 37.5–51)
HCT VFR BLD AUTO: 43.2 % (ref 37.5–51)
HGB BLD-MCNC: 13.9 G/DL (ref 13–17.7)
HGB BLD-MCNC: 13.9 G/DL (ref 13–17.7)
MAGNESIUM SERPL-MCNC: 2.2 MG/DL (ref 1.6–2.4)
MAGNESIUM SERPL-MCNC: 2.2 MG/DL (ref 1.6–2.4)
MCH RBC QN AUTO: 32 PG (ref 26.6–33)
MCH RBC QN AUTO: 32 PG (ref 26.6–33)
MCHC RBC AUTO-ENTMCNC: 32.2 G/DL (ref 31.5–35.7)
MCHC RBC AUTO-ENTMCNC: 32.2 G/DL (ref 31.5–35.7)
MCV RBC AUTO: 99.5 FL (ref 79–97)
MCV RBC AUTO: 99.5 FL (ref 79–97)
PHOSPHATE SERPL-MCNC: 2.8 MG/DL (ref 2.5–4.5)
PHOSPHATE SERPL-MCNC: 2.8 MG/DL (ref 2.5–4.5)
PLATELET # BLD AUTO: 205 10*3/MM3 (ref 140–450)
PLATELET # BLD AUTO: 205 10*3/MM3 (ref 140–450)
PMV BLD AUTO: 10.1 FL (ref 6–12)
PMV BLD AUTO: 10.1 FL (ref 6–12)
POTASSIUM SERPL-SCNC: 4.3 MMOL/L (ref 3.5–5.2)
POTASSIUM SERPL-SCNC: 4.3 MMOL/L (ref 3.5–5.2)
RBC # BLD AUTO: 4.34 10*6/MM3 (ref 4.14–5.8)
RBC # BLD AUTO: 4.34 10*6/MM3 (ref 4.14–5.8)
SODIUM SERPL-SCNC: 137 MMOL/L (ref 136–145)
SODIUM SERPL-SCNC: 137 MMOL/L (ref 136–145)
WBC NRBC COR # BLD: 7.86 10*3/MM3 (ref 3.4–10.8)
WBC NRBC COR # BLD: 7.86 10*3/MM3 (ref 3.4–10.8)

## 2023-04-02 PROCEDURE — 82962 GLUCOSE BLOOD TEST: CPT

## 2023-04-02 PROCEDURE — 99233 SBSQ HOSP IP/OBS HIGH 50: CPT | Performed by: INTERNAL MEDICINE

## 2023-04-02 PROCEDURE — 94664 DEMO&/EVAL PT USE INHALER: CPT

## 2023-04-02 PROCEDURE — 94799 UNLISTED PULMONARY SVC/PX: CPT

## 2023-04-02 PROCEDURE — 25010000002 FUROSEMIDE PER 20 MG: Performed by: NURSE PRACTITIONER

## 2023-04-02 PROCEDURE — 83735 ASSAY OF MAGNESIUM: CPT | Performed by: NURSE PRACTITIONER

## 2023-04-02 PROCEDURE — 97530 THERAPEUTIC ACTIVITIES: CPT

## 2023-04-02 PROCEDURE — 80069 RENAL FUNCTION PANEL: CPT | Performed by: NURSE PRACTITIONER

## 2023-04-02 PROCEDURE — 94668 MNPJ CHEST WALL SBSQ: CPT

## 2023-04-02 PROCEDURE — 25010000002 CEFEPIME PER 500 MG: Performed by: INTERNAL MEDICINE

## 2023-04-02 PROCEDURE — 63710000001 INSULIN REGULAR HUMAN PER 5 UNITS: Performed by: INTERNAL MEDICINE

## 2023-04-02 PROCEDURE — 85027 COMPLETE CBC AUTOMATED: CPT | Performed by: NURSE PRACTITIONER

## 2023-04-02 RX ORDER — ACETAZOLAMIDE 250 MG/1
500 TABLET ORAL 2 TIMES DAILY
Status: COMPLETED | OUTPATIENT
Start: 2023-04-02 | End: 2023-04-02

## 2023-04-02 RX ORDER — IPRATROPIUM BROMIDE AND ALBUTEROL SULFATE 2.5; .5 MG/3ML; MG/3ML
3 SOLUTION RESPIRATORY (INHALATION)
Status: DISCONTINUED | OUTPATIENT
Start: 2023-04-03 | End: 2023-04-08

## 2023-04-02 RX ADMIN — SENNOSIDES AND DOCUSATE SODIUM 1 TABLET: 8.6; 5 TABLET ORAL at 08:26

## 2023-04-02 RX ADMIN — TRIAMCINOLONE ACETONIDE 1 APPLICATION: 1 OINTMENT TOPICAL at 20:57

## 2023-04-02 RX ADMIN — CEFEPIME 2 G: 2 INJECTION, POWDER, FOR SOLUTION INTRAVENOUS at 17:36

## 2023-04-02 RX ADMIN — IPRATROPIUM BROMIDE AND ALBUTEROL SULFATE 3 ML: 2.5; .5 SOLUTION RESPIRATORY (INHALATION) at 03:32

## 2023-04-02 RX ADMIN — INSULIN HUMAN 2 UNITS: 100 INJECTION, SOLUTION PARENTERAL at 06:22

## 2023-04-02 RX ADMIN — IPRATROPIUM BROMIDE AND ALBUTEROL SULFATE 3 ML: 2.5; .5 SOLUTION RESPIRATORY (INHALATION) at 18:40

## 2023-04-02 RX ADMIN — DULOXETINE HYDROCHLORIDE 60 MG: 30 CAPSULE, DELAYED RELEASE ORAL at 08:26

## 2023-04-02 RX ADMIN — FAMOTIDINE 40 MG: 20 TABLET ORAL at 08:26

## 2023-04-02 RX ADMIN — APIXABAN 5 MG: 5 TABLET, FILM COATED ORAL at 08:26

## 2023-04-02 RX ADMIN — IPRATROPIUM BROMIDE AND ALBUTEROL SULFATE 3 ML: 2.5; .5 SOLUTION RESPIRATORY (INHALATION) at 14:24

## 2023-04-02 RX ADMIN — IPRATROPIUM BROMIDE AND ALBUTEROL SULFATE 3 ML: 2.5; .5 SOLUTION RESPIRATORY (INHALATION) at 07:03

## 2023-04-02 RX ADMIN — CEFEPIME 2 G: 2 INJECTION, POWDER, FOR SOLUTION INTRAVENOUS at 00:19

## 2023-04-02 RX ADMIN — TRIAMCINOLONE ACETONIDE 1 APPLICATION: 1 OINTMENT TOPICAL at 08:27

## 2023-04-02 RX ADMIN — CARVEDILOL 25 MG: 25 TABLET, FILM COATED ORAL at 08:26

## 2023-04-02 RX ADMIN — FUROSEMIDE 40 MG: 10 INJECTION, SOLUTION INTRAMUSCULAR; INTRAVENOUS at 08:26

## 2023-04-02 RX ADMIN — GLIPIZIDE 5 MG: 5 TABLET ORAL at 08:26

## 2023-04-02 RX ADMIN — FUROSEMIDE 40 MG: 10 INJECTION, SOLUTION INTRAMUSCULAR; INTRAVENOUS at 20:56

## 2023-04-02 RX ADMIN — NYSTATIN 500000 UNITS: 100000 SUSPENSION ORAL at 08:26

## 2023-04-02 RX ADMIN — IPRATROPIUM BROMIDE AND ALBUTEROL SULFATE 3 ML: 2.5; .5 SOLUTION RESPIRATORY (INHALATION) at 11:31

## 2023-04-02 RX ADMIN — CARVEDILOL 25 MG: 25 TABLET, FILM COATED ORAL at 20:56

## 2023-04-02 RX ADMIN — APIXABAN 5 MG: 5 TABLET, FILM COATED ORAL at 20:56

## 2023-04-02 RX ADMIN — CEFEPIME 2 G: 2 INJECTION, POWDER, FOR SOLUTION INTRAVENOUS at 08:26

## 2023-04-02 RX ADMIN — ACETAZOLAMIDE 500 MG: 250 TABLET ORAL at 08:26

## 2023-04-02 RX ADMIN — ACETAZOLAMIDE 500 MG: 250 TABLET ORAL at 20:56

## 2023-04-02 NOTE — THERAPY TREATMENT NOTE
Acute Care - Physical Therapy Progress Note   [facility]     Patient Name: Stephen Aguero  : 1945  MRN: [id number]  Today's Date: 2023      Visit Dx:     ICD-10-CM ICD-9-CM   1. Hypoglycemia  E16.2 251.2   2. Pneumonia of both lungs due to infectious organism, unspecified part of lung  J18.9 483.8   3. Hypercapnia  R06.89 786.09   4. Somnolence  R40.0 780.09   5. Sepsis, due to unspecified organism, unspecified whether acute organ dysfunction present  A41.9 038.9     995.91   6. Dysphagia, oropharyngeal  R13.12 787.22   7. Decreased activities of daily living (ADL)  Z78.9 V49.89   8. Difficulty walking  R26.2 719.7     Patient Active Problem List   Diagnosis   • Acute respiratory failure with hypercapnia   • Hypoglycemia   • Diabetes mellitus   • COPD (chronic obstructive pulmonary disease)   • Pneumonia   • Acute UTI (urinary tract infection)   • Obesity hypoventilation syndrome   • Tachycardia   • New onset a-fib   • Mycoplasma pneumonia     Past Medical History:   Diagnosis Date   • Diabetes mellitus    • Elevated cholesterol    • GERD (gastroesophageal reflux disease)    • Hypertension      Past Surgical History:   Procedure Laterality Date   • ABDOMINAL SURGERY     • APPENDECTOMY     • EYE SURGERY       PT Assessment (last 12 hours)     PT Evaluation and Treatment     Row Name 23 1600          Physical Therapy Time and Intention    Subjective Information complains of  needing to use the bathroom  -CS     Document Type therapy note (daily note)  -CS     Mode of Treatment individual therapy;physical therapy  -CS     Patient Effort good  -CS     Symptoms Noted During/After Treatment fatigue  -CS     Row Name 23 1600          Bed Mobility    Bed Mobility rolling left;rolling right;scooting/bridging  -CS     Rolling Left [place] (Bed Mobility) maximum assist (25% patient effort);1 person assist;verbal cues;nonverbal cues (demo/gesture)  -CS     Rolling Right [place] (Bed Mobility)  maximum assist (25% patient effort);1 person assist;verbal cues;nonverbal cues (demo/gesture)  -CS     Scooting/Bridging [place] (Bed Mobility) maximum assist (25% patient effort);1 person assist;verbal cues;nonverbal cues (demo/gesture)  -CS     Supine-Sit-Supine [place] (Bed Mobility) moderate assist (50% patient effort);1 person assist;verbal cues;nonverbal cues (demo/gesture)  -CS     Bed Mobility, Safety Issues decreased use of arms for pushing/pulling;decreased use of legs for bridging/pushing  -CS     Assistive Device (Bed Mobility) bed rails;draw sheet;head of bed elevated  -CS     Row Name 04/02/23 1600          Sit-Stand Transfer    Sit-Stand [place] (Transfers) verbal cues;minimum assist (75% patient effort);1 person assist  -CS     Assistive Device (Sit-Stand Transfers) walker, front-wheeled  -CS     Row Name 04/02/23 1600          Stand-Sit Transfer    Stand-Sit [place] (Transfers) verbal cues;minimum assist (75% patient effort);1 person assist  -CS     Assistive Device (Stand-Sit Transfers) walker, front-wheeled  -CS     Row Name             Wound 03/23/23 1443 Bilateral groin    Wound - Properties Group Placement Date: 03/23/23  -DD Placement Time: 1443  -DD Side: Bilateral  -DD Orientation: --  -DD Location: groin  -DD    Retired Wound - Properties Group Placement Date: 03/23/23  -DD Placement Time: 1443  -DD Side: Bilateral  -DD Orientation: --  -DD Location: groin  -DD    Retired Wound - Properties Group Date first assessed: 03/23/23  -DD Time first assessed: 1443  -DD Side: Bilateral  -DD Location: groin  -DD    Row Name             Wound 03/23/23 1443 Bilateral lower leg    Wound - Properties Group Placement Date: 03/23/23  -DD Placement Time: 1443  -DD Side: Bilateral  -DD Orientation: lower  -DD Location: leg  -DD    Retired Wound - Properties Group Placement Date: 03/23/23  -DD Placement Time: 1443  -DD Side: Bilateral  -DD Orientation: lower  -DD Location: leg  -DD     Retired Wound - Properties Group Date first assessed: 03/23/23  -DD Time first assessed: 1443  -DD Side: Bilateral  -DD Location: leg  -DD    Row Name             Wound 03/23/23 1444 Bilateral anterior foot    Wound - Properties Group Placement Date: 03/23/23  -DD Placement Time: 1444  -DD Side: Bilateral  -DD Orientation: anterior  -DD Location: foot  -DD    Retired Wound - Properties Group Placement Date: 03/23/23  -DD Placement Time: 1444  -DD Side: Bilateral  -DD Orientation: anterior  -DD Location: foot  -DD    Retired Wound - Properties Group Date first assessed: 03/23/23  -DD Time first assessed: 1444  -DD Side: Bilateral  -DD Location: foot  -DD    Row Name             Wound 03/23/23 1445 penis    Wound - Properties Group Placement Date: 03/23/23  -DD Placement Time: 1445  -DD Location: penis  -DD    Retired Wound - Properties Group Placement Date: 03/23/23  -DD Placement Time: 1445  -DD Location: penis  -DD    Retired Wound - Properties Group Date first assessed: 03/23/23  -DD Time first assessed: 1445  -DD Location: penis  -DD    Row Name             Wound Left proximal arm Blisters    Wound - Properties Group Side: Left  -MP Orientation: proximal  -MP Location: arm  -MP Primary Wound Type: Blisters  -MP Additional Comments: weeping  -MP    Retired Wound - Properties Group Side: Left  -MP Orientation: proximal  -MP Location: arm  -MP Primary Wound Type: Blisters  -MP Additional Comments: weeping  -MP    Retired Wound - Properties Group Side: Left  -MP Location: arm  -MP Primary Wound Type: Blisters  -MP Additional Comments: weeping  -MP    Row Name 04/02/23 1600          Positioning and Restraints    Pre-Treatment Position in bed  -CS     Post Treatment Position bed  -CS     In Bed side lying right;call light within reach;encouraged to call for assist;exit alarm on  -CS     Row Name 04/02/23 1600          Progress Summary (PT)    Progress Toward Functional Goals (PT) progress toward functional goals is  fair  -CS     Daily Progress Summary (PT) Pt. reported that he needed to use the bathroom upon entering room.  Pt. agreeable to attempt BSC and was assisted to the side of the bed.  2 sit to stands were performed for attempt to T/F to BSC; however, pt. was unable to weight shift and advance either lower extremity.  Static/dynamic standing performed x 2 reps for ~2 minutes each at side of bed.  Pt. returned to side of bed and reported that he would try to use the bedpan.  Upon returning to supine position, it was observed that pt. had already had a BM.  Nursing staff was called and PTA assisted nursing staff with rolling while being cleaned up.  Pt. has difficulty with reaching across body for handrail on ipsilateral side and needs quite a bit of assistance.  Pt. was then placed in R side lying position for pressure relief using wedges and pillows.  -CS           User Key  (r) = Recorded By, (t) = Taken By, (c) = Cosigned By    Initials Name Provider Type    MP Clara Álvarez, KYLE Registered Nurse    Elton Urrutia PTA Physical Therapist Assistant    Venessa Engel, RN Registered Nurse                Physical Therapy Education     Title: PT OT SLP Therapies (Done)     Topic: Physical Therapy (Done)     Point: Mobility training (Done)     Learning Progress Summary           Patient Acceptance, E, VU by KARINE at 3/27/2023 0928                               User Key     Initials Effective Dates Name Provider Type Gavin MILTON 01/11/23 -  Shaka Iglesias, PT Student PT Student PT              PT Recommendation and Plan     Progress Summary (PT)  Progress Toward Functional Goals (PT): progress toward functional goals is fair  Daily Progress Summary (PT): Pt. reported that he needed to use the bathroom upon entering room.  Pt. agreeable to attempt BSC and was assisted to the side of the bed.  2 sit to stands were performed for attempt to T/F to BSC; however, pt. was unable to weight shift and advance either lower  extremity.  Static/dynamic standing performed x 2 reps for ~2 minutes each at side of bed.  Pt. returned to side of bed and reported that he would try to use the bedpan.  Upon returning to supine position, it was observed that pt. had already had a BM.  Nursing staff was called and PTA assisted nursing staff with rolling while being cleaned up.  Pt. has difficulty with reaching across body for handrail on ipsilateral side and needs quite a bit of assistance.  Pt. was then placed in R side lying position for pressure relief using wedges and pillows.   Outcome Measures     Row Name 04/02/23 1600             How much help from another person do you currently need...    Turning from your back to your side while in flat bed without using bedrails? 2  -CS      Moving from lying on back to sitting on the side of a flat bed without bedrails? 2  -CS      Moving to and from a bed to a chair (including a wheelchair)? 1  -CS      Standing up from a chair using your arms (e.g., wheelchair, bedside chair)? 2  -CS      Climbing 3-5 steps with a railing? 1  -CS      To walk in hospital room? 1  -CS      AM-PAC 6 Clicks Score (PT) 9  -CS         Functional Assessment    Outcome Measure Options AM-PAC 6 Clicks Basic Mobility (PT)  -CS            User Key  (r) = Recorded By, (t) = Taken By, (c) = Cosigned By    Initials Name Provider Type    Elton Urrutia PTA Physical Therapist Assistant                 Time Calculation:    PT Charges     Row Name 04/02/23 1622             Time Calculation    Start Time 1518  -CS      PT Received On 04/02/23  -CS         Timed Charges    95905 - PT Therapeutic Exercise Minutes 4  -CS      07405 - PT Therapeutic Activity Minutes 50  -CS         Total Minutes    Timed Charges Total Minutes 54  -CS       Total Minutes 54  -CS            User Key  (r) = Recorded By, (t) = Taken By, (c) = Cosigned By    Initials Name Provider Type    Elton Urrutia PTA Physical Therapist Assistant               Therapy Charges for Today     Code Description Service Date Service Provider Modifiers Qty    74117540848 HC PT THERAPEUTIC ACT EA 15 MIN 4/2/2023 Elton Herrera, PTA GP 4          PT G-Codes  Outcome Measure Options: AM-PAC 6 Clicks Basic Mobility (PT)  AM-PAC 6 Clicks Score (PT): 9  AM-PAC 6 Clicks Score (OT): 9    Elton Herrera PTA  4/2/2023

## 2023-04-02 NOTE — PLAN OF CARE
Goal Outcome Evaluation:  Plan of Care Reviewed With: patient        Progress: no change    Patient has bipap on.  When patient comes off BIPAP he is on 4 lpm

## 2023-04-02 NOTE — PROGRESS NOTES
Pulmonary / Critical Care Progress Note      Patient Name: Stephen Aguero  : 1945  MRN: [id number]  Attending:  Marco Bishop MD  Date of admission: 3/22/2023    Subjective   Subjective   Follow-up for hypoxic respiratory failure, JULITA/OHS    Past 24-hour events,  Had some confusion yesterday, ABG showed elevated PCO2 levels @ 70  Placed back on to BiPAP and has been only taking breaks for meals  Diuresing with Diamox and Lasix  Is weak and fatigued, but awake and alert in the bed wearing BiPAP  Is short of breath with weak cough with thick secretions  No nausea, fevers or chills      Review of Systems  Constitutional symptoms: Fatigue and weakness, otherwise denied complaints   Ear, nose, throat: Denied complaints  Cardiovascular:  Denied complaints  Respiratory: Dyspnea and cough, otherwise some dyspnea  Hematologic: Denied complaints  Neurologic: Denied complaints  Skin: Denied complaints          Objective   Objective     Vitals:   Temp:  [97.5 °F (36.4 °C)-98.1 °F (36.7 °C)] 98.1 °F (36.7 °C)  Heart Rate:  [72-83] 78  Resp:  [17-24] 17  BP: (115-135)/(62-82) 135/82  Flow (L/min):  [4] 4    Physical Exam   Vital Signs Reviewed   General:  WDWN, alert, NAD.  Right eyelid closed.   HEENT:  PERRL, EOMI.  OP, nares clear  Chest:  good aeration, decreasing crackles and rhonchi bilaterally, tympanic to percussion left base, no work of breathing noted on BiPAP settings of 20/12 FiO2 32%  CV: RRR, no MGR, pulses 2+, equal.  Abd:  Soft, NT, ND, + BS, obese  EXT:  no clubbing, no cyanosis, decreasing bilateral lower extremity edema with chronic venous stasis changes  Neuro:  A&Ox3, CN grossly intact, no focal deficits.  Skin: No rashes or lesions noted      Result Review    Result Review:  I have personally reviewed the results from the time of this admission to 2023 07:33 EDT and agree with these findings:  [x]  Laboratory  [x]  Microbiology  [x]  Radiology  [x]  EKG/Telemetry   [x]  Cardiology/Vascular   []   Pathology  []  Old records  []  Other:  Most notable findings include: Serum bicarb this morning 34      Lab 04/02/23  0434 04/01/23  0438 03/31/23  0326 03/30/23  0337 03/30/23  0331 03/29/23  0739 03/28/23 2038 03/28/23 2036 03/27/23  0558 03/27/23  0558 03/27/23  0440   WBC 7.86 7.72 6.93 9.56  --  12.36*  --  10.32  --   --  9.15   HEMOGLOBIN 13.9 13.6 13.3 12.8*  --  14.0  --  14.3  --   --  14.6   HEMATOCRIT 43.2 42.0 40.5 39.6  --  42.4  --  44.4  --   --  45.2   PLATELETS 205 191 183 185  --  200  --  222  --   --  149   SODIUM 137 137 137 135*  --  135*  --  135*   < > 136  --    SODIUM, ARTERIAL  --   --   --   --  136.3  --  134.1*  --   --   --   --    POTASSIUM 4.3 3.9 4.0 4.6  --  4.8  --  5.1  --  5.1  --    CHLORIDE 98 100 96* 95*  --  94*  --  96*  --  97*  --    CO2 34.1* 38.3* 38.4* 31.1*  --  33.0*  --  34.1*  --  34.3*  --    BUN 27* 29* 33* 36*  --  36*  --  36*  --  27*  --    CREATININE 0.68* 0.66* 0.71* 0.83  --  0.85  --  0.90  --  0.86  --    GLUCOSE 151* 152* 165* 200*  --  176*  --  256*   < > 170*  --    GLUCOSE, ARTERIAL  --   --   --   --  211*  --  260*  --   --   --   --    CALCIUM 8.8 8.2* 8.4* 8.6  --  8.6  --  8.9  --  8.6  --    PHOSPHORUS 2.8 2.8 3.3 3.2  --  3.4  --   --   --   --   --    TOTAL PROTEIN  --  6.3  --   --   --   --   --   --   --  7.0  --    ALBUMIN 2.6* 2.4* 2.4* 2.4*  --  2.7*  --   --   --  2.8*  --    GLOBULIN  --  3.9  --   --   --   --   --   --   --  4.2  --     < > = values in this interval not displayed.         Assessment & Plan   Assessment / Plan     Active Hospital Problems:  Active Hospital Problems    Diagnosis    • **Acute respiratory failure with hypercapnia    • Mycoplasma pneumonia    • New onset a-fib    • Tachycardia    • Hypoglycemia    • Obesity hypoventilation syndrome    • Diabetes mellitus    • COPD (chronic obstructive pulmonary disease)    • Pneumonia    • Acute UTI (urinary tract infection)      Impression:  Acute hypoxic and  hypercapnic respiratory failure requiring NIPPV  COPD exacerbation  Like a plasma pneumonia  Nosocomial pneumonia for unspecified organism  Bilateral pleural effusions  Bibasilar atelectasis  Volume overload  Acute decompensated diastolic congestive heart failure  JULITA/OHS  Urinary tract infection secondary to Morganella  Bibasilar atelectasis  Altered mental status  Toxic/metabolic encephalopathy  Type 2 diabetes with hyperglycemia  Morbid obesity, BMI 43.2  Hyponatremia, clinically insignificant  Hypokalemia     Plan:  Continue to use BiPAP as much as possible  Allow breaks onto nasal cannula and titrate to maintain oxygen saturations greater than 90%  Use BiPAP 20/12 with naps and at night  Bilateral thoracentesis consistent with volume overload  Continue bronchopulmonary hygiene, aggressive airway clearance, incentive spirometry  Has bronchodilator protocol  On day 4/5 of cefepime.  Cultures so far all negative  Continue Eliquis for A-fib  Continue IV diuresis Lasix 40 twice daily, spot dose Diamox 500 mg x 2 today  Trend renal function and electrolytes.  Speech therapy following, tolerating p.o. diet  Continue wound care for lower extremity chronic venous stasis wounds  Continue PT/OT as tolerated; will need rehab     DVT prophylaxis: Eliquis  Medical DVT prophylaxis orders are present.    CODE STATUS:   Code Status (Patient has no pulse and is not breathing): CPR (Attempt to Resuscitate)  Medical Interventions (Patient has pulse or is breathing): Full Support      Labs, imaging, microbiology, notes and medications personally reviewed  Discussed with primary    I, Dr. Mateus Ewing, have spent more than 50% of the total time managing the patient in this encounter today.  This included personally reviewing all pertinent labs, imaging, microbiology and documentation. Also discussing the case with the patient and any available family, the admitting physician and any available ancillary staff.    Electronically  signed by Mateus Ewing MD, 04/02/23, 11:42 AM EDT.

## 2023-04-02 NOTE — PLAN OF CARE
Goal Outcome Evaluation:               Pt on the Bipap this morning but did not want to put it back on after lunch. No acute events this shift.

## 2023-04-02 NOTE — PLAN OF CARE
Goal Outcome Evaluation:      Patient has been on bipap since start of shift at 1800, tolerates well; good tidal volumes; decreased oxygen on bipap to 32% sats 96%

## 2023-04-03 LAB
ALBUMIN SERPL-MCNC: 2.5 G/DL (ref 3.5–5.2)
ALBUMIN SERPL-MCNC: 2.5 G/DL (ref 3.5–5.2)
ANION GAP SERPL CALCULATED.3IONS-SCNC: 4.1 MMOL/L (ref 5–15)
ANION GAP SERPL CALCULATED.3IONS-SCNC: 4.1 MMOL/L (ref 5–15)
BACTERIA SPEC ANAEROBE CULT: NORMAL
BACTERIA SPEC ANAEROBE CULT: NORMAL
BUN SERPL-MCNC: 27 MG/DL (ref 8–23)
BUN SERPL-MCNC: 27 MG/DL (ref 8–23)
BUN/CREAT SERPL: 32.5 (ref 7–25)
BUN/CREAT SERPL: 32.5 (ref 7–25)
CALCIUM SPEC-SCNC: 8.9 MG/DL (ref 8.6–10.5)
CALCIUM SPEC-SCNC: 8.9 MG/DL (ref 8.6–10.5)
CHLORIDE SERPL-SCNC: 97 MMOL/L (ref 98–107)
CHLORIDE SERPL-SCNC: 97 MMOL/L (ref 98–107)
CO2 SERPL-SCNC: 34.9 MMOL/L (ref 22–29)
CO2 SERPL-SCNC: 34.9 MMOL/L (ref 22–29)
CREAT SERPL-MCNC: 0.83 MG/DL (ref 0.76–1.27)
CREAT SERPL-MCNC: 0.83 MG/DL (ref 0.76–1.27)
CYTO UR: NORMAL
CYTO UR: NORMAL
DEPRECATED RDW RBC AUTO: 58.7 FL (ref 37–54)
DEPRECATED RDW RBC AUTO: 58.7 FL (ref 37–54)
EGFRCR SERPLBLD CKD-EPI 2021: 89.6 ML/MIN/1.73
EGFRCR SERPLBLD CKD-EPI 2021: 89.6 ML/MIN/1.73
ERYTHROCYTE [DISTWIDTH] IN BLOOD BY AUTOMATED COUNT: 15.7 % (ref 12.3–15.4)
ERYTHROCYTE [DISTWIDTH] IN BLOOD BY AUTOMATED COUNT: 15.7 % (ref 12.3–15.4)
GLUCOSE BLDC GLUCOMTR-MCNC: 123 MG/DL (ref 70–99)
GLUCOSE BLDC GLUCOMTR-MCNC: 123 MG/DL (ref 70–99)
GLUCOSE BLDC GLUCOMTR-MCNC: 136 MG/DL (ref 70–99)
GLUCOSE BLDC GLUCOMTR-MCNC: 136 MG/DL (ref 70–99)
GLUCOSE BLDC GLUCOMTR-MCNC: 149 MG/DL (ref 70–99)
GLUCOSE BLDC GLUCOMTR-MCNC: 149 MG/DL (ref 70–99)
GLUCOSE BLDC GLUCOMTR-MCNC: 174 MG/DL (ref 70–99)
GLUCOSE BLDC GLUCOMTR-MCNC: 174 MG/DL (ref 70–99)
GLUCOSE SERPL-MCNC: 209 MG/DL (ref 65–99)
GLUCOSE SERPL-MCNC: 209 MG/DL (ref 65–99)
HCT VFR BLD AUTO: 44.4 % (ref 37.5–51)
HCT VFR BLD AUTO: 44.4 % (ref 37.5–51)
HGB BLD-MCNC: 14.6 G/DL (ref 13–17.7)
HGB BLD-MCNC: 14.6 G/DL (ref 13–17.7)
LAB AP CASE REPORT: NORMAL
LAB AP CASE REPORT: NORMAL
LAB AP CLINICAL INFORMATION: NORMAL
LAB AP CLINICAL INFORMATION: NORMAL
LAB AP FLOW CYTOMETRY SUMMARY: NORMAL
LAB AP FLOW CYTOMETRY SUMMARY: NORMAL
MAGNESIUM SERPL-MCNC: 2.1 MG/DL (ref 1.6–2.4)
MAGNESIUM SERPL-MCNC: 2.1 MG/DL (ref 1.6–2.4)
MCH RBC QN AUTO: 33.1 PG (ref 26.6–33)
MCH RBC QN AUTO: 33.1 PG (ref 26.6–33)
MCHC RBC AUTO-ENTMCNC: 32.9 G/DL (ref 31.5–35.7)
MCHC RBC AUTO-ENTMCNC: 32.9 G/DL (ref 31.5–35.7)
MCV RBC AUTO: 100.7 FL (ref 79–97)
MCV RBC AUTO: 100.7 FL (ref 79–97)
PATH REPORT.FINAL DX SPEC: NORMAL
PATH REPORT.FINAL DX SPEC: NORMAL
PATH REPORT.GROSS SPEC: NORMAL
PATH REPORT.GROSS SPEC: NORMAL
PHOSPHATE SERPL-MCNC: 3.2 MG/DL (ref 2.5–4.5)
PHOSPHATE SERPL-MCNC: 3.2 MG/DL (ref 2.5–4.5)
PLATELET # BLD AUTO: 176 10*3/MM3 (ref 140–450)
PLATELET # BLD AUTO: 176 10*3/MM3 (ref 140–450)
PMV BLD AUTO: 10.9 FL (ref 6–12)
PMV BLD AUTO: 10.9 FL (ref 6–12)
POTASSIUM SERPL-SCNC: 4.1 MMOL/L (ref 3.5–5.2)
POTASSIUM SERPL-SCNC: 4.1 MMOL/L (ref 3.5–5.2)
RBC # BLD AUTO: 4.41 10*6/MM3 (ref 4.14–5.8)
RBC # BLD AUTO: 4.41 10*6/MM3 (ref 4.14–5.8)
SODIUM SERPL-SCNC: 136 MMOL/L (ref 136–145)
SODIUM SERPL-SCNC: 136 MMOL/L (ref 136–145)
STAT OF ADQ CVX/VAG CYTO-IMP: NORMAL
STAT OF ADQ CVX/VAG CYTO-IMP: NORMAL
WBC NRBC COR # BLD: 8.67 10*3/MM3 (ref 3.4–10.8)
WBC NRBC COR # BLD: 8.67 10*3/MM3 (ref 3.4–10.8)

## 2023-04-03 PROCEDURE — 85027 COMPLETE CBC AUTOMATED: CPT | Performed by: NURSE PRACTITIONER

## 2023-04-03 PROCEDURE — 92526 ORAL FUNCTION THERAPY: CPT

## 2023-04-03 PROCEDURE — 25010000002 CEFEPIME PER 500 MG: Performed by: INTERNAL MEDICINE

## 2023-04-03 PROCEDURE — 97530 THERAPEUTIC ACTIVITIES: CPT

## 2023-04-03 PROCEDURE — 80069 RENAL FUNCTION PANEL: CPT | Performed by: NURSE PRACTITIONER

## 2023-04-03 PROCEDURE — 63710000001 INSULIN REGULAR HUMAN PER 5 UNITS: Performed by: INTERNAL MEDICINE

## 2023-04-03 PROCEDURE — 94799 UNLISTED PULMONARY SVC/PX: CPT

## 2023-04-03 PROCEDURE — 94660 CPAP INITIATION&MGMT: CPT

## 2023-04-03 PROCEDURE — 94668 MNPJ CHEST WALL SBSQ: CPT

## 2023-04-03 PROCEDURE — 99233 SBSQ HOSP IP/OBS HIGH 50: CPT | Performed by: INTERNAL MEDICINE

## 2023-04-03 PROCEDURE — 83735 ASSAY OF MAGNESIUM: CPT | Performed by: NURSE PRACTITIONER

## 2023-04-03 PROCEDURE — 25010000002 FUROSEMIDE PER 20 MG: Performed by: NURSE PRACTITIONER

## 2023-04-03 PROCEDURE — 82962 GLUCOSE BLOOD TEST: CPT

## 2023-04-03 PROCEDURE — 94761 N-INVAS EAR/PLS OXIMETRY MLT: CPT

## 2023-04-03 PROCEDURE — 94664 DEMO&/EVAL PT USE INHALER: CPT

## 2023-04-03 PROCEDURE — 94762 N-INVAS EAR/PLS OXIMTRY CONT: CPT

## 2023-04-03 RX ADMIN — SENNOSIDES AND DOCUSATE SODIUM 1 TABLET: 8.6; 5 TABLET ORAL at 20:39

## 2023-04-03 RX ADMIN — IPRATROPIUM BROMIDE AND ALBUTEROL SULFATE 3 ML: 2.5; .5 SOLUTION RESPIRATORY (INHALATION) at 12:26

## 2023-04-03 RX ADMIN — IPRATROPIUM BROMIDE AND ALBUTEROL SULFATE 3 ML: 2.5; .5 SOLUTION RESPIRATORY (INHALATION) at 20:02

## 2023-04-03 RX ADMIN — CEFEPIME 2 G: 2 INJECTION, POWDER, FOR SOLUTION INTRAVENOUS at 08:51

## 2023-04-03 RX ADMIN — FUROSEMIDE 40 MG: 10 INJECTION, SOLUTION INTRAMUSCULAR; INTRAVENOUS at 20:39

## 2023-04-03 RX ADMIN — CARVEDILOL 25 MG: 25 TABLET, FILM COATED ORAL at 20:39

## 2023-04-03 RX ADMIN — GLIPIZIDE 5 MG: 5 TABLET ORAL at 08:46

## 2023-04-03 RX ADMIN — FUROSEMIDE 40 MG: 10 INJECTION, SOLUTION INTRAMUSCULAR; INTRAVENOUS at 08:47

## 2023-04-03 RX ADMIN — FAMOTIDINE 40 MG: 20 TABLET ORAL at 08:47

## 2023-04-03 RX ADMIN — INSULIN HUMAN 2 UNITS: 100 INJECTION, SOLUTION PARENTERAL at 17:17

## 2023-04-03 RX ADMIN — IPRATROPIUM BROMIDE AND ALBUTEROL SULFATE 3 ML: 2.5; .5 SOLUTION RESPIRATORY (INHALATION) at 00:49

## 2023-04-03 RX ADMIN — CEFEPIME 2 G: 2 INJECTION, POWDER, FOR SOLUTION INTRAVENOUS at 00:26

## 2023-04-03 RX ADMIN — APIXABAN 5 MG: 5 TABLET, FILM COATED ORAL at 08:46

## 2023-04-03 RX ADMIN — SENNOSIDES AND DOCUSATE SODIUM 1 TABLET: 8.6; 5 TABLET ORAL at 08:52

## 2023-04-03 RX ADMIN — DULOXETINE HYDROCHLORIDE 60 MG: 30 CAPSULE, DELAYED RELEASE ORAL at 08:47

## 2023-04-03 RX ADMIN — IPRATROPIUM BROMIDE AND ALBUTEROL SULFATE 3 ML: 2.5; .5 SOLUTION RESPIRATORY (INHALATION) at 06:50

## 2023-04-03 RX ADMIN — TRIAMCINOLONE ACETONIDE 1 APPLICATION: 1 OINTMENT TOPICAL at 20:40

## 2023-04-03 RX ADMIN — CEFEPIME 2 G: 2 INJECTION, POWDER, FOR SOLUTION INTRAVENOUS at 16:54

## 2023-04-03 RX ADMIN — CARVEDILOL 25 MG: 25 TABLET, FILM COATED ORAL at 08:52

## 2023-04-03 RX ADMIN — APIXABAN 5 MG: 5 TABLET, FILM COATED ORAL at 20:39

## 2023-04-03 RX ADMIN — TRIAMCINOLONE ACETONIDE 1 APPLICATION: 1 OINTMENT TOPICAL at 08:52

## 2023-04-03 NOTE — PROGRESS NOTES
[facility]     Progress Note    Patient Name: Stephen Aguero  : 1945  MRN: [id number]  Primary Care Physician:  Papi Vasquez MD  Date of admission: 3/22/2023      Subjective   Brief summary.  Patient admitted with respiratory failure and UTI along with decreased level of consciousness.  Patient initially admitted to ICU subsequently transferred out of ICU and again transferred back to ICU  for increased lethargy and CO2 retention      HPI:    Patient in PCU, wife at bedside.  No chest pain or shortness of breath reported, lethargic and weak.  Patient very weak and unable to communicate much not able to move much     Review of Systems     Extremely weak and lethargic  Edematous and swollen  No significant shortness of breath  Denies any pain        Objective     Vitals:   Temp:  [97.3 °F (36.3 °C)-98.2 °F (36.8 °C)] 97.8 °F (36.6 °C)  Heart Rate:  [75-91] 75  Resp:  [18-22] 18  BP: (104-135)/(55-76) 114/62  Flow (L/min):  [2] 2    Physical Exam :     Elderly morbidly obese male, tired looking  Neck supple and obese  Heart irregular,  Lungs diminished breath sounds few crackles  Abdomen obese and soft, morbidly obese difficult to palpate  Extremities with 3+ edema with chronic venous stasis changes      Result Review:  I have personally reviewed the results from the time of this admission to 4/3/2023 17:29 EDT and agree with these findings:  [x]  Laboratory  [x]  Microbiology  [x]  Radiology  []  EKG/Telemetry   []  Cardiology/Vascular   []  Pathology  []  Old records  []  Other:       Blood sugar stable, ABGs reviewed      Assessment / Plan       Active Hospital Problems:  Active Hospital Problems    Diagnosis    • **Acute respiratory failure with hypercapnia    • Mycoplasma pneumonia    • New onset a-fib    • Tachycardia    • Hypoglycemia    • Obesity hypoventilation syndrome    • Diabetes mellitus    • COPD (chronic obstructive pulmonary disease)    • Pneumonia    • Acute UTI (urinary tract  infection)        Plan:     Extremely weak  Respiratory status stable  Patient will need BiPAP at discharge.  Continue antibiotics and diuresis.  Recommend aggressive therapy  Monitor labs          DVT prophylaxis:  Medical DVT prophylaxis orders are present.    CODE STATUS:   Code Status (Patient has no pulse and is not breathing): CPR (Attempt to Resuscitate)  Medical Interventions (Patient has pulse or is breathing): Full Support          Electronically signed by Marco Bishop MD, 04/03/23, 5:33 PM EDT.                              .    .      .

## 2023-04-03 NOTE — THERAPY TREATMENT NOTE
Acute Care - Physical Therapy Treatment Note   [facility]     Patient Name: Stephen Aguero  : 1945  MRN: [id number]  Today's Date: 4/3/2023      Visit Dx:     ICD-10-CM ICD-9-CM   1. Hypoglycemia  E16.2 251.2   2. Pneumonia of both lungs due to infectious organism, unspecified part of lung  J18.9 483.8   3. Hypercapnia  R06.89 786.09   4. Somnolence  R40.0 780.09   5. Sepsis, due to unspecified organism, unspecified whether acute organ dysfunction present  A41.9 038.9     995.91   6. Dysphagia, oropharyngeal  R13.12 787.22   7. Decreased activities of daily living (ADL)  Z78.9 V49.89   8. Difficulty walking  R26.2 719.7     Patient Active Problem List   Diagnosis   • Acute respiratory failure with hypercapnia   • Hypoglycemia   • Diabetes mellitus   • COPD (chronic obstructive pulmonary disease)   • Pneumonia   • Acute UTI (urinary tract infection)   • Obesity hypoventilation syndrome   • Tachycardia   • New onset a-fib   • Mycoplasma pneumonia     Past Medical History:   Diagnosis Date   • Diabetes mellitus    • Elevated cholesterol    • GERD (gastroesophageal reflux disease)    • Hypertension      Past Surgical History:   Procedure Laterality Date   • ABDOMINAL SURGERY     • APPENDECTOMY     • EYE SURGERY       PT Assessment (last 12 hours)     PT Evaluation and Treatment     Row Name 23 1400          Physical Therapy Time and Intention    Subjective Information no complaints  -CS     Document Type therapy note (daily note)  -CS     Patient Effort fair  -CS     Symptoms Noted During/After Treatment fatigue  -CS     Comment Patient having difficulty staying awake during session.  -CS     Row Name 23 1400          Cognition    Affect/Mental Status (Cognition) low arousal/lethargic;confused  -CS     Row Name 23 1400          Bed Mobility    Bed Mobility rolling left;rolling right;scooting/bridging  -CS     Rolling Left [place] (Bed Mobility) verbal cues;nonverbal cues  (demo/gesture);maximum assist (25% patient effort)  -CS     Rolling Right [place] (Bed Mobility) verbal cues;nonverbal cues (demo/gesture);maximum assist (25% patient effort)  -CS     Scooting/Bridging [place] (Bed Mobility) verbal cues;nonverbal cues (demo/gesture);maximum assist (25% patient effort);dependent (less than 25% patient effort)  -CS     Bed Mobility, Safety Issues decreased use of arms for pushing/pulling;decreased use of legs for bridging/pushing;cognitive deficits limit understanding  -CS     Assistive Device (Bed Mobility) bed rails;draw sheet  -CS     Comment, (Bed Mobility) Patient having difficulty staying awake for treatment session and requiring mod/max cues so no further transfers completed.  -CS     Row Name             Wound 03/23/23 1443 Bilateral groin    Wound - Properties Group Placement Date: 03/23/23  -DD Placement Time: 1443  -DD Side: Bilateral  -DD Orientation: --  -DD Location: groin  -DD    Retired Wound - Properties Group Placement Date: 03/23/23  -DD Placement Time: 1443  -DD Side: Bilateral  -DD Orientation: --  -DD Location: groin  -DD    Retired Wound - Properties Group Date first assessed: 03/23/23  -DD Time first assessed: 1443  -DD Side: Bilateral  -DD Location: groin  -DD    Row Name             Wound 03/23/23 1443 Bilateral lower leg    Wound - Properties Group Placement Date: 03/23/23  -DD Placement Time: 1443  -DD Side: Bilateral  -DD Orientation: lower  -DD Location: leg  -DD    Retired Wound - Properties Group Placement Date: 03/23/23  -DD Placement Time: 1443  -DD Side: Bilateral  -DD Orientation: lower  -DD Location: leg  -DD    Retired Wound - Properties Group Date first assessed: 03/23/23  -DD Time first assessed: 1443  -DD Side: Bilateral  -DD Location: leg  -DD    Row Name             Wound 03/23/23 1444 Bilateral anterior foot    Wound - Properties Group Placement Date: 03/23/23  -DD Placement Time: 1444  -DD Side: Bilateral  -DD Orientation:  anterior  -DD Location: foot  -DD    Retired Wound - Properties Group Placement Date: 03/23/23  -DD Placement Time: 1444  -DD Side: Bilateral  -DD Orientation: anterior  -DD Location: foot  -DD    Retired Wound - Properties Group Date first assessed: 03/23/23  -DD Time first assessed: 1444  -DD Side: Bilateral  -DD Location: foot  -DD    Row Name             Wound 03/23/23 1445 penis    Wound - Properties Group Placement Date: 03/23/23 -DD Placement Time: 1445  -DD Location: penis  -DD    Retired Wound - Properties Group Placement Date: 03/23/23 -DD Placement Time: 1445  -DD Location: penis  -DD    Retired Wound - Properties Group Date first assessed: 03/23/23  -DD Time first assessed: 1445 -DD Location: penis  -DD    Row Name             Wound Left proximal arm Blisters    Wound - Properties Group Side: Left  -MP Orientation: proximal  -MP Location: arm  -MP Primary Wound Type: Blisters  -MP Additional Comments: weeping  -MP    Retired Wound - Properties Group Side: Left  -MP Orientation: proximal  -MP Location: arm  -MP Primary Wound Type: Blisters  -MP Additional Comments: weeping  -MP    Retired Wound - Properties Group Side: Left  -MP Location: arm  -MP Primary Wound Type: Blisters  -MP Additional Comments: weeping  -MP    Row Name 04/03/23 1400          Plan of Care Review    Plan of Care Reviewed With patient  -CS     Progress no change  -CS     Outcome Evaluation Continue plan of care  -CS     Row Name 04/03/23 1400          Progress Summary (PT)    Progress Toward Functional Goals (PT) progress toward functional goals is gradual  -CS           User Key  (r) = Recorded By, (t) = Taken By, (c) = Cosigned By    Initials Name Provider Type    Clara Ventura, RN Registered Nurse    CS Lima Ramirez, PT Physical Therapist    DD Venessa Glasgow, RN Registered Nurse                Physical Therapy Education     Title: PT OT SLP Therapies (In Progress)     Topic: Physical Therapy (In Progress)     Point:  Mobility training (In Progress)     Learning Progress Summary           Patient Acceptance, E, NR by LOUISE at 4/3/2023 0436    Acceptance, E, VU by KARINE at 3/27/2023 0976                               User Key     Initials Effective Dates Name Provider Type Discipline    LOUISE 01/16/23 -  Harika Vela, RN Registered Nurse Nurse    KARINE 01/11/23 -  Shaka Iglesias, PATTIE Student PT Student PT              PT Recommendation and Plan     Progress Summary (PT)  Progress Toward Functional Goals (PT): progress toward functional goals is gradual  Plan of Care Reviewed With: patient  Progress: no change  Outcome Evaluation: Continue plan of care   Outcome Measures     Row Name 04/02/23 1600             How much help from another person do you currently need...    Turning from your back to your side while in flat bed without using bedrails? 2  -CS      Moving from lying on back to sitting on the side of a flat bed without bedrails? 2  -CS      Moving to and from a bed to a chair (including a wheelchair)? 1  -CS      Standing up from a chair using your arms (e.g., wheelchair, bedside chair)? 2  -CS      Climbing 3-5 steps with a railing? 1  -CS      To walk in hospital room? 1  -CS      AM-PAC 6 Clicks Score (PT) 9  -CS         Functional Assessment    Outcome Measure Options AM-PAC 6 Clicks Basic Mobility (PT)  -CS            User Key  (r) = Recorded By, (t) = Taken By, (c) = Cosigned By    Initials Name Provider Type    CS Elton Herrera, PTA Physical Therapist Assistant                 Time Calculation:    PT Charges     Row Name 04/03/23 1427             Time Calculation    PT Received On 04/03/23  -CS         Timed Charges    10842 - PT Therapeutic Activity Minutes 10  -CS         Total Minutes    Timed Charges Total Minutes 10  -CS       Total Minutes 10  -CS            User Key  (r) = Recorded By, (t) = Taken By, (c) = Cosigned By    Initials Name Provider Type    CS Lima Ramirez, PT Physical Therapist              Therapy  Charges for Today     Code Description Service Date Service Provider Modifiers Qty    50982352036 HC PT THERAPEUTIC ACT EA 15 MIN 4/3/2023 Liam Ramirez, PT GP 1          PT G-Codes  Outcome Measure Options: AM-PAC 6 Clicks Basic Mobility (PT)  AM-PAC 6 Clicks Score (PT): 8  AM-PAC 6 Clicks Score (OT): 9    Lima Ramirez, PATTIE  4/3/2023

## 2023-04-03 NOTE — CONSULTS
"Nutrition Services    Patient Name: Stephen Aguero  YOB: 1945  MRN: [id number]  Admission date: 3/22/2023      CLINICAL NUTRITION ASSESSMENT      Reason for Assessment  Follow-up protocol     H&P:    Past Medical History:   Diagnosis Date   • Diabetes mellitus    • Elevated cholesterol    • GERD (gastroesophageal reflux disease)    • Hypertension         Current Problems:   Active Hospital Problems    Diagnosis    • **Acute respiratory failure with hypercapnia    • Mycoplasma pneumonia    • New onset a-fib    • Tachycardia    • Hypoglycemia    • Obesity hypoventilation syndrome    • Diabetes mellitus    • COPD (chronic obstructive pulmonary disease)    • Pneumonia    • Acute UTI (urinary tract infection)         Nutrition/Diet History         Narrative     Continues to have variable intake throughout admission, seems to be improving.  Will continue current nutrition interventions.       Anthropometrics        Current Height, Weight Height: 180.3 cm (71\")  Weight: (!) 141 kg (310 lb 3 oz)   Current BMI Body mass index is 43.26 kg/m².       Weight Hx  Wt Readings from Last 30 Encounters:   03/22/23 2112 (!) 141 kg (310 lb 3 oz)   03/22/23 1611 (!) 140 kg (308 lb 13.8 oz)            Wt Change Observation Unable to determine.       Estimated/Assessed Needs       Energy Requirements 30 kcal/kg IBW   EST Needs (kcal/day) 2259 kcal        Protein Requirements 1.0 g/kg IBW   EST Daily Needs (g/day) 75       Fluid Requirements 25 ml/kg IBW    Estimated Needs (mL/day) 1883 ml      Labs/Medications         Pertinent Labs Reviewed.   Results from last 7 days   Lab Units 04/02/23  0434 04/01/23  0438 03/31/23  0326   SODIUM mmol/L 137 137 137   POTASSIUM mmol/L 4.3 3.9 4.0   CHLORIDE mmol/L 98 100 96*   CO2 mmol/L 34.1* 38.3* 38.4*   BUN mg/dL 27* 29* 33*   CREATININE mg/dL 0.68* 0.66* 0.71*   CALCIUM mg/dL 8.8 8.2* 8.4*   BILIRUBIN mg/dL  --  0.7  --    ALK PHOS U/L  --  106  --    ALT (SGPT) U/L  --  18  --  "   AST (SGOT) U/L  --  23  --    GLUCOSE mg/dL 151* 152* 165*     Results from last 7 days   Lab Units 04/03/23  0539 04/02/23  0434 04/01/23  0438   MAGNESIUM mg/dL 2.1 2.2 2.1   PHOSPHORUS mg/dL  --  2.8 2.8   HEMOGLOBIN g/dL 14.6 13.9 13.6   HEMATOCRIT % 44.4 43.2 42.0     COVID19   Date Value Ref Range Status   03/22/2023 Not Detected Not Detected - Ref. Range Final     No results found for: HGBA1C      Pertinent Medications Reviewed.     Current Nutrition Orders & Evaluation of Intake       Oral Nutrition     Current PO Diet Diet: Cardiac Diets; Healthy Heart (2-3 Na+); Texture: Mechanical Ground (NDD 2); Fluid Consistency: Thin (IDDSI 0)   Supplement Orders Placed This Encounter      Dietary Nutrition Supplements Boost Glucose Control (Glucerna Shake)       Malnutrition Severity Assessment                Nutrition Diagnosis         Nutrition Dx Problem 1 Inadequate energy Intake related to decreased ability to consume sufficient energy as evidenced by report of minimal PO intake.       Nutrition Intervention         Boost Glucose Control TID  +570 kcal, 48 g pro per day      Medical Nutrition Therapy/Nutrition Education          Learner     Readiness Patient  Education not appropriate at this time     Method     Response N/A  N/A     Monitor/Evaluation        Monitor Per protocol, PO intake, Supplement intake, Weight, POC/GOC       Nutrition Discharge Plan         To be determined       Electronically signed by:  Justina Dawson RD  04/03/23 10:11 EDT

## 2023-04-03 NOTE — PLAN OF CARE
Goal Outcome Evaluation:  Plan of Care Reviewed With: patient        Progress: no change  Outcome Evaluation: Patient wearing BiPAP 20/12 while asleep. No issues.

## 2023-04-03 NOTE — THERAPY TREATMENT NOTE
Acute Care - Speech Language Pathology   Swallow Treatment Note [facility]     Patient Name: Stephen Aguero  : 1945  MRN: [id number]  Today's Date: 4/3/2023               Admit Date: 3/22/2023    Visit Dx:     ICD-10-CM ICD-9-CM   1. Hypoglycemia  E16.2 251.2   2. Pneumonia of both lungs due to infectious organism, unspecified part of lung  J18.9 483.8   3. Hypercapnia  R06.89 786.09   4. Somnolence  R40.0 780.09   5. Sepsis, due to unspecified organism, unspecified whether acute organ dysfunction present  A41.9 038.9     995.91   6. Dysphagia, oropharyngeal  R13.12 787.22   7. Decreased activities of daily living (ADL)  Z78.9 V49.89   8. Difficulty walking  R26.2 719.7     Patient Active Problem List   Diagnosis   • Acute respiratory failure with hypercapnia   • Hypoglycemia   • Diabetes mellitus   • COPD (chronic obstructive pulmonary disease)   • Pneumonia   • Acute UTI (urinary tract infection)   • Obesity hypoventilation syndrome   • Tachycardia   • New onset a-fib   • Mycoplasma pneumonia     Past Medical History:   Diagnosis Date   • Diabetes mellitus    • Elevated cholesterol    • GERD (gastroesophageal reflux disease)    • Hypertension      Past Surgical History:   Procedure Laterality Date   • ABDOMINAL SURGERY     • APPENDECTOMY     • EYE SURGERY       SPEECH PATHOLOGY DYSPHAGIA TREATMENT    Subjective/Behavioral Observations: Patient seen during morning meal.  Awake and alert      Current Diet: Mechanical soft diet-thin liquids    Current Strategies: Slow rate, small bites/drinks    Treatment received: Patient with mechanical soft diet and single sips of thin liquids.  Wife is at bedside.  Assisted patient with morning meal.  Reduced intake of ground meats.  Patient did eat 2 bananas clinical sign or symptom of aspiration.  Tolerating thin liquids without clinical sign or symptom of aspiration.  Education with family regarding proper positioning for p.o. intake     Results of treatment: As  stated    Progress toward goals: Progress noted    Barriers to Achieving goals: Medical status    Plan of care:/changes in plan: Continue with current diet  90 degrees upright for all intake, use pillows behind back to support upright positioning.    No further dysphagia therapy is indicated at this time.  Available for reconsult should patient's medical status warrant.    EDUCATION  The patient has been educated in the following areas:   Dysphagia (Swallowing Impairment).            Shyanne Moss, SLP  4/3/2023.

## 2023-04-03 NOTE — PROGRESS NOTES
Pulmonary / Critical Care Progress Note      Patient Name: Stephen Aguero  : 1945  MRN: [id number]  Attending:  Marco Bishop MD  Date of admission: 3/22/2023    Subjective   Subjective   Follow-up for hypoxic respiratory failure, JULITA/OHS    Past 24-hour events,  Mentation back to baseline now that he is utilizing BiPAP  Able to transition off BiPAP and utilizing supplemental oxygen via nasal cannula  Able to wean from 4 L to 2 L nasal cannula, saturations 94%  Wife at bedside assisting with ADLs  Patient has CPAP without home oxygen currently use at home  Discussed with patient and wife that his CPAP will most likely need to be upgraded to a BiPAP machine for carbon dioxide removal  Diuresing with Diamox and Lasix  Is weak and fatigued  Is short of breath with weak cough with thick secretions  No nausea, fevers or chills      Review of Systems  Constitutional symptoms: Fatigue and weakness, otherwise denied complaints   Ear, nose, throat: Denied complaints  Cardiovascular:  Denied complaints  Respiratory: Dyspnea and cough, otherwise some dyspnea  Hematologic: Denied complaints  Neurologic: Denied complaints  Skin: Denied complaints          Objective   Objective     Vitals:   Temp:  [97.2 °F (36.2 °C)-98.2 °F (36.8 °C)] 98.2 °F (36.8 °C)  Heart Rate:  [75-89] 83  Resp:  [11-22] 20  BP: (104-135)/(51-76) 118/76  Flow (L/min):  [2-4] 2    Physical Exam   Vital Signs Reviewed   General:  WDWN, alert, NAD.  Right eyelid closed.   HEENT:  PERRL, EOMI.  OP, nares clear  Chest:  good aeration, decreasing crackles and rhonchi bilaterally, tympanic to percussion left base, no work of breathing noted on 2 L nasal cannula  CV: RRR, no MGR, pulses 2+, equal.  Abd:  Soft, NT, ND, + BS, obese  EXT:  no clubbing, no cyanosis, decreasing bilateral lower extremity edema with chronic venous stasis changes  Neuro:  A&Ox3, CN grossly intact, no focal deficits.  Skin: No rashes or lesions noted      Result Review    Result  Review:  I have personally reviewed the results from the time of this admission to 4/3/2023 07:13 EDT and agree with these findings:  [x]  Laboratory  [x]  Microbiology  [x]  Radiology  [x]  EKG/Telemetry   [x]  Cardiology/Vascular   []  Pathology  []  Old records  []  Other:  Most notable findings include:      Lab 04/03/23  0539 04/02/23  0434 04/01/23  0438 03/31/23  0326 03/30/23  0337 03/30/23  0331 03/29/23  0739 03/28/23 2038 03/28/23 2036   WBC 8.67 7.86 7.72 6.93 9.56  --  12.36*  --  10.32   HEMOGLOBIN 14.6 13.9 13.6 13.3 12.8*  --  14.0  --  14.3   HEMATOCRIT 44.4 43.2 42.0 40.5 39.6  --  42.4  --  44.4   PLATELETS 176 205 191 183 185  --  200  --  222   SODIUM  --  137 137 137 135*  --  135*  --  135*   SODIUM, ARTERIAL  --   --   --   --   --  136.3  --  134.1*  --    POTASSIUM  --  4.3 3.9 4.0 4.6  --  4.8  --  5.1   CHLORIDE  --  98 100 96* 95*  --  94*  --  96*   CO2  --  34.1* 38.3* 38.4* 31.1*  --  33.0*  --  34.1*   BUN  --  27* 29* 33* 36*  --  36*  --  36*   CREATININE  --  0.68* 0.66* 0.71* 0.83  --  0.85  --  0.90   GLUCOSE  --  151* 152* 165* 200*  --  176*  --  256*   GLUCOSE, ARTERIAL  --   --   --   --   --  211*  --  260*  --    CALCIUM  --  8.8 8.2* 8.4* 8.6  --  8.6  --  8.9   PHOSPHORUS  --  2.8 2.8 3.3 3.2  --  3.4  --   --    TOTAL PROTEIN  --   --  6.3  --   --   --   --   --   --    ALBUMIN  --  2.6* 2.4* 2.4* 2.4*  --  2.7*  --   --    GLOBULIN  --   --  3.9  --   --   --   --   --   --          Assessment & Plan   Assessment / Plan     Active Hospital Problems:  Active Hospital Problems    Diagnosis    • **Acute respiratory failure with hypercapnia    • Mycoplasma pneumonia    • New onset a-fib    • Tachycardia    • Hypoglycemia    • Obesity hypoventilation syndrome    • Diabetes mellitus    • COPD (chronic obstructive pulmonary disease)    • Pneumonia    • Acute UTI (urinary tract infection)      Impression:  Acute hypoxic and hypercapnic respiratory failure requiring  NIPPV  COPD exacerbation  Like a plasma pneumonia  Nosocomial pneumonia for unspecified organism  Bilateral pleural effusions  Bibasilar atelectasis  Volume overload  Acute decompensated diastolic congestive heart failure  JULITA/OHS  Urinary tract infection secondary to Morganella  Bibasilar atelectasis  Altered mental status  Toxic/metabolic encephalopathy  Type 2 diabetes with hyperglycemia  Morbid obesity, BMI 43.2  Hyponatremia, clinically insignificant  Hypokalemia     Plan:  Continue to use BiPAP during sleep at night and during naps  Allow breaks onto nasal cannula and titrate to maintain oxygen saturations greater than 90%  Use BiPAP settings 20/12 with 32% FiO2  Patient had CPAP at home.  However, with obstructive sleep apnea, obesity hypoventilation syndrome and severe hypercapnic respiratory failure with PCO2 of 70, he would benefit from bilevel mechanical ventilator.  BiPAP would help him with improved lung compliance and overall decreased hospitalization and decreased risk of respiratory failure exacerbation.  He understands and is agreeable to switch if able to.  RT  is consulted.  Not being able to use NIPPV will predispose him to recurrent worsening respiratory failure, and endanger his life.  BiPAP will allow him to have different pressures with inspiration and expiration will help with hypercapnic respiratory failure with obesity hypoventilation syndrome.  We will check overnight oximetry on oxygen at 2 L.  Previous bilateral thoracentesis consistent with volume overload  Continue bronchopulmonary hygiene, aggressive airway clearance, incentive spirometry  Has bronchodilator protocol  On day 5/5 of cefepime.  Cultures so far all negative  Continue Eliquis for A-fib  Continue IV diuresis Lasix 40 twice daily  Trend renal function and electrolytes.  Speech therapy following, tolerating p.o. diet  Continue wound care for lower extremity chronic venous stasis wounds  Continue PT/OT as  tolerated; will need rehab     DVT prophylaxis: Eliquis  Medical DVT prophylaxis orders are present.    CODE STATUS:   Code Status (Patient has no pulse and is not breathing): CPR (Attempt to Resuscitate)  Medical Interventions (Patient has pulse or is breathing): Full Support      Labs, imaging, microbiology, notes and medications personally reviewed  Discussed with primary service and bedside nurse.    This visit was performed by BOTH a physician and an APC. I personally evaluated and examined the patient. I performed all aspects of MDM as documented. , I have reviewed and confirmed the accuracy of the patient's history as documented in this note. and I have reexamined the patient and the results are consistent with the previously documented exam. I have updated the documentation as necessary.     Electronically signed by Destin Bailon MD, 04/03/23, 3:57 PM EDT.       Electronically signed by Destin Bailon MD, 04/03/23, 7:13 AM EDT.

## 2023-04-04 LAB
ALBUMIN SERPL-MCNC: 2.6 G/DL (ref 3.5–5.2)
ALBUMIN SERPL-MCNC: 2.6 G/DL (ref 3.5–5.2)
ANION GAP SERPL CALCULATED.3IONS-SCNC: 0.4 MMOL/L (ref 5–15)
ANION GAP SERPL CALCULATED.3IONS-SCNC: 0.4 MMOL/L (ref 5–15)
BACTERIA SPEC ANAEROBE CULT: NORMAL
BACTERIA SPEC ANAEROBE CULT: NORMAL
BUN SERPL-MCNC: 27 MG/DL (ref 8–23)
BUN SERPL-MCNC: 27 MG/DL (ref 8–23)
BUN/CREAT SERPL: 42.9 (ref 7–25)
BUN/CREAT SERPL: 42.9 (ref 7–25)
CALCIUM SPEC-SCNC: 9 MG/DL (ref 8.6–10.5)
CALCIUM SPEC-SCNC: 9 MG/DL (ref 8.6–10.5)
CHLORIDE SERPL-SCNC: 99 MMOL/L (ref 98–107)
CHLORIDE SERPL-SCNC: 99 MMOL/L (ref 98–107)
CO2 SERPL-SCNC: 38.6 MMOL/L (ref 22–29)
CO2 SERPL-SCNC: 38.6 MMOL/L (ref 22–29)
CREAT SERPL-MCNC: 0.63 MG/DL (ref 0.76–1.27)
CREAT SERPL-MCNC: 0.63 MG/DL (ref 0.76–1.27)
DEPRECATED RDW RBC AUTO: 56.6 FL (ref 37–54)
DEPRECATED RDW RBC AUTO: 56.6 FL (ref 37–54)
EGFRCR SERPLBLD CKD-EPI 2021: 97.4 ML/MIN/1.73
EGFRCR SERPLBLD CKD-EPI 2021: 97.4 ML/MIN/1.73
ERYTHROCYTE [DISTWIDTH] IN BLOOD BY AUTOMATED COUNT: 15.7 % (ref 12.3–15.4)
ERYTHROCYTE [DISTWIDTH] IN BLOOD BY AUTOMATED COUNT: 15.7 % (ref 12.3–15.4)
GLUCOSE BLDC GLUCOMTR-MCNC: 131 MG/DL (ref 70–99)
GLUCOSE BLDC GLUCOMTR-MCNC: 131 MG/DL (ref 70–99)
GLUCOSE BLDC GLUCOMTR-MCNC: 136 MG/DL (ref 70–99)
GLUCOSE BLDC GLUCOMTR-MCNC: 136 MG/DL (ref 70–99)
GLUCOSE BLDC GLUCOMTR-MCNC: 138 MG/DL (ref 70–99)
GLUCOSE BLDC GLUCOMTR-MCNC: 138 MG/DL (ref 70–99)
GLUCOSE BLDC GLUCOMTR-MCNC: 150 MG/DL (ref 70–99)
GLUCOSE BLDC GLUCOMTR-MCNC: 154 MG/DL (ref 70–99)
GLUCOSE BLDC GLUCOMTR-MCNC: 154 MG/DL (ref 70–99)
GLUCOSE SERPL-MCNC: 151 MG/DL (ref 65–99)
GLUCOSE SERPL-MCNC: 151 MG/DL (ref 65–99)
HCT VFR BLD AUTO: 41.6 % (ref 37.5–51)
HCT VFR BLD AUTO: 41.6 % (ref 37.5–51)
HGB BLD-MCNC: 13.8 G/DL (ref 13–17.7)
HGB BLD-MCNC: 13.8 G/DL (ref 13–17.7)
MAGNESIUM SERPL-MCNC: 2.2 MG/DL (ref 1.6–2.4)
MAGNESIUM SERPL-MCNC: 2.2 MG/DL (ref 1.6–2.4)
MCH RBC QN AUTO: 32.4 PG (ref 26.6–33)
MCH RBC QN AUTO: 32.4 PG (ref 26.6–33)
MCHC RBC AUTO-ENTMCNC: 33.2 G/DL (ref 31.5–35.7)
MCHC RBC AUTO-ENTMCNC: 33.2 G/DL (ref 31.5–35.7)
MCV RBC AUTO: 97.7 FL (ref 79–97)
MCV RBC AUTO: 97.7 FL (ref 79–97)
PHOSPHATE SERPL-MCNC: 3.2 MG/DL (ref 2.5–4.5)
PHOSPHATE SERPL-MCNC: 3.2 MG/DL (ref 2.5–4.5)
PLATELET # BLD AUTO: 196 10*3/MM3 (ref 140–450)
PLATELET # BLD AUTO: 196 10*3/MM3 (ref 140–450)
PMV BLD AUTO: 10.1 FL (ref 6–12)
PMV BLD AUTO: 10.1 FL (ref 6–12)
POTASSIUM SERPL-SCNC: 3.8 MMOL/L (ref 3.5–5.2)
POTASSIUM SERPL-SCNC: 3.8 MMOL/L (ref 3.5–5.2)
QT INTERVAL: 451 MS
QT INTERVAL: 451 MS
RBC # BLD AUTO: 4.26 10*6/MM3 (ref 4.14–5.8)
RBC # BLD AUTO: 4.26 10*6/MM3 (ref 4.14–5.8)
SODIUM SERPL-SCNC: 138 MMOL/L (ref 136–145)
SODIUM SERPL-SCNC: 138 MMOL/L (ref 136–145)
WBC NRBC COR # BLD: 7.45 10*3/MM3 (ref 3.4–10.8)
WBC NRBC COR # BLD: 7.45 10*3/MM3 (ref 3.4–10.8)

## 2023-04-04 PROCEDURE — 94660 CPAP INITIATION&MGMT: CPT

## 2023-04-04 PROCEDURE — 99233 SBSQ HOSP IP/OBS HIGH 50: CPT | Performed by: INTERNAL MEDICINE

## 2023-04-04 PROCEDURE — 94668 MNPJ CHEST WALL SBSQ: CPT

## 2023-04-04 PROCEDURE — 94799 UNLISTED PULMONARY SVC/PX: CPT

## 2023-04-04 PROCEDURE — 83735 ASSAY OF MAGNESIUM: CPT | Performed by: NURSE PRACTITIONER

## 2023-04-04 PROCEDURE — 85027 COMPLETE CBC AUTOMATED: CPT | Performed by: NURSE PRACTITIONER

## 2023-04-04 PROCEDURE — 25010000002 FUROSEMIDE PER 20 MG: Performed by: NURSE PRACTITIONER

## 2023-04-04 PROCEDURE — 80069 RENAL FUNCTION PANEL: CPT | Performed by: NURSE PRACTITIONER

## 2023-04-04 PROCEDURE — 97110 THERAPEUTIC EXERCISES: CPT

## 2023-04-04 PROCEDURE — 82962 GLUCOSE BLOOD TEST: CPT

## 2023-04-04 PROCEDURE — 63710000001 INSULIN REGULAR HUMAN PER 5 UNITS: Performed by: INTERNAL MEDICINE

## 2023-04-04 PROCEDURE — 94664 DEMO&/EVAL PT USE INHALER: CPT

## 2023-04-04 PROCEDURE — 25010000002 CEFEPIME PER 500 MG: Performed by: INTERNAL MEDICINE

## 2023-04-04 PROCEDURE — 94761 N-INVAS EAR/PLS OXIMETRY MLT: CPT

## 2023-04-04 RX ADMIN — DULOXETINE HYDROCHLORIDE 60 MG: 30 CAPSULE, DELAYED RELEASE ORAL at 08:33

## 2023-04-04 RX ADMIN — CARVEDILOL 25 MG: 25 TABLET, FILM COATED ORAL at 20:23

## 2023-04-04 RX ADMIN — CEFEPIME 2 G: 2 INJECTION, POWDER, FOR SOLUTION INTRAVENOUS at 00:20

## 2023-04-04 RX ADMIN — FUROSEMIDE 40 MG: 10 INJECTION, SOLUTION INTRAMUSCULAR; INTRAVENOUS at 20:23

## 2023-04-04 RX ADMIN — SENNOSIDES AND DOCUSATE SODIUM 1 TABLET: 8.6; 5 TABLET ORAL at 20:23

## 2023-04-04 RX ADMIN — Medication 10 ML: at 20:23

## 2023-04-04 RX ADMIN — IPRATROPIUM BROMIDE AND ALBUTEROL SULFATE 3 ML: 2.5; .5 SOLUTION RESPIRATORY (INHALATION) at 12:30

## 2023-04-04 RX ADMIN — TRIAMCINOLONE ACETONIDE 1 APPLICATION: 1 OINTMENT TOPICAL at 08:33

## 2023-04-04 RX ADMIN — INSULIN HUMAN 2 UNITS: 100 INJECTION, SOLUTION PARENTERAL at 05:22

## 2023-04-04 RX ADMIN — SENNOSIDES AND DOCUSATE SODIUM 1 TABLET: 8.6; 5 TABLET ORAL at 08:33

## 2023-04-04 RX ADMIN — FAMOTIDINE 40 MG: 20 TABLET ORAL at 08:33

## 2023-04-04 RX ADMIN — GLIPIZIDE 5 MG: 5 TABLET ORAL at 08:33

## 2023-04-04 RX ADMIN — APIXABAN 5 MG: 5 TABLET, FILM COATED ORAL at 08:33

## 2023-04-04 RX ADMIN — APIXABAN 5 MG: 5 TABLET, FILM COATED ORAL at 20:23

## 2023-04-04 RX ADMIN — IPRATROPIUM BROMIDE AND ALBUTEROL SULFATE 3 ML: 2.5; .5 SOLUTION RESPIRATORY (INHALATION) at 08:33

## 2023-04-04 RX ADMIN — IPRATROPIUM BROMIDE AND ALBUTEROL SULFATE 3 ML: 2.5; .5 SOLUTION RESPIRATORY (INHALATION) at 19:46

## 2023-04-04 RX ADMIN — FUROSEMIDE 40 MG: 10 INJECTION, SOLUTION INTRAMUSCULAR; INTRAVENOUS at 08:32

## 2023-04-04 RX ADMIN — INSULIN HUMAN 2 UNITS: 100 INJECTION, SOLUTION PARENTERAL at 17:26

## 2023-04-04 RX ADMIN — TRIAMCINOLONE ACETONIDE 1 APPLICATION: 1 OINTMENT TOPICAL at 20:24

## 2023-04-04 RX ADMIN — CARVEDILOL 25 MG: 25 TABLET, FILM COATED ORAL at 08:33

## 2023-04-04 RX ADMIN — CEFEPIME 2 G: 2 INJECTION, POWDER, FOR SOLUTION INTRAVENOUS at 08:32

## 2023-04-04 NOTE — PROGRESS NOTES
Pulmonary / Critical Care Progress Note      Patient Name: Stephen Aguero  : 1945  MRN: [id number]  Attending:  Marco Bishop MD  Date of admission: 3/22/2023    Subjective   Subjective   Follow-up for hypoxic respiratory failure, JULITA/OHS.    3/29 right thoracentesis with drainage of 500 mL.  Cultures and cytology negative.  3/30 left thoracentesis with drainage of 900 mL.  Cultures and cytology negative.    No acute events overnight.  Underwent overnight oximetry which revealed only 2 desaturations.    This morning,  Lying in bed on NIPPV  NIPPV 20/ FiO2 32% with O2 sats 95%  He is awake trying to answer questions  Continues to diurese well  No nausea, fevers or chills  No chest pain  Weak and fatigued    Review of Systems  Constitutional symptoms: Fatigue and weakness, otherwise denied complaints   Ear, nose, throat: Denied complaints  Cardiovascular:  Denied complaints  Respiratory: Dyspnea and cough, otherwise some dyspnea  Hematologic: Denied complaints  Neurologic: Denied complaints  Skin: Denied complaints    Objective   Objective     Vitals:   Temp:  [97.5 °F (36.4 °C)-98.1 °F (36.7 °C)] 97.9 °F (36.6 °C)  Heart Rate:  [75-96] 95  Resp:  [18-20] 20  BP: (110-146)/(55-92) 127/73  Flow (L/min):  [2] 2    Physical Exam   Vital Signs Reviewed    General:  WDWN, alert, NAD on NIPPV   HEENT:  PERRL, EOMI.  OP, nares clear  Chest:  good aeration, decreasing crackles and rhonchi bilaterally, tympanic to percussion left base, no work of breathing noted   CV: RRR, no MGR, pulses 2+, equal  Abd:  Soft, NT, ND, + BS, obese  EXT:  no clubbing, no cyanosis, decreasing bilateral lower extremity edema with chronic venous stasis changes  Neuro:  A&Ox3, CN grossly intact, no focal deficits  Skin: No rashes or lesions noted    Result Review    Result Review:  I have personally reviewed the results from the time of this admission to 2023 07:22 EDT and agree with these findings:  [x]  Laboratory  [x]   Microbiology  [x]  Radiology  [x]  EKG/Telemetry   [x]  Cardiology/Vascular   []  Pathology  []  Old records  []  Other:  Most notable findings include:    3/29 pleural fluid cultures negative.  Cytology negative for malignant cells    3/22 urine culture Sourav Sahni        Lab 04/04/23  0415 04/03/23  1034 04/03/23  0539 04/02/23  0434 04/01/23  0438 03/31/23  0326 03/30/23  0337 03/30/23  0331 03/29/23  0739   WBC 7.45  --  8.67 7.86 7.72 6.93 9.56  --  12.36*   HEMOGLOBIN 13.8  --  14.6 13.9 13.6 13.3 12.8*  --  14.0   HEMATOCRIT 41.6  --  44.4 43.2 42.0 40.5 39.6  --  42.4   PLATELETS 196  --  176 205 191 183 185  --  200   SODIUM 138 136  --  137 137 137 135*  --  135*   SODIUM, ARTERIAL  --   --   --   --   --   --   --  136.3  --    POTASSIUM 3.8 4.1  --  4.3 3.9 4.0 4.6  --  4.8   CHLORIDE 99 97*  --  98 100 96* 95*  --  94*   CO2 38.6* 34.9*  --  34.1* 38.3* 38.4* 31.1*  --  33.0*   BUN 27* 27*  --  27* 29* 33* 36*  --  36*   CREATININE 0.63* 0.83  --  0.68* 0.66* 0.71* 0.83  --  0.85   GLUCOSE 151* 209*  --  151* 152* 165* 200*  --  176*   GLUCOSE, ARTERIAL  --   --   --   --   --   --   --  211*  --    CALCIUM 9.0 8.9  --  8.8 8.2* 8.4* 8.6  --  8.6   PHOSPHORUS 3.2 3.2  --  2.8 2.8 3.3 3.2  --  3.4   TOTAL PROTEIN  --   --   --   --  6.3  --   --   --   --    ALBUMIN 2.6* 2.5*  --  2.6* 2.4* 2.4* 2.4*  --  2.7*   GLOBULIN  --   --   --   --  3.9  --   --   --   --      Assessment & Plan   Assessment / Plan     Active Hospital Problems:  Active Hospital Problems    Diagnosis    • **Acute respiratory failure with hypercapnia    • Mycoplasma pneumonia    • New onset a-fib    • Tachycardia    • Hypoglycemia    • Obesity hypoventilation syndrome    • Diabetes mellitus    • COPD (chronic obstructive pulmonary disease)    • Pneumonia    • Acute UTI (urinary tract infection)      Impression:  Acute hypoxic and hypercapnic respiratory failure requiring NIPPV  COPD exacerbation  Nosocomial pneumonia for  unspecified organism  Bilateral pleural effusions  Bibasilar atelectasis  Volume overload  Acute decompensated diastolic congestive heart failure  JULITA/OHS: compliant with CPAP  Urinary tract infection secondary to Morganella  Altered mental status  Toxic/metabolic encephalopathy  Type 2 diabetes with hyperglycemia  Morbid obesity, BMI 43.2  Hyponatremia, clinically insignificant  Hypokalemia     Plan:  -Continue to wean O2 to keep sat greater than 90%.  -Continue NIPPV 20/12 at night and as needed days.  -Overnight oximetry reviewed revealing 2 desaturations.  -He has chronic respiratory failure secondary to COPD. His respiratory failure is compounded by his underlying JULITA/OHS; therefore he will require IVAPS AE mode, which provides targeted tidal/minute volume, back up rate and battery back up. Bilevel has been considered and ruled out due to the lack of AVAPS mode and backup battery on home bilevel unit. Failure to provide non invasive ventilation for Mr Aguero's chronic respiratory failure increases patient's risk of acute on chronic respiratory failure, rehospitalization, and mortality.  -Appreciate RT  assistance in arranging this for home.  -Cultures negative to date.  Has completed course of cefepime.  -Continue Lasix 40 mg IV twice daily.  Trend renal panel and electrolytes.  -Previous bilateral thoracentesis consistent with volume overload.  -Continue nebulizers and bronchopulmonary hygiene.  -Encourage I-S and flutter valve.  -Continue Eliquis for A-fib.  -Continue wound care for lower extremity chronic venous stasis wounds.  -Encourage mobilization.  Out of bed to chair.  -PT/OT on board.  -We will need rehab placement.     DVT prophylaxis: Eliquis  Medical DVT prophylaxis orders are present.    CODE STATUS:   Code Status (Patient has no pulse and is not breathing): CPR (Attempt to Resuscitate)  Medical Interventions (Patient has pulse or is breathing): Full Support    Labs, imaging,  microbiology, notes and medications personally reviewed  Discussed with primary service and bedside nurse.    Electronically signed by CLIFTON Carmona, 04/04/23, 3:51 PM EDT.    This visit was performed by BOTH a physician and an APC. I personally evaluated and examined the patient. I performed all aspects of MDM as documented. , I have reviewed and confirmed the accuracy of the patient's history as documented in this note. and I have reexamined the patient and the results are consistent with the previously documented exam. I have updated the documentation as necessary.     Electronically signed by Destin Bailon MD, 04/04/23, 4:40 PM EDT.   Electronically signed by Destin Bailon MD, 04/04/23, 7:22 AM EDT.

## 2023-04-04 NOTE — PROGRESS NOTES
[facility]     Progress Note    Patient Name: Stephen Aguero  : 1945  MRN: [id number]  Primary Care Physician:  Papi Vasquez MD  Date of admission: 3/22/2023      Subjective   Brief summary.  Patient admitted with respiratory failure and UTI along with decreased level of consciousness.        HPI:    Patient in PCU, seen early in the morning.  Sleepy today.  Waiting for breakfast.  No shortness of breath     Review of Systems     Extremely weak and lethargic  Edematous and swollen  No fever chills reported        Objective     Vitals:   Temp:  [97.5 °F (36.4 °C)-98.1 °F (36.7 °C)] 98.1 °F (36.7 °C)  Heart Rate:  [75-96] 93  Resp:  [18-20] 20  BP: (110-146)/(55-92) 117/58  Flow (L/min):  [2] 2    Physical Exam :     Elderly morbidly obese male, tired looking  Neck supple and obese  Heart irregular,  Lungs diminished breath sounds few crackles  Abdomen obese and soft, morbidly obese difficult to palpate  Extremities with 3+ edema with chronic venous stasis changes      Result Review:  I have personally reviewed the results from the time of this admission to 2023 08:53 EDT and agree with these findings:  [x]  Laboratory  [x]  Microbiology  [x]  Radiology  []  EKG/Telemetry   []  Cardiology/Vascular   []  Pathology  []  Old records  []  Other:             Assessment / Plan       Active Hospital Problems:  Active Hospital Problems    Diagnosis    • **Acute respiratory failure with hypercapnia    • Mycoplasma pneumonia    • New onset a-fib    • Tachycardia    • Hypoglycemia    • Obesity hypoventilation syndrome    • Diabetes mellitus    • COPD (chronic obstructive pulmonary disease)    • Pneumonia    • Acute UTI (urinary tract infection)        Plan:     Very slow improvement.  Discussed with intensivist.  Not much to offer at this point.  Plan for discharging patient on BiPAP.  Family prefers patient to go to rehab.   notified.  Continue PT OT efforts          DVT  prophylaxis:  Medical DVT prophylaxis orders are present.    CODE STATUS:   Code Status (Patient has no pulse and is not breathing): CPR (Attempt to Resuscitate)  Medical Interventions (Patient has pulse or is breathing): Full Support          Electronically signed by Marco Bishop MD, 04/04/23, 8:33 AM EDT.                              .    .      .

## 2023-04-04 NOTE — CASE MANAGEMENT/SOCIAL WORK
Mr Aguero completed overnight pulse oximetry study on 2L.      Onl 2 desaturation events occurred, lasting 51 seconds total. This will not meet the critieria for bilevel positive airway presssure device (aka BIPAP).  Mr Aguero can qualify for noninvasive ventilator with Astral for chronic respiratory failure secondary to COPD compounded by JULITA/OHS.     Mr Aguero has chronic respiratory failure secondary to COPD. Additionally, Mr Aguero's respiratory failure is compounded by his underlying JULITA/OHS; therefore he will require IVAPS AE mode, which provides targeted tidal/minute volume, back up rate and battery back up. Bilevel has been considered and ruled out due to the lack of AVAPS mode and backup battery on home bilevel unit. Failure to provide non invasive ventilation for Mr Aguero's chronic respiratory failure increases patient's risk of acute on chronic respiratory failure, rehospitalization, and mortality.

## 2023-04-04 NOTE — PLAN OF CARE
Goal Outcome Evaluation:            Pt did well with overnight sleep study and was not on the bipap. I placed bipap on this am and pt is tolerating well.

## 2023-04-04 NOTE — THERAPY TREATMENT NOTE
Acute Care - Physical Therapy Treatment Note   [facility]     Patient Name: Stephen Agureo  : 1945  MRN: [id number]  Today's Date: 2023      Visit Dx:     ICD-10-CM ICD-9-CM   1. Hypoglycemia  E16.2 251.2   2. Pneumonia of both lungs due to infectious organism, unspecified part of lung  J18.9 483.8   3. Hypercapnia  R06.89 786.09   4. Somnolence  R40.0 780.09   5. Sepsis, due to unspecified organism, unspecified whether acute organ dysfunction present  A41.9 038.9     995.91   6. Dysphagia, oropharyngeal  R13.12 787.22   7. Decreased activities of daily living (ADL)  Z78.9 V49.89   8. Difficulty walking  R26.2 719.7     Patient Active Problem List   Diagnosis   • Acute respiratory failure with hypercapnia   • Hypoglycemia   • Diabetes mellitus   • COPD (chronic obstructive pulmonary disease)   • Pneumonia   • Acute UTI (urinary tract infection)   • Obesity hypoventilation syndrome   • Tachycardia   • New onset a-fib   • Mycoplasma pneumonia     Past Medical History:   Diagnosis Date   • Diabetes mellitus    • Elevated cholesterol    • GERD (gastroesophageal reflux disease)    • Hypertension      Past Surgical History:   Procedure Laterality Date   • ABDOMINAL SURGERY     • APPENDECTOMY     • EYE SURGERY       PT Assessment (last 12 hours)     PT Evaluation and Treatment     Row Name 23 1300          Physical Therapy Time and Intention    Subjective Information no complaints (P)   -SW     Document Type therapy note (daily note) (P)   -SW     Mode of Treatment individual therapy;physical therapy (P)   -SW     Patient Effort fair (P)   -SW     Symptoms Noted During/After Treatment none (P)   -     Row Name 23 1300          Motor Skills    Therapeutic Exercise -- (P)   Supine exercises x10 reps each, AAROM knee flexion and extension, dorsiflexion and plantarflexion, and AROM hip flexion. x10 reps 3 sec hold ankle inversion and eversion.  -     Row Name             Wound 23 1443 Bilateral  groin    Wound - Properties Group Placement Date: 03/23/23  -DD Placement Time: 1443  -DD Side: Bilateral  -DD Orientation: --  -DD Location: groin  -DD    Retired Wound - Properties Group Placement Date: 03/23/23  -DD Placement Time: 1443  -DD Side: Bilateral  -DD Orientation: --  -DD Location: groin  -DD    Retired Wound - Properties Group Date first assessed: 03/23/23  -DD Time first assessed: 1443  -DD Side: Bilateral  -DD Location: groin  -DD    Row Name             Wound 03/23/23 1443 Bilateral lower leg    Wound - Properties Group Placement Date: 03/23/23  -DD Placement Time: 1443  -DD Side: Bilateral  -DD Orientation: lower  -DD Location: leg  -DD    Retired Wound - Properties Group Placement Date: 03/23/23  -DD Placement Time: 1443  -DD Side: Bilateral  -DD Orientation: lower  -DD Location: leg  -DD    Retired Wound - Properties Group Date first assessed: 03/23/23  -DD Time first assessed: 1443  -DD Side: Bilateral  -DD Location: leg  -DD    Row Name             Wound 03/23/23 1444 Bilateral anterior foot    Wound - Properties Group Placement Date: 03/23/23  -DD Placement Time: 1444  -DD Side: Bilateral  -DD Orientation: anterior  -DD Location: foot  -DD    Retired Wound - Properties Group Placement Date: 03/23/23  -DD Placement Time: 1444  -DD Side: Bilateral  -DD Orientation: anterior  -DD Location: foot  -DD    Retired Wound - Properties Group Date first assessed: 03/23/23  -DD Time first assessed: 1444  -DD Side: Bilateral  -DD Location: foot  -DD    Row Name             Wound 03/23/23 1445 penis    Wound - Properties Group Placement Date: 03/23/23  -DD Placement Time: 1445  -DD Location: penis  -DD    Retired Wound - Properties Group Placement Date: 03/23/23  -DD Placement Time: 1445  -DD Location: penis  -DD    Retired Wound - Properties Group Date first assessed: 03/23/23  -DD Time first assessed: 1445  -DD Location: penis  -DD    Row Name             Wound Left proximal arm Blisters    Wound -  Properties Group Side: Left  -MP Orientation: proximal  -MP Location: arm  -MP Primary Wound Type: Blisters  -MP Additional Comments: weeping  -MP    Retired Wound - Properties Group Side: Left  -MP Orientation: proximal  -MP Location: arm  -MP Primary Wound Type: Blisters  -MP Additional Comments: weeping  -MP    Retired Wound - Properties Group Side: Left  -MP Location: arm  -MP Primary Wound Type: Blisters  -MP Additional Comments: weeping  -MP    Row Name 04/04/23 1300          Positioning and Restraints    Pre-Treatment Position in bed (P)   -SW     Post Treatment Position bed (P)   -SW     In Bed supine;call light within reach (P)   -SW     Row Name 04/04/23 1300          Progress Summary (PT)    Progress Toward Functional Goals (PT) progress toward functional goals is gradual (P)   -SW     Daily Progress Summary (PT) Pt was agreeable to therapy and performed supine exercises. Performed AROM, AAROM, or PROM excersies based on pt ability. Pt was left in L side lying position for pressure relief with call light in reach. (P)   -SW           User Key  (r) = Recorded By, (t) = Taken By, (c) = Cosigned By    Initials Name Provider Type    MP Clara Álvarez, KYLE Registered Nurse    Venessa Engel RN Registered Nurse    Beverley John, PT Student PT Student                Physical Therapy Education     Title: PT OT SLP Therapies (In Progress)     Topic: Physical Therapy (In Progress)     Point: Mobility training (In Progress)     Learning Progress Summary           Patient Acceptance, E, NR by LOUISE at 4/3/2023 0436    Acceptance, E, VU by KARINE at 3/27/2023 0928                               User Key     Initials Effective Dates Name Provider Type Discipline    LOUISE 01/16/23 -  Harika Vela, RN Registered Nurse Nurse    KARINE 01/11/23 -  Shaka Iglesias, PT Student PT Student PT              PT Recommendation and Plan     Progress Summary (PT)  Progress Toward Functional Goals (PT): (P) progress toward functional  goals is gradual  Daily Progress Summary (PT): (P) Pt was agreeable to therapy and performed supine exercises. Performed AROM, AAROM, or PROM excersies based on pt ability. Pt was left in L side lying position for pressure relief with call light in reach.   Outcome Measures     Row Name 04/04/23 1400 04/02/23 1600          How much help from another person do you currently need...    Turning from your back to your side while in flat bed without using bedrails? 2 (P)   -SW 2  -CS     Moving from lying on back to sitting on the side of a flat bed without bedrails? 2 (P)   -SW 2  -CS     Moving to and from a bed to a chair (including a wheelchair)? 1 (P)   -SW 1  -CS     Standing up from a chair using your arms (e.g., wheelchair, bedside chair)? 1 (P)   -SW 2  -CS     Climbing 3-5 steps with a railing? 1 (P)   -SW 1  -CS     To walk in hospital room? 1 (P)   -SW 1  -CS     AM-PAC 6 Clicks Score (PT) 8 (P)   -SW 9  -CS        Functional Assessment    Outcome Measure Options -- AM-PAC 6 Clicks Basic Mobility (PT)  -CS           User Key  (r) = Recorded By, (t) = Taken By, (c) = Cosigned By    Initials Name Provider Type    Elton Urrutia, PTA Physical Therapist Assistant    Beverley John, PT Student PT Student                 Time Calculation:    PT Charges     Row Name 04/04/23 1347             Time Calculation    PT Received On 04/04/23 (P)   -SW         Timed Charges    69703 - PT Therapeutic Exercise Minutes 10 (P)   -SW         Total Minutes    Timed Charges Total Minutes 10 (P)   -SW       Total Minutes 10 (P)   -SW            User Key  (r) = Recorded By, (t) = Taken By, (c) = Cosigned By    Initials Name Provider Type    Beverley John PT Student PT Student                  PT G-Codes  Outcome Measure Options: AM-PAC 6 Clicks Basic Mobility (PT)  AM-PAC 6 Clicks Score (PT): (P) 8  AM-PAC 6 Clicks Score (OT): 9    Beverley Hu PT Student  4/4/2023

## 2023-04-04 NOTE — PLAN OF CARE
Goal Outcome Evaluation:   Pt has been resting comfortably throughout shift. No complaints of pain. VSS. Will continue to monitor. Call light is within reach.     Osman Lopez RN

## 2023-04-04 NOTE — PLAN OF CARE
Goal Outcome Evaluation:   Patient was unable to wear Bipap tonight because he was on an overnight pulse ox study.

## 2023-04-04 NOTE — PLAN OF CARE
Goal Outcome Evaluation:  Plan of Care Reviewed With: daughter   No acute events this shift.  Daughter in to visit this a.m. VSS.     Progress: improving

## 2023-04-05 LAB
ALBUMIN SERPL-MCNC: 2.6 G/DL (ref 3.5–5.2)
ALBUMIN SERPL-MCNC: 2.6 G/DL (ref 3.5–5.2)
ANION GAP SERPL CALCULATED.3IONS-SCNC: 7.2 MMOL/L (ref 5–15)
ANION GAP SERPL CALCULATED.3IONS-SCNC: 7.2 MMOL/L (ref 5–15)
BUN SERPL-MCNC: 27 MG/DL (ref 8–23)
BUN SERPL-MCNC: 27 MG/DL (ref 8–23)
BUN/CREAT SERPL: 40.3 (ref 7–25)
BUN/CREAT SERPL: 40.3 (ref 7–25)
CALCIUM SPEC-SCNC: 8.9 MG/DL (ref 8.6–10.5)
CALCIUM SPEC-SCNC: 8.9 MG/DL (ref 8.6–10.5)
CHLORIDE SERPL-SCNC: 99 MMOL/L (ref 98–107)
CHLORIDE SERPL-SCNC: 99 MMOL/L (ref 98–107)
CO2 SERPL-SCNC: 32.8 MMOL/L (ref 22–29)
CO2 SERPL-SCNC: 32.8 MMOL/L (ref 22–29)
CREAT SERPL-MCNC: 0.67 MG/DL (ref 0.76–1.27)
CREAT SERPL-MCNC: 0.67 MG/DL (ref 0.76–1.27)
DEPRECATED RDW RBC AUTO: 55.7 FL (ref 37–54)
DEPRECATED RDW RBC AUTO: 55.7 FL (ref 37–54)
EGFRCR SERPLBLD CKD-EPI 2021: 95.6 ML/MIN/1.73
EGFRCR SERPLBLD CKD-EPI 2021: 95.6 ML/MIN/1.73
ERYTHROCYTE [DISTWIDTH] IN BLOOD BY AUTOMATED COUNT: 15.6 % (ref 12.3–15.4)
ERYTHROCYTE [DISTWIDTH] IN BLOOD BY AUTOMATED COUNT: 15.6 % (ref 12.3–15.4)
GLUCOSE BLDC GLUCOMTR-MCNC: 132 MG/DL (ref 70–99)
GLUCOSE BLDC GLUCOMTR-MCNC: 132 MG/DL (ref 70–99)
GLUCOSE BLDC GLUCOMTR-MCNC: 143 MG/DL (ref 70–99)
GLUCOSE BLDC GLUCOMTR-MCNC: 143 MG/DL (ref 70–99)
GLUCOSE BLDC GLUCOMTR-MCNC: 189 MG/DL (ref 70–99)
GLUCOSE BLDC GLUCOMTR-MCNC: 189 MG/DL (ref 70–99)
GLUCOSE SERPL-MCNC: 139 MG/DL (ref 65–99)
GLUCOSE SERPL-MCNC: 139 MG/DL (ref 65–99)
HCT VFR BLD AUTO: 41.2 % (ref 37.5–51)
HCT VFR BLD AUTO: 41.2 % (ref 37.5–51)
HGB BLD-MCNC: 13.8 G/DL (ref 13–17.7)
HGB BLD-MCNC: 13.8 G/DL (ref 13–17.7)
MAGNESIUM SERPL-MCNC: 2.1 MG/DL (ref 1.6–2.4)
MAGNESIUM SERPL-MCNC: 2.1 MG/DL (ref 1.6–2.4)
MCH RBC QN AUTO: 32.6 PG (ref 26.6–33)
MCH RBC QN AUTO: 32.6 PG (ref 26.6–33)
MCHC RBC AUTO-ENTMCNC: 33.5 G/DL (ref 31.5–35.7)
MCHC RBC AUTO-ENTMCNC: 33.5 G/DL (ref 31.5–35.7)
MCV RBC AUTO: 97.4 FL (ref 79–97)
MCV RBC AUTO: 97.4 FL (ref 79–97)
PHOSPHATE SERPL-MCNC: 2.7 MG/DL (ref 2.5–4.5)
PHOSPHATE SERPL-MCNC: 2.7 MG/DL (ref 2.5–4.5)
PLATELET # BLD AUTO: 183 10*3/MM3 (ref 140–450)
PLATELET # BLD AUTO: 183 10*3/MM3 (ref 140–450)
PMV BLD AUTO: 10.2 FL (ref 6–12)
PMV BLD AUTO: 10.2 FL (ref 6–12)
POTASSIUM SERPL-SCNC: 3.9 MMOL/L (ref 3.5–5.2)
POTASSIUM SERPL-SCNC: 3.9 MMOL/L (ref 3.5–5.2)
RBC # BLD AUTO: 4.23 10*6/MM3 (ref 4.14–5.8)
RBC # BLD AUTO: 4.23 10*6/MM3 (ref 4.14–5.8)
SODIUM SERPL-SCNC: 139 MMOL/L (ref 136–145)
SODIUM SERPL-SCNC: 139 MMOL/L (ref 136–145)
WBC NRBC COR # BLD: 7.06 10*3/MM3 (ref 3.4–10.8)
WBC NRBC COR # BLD: 7.06 10*3/MM3 (ref 3.4–10.8)

## 2023-04-05 PROCEDURE — 82962 GLUCOSE BLOOD TEST: CPT

## 2023-04-05 PROCEDURE — 63710000001 INSULIN REGULAR HUMAN PER 5 UNITS: Performed by: INTERNAL MEDICINE

## 2023-04-05 PROCEDURE — 99233 SBSQ HOSP IP/OBS HIGH 50: CPT | Performed by: INTERNAL MEDICINE

## 2023-04-05 PROCEDURE — 25010000002 FUROSEMIDE PER 20 MG: Performed by: NURSE PRACTITIONER

## 2023-04-05 PROCEDURE — 94664 DEMO&/EVAL PT USE INHALER: CPT

## 2023-04-05 PROCEDURE — 80069 RENAL FUNCTION PANEL: CPT | Performed by: NURSE PRACTITIONER

## 2023-04-05 PROCEDURE — 94668 MNPJ CHEST WALL SBSQ: CPT

## 2023-04-05 PROCEDURE — 94799 UNLISTED PULMONARY SVC/PX: CPT

## 2023-04-05 PROCEDURE — 94761 N-INVAS EAR/PLS OXIMETRY MLT: CPT

## 2023-04-05 PROCEDURE — 97164 PT RE-EVAL EST PLAN CARE: CPT

## 2023-04-05 PROCEDURE — 94660 CPAP INITIATION&MGMT: CPT

## 2023-04-05 PROCEDURE — 83735 ASSAY OF MAGNESIUM: CPT | Performed by: NURSE PRACTITIONER

## 2023-04-05 PROCEDURE — 85027 COMPLETE CBC AUTOMATED: CPT | Performed by: NURSE PRACTITIONER

## 2023-04-05 RX ADMIN — FUROSEMIDE 40 MG: 10 INJECTION, SOLUTION INTRAMUSCULAR; INTRAVENOUS at 08:35

## 2023-04-05 RX ADMIN — FUROSEMIDE 40 MG: 10 INJECTION, SOLUTION INTRAMUSCULAR; INTRAVENOUS at 21:53

## 2023-04-05 RX ADMIN — Medication 10 ML: at 21:53

## 2023-04-05 RX ADMIN — APIXABAN 5 MG: 5 TABLET, FILM COATED ORAL at 08:36

## 2023-04-05 RX ADMIN — DULOXETINE HYDROCHLORIDE 60 MG: 30 CAPSULE, DELAYED RELEASE ORAL at 08:35

## 2023-04-05 RX ADMIN — GLIPIZIDE 5 MG: 5 TABLET ORAL at 06:33

## 2023-04-05 RX ADMIN — FAMOTIDINE 40 MG: 20 TABLET ORAL at 08:35

## 2023-04-05 RX ADMIN — IPRATROPIUM BROMIDE AND ALBUTEROL SULFATE 3 ML: 2.5; .5 SOLUTION RESPIRATORY (INHALATION) at 06:32

## 2023-04-05 RX ADMIN — APIXABAN 5 MG: 5 TABLET, FILM COATED ORAL at 21:53

## 2023-04-05 RX ADMIN — CARVEDILOL 25 MG: 25 TABLET, FILM COATED ORAL at 08:36

## 2023-04-05 RX ADMIN — IPRATROPIUM BROMIDE AND ALBUTEROL SULFATE 3 ML: 2.5; .5 SOLUTION RESPIRATORY (INHALATION) at 01:04

## 2023-04-05 RX ADMIN — IPRATROPIUM BROMIDE AND ALBUTEROL SULFATE 3 ML: 2.5; .5 SOLUTION RESPIRATORY (INHALATION) at 12:12

## 2023-04-05 RX ADMIN — SENNOSIDES AND DOCUSATE SODIUM 1 TABLET: 8.6; 5 TABLET ORAL at 08:35

## 2023-04-05 RX ADMIN — IPRATROPIUM BROMIDE AND ALBUTEROL SULFATE 3 ML: 2.5; .5 SOLUTION RESPIRATORY (INHALATION) at 18:56

## 2023-04-05 RX ADMIN — TRIAMCINOLONE ACETONIDE 1 APPLICATION: 1 OINTMENT TOPICAL at 11:00

## 2023-04-05 RX ADMIN — INSULIN HUMAN 2 UNITS: 100 INJECTION, SOLUTION PARENTERAL at 12:30

## 2023-04-05 RX ADMIN — CARVEDILOL 25 MG: 25 TABLET, FILM COATED ORAL at 21:53

## 2023-04-05 NOTE — PROGRESS NOTES
[facility]     Progress Note    Patient Name: Stephen Aguero  : 1945  MRN: [id number]  Primary Care Physician:  Papi Vasquez MD  Date of admission: 3/22/2023      Subjective   Brief summary.  Patient admitted with respiratory failure and UTI along with decreased level of consciousness.        HPI:    No new issues  Extremely weak  No shortness of breath     Review of Systems     No reports of fever or chills  Extremely weak and lethargic  Edematous and swollen  No fever chills reported        Objective     Vitals:   Temp:  [97.2 °F (36.2 °C)-98.2 °F (36.8 °C)] 98.1 °F (36.7 °C)  Heart Rate:  [74-96] 82  Resp:  [16-20] 16  BP: (105-138)/(48-75) 118/64  Flow (L/min):  [2-3] 2    Physical Exam :     Elderly morbidly obese male, tired looking  Heart irregular,  Lungs diminished breath sounds few crackles  Abdomen obese and soft, morbidly obese difficult to palpate  Extremities with 3+ edema with chronic venous stasis changes      Result Review:  I have personally reviewed the results from the time of this admission to 2023 16:22 EDT and agree with these findings:  [x]  Laboratory  [x]  Microbiology  [x]  Radiology  []  EKG/Telemetry   []  Cardiology/Vascular   []  Pathology  []  Old records  []  Other:             Assessment / Plan       Active Hospital Problems:  Active Hospital Problems    Diagnosis    • **Acute respiratory failure with hypercapnia    • Mycoplasma pneumonia    • New onset a-fib    • Tachycardia    • Hypoglycemia    • Obesity hypoventilation syndrome    • Diabetes mellitus    • COPD (chronic obstructive pulmonary disease)    • Pneumonia    • Acute UTI (urinary tract infection)        Plan:     Continue current treatment.  Check labs in a.m.  Waiting for placement        DVT prophylaxis:  Medical DVT prophylaxis orders are present.    CODE STATUS:   Code Status (Patient has no pulse and is not breathing): CPR (Attempt to Resuscitate)  Medical Interventions (Patient has pulse or is  breathing): Full Support            Electronically signed by Marco Bishop MD, 04/05/23, 4:23 PM EDT.  .                              .    .      .

## 2023-04-05 NOTE — PROGRESS NOTES
Pulmonary / Critical Care Progress Note      Patient Name: Stephen Aguero  : 1945  MRN: [id number]  Attending:  Marco Bishop MD  Date of admission: 3/22/2023    Subjective   Subjective   Follow-up for hypoxic respiratory failure, JULITA/OHS.    3/29 right thoracentesis with drainage of 500 mL.  Cultures and cytology negative.  3/30 left thoracentesis with drainage of 900 mL.  Cultures and cytology negative.    No acute events overnight.  Wore NIPPV.    This morning,  Lying in bed on 3 L NC  O2 sats 97%, decreased to 2 L NC  Continues to diurese well  Dyspnea improved  Overall feeling better  No nausea, fevers or chills  No chest pain  Bed ridden, weak and fatigued    Review of Systems  Constitutional symptoms: Fatigue and weakness, otherwise denied complaints   Ear, nose, throat: Denied complaints  Cardiovascular:  Denied complaints  Respiratory: Dyspnea and cough, otherwise some dyspnea  Hematologic: Denied complaints  Neurologic: Denied complaints  Skin: Denied complaints    Objective   Objective     Vitals:   Temp:  [97.2 °F (36.2 °C)-98.2 °F (36.8 °C)] 97.2 °F (36.2 °C)  Heart Rate:  [79-96] 92  Resp:  [15-20] 18  BP: (105-138)/(56-75) 105/56  Flow (L/min):  [2-3] 3    Physical Exam   Vital Signs Reviewed    General:  WDWN, alert, NAD on 2 L NC  HEENT:  PERRL, EOMI.  OP, nares clear  Chest:  good aeration, decreasing crackles bilaterally, tympanic to percussion, no work of breathing noted   CV: RRR, no MGR, pulses 2+, equal  Abd:  Soft, NT, ND, + BS, obese  EXT:  no clubbing, no cyanosis, decreasing bilateral lower extremity edema with chronic venous stasis changes  Neuro:  A&Ox3, CN grossly intact, no focal deficits  Skin: No rashes or lesions noted    Result Review    Result Review:  I have personally reviewed the results from the time of this admission to 2023 07:28 EDT and agree with these findings:  [x]  Laboratory  [x]  Microbiology  [x]  Radiology  [x]  EKG/Telemetry   [x]  Cardiology/Vascular    []  Pathology  []  Old records  []  Other:  Most notable findings include:    3/29 and 3/30 pleural fluid cultures negative.  Cytology negative for malignant cells    3/22 urine culture Morganella Morgagni  Mycoplasma IgM positive        Lab 04/05/23  0442 04/04/23  0415 04/03/23  1034 04/03/23  0539 04/02/23  0434 04/01/23  0438 03/31/23  0326 03/30/23  0337   WBC 7.06 7.45  --  8.67 7.86 7.72 6.93 9.56   HEMOGLOBIN 13.8 13.8  --  14.6 13.9 13.6 13.3 12.8*   HEMATOCRIT 41.2 41.6  --  44.4 43.2 42.0 40.5 39.6   PLATELETS 183 196  --  176 205 191 183 185   SODIUM 139 138 136  --  137 137 137 135*   POTASSIUM 3.9 3.8 4.1  --  4.3 3.9 4.0 4.6   CHLORIDE 99 99 97*  --  98 100 96* 95*   CO2 32.8* 38.6* 34.9*  --  34.1* 38.3* 38.4* 31.1*   BUN 27* 27* 27*  --  27* 29* 33* 36*   CREATININE 0.67* 0.63* 0.83  --  0.68* 0.66* 0.71* 0.83   GLUCOSE 139* 151* 209*  --  151* 152* 165* 200*   CALCIUM 8.9 9.0 8.9  --  8.8 8.2* 8.4* 8.6   PHOSPHORUS 2.7 3.2 3.2  --  2.8 2.8 3.3 3.2   TOTAL PROTEIN  --   --   --   --   --  6.3  --   --    ALBUMIN 2.6* 2.6* 2.5*  --  2.6* 2.4* 2.4* 2.4*   GLOBULIN  --   --   --   --   --  3.9  --   --      Assessment & Plan   Assessment / Plan     Active Hospital Problems:  Active Hospital Problems    Diagnosis    • **Acute respiratory failure with hypercapnia    • Mycoplasma pneumonia    • New onset a-fib    • Tachycardia    • Hypoglycemia    • Obesity hypoventilation syndrome    • Diabetes mellitus    • COPD (chronic obstructive pulmonary disease)    • Pneumonia    • Acute UTI (urinary tract infection)      Impression:  Acute hypoxic and hypercapnic respiratory failure requiring NIPPV  COPD exacerbation  Mycoplasma pneumonia  Bilateral pleural effusions  Bibasilar atelectasis  Volume overload  Acute decompensated diastolic congestive heart failure  JULITA/OHS: compliant with CPAP  Urinary tract infection secondary to Morganella  Altered mental status  Toxic/metabolic encephalopathy  Type 2 diabetes  with hyperglycemia  Morbid obesity, BMI 43.2  Hyponatremia, clinically insignificant  Hypokalemia     Plan:  -Continue to wean O2 to keep sat greater than 90%.  -Continue NIPPV 20/12 at night and as needed days.  -He has chronic respiratory failure secondary to COPD. His respiratory failure is compounded by his underlying JULITA/OHS; therefore he will require IVAPS AE mode, which provides targeted tidal/minute volume, back up rate and battery back up. Bilevel has been considered and ruled out due to the lack of AVAPS mode and backup battery on home bilevel unit. Failure to provide non invasive ventilation for Mr Aguero's chronic respiratory failure increases patient's risk of acute on chronic respiratory failure, rehospitalization, and mortality.  -Appreciate RT  assistance in arranging this for home.  -Cultures negative to date.  Has completed course of cefepime.  -Continue Lasix 40 mg IV twice daily.  Trend renal panel and electrolytes.  -Previous bilateral thoracentesis consistent with volume overload.  -Continue nebulizers and bronchopulmonary hygiene.  -Encourage I-S and flutter valve.  -Continue Eliquis for A-fib.  -Continue wound care for lower extremity chronic venous stasis wounds.  -Encourage mobilization.  Out of bed to chair.  -PT/OT on board.  -We will need rehab placement.     DVT prophylaxis: Eliquis  Medical DVT prophylaxis orders are present.    CODE STATUS:   Code Status (Patient has no pulse and is not breathing): CPR (Attempt to Resuscitate)  Medical Interventions (Patient has pulse or is breathing): Full Support    Labs, imaging, microbiology, notes and medications personally reviewed  Discussed with primary service and bedside nurse.    Electronically signed by CLIFTON Carmona, 04/05/23, 9:30 AM EDT.    This visit was performed by BOTH a physician and an APC. I personally evaluated and examined the patient. I performed all aspects of MDM as documented. , I have reviewed and  confirmed the accuracy of the patient's history as documented in this note. and I have reexamined the patient and the results are consistent with the previously documented exam. I have updated the documentation as necessary.     Electronically signed by Destin Bailon MD, 04/05/23, 4:09 PM EDT.   Electronically signed by Destin Bailon MD, 04/05/23, 7:28 AM EDT.

## 2023-04-05 NOTE — PLAN OF CARE
Goal Outcome Evaluation:  Plan of Care Reviewed With: patient        Progress: no change  Outcome Evaluation: Patient continues with 3lpm O2 NC, Diminished BS and will wear our V60 BIPAP at night and PRN. He is very compliant with usage and there are no reported issues with comfort. Settings remain on 20/12 R 4 28% O2.

## 2023-04-05 NOTE — PLAN OF CARE
Goal Outcome Evaluation:  Plan of Care Reviewed With: daughter   No acute events.  VSS.     Progress: improving

## 2023-04-05 NOTE — SIGNIFICANT NOTE
Wound Eval / Progress Noted    MIKE Alan     Patient Name: Stephen Aguero  : 1945  MRN: [id number]  Today's Date: 2023                 Admit Date: 3/22/2023    Visit Dx:    ICD-10-CM ICD-9-CM   1. Hypoglycemia  E16.2 251.2   2. Pneumonia of both lungs due to infectious organism, unspecified part of lung  J18.9 483.8   3. Hypercapnia  R06.89 786.09   4. Somnolence  R40.0 780.09   5. Sepsis, due to unspecified organism, unspecified whether acute organ dysfunction present  A41.9 038.9     995.91   6. Dysphagia, oropharyngeal  R13.12 787.22   7. Decreased activities of daily living (ADL)  Z78.9 V49.89   8. Difficulty walking  R26.2 719.7   9. Chronic obstructive pulmonary disease, unspecified COPD type  J44.9 496   10. Chronic respiratory failure, unspecified whether with hypoxia or hypercapnia  J96.10 518.83       Patient Active Problem List   Diagnosis    Acute respiratory failure with hypercapnia    Hypoglycemia    Diabetes mellitus    COPD (chronic obstructive pulmonary disease)    Pneumonia    Acute UTI (urinary tract infection)    Obesity hypoventilation syndrome    Tachycardia    New onset a-fib    Mycoplasma pneumonia        Past Medical History:   Diagnosis Date    Diabetes mellitus     Elevated cholesterol     GERD (gastroesophageal reflux disease)     Hypertension         Past Surgical History:   Procedure Laterality Date    ABDOMINAL SURGERY      APPENDECTOMY      EYE SURGERY           Physical Assessment:     23 1610   Wound 23 Bilateral groin   Placement Date/Time: 23   Side: Bilateral  Location: groin   Dressing Appearance open to air   Base red;moist   Periwound moist;redness   Edges open;rolled/closed   Wound 23 Bilateral lower leg   Placement Date/Time: 23   Side: Bilateral  Orientation: lower  Location: leg   Wound Image     Dressing Appearance open to air   Base dry;yellow;other (see comments)  (chronic discoloration)   Edges  rolled/closed   Wound Left proximal arm Blisters   No placement date or time found.   Side: Left  Orientation: proximal  Location: arm  Primary Wound Type: Blisters  Additional Comments: weeping   Wound Image    Dressing Appearance intact;moist drainage   Base moist;red   Periwound intact;dry;pink   Periwound Temperature warm   Periwound Skin Turgor soft   Edges open   Drainage Characteristics/Odor serosanguineous   Drainage Amount scant   Care, Wound cleansed with;sterile normal saline   Dressing Care dressing applied;border dressing;non-adherent;petroleum-based;silicone   Periwound Care absorptive dressing applied            Wound Check / Follow-up:  Patient seen today for wound follow-up. Patient currently in Progressive Care. He has thick crusting to right cheek as well as to right elbow. Partial thickness tissue loss along left elbow. Recommending to continue current wound treatment with non-adherent petroleum based gauze and silicone border dressing.   Dry thick crusty skin to BLE that is improving. Chronic discoloration, hemosiderin staining, noted bilaterally.   Right fifth toe with some visible traumat to tissue measuring 1.2cm x 0.8cm.   MASD / ITD within skin folds under abdomen and to bilateral groin remains present with excoriation at crease bases. Topical treatment in use. Recommending to continue treatment.     Impression: MASD / ITD to skin folds / creases. Tissue loss to left elbow. Dry thick scaly skin to BLE with discoloration    Short term goals:  Regain skin integrity. Skin protection, moisture prevention, Skin care / hygiene. Dressing changes.     Martha Jones RN    4/5/2023    18:04 EDT

## 2023-04-05 NOTE — THERAPY RE-EVALUATION
Acute Care - Physical Therapy Re-Evaluation   [facility]     Patient Name: Stephen Aguero  : 1945  MRN: [id number]  Today's Date: 2023      Visit Dx:     ICD-10-CM ICD-9-CM   1. Hypoglycemia  E16.2 251.2   2. Pneumonia of both lungs due to infectious organism, unspecified part of lung  J18.9 483.8   3. Hypercapnia  R06.89 786.09   4. Somnolence  R40.0 780.09   5. Sepsis, due to unspecified organism, unspecified whether acute organ dysfunction present  A41.9 038.9     995.91   6. Dysphagia, oropharyngeal  R13.12 787.22   7. Decreased activities of daily living (ADL)  Z78.9 V49.89   8. Difficulty walking  R26.2 719.7   9. Chronic obstructive pulmonary disease, unspecified COPD type  J44.9 496   10. Chronic respiratory failure, unspecified whether with hypoxia or hypercapnia  J96.10 518.83     Patient Active Problem List   Diagnosis   • Acute respiratory failure with hypercapnia   • Hypoglycemia   • Diabetes mellitus   • COPD (chronic obstructive pulmonary disease)   • Pneumonia   • Acute UTI (urinary tract infection)   • Obesity hypoventilation syndrome   • Tachycardia   • New onset a-fib   • Mycoplasma pneumonia     Past Medical History:   Diagnosis Date   • Diabetes mellitus    • Elevated cholesterol    • GERD (gastroesophageal reflux disease)    • Hypertension      Past Surgical History:   Procedure Laterality Date   • ABDOMINAL SURGERY     • APPENDECTOMY     • EYE SURGERY       PT Assessment (last 12 hours)     PT Evaluation and Treatment     Row Name 23 1108          Physical Therapy Time and Intention    Subjective Information no complaints  -DP     Document Type re-evaluation  -DP     Mode of Treatment individual therapy;physical therapy  -DP     Patient Effort fair  -DP     Symptoms Noted During/After Treatment fatigue  -DP     Row Name 23 1108          General Information    Patient Profile Reviewed yes  -DP     Patient Observations alert;cooperative;agree to therapy  -DP     Equipment  Currently Used at Home wheelchair, motorized;walker, rolling;rollator  -DP     Existing Precautions/Restrictions fall  -DP     Barriers to Rehab none identified  -DP     Row Name 04/05/23 1108          Cognition    Affect/Mental Status (Cognition) WFL  -DP     Orientation Status (Cognition) oriented to;person;place  -DP     Follows Commands (Cognition) physical/tactile prompts required;repetition of directions required;verbal cues/prompting required  -DP     Cognitive Function WFL  -DP     Row Name 04/05/23 1108          Bed Mobility    All Activities, [place] (Bed Mobility) maximum assist (25% patient effort);1 person assist  -DP     Scooting/Bridging [place] (Bed Mobility) maximum assist (25% patient effort)  -DP     Supine-Sit-Supine [place] (Bed Mobility) maximum assist (25% patient effort)  -DP     Bed Mobility, Safety Issues decreased use of arms for pushing/pulling;decreased use of legs for bridging/pushing  -DP     Assistive Device (Bed Mobility) bed rails;draw sheet;head of bed elevated  -DP     Row Name 04/05/23 1108          Transfers    Transfers other (see comments)  not tested, patient declined  -DP     Row Name 04/05/23 1108          Sit-Stand Transfer    Sit-Stand [place] (Transfers) not tested  -DP     Row Name 04/05/23 1108          Stand-Sit Transfer    Stand-Sit [place] (Transfers) not tested  -DP     Row Name 04/05/23 1108          Gait/Stairs (Locomotion)    Gait/Stairs Locomotion other (see comments)  not tested  -DP     [place] Level (Gait) not tested  -DP     Row Name 04/05/23 1108          Balance    Balance Assessment sitting static balance  -DP     Static Sitting Balance moderate assist  -DP     Position, Sitting Balance supported;sitting edge of bed  -DP     Row Name             Wound 03/23/23 1443 Bilateral groin    Wound - Properties Group Placement Date: 03/23/23  -DD Placement Time: 1443 -DD Side: Bilateral  -DD Orientation: --  -DD Location: groin  -DD     Retired Wound - Properties Group Placement Date: 03/23/23  -DD Placement Time: 1443  -DD Side: Bilateral  -DD Orientation: --  -DD Location: groin  -DD    Retired Wound - Properties Group Date first assessed: 03/23/23  -DD Time first assessed: 1443  -DD Side: Bilateral  -DD Location: groin  -DD    Row Name             Wound 03/23/23 1443 Bilateral lower leg    Wound - Properties Group Placement Date: 03/23/23  -DD Placement Time: 1443  -DD Side: Bilateral  -DD Orientation: lower  -DD Location: leg  -DD    Retired Wound - Properties Group Placement Date: 03/23/23  -DD Placement Time: 1443  -DD Side: Bilateral  -DD Orientation: lower  -DD Location: leg  -DD    Retired Wound - Properties Group Date first assessed: 03/23/23  -DD Time first assessed: 1443  -DD Side: Bilateral  -DD Location: leg  -DD    Row Name             Wound 03/23/23 1444 Bilateral anterior foot    Wound - Properties Group Placement Date: 03/23/23  -DD Placement Time: 1444  -DD Side: Bilateral  -DD Orientation: anterior  -DD Location: foot  -DD    Retired Wound - Properties Group Placement Date: 03/23/23  -DD Placement Time: 1444  -DD Side: Bilateral  -DD Orientation: anterior  -DD Location: foot  -DD    Retired Wound - Properties Group Date first assessed: 03/23/23  -DD Time first assessed: 1444  -DD Side: Bilateral  -DD Location: foot  -DD    Row Name             Wound 03/23/23 1445 penis    Wound - Properties Group Placement Date: 03/23/23  -DD Placement Time: 1445  -DD Location: penis  -DD    Retired Wound - Properties Group Placement Date: 03/23/23  -DD Placement Time: 1445  -DD Location: penis  -DD    Retired Wound - Properties Group Date first assessed: 03/23/23  -DD Time first assessed: 1445  -DD Location: penis  -DD    Row Name             Wound Left proximal arm Blisters    Wound - Properties Group Side: Left  -MP Orientation: proximal  -MP Location: arm  -MP Primary Wound Type: Blisters  -MP Additional Comments: weeping  -MP    Retired  Wound - Properties Group Side: Left  -MP Orientation: proximal  -MP Location: arm  -MP Primary Wound Type: Blisters  -MP Additional Comments: amilcar BurkMP    Retired Wound - Properties Group Side: Left  -MP Location: arm  -MP Primary Wound Type: Blisters  -MP Additional Comments: amilcar TUTTLE    Row Name 04/05/23 1108          Plan of Care Review    Outcome Evaluation Patient tolerated sitting on edge of bed for approximatley 2 minutes today before requiring to lay back down due to increased fatigue and pain. He required max assist and increased time for bed mobility today and declined further transfers. He will continue to benefit from physical therapy services while in the hospital.  -DP     Row Name 04/05/23 1108          Physical Therapy Goals    Bed Mobility Goal Selection (PT) bed mobility, PT goal 1  -DP     Transfer Goal Selection (PT) transfer, PT goal 1  -DP     Gait Training Goal Selection (PT) gait training, PT goal 1  -DP     Row Name 04/05/23 1108          Bed Mobility Goal 1 (PT)    Activity/Assistive Device (Bed Mobility Goal 1, PT) bed mobility activities, all  -DP     [place] Level/Cues Needed (Bed Mobility Goal 1, PT) standby assist  -DP     Time Frame (Bed Mobility Goal 1, PT) long term goal (LTG);10 days  -DP     Progress/Outcomes (Bed Mobility Goal 1, PT) continuing progress toward goal;goal ongoing  -DP     Row Name 04/05/23 1108          Transfer Goal 1 (PT)    Activity/Assistive Device (Transfer Goal 1, PT) sit-to-stand/stand-to-sit;walker, rolling  -DP     [place] Level/Cues Needed (Transfer Goal 1, PT) contact guard required  -DP     Time Frame (Transfer Goal 1, PT) long term goal (LTG);10 days  -DP     Progress/Outcome (Transfer Goal 1, PT) continuing progress toward goal;goal ongoing  -DP     Row Name 04/05/23 1108          Gait Training Goal 1 (PT)    Activity/Assistive Device (Gait Training Goal 1, PT) gait (walking locomotion);walker, rolling  -DP     [place] Level  (Gait Training Goal 1, PT) contact guard required  -DP     Distance (Gait Training Goal 1, PT) 50 ft  -DP     Time Frame (Gait Training Goal 1, PT) long term goal (LTG);10 days  -DP     Progress/Outcome (Gait Training Goal 1, PT) continuing progress toward goal;goal ongoing  -DP           User Key  (r) = Recorded By, (t) = Taken By, (c) = Cosigned By    Initials Name Provider Type    MP Clara Álvarez, RN Registered Nurse    Selvin Pemberton, PT Physical Therapist    Venessa Engel, RN Registered Nurse                Physical Therapy Education     Title: PT OT SLP Therapies (In Progress)     Topic: Physical Therapy (In Progress)     Point: Mobility training (In Progress)     Learning Progress Summary           Patient Acceptance, E, NR by LOUISE at 4/3/2023 0436    Acceptance, E, VU by KARINE at 3/27/2023 0928                               User Key     Initials Effective Dates Name Provider Type Discipline    LOUISE 01/16/23 -  Harika Vela RN Registered Nurse Nurse    KARINE 01/11/23 -  Shaka Iglesias, PATTIE Student PT Student PT              PT Recommendation and Plan     Outcome Evaluation: Patient tolerated sitting on edge of bed for approximatley 2 minutes today before requiring to lay back down due to increased fatigue and pain. He required max assist and increased time for bed mobility today and declined further transfers. He will continue to benefit from physical therapy services while in the hospital.   Outcome Measures     Row Name 04/05/23 1113 04/04/23 1400 04/02/23 1600       How much help from another person do you currently need...    Turning from your back to your side while in flat bed without using bedrails? 2  -DP 2 (P)   -SW 2  -CS    Moving from lying on back to sitting on the side of a flat bed without bedrails? 1  -DP 2 (P)   -SW 2  -CS    Moving to and from a bed to a chair (including a wheelchair)? 1  -DP 1 (P)   -SW 1  -CS    Standing up from a chair using your arms (e.g., wheelchair, bedside chair)?  1  -DP 1 (P)   -SW 2  -CS    Climbing 3-5 steps with a railing? 1  -DP 1 (P)   -SW 1  -CS    To walk in hospital room? 1  -DP 1 (P)   -SW 1  -CS    AM-PAC 6 Clicks Score (PT) 7  -DP 8 (P)   -SW 9  -CS       Functional Assessment    Outcome Measure Options AM-PAC 6 Clicks Basic Mobility (PT)  -DP -- AM-PAC 6 Clicks Basic Mobility (PT)  -CS          User Key  (r) = Recorded By, (t) = Taken By, (c) = Cosigned By    Initials Name Provider Type    DP Selvin Rodriguez, PT Physical Therapist    CS Elton Herrera, PTA Physical Therapist Assistant    SW Beverley Hu, PT Student PT Student                 Time Calculation:    PT Charges     Row Name 04/05/23 1114             Time Calculation    PT Received On 04/05/23  -DP      PT Goal Re-Cert Due Date 04/14/23  -DP         Untimed Charges    PT Eval/Re-eval Minutes 40  -DP         Total Minutes    Untimed Charges Total Minutes 40  -DP       Total Minutes 40  -DP            User Key  (r) = Recorded By, (t) = Taken By, (c) = Cosigned By    Initials Name Provider Type    DP Selvin Rodriguez, PT Physical Therapist              Therapy Charges for Today     Code Description Service Date Service Provider Modifiers Qty    84882943313 HC PT RE-EVAL ESTABLISHED PLAN 2 4/5/2023 Selvin Rodriguez, PT GP 1          PT G-Codes  Outcome Measure Options: AM-PAC 6 Clicks Basic Mobility (PT)  AM-PAC 6 Clicks Score (PT): 7  AM-PAC 6 Clicks Score (OT): 9    Selvin Rodriguez, PT  4/5/2023

## 2023-04-05 NOTE — PLAN OF CARE
Problem: Adult Inpatient Plan of Care  Goal: Plan of Care Review  Outcome: Ongoing, Progressing  Goal: Patient-Specific Goal (Individualized)  Outcome: Ongoing, Progressing  Goal: Readiness for Transition of Care  Outcome: Ongoing, Progressing     Problem: Skin Injury Risk Increased  Goal: Skin Health and Integrity  Outcome: Ongoing, Progressing  Intervention: Optimize Skin Protection  Recent Flowsheet Documentation  Taken 4/4/2023 2023 by Mary Jackson RN  Pressure Reduction Techniques:   weight shift assistance provided   heels elevated off bed  Skin Protection:   tubing/devices free from skin contact   skin-to-skin areas padded   skin-to-device areas padded   skin sealant/moisture barrier applied   incontinence pads utilized     Problem: Fall Injury Risk  Goal: Absence of Fall and Fall-Related Injury  Outcome: Ongoing, Progressing  Intervention: Promote Injury-Free Environment  Recent Flowsheet Documentation  Taken 4/5/2023 0600 by Mary Jackson RN  Safety Promotion/Fall Prevention: safety round/check completed  Taken 4/5/2023 0422 by Mary Jackson RN  Safety Promotion/Fall Prevention: safety round/check completed  Taken 4/5/2023 0400 by Mary Jackson RN  Safety Promotion/Fall Prevention: safety round/check completed  Taken 4/5/2023 0300 by Mary Jackson RN  Safety Promotion/Fall Prevention: safety round/check completed  Taken 4/5/2023 0200 by Mary Jackson RN  Safety Promotion/Fall Prevention: safety round/check completed  Taken 4/5/2023 0127 by Mary Jackson RN  Safety Promotion/Fall Prevention: safety round/check completed  Taken 4/5/2023 0000 by Mary Jackson RN  Safety Promotion/Fall Prevention: safety round/check completed  Taken 4/4/2023 2336 by Mary Jackson RN  Safety Promotion/Fall Prevention: safety round/check completed  Taken 4/4/2023 2300 by Mary Jackson RN  Safety Promotion/Fall Prevention: safety round/check completed  Taken 4/4/2023 2233 by Mary Jackson  RN  Safety Promotion/Fall Prevention: safety round/check completed  Taken 4/4/2023 2200 by Mary Jackson RN  Safety Promotion/Fall Prevention: safety round/check completed  Taken 4/4/2023 2023 by Mary Jackson RN  Safety Promotion/Fall Prevention: safety round/check completed  Taken 4/4/2023 1906 by Mary Jackson RN  Safety Promotion/Fall Prevention: safety round/check completed     Problem: Noninvasive Ventilation Acute  Goal: Effective Unassisted Ventilation and Oxygenation  Outcome: Ongoing, Progressing   Goal Outcome Evaluation:

## 2023-04-06 LAB
ALBUMIN SERPL-MCNC: 2.6 G/DL (ref 3.5–5.2)
ALBUMIN SERPL-MCNC: 2.6 G/DL (ref 3.5–5.2)
ANION GAP SERPL CALCULATED.3IONS-SCNC: 3.7 MMOL/L (ref 5–15)
ANION GAP SERPL CALCULATED.3IONS-SCNC: 3.7 MMOL/L (ref 5–15)
BUN SERPL-MCNC: 26 MG/DL (ref 8–23)
BUN SERPL-MCNC: 26 MG/DL (ref 8–23)
BUN/CREAT SERPL: 83.9 (ref 7–25)
BUN/CREAT SERPL: 83.9 (ref 7–25)
CALCIUM SPEC-SCNC: 8.9 MG/DL (ref 8.6–10.5)
CALCIUM SPEC-SCNC: 8.9 MG/DL (ref 8.6–10.5)
CHLORIDE SERPL-SCNC: 97 MMOL/L (ref 98–107)
CHLORIDE SERPL-SCNC: 97 MMOL/L (ref 98–107)
CO2 SERPL-SCNC: 37.3 MMOL/L (ref 22–29)
CO2 SERPL-SCNC: 37.3 MMOL/L (ref 22–29)
CREAT SERPL-MCNC: 0.31 MG/DL (ref 0.76–1.27)
CREAT SERPL-MCNC: 0.31 MG/DL (ref 0.76–1.27)
DEPRECATED RDW RBC AUTO: 56.4 FL (ref 37–54)
DEPRECATED RDW RBC AUTO: 56.4 FL (ref 37–54)
EGFRCR SERPLBLD CKD-EPI 2021: 120.6 ML/MIN/1.73
EGFRCR SERPLBLD CKD-EPI 2021: 120.6 ML/MIN/1.73
ERYTHROCYTE [DISTWIDTH] IN BLOOD BY AUTOMATED COUNT: 15.5 % (ref 12.3–15.4)
ERYTHROCYTE [DISTWIDTH] IN BLOOD BY AUTOMATED COUNT: 15.5 % (ref 12.3–15.4)
GLUCOSE BLDC GLUCOMTR-MCNC: 130 MG/DL (ref 70–99)
GLUCOSE BLDC GLUCOMTR-MCNC: 130 MG/DL (ref 70–99)
GLUCOSE BLDC GLUCOMTR-MCNC: 147 MG/DL (ref 70–99)
GLUCOSE BLDC GLUCOMTR-MCNC: 147 MG/DL (ref 70–99)
GLUCOSE BLDC GLUCOMTR-MCNC: 150 MG/DL (ref 70–99)
GLUCOSE BLDC GLUCOMTR-MCNC: 150 MG/DL (ref 70–99)
GLUCOSE BLDC GLUCOMTR-MCNC: 168 MG/DL (ref 70–99)
GLUCOSE BLDC GLUCOMTR-MCNC: 168 MG/DL (ref 70–99)
GLUCOSE BLDC GLUCOMTR-MCNC: 184 MG/DL (ref 70–99)
GLUCOSE BLDC GLUCOMTR-MCNC: 184 MG/DL (ref 70–99)
GLUCOSE SERPL-MCNC: 208 MG/DL (ref 65–99)
GLUCOSE SERPL-MCNC: 208 MG/DL (ref 65–99)
HCT VFR BLD AUTO: 41.7 % (ref 37.5–51)
HCT VFR BLD AUTO: 41.7 % (ref 37.5–51)
HGB BLD-MCNC: 13.7 G/DL (ref 13–17.7)
HGB BLD-MCNC: 13.7 G/DL (ref 13–17.7)
MAGNESIUM SERPL-MCNC: 2.1 MG/DL (ref 1.6–2.4)
MAGNESIUM SERPL-MCNC: 2.1 MG/DL (ref 1.6–2.4)
MCH RBC QN AUTO: 32.5 PG (ref 26.6–33)
MCH RBC QN AUTO: 32.5 PG (ref 26.6–33)
MCHC RBC AUTO-ENTMCNC: 32.9 G/DL (ref 31.5–35.7)
MCHC RBC AUTO-ENTMCNC: 32.9 G/DL (ref 31.5–35.7)
MCV RBC AUTO: 99 FL (ref 79–97)
MCV RBC AUTO: 99 FL (ref 79–97)
PHOSPHATE SERPL-MCNC: 2.4 MG/DL (ref 2.5–4.5)
PHOSPHATE SERPL-MCNC: 2.4 MG/DL (ref 2.5–4.5)
PLATELET # BLD AUTO: 175 10*3/MM3 (ref 140–450)
PLATELET # BLD AUTO: 175 10*3/MM3 (ref 140–450)
PMV BLD AUTO: 10.2 FL (ref 6–12)
PMV BLD AUTO: 10.2 FL (ref 6–12)
POTASSIUM SERPL-SCNC: 4 MMOL/L (ref 3.5–5.2)
POTASSIUM SERPL-SCNC: 4 MMOL/L (ref 3.5–5.2)
RBC # BLD AUTO: 4.21 10*6/MM3 (ref 4.14–5.8)
RBC # BLD AUTO: 4.21 10*6/MM3 (ref 4.14–5.8)
SODIUM SERPL-SCNC: 138 MMOL/L (ref 136–145)
SODIUM SERPL-SCNC: 138 MMOL/L (ref 136–145)
WBC NRBC COR # BLD: 7.39 10*3/MM3 (ref 3.4–10.8)
WBC NRBC COR # BLD: 7.39 10*3/MM3 (ref 3.4–10.8)

## 2023-04-06 PROCEDURE — 80069 RENAL FUNCTION PANEL: CPT | Performed by: NURSE PRACTITIONER

## 2023-04-06 PROCEDURE — 97110 THERAPEUTIC EXERCISES: CPT

## 2023-04-06 PROCEDURE — 94799 UNLISTED PULMONARY SVC/PX: CPT

## 2023-04-06 PROCEDURE — 25010000002 FUROSEMIDE PER 20 MG: Performed by: NURSE PRACTITIONER

## 2023-04-06 PROCEDURE — 94761 N-INVAS EAR/PLS OXIMETRY MLT: CPT

## 2023-04-06 PROCEDURE — 94660 CPAP INITIATION&MGMT: CPT

## 2023-04-06 PROCEDURE — 97168 OT RE-EVAL EST PLAN CARE: CPT

## 2023-04-06 PROCEDURE — 83735 ASSAY OF MAGNESIUM: CPT | Performed by: NURSE PRACTITIONER

## 2023-04-06 PROCEDURE — 94668 MNPJ CHEST WALL SBSQ: CPT

## 2023-04-06 PROCEDURE — 82962 GLUCOSE BLOOD TEST: CPT

## 2023-04-06 PROCEDURE — 94664 DEMO&/EVAL PT USE INHALER: CPT

## 2023-04-06 PROCEDURE — 85027 COMPLETE CBC AUTOMATED: CPT | Performed by: NURSE PRACTITIONER

## 2023-04-06 PROCEDURE — 99233 SBSQ HOSP IP/OBS HIGH 50: CPT | Performed by: INTERNAL MEDICINE

## 2023-04-06 PROCEDURE — 63710000001 INSULIN REGULAR HUMAN PER 5 UNITS: Performed by: INTERNAL MEDICINE

## 2023-04-06 RX ADMIN — FUROSEMIDE 40 MG: 10 INJECTION, SOLUTION INTRAMUSCULAR; INTRAVENOUS at 08:48

## 2023-04-06 RX ADMIN — TRIAMCINOLONE ACETONIDE 1 APPLICATION: 1 OINTMENT TOPICAL at 08:53

## 2023-04-06 RX ADMIN — FAMOTIDINE 40 MG: 20 TABLET ORAL at 08:48

## 2023-04-06 RX ADMIN — INSULIN HUMAN 2 UNITS: 100 INJECTION, SOLUTION PARENTERAL at 12:39

## 2023-04-06 RX ADMIN — APIXABAN 5 MG: 5 TABLET, FILM COATED ORAL at 20:46

## 2023-04-06 RX ADMIN — CARVEDILOL 25 MG: 25 TABLET, FILM COATED ORAL at 20:46

## 2023-04-06 RX ADMIN — CARVEDILOL 25 MG: 25 TABLET, FILM COATED ORAL at 08:48

## 2023-04-06 RX ADMIN — Medication 10 ML: at 20:46

## 2023-04-06 RX ADMIN — IPRATROPIUM BROMIDE AND ALBUTEROL SULFATE 3 ML: 2.5; .5 SOLUTION RESPIRATORY (INHALATION) at 00:06

## 2023-04-06 RX ADMIN — IPRATROPIUM BROMIDE AND ALBUTEROL SULFATE 3 ML: 2.5; .5 SOLUTION RESPIRATORY (INHALATION) at 06:40

## 2023-04-06 RX ADMIN — TRIAMCINOLONE ACETONIDE 1 APPLICATION: 1 OINTMENT TOPICAL at 20:45

## 2023-04-06 RX ADMIN — FUROSEMIDE 40 MG: 10 INJECTION, SOLUTION INTRAMUSCULAR; INTRAVENOUS at 20:46

## 2023-04-06 RX ADMIN — APIXABAN 5 MG: 5 TABLET, FILM COATED ORAL at 08:49

## 2023-04-06 RX ADMIN — INSULIN HUMAN 2 UNITS: 100 INJECTION, SOLUTION PARENTERAL at 06:11

## 2023-04-06 RX ADMIN — GLIPIZIDE 5 MG: 5 TABLET ORAL at 08:48

## 2023-04-06 RX ADMIN — IPRATROPIUM BROMIDE AND ALBUTEROL SULFATE 3 ML: 2.5; .5 SOLUTION RESPIRATORY (INHALATION) at 12:12

## 2023-04-06 RX ADMIN — IPRATROPIUM BROMIDE AND ALBUTEROL SULFATE 3 ML: 2.5; .5 SOLUTION RESPIRATORY (INHALATION) at 19:41

## 2023-04-06 RX ADMIN — DULOXETINE HYDROCHLORIDE 60 MG: 30 CAPSULE, DELAYED RELEASE ORAL at 08:48

## 2023-04-06 NOTE — PLAN OF CARE
Goal Outcome Evaluation:            Patient wore bipap throughout the night and remains on, at this time. Patient is tolerating bipap therapy well. Patient's saturation is 94% on 28% this morning.  No breakdown noted on the face at this time, under the nose mask is being utilized.

## 2023-04-06 NOTE — PROGRESS NOTES
Pulmonary / Critical Care Progress Note      Patient Name: Stephen Aguero  : 1945  MRN: [id number]  Attending:  Marco Bishop MD  Date of admission: 3/22/2023    Subjective   Subjective   Follow-up for hypoxic respiratory failure, volume overload, bilateral pleural effusions, mycoplasma pneumonia, JULITA/OHS.    3/29 right thoracentesis with drainage of 500 mL.  Cultures and cytology negative.  3/30 left thoracentesis with drainage of 900 mL.  Cultures and cytology negative.    No acute events overnight.  Wore NIPPV.    This morning,  Lying in bed on 2 L NC  Continues to diurese well  Dyspnea improved  Overall feeling better  No nausea, fevers or chills  No chest pain  Bed ridden, weak and fatigued  Awaiting rehab placement    Review of Systems  Constitutional symptoms: Fatigue and weakness, otherwise denied complaints   Ear, nose, throat: Denied complaints  Cardiovascular:  Denied complaints  Respiratory: Dyspnea and cough, otherwise some dyspnea  Hematologic: Denied complaints  Neurologic: Denied complaints  Skin: Denied complaints    Objective   Objective     Vitals:   Temp:  [97.7 °F (36.5 °C)-98.1 °F (36.7 °C)] 97.7 °F (36.5 °C)  Heart Rate:  [74-96] 83  Resp:  [16-23] 18  BP: ()/(44-74) 99/44  Flow (L/min):  [2-3] 2    Physical Exam   Vital Signs Reviewed    General:  WDWN, alert, NAD on 2 L NC  HEENT:  PERRL, EOMI.  OP, nares clear  Chest:  good aeration, diminsihed bilaterally, tympanic to percussion, no work of breathing noted   CV: RRR, no MGR, pulses 2+, equal  Abd:  Soft, NT, ND, + BS, obese  EXT:  no clubbing, no cyanosis, decreasing bilateral lower extremity edema with chronic venous stasis changes  Neuro:  A&Ox3, CN grossly intact, no focal deficits  Skin: No rashes or lesions noted    Result Review    Result Review:  I have personally reviewed the results from the time of this admission to 2023 07:11 EDT and agree with these findings:  [x]  Laboratory  [x]  Microbiology  [x]   Radiology  [x]  EKG/Telemetry   [x]  Cardiology/Vascular   []  Pathology  []  Old records  []  Other:  Most notable findings include:    3/29 and 3/30 pleural fluid cultures negative.  Cytology negative for malignant cells    3/22 urine culture Morganella Morgagni  Mycoplasma IgM positive        Lab 04/06/23  0405 04/05/23  0442 04/04/23  0415 04/03/23  1034 04/03/23  0539 04/02/23  0434 04/01/23  0438 03/31/23  0326   WBC 7.39 7.06 7.45  --  8.67 7.86 7.72 6.93   HEMOGLOBIN 13.7 13.8 13.8  --  14.6 13.9 13.6 13.3   HEMATOCRIT 41.7 41.2 41.6  --  44.4 43.2 42.0 40.5   PLATELETS 175 183 196  --  176 205 191 183   SODIUM 138 139 138 136  --  137 137 137   POTASSIUM 4.0 3.9 3.8 4.1  --  4.3 3.9 4.0   CHLORIDE 97* 99 99 97*  --  98 100 96*   CO2 37.3* 32.8* 38.6* 34.9*  --  34.1* 38.3* 38.4*   BUN 26* 27* 27* 27*  --  27* 29* 33*   CREATININE 0.31* 0.67* 0.63* 0.83  --  0.68* 0.66* 0.71*   GLUCOSE 208* 139* 151* 209*  --  151* 152* 165*   CALCIUM 8.9 8.9 9.0 8.9  --  8.8 8.2* 8.4*   PHOSPHORUS 2.4* 2.7 3.2 3.2  --  2.8 2.8 3.3   TOTAL PROTEIN  --   --   --   --   --   --  6.3  --    ALBUMIN 2.6* 2.6* 2.6* 2.5*  --  2.6* 2.4* 2.4*   GLOBULIN  --   --   --   --   --   --  3.9  --      Assessment & Plan   Assessment / Plan     Active Hospital Problems:  Active Hospital Problems    Diagnosis    • **Acute respiratory failure with hypercapnia    • Mycoplasma pneumonia    • New onset a-fib    • Tachycardia    • Hypoglycemia    • Obesity hypoventilation syndrome    • Diabetes mellitus    • COPD (chronic obstructive pulmonary disease)    • Pneumonia    • Acute UTI (urinary tract infection)      Impression:  Acute hypoxic and hypercapnic respiratory failure requiring NIPPV  COPD exacerbation  Mycoplasma pneumonia  Bilateral pleural effusions  Bibasilar atelectasis  Volume overload  Acute decompensated diastolic congestive heart failure  JULITA/OHS: compliant with CPAP  Urinary tract infection secondary to Morganella  Altered mental  status  Toxic/metabolic encephalopathy  Type 2 diabetes with hyperglycemia  Morbid obesity, BMI 43.2  Hyponatremia, clinically insignificant  Hypokalemia     Plan:  -Continue to wean O2 to keep sat greater than 90%.  -Continue NIPPV 20/12 at night and as needed days.  -Appreciate RT  assistance in arranging NIV for home.  -Cultures negative to date.  Has completed course of cefepime.  -Continue Lasix 40 mg IV twice daily.  Trend renal panel and electrolytes.  -Previous bilateral thoracentesis consistent with volume overload.  -Continue nebulizers and bronchopulmonary hygiene.  -Encourage I-S and flutter valve.  -Continue Eliquis for A-fib.  -Continue wound care for lower extremity chronic venous stasis wounds.  -Encourage mobilization.  Out of bed to chair.  -PT/OT on board.  -Awaiting rehab placement.    -Okay from pulmonary standpoint to discharge patient [facility] when bed available.  -We will need BiPAP 20/12 to wear at night and with naps at rehab.  -Patient will be set up with home NIV after discharge from rehab.  -We will need to follow-up in our clinic in 3 weeks.  -Will need outpatient PFTs.     DVT prophylaxis: Eliquis  Medical DVT prophylaxis orders are present.    CODE STATUS:   Code Status (Patient has no pulse and is not breathing): CPR (Attempt to Resuscitate)  Medical Interventions (Patient has pulse or is breathing): Full Support    Labs, imaging, microbiology, notes and medications personally reviewed  Discussed with primary service and bedside nurse.    Electronically signed by CLIFTON Carmona, 04/06/23, 9:46 AM EDT.    This visit was performed by BOTH a physician and an APC. I personally evaluated and examined the patient. I performed all aspects of MDM as documented. , I have reviewed and confirmed the accuracy of the patient's history as documented in this note. and I have reexamined the patient and the results are consistent with the previously documented exam. I have  updated the documentation as necessary.     Electronically signed by Destin Bailon MD, 04/06/23, 3:46 PM EDT.   Electronically signed by Destin Bailon MD, 04/06/23, 7:11 AM EDT.

## 2023-04-06 NOTE — PROGRESS NOTES
[facility]     Progress Note    Patient Name: Stephen Aguero  : 1945  MRN: [id number]  Primary Care Physician:  Papi Vasquez MD  Date of admission: 3/22/2023      Subjective   Brief summary.  Patient admitted with respiratory failure and UTI along with decreased level of consciousness.        HPI:  Patient sleepy this morning  No new issues  No shortness of breath  Using BiPAP     Review of Systems     No fever chills, no shortness of breath  Edematous and swollen  No fever chills reported        Objective     Vitals:   Temp:  [97.3 °F (36.3 °C)-97.9 °F (36.6 °C)] 97.6 °F (36.4 °C)  Heart Rate:  [77-96] 92  Resp:  [18-23] 18  BP: ()/(44-74) 128/67  Flow (L/min):  [2] 2    Physical Exam :     Elderly morbidly obese male, tired looking  Wearing BiPAP this morning.  Neck supple  Heart irregular,  Lungs diminished breath sounds few crackles  Abdomen obese and soft, morbidly obese difficult to palpate  Extremities with 3+ edema with chronic venous stasis changes      Result Review:  I have personally reviewed the results from the time of this admission to 2023 16:51 EDT and agree with these findings:  [x]  Laboratory  [x]  Microbiology  [x]  Radiology  []  EKG/Telemetry   []  Cardiology/Vascular   []  Pathology  []  Old records  []  Other:             Assessment / Plan       Active Hospital Problems:  Active Hospital Problems    Diagnosis    • **Acute respiratory failure with hypercapnia    • Mycoplasma pneumonia    • New onset a-fib    • Tachycardia    • Hypoglycemia    • Obesity hypoventilation syndrome    • Diabetes mellitus    • COPD (chronic obstructive pulmonary disease)    • Pneumonia    • Acute UTI (urinary tract infection)        Plan:     Volume status improving with diuresis.  Blood sugar stable  For discharge by pulmonary.  BiPAP/trilogy set up before discharge to inpatient rehab.  Possible discharge to [facility] Saturday morning        DVT prophylaxis:  Medical DVT  prophylaxis orders are present.    CODE STATUS:   Code Status (Patient has no pulse and is not breathing): CPR (Attempt to Resuscitate)  Medical Interventions (Patient has pulse or is breathing): Full Support              Electronically signed by Marco Bishop MD, 04/06/23, 4:53 PM EDT.    .                              .    .      .

## 2023-04-06 NOTE — PLAN OF CARE
Goal Outcome Evaluation:    Pt resistant to wearing Bipap, alert and oriented to self, confused related to time, place and situation, no distress noted.

## 2023-04-06 NOTE — PLAN OF CARE
Goal Outcome Evaluation:              Outcome Evaluation: Patient resting comfortably in bed.  VSS.  No acute changes this shift.  No signs of distress noted at this time.

## 2023-04-06 NOTE — THERAPY RE-EVALUATION
Patient Name: Stephen Aguero  : 1945    MRN: [id number]                              Today's Date: 2023       Admit Date: 3/22/2023    Visit Dx:     ICD-10-CM ICD-9-CM   1. Hypoglycemia  E16.2 251.2   2. Pneumonia of both lungs due to infectious organism, unspecified part of lung  J18.9 483.8   3. Hypercapnia  R06.89 786.09   4. Somnolence  R40.0 780.09   5. Sepsis, due to unspecified organism, unspecified whether acute organ dysfunction present  A41.9 038.9     995.91   6. Dysphagia, oropharyngeal  R13.12 787.22   7. Decreased activities of daily living (ADL)  Z78.9 V49.89   8. Difficulty walking  R26.2 719.7   9. Chronic obstructive pulmonary disease, unspecified COPD type  J44.9 496   10. Chronic respiratory failure, unspecified whether with hypoxia or hypercapnia  J96.10 518.83     Patient Active Problem List   Diagnosis   • Acute respiratory failure with hypercapnia   • Hypoglycemia   • Diabetes mellitus   • COPD (chronic obstructive pulmonary disease)   • Pneumonia   • Acute UTI (urinary tract infection)   • Obesity hypoventilation syndrome   • Tachycardia   • New onset a-fib   • Mycoplasma pneumonia     Past Medical History:   Diagnosis Date   • Diabetes mellitus    • Elevated cholesterol    • GERD (gastroesophageal reflux disease)    • Hypertension      Past Surgical History:   Procedure Laterality Date   • ABDOMINAL SURGERY     • APPENDECTOMY     • EYE SURGERY        General Information     Row Name 23 1456 23 1440       OT Time and Intention    Document Type therapy note (daily note)  -ES re-evaluation  -ES    Mode of Treatment individual therapy;occupational therapy  -ES individual therapy;occupational therapy  -ES    Row Name 23 1440          General Information    Existing Precautions/Restrictions fall;oxygen therapy device and L/min  -ES     Row Name 23 1456 23 1440       Cognition    Orientation Status (Cognition) oriented x 3  patient cooperative, agreeable  to therapy participation  - oriented x 3  -    Row Name 04/06/23 1440          Safety Issues, Functional Mobility    Impairments Affecting Function (Mobility) balance;endurance/activity tolerance;strength;shortness of breath  -           User Key  (r) = Recorded By, (t) = Taken By, (c) = Cosigned By    Initials Name Provider Type     Mary Jiang, OTR/L, CSRS Occupational Therapist                 Mobility/ADL's     Row Name 04/06/23 1441          Bed Mobility    Supine-Sit-Supine [place] (Bed Mobility) maximum assist (25% patient effort)  -     Bed Mobility, Safety Issues decreased use of arms for pushing/pulling;decreased use of legs for bridging/pushing  -     Row Name 04/06/23 1441          Transfers    Comment, (Transfers) not tested secondary to patient substantial assist with bed mobiltiy  -     Row Name 04/06/23 1441          Functional Mobility    Functional Mobility- Ind. Level not tested  -     Row Name 04/06/23 1441          Activities of Daily Living    BADL Assessment/Intervention bathing;lower body dressing;upper body dressing;grooming;feeding;toileting  -     Row Name 04/06/23 1441          Bathing Assessment/Intervention    [place] Level (Bathing) bathing skills;maximum assist (25% patient effort)  -     Row Name 04/06/23 1441          Lower Body Dressing Assessment/Training    [place] Level (Lower Body Dressing) lower body dressing skills;maximum assist (25% patient effort)  -     Row Name 04/06/23 1441          Upper Body Dressing Assessment/Training    [place] Level (Upper Body Dressing) upper body dressing skills;moderate assist (50% patient effort)  -     Row Name 04/06/23 1441          Grooming Assessment/Training    [place] Level (Grooming) grooming skills;minimum assist (75% patient effort)  -     Row Name 04/06/23 1441          Self-Feeding Assessment/Training    [place] Level (Feeding) feeding skills;set up  -[facility] Name 04/06/23  1441          Toileting Assessment/Training    [place] Level (Toileting) toileting skills;maximum assist (25% patient effort);dependent (less than 25% patient effort)  -ES     Comment, (Toileting) male external catheter  -ES           User Key  (r) = Recorded By, (t) = Taken By, (c) = Cosigned By    Initials Name Provider Type    ES Mary Jiang, OTR/L, CSRS Occupational Therapist               Obj/Interventions     Row Name 04/06/23 1445          Sensory Assessment (Somatosensory)    Sensory Assessment (Somatosensory) sensation intact  -ES     Row Name 04/06/23 1445          Vision Assessment/Intervention    Visual Impairment/Limitations WFL  -ES     Row Name 04/06/23 1445          Range of Motion Comprehensive    Comment, General Range of Motion AAROM WFL bilaterally. Patient is unable to complete UE AROM without assist  -ES     Row Name 04/06/23 1445          Strength Comprehensive (MMT)    Comment, General Manual Muscle Testing (MMT) Assessment bilateral upper extremities assessed 2+/5 grossly  -ES     Row Name 04/06/23 1457          Shoulder (Therapeutic Exercise)    Shoulder (Therapeutic Exercise) AAROM (active assistive range of motion)  -ES     Shoulder AAROM (Therapeutic Exercise) bilateral;flexion;aBduction;aDduction;supine;10 repetitions  -ES     Row Name 04/06/23 1457          Elbow/Forearm (Therapeutic Exercise)    Elbow/Forearm (Therapeutic Exercise) AAROM (active assistive range of motion)  -ES     Elbow/Forearm AAROM (Therapeutic Exercise) bilateral;flexion;extension;10 repetitions;supine  -ES     Row Name 04/06/23 1457 04/06/23 1445       Motor Skills    Coordination -- WFL  -ES    Functional Endurance -- fair minus  -ES    Therapeutic Exercise shoulder;elbow/forearm  -ES --          User Key  (r) = Recorded By, (t) = Taken By, (c) = Cosigned By    Initials Name Provider Type    ES Mary Jiang, OTR/L, CSRS Occupational Therapist               Goals/Plan     Row Name 04/06/23 145           Transfer Goal 1 (OT)    Activity/Assistive Device (Transfer Goal 1, OT) transfers, all  -ES     [place] Level/Cues Needed (Transfer Goal 1, OT) standby assist  -ES     Time Frame (Transfer Goal 1, OT) long term goal (LTG);10 days  -ES     Progress/Outcome (Transfer Goal 1, OT) goal revised this date  -ES     Row Name 04/06/23 1454          Bathing Goal 1 (OT)    Activity/Device (Bathing Goal 1, OT) bathing skills, all  -ES     [place] Level/Cues Needed (Bathing Goal 1, OT) standby assist  -ES     Time Frame (Bathing Goal 1, OT) long term goal (LTG);10 days  -ES     Progress/Outcomes (Bathing Goal 1, OT) goal revised this date  -ES     Row Name 04/06/23 1454          Dressing Goal 1 (OT)    Activity/Device (Dressing Goal 1, OT) dressing skills, all  -ES     [place]/Cues Needed (Dressing Goal 1, OT) standby assist  -ES     Time Frame (Dressing Goal 1, OT) long term goal (LTG);10 days  -ES     Progress/Outcome (Dressing Goal 1, OT) goal revised this date  -ES     Row Name 04/06/23 1454          Toileting Goal 1 (OT)    Activity/Device (Toileting Goal 1, OT) toileting skills, all  -ES     [place] Level/Cues Needed (Toileting Goal 1, OT) standby assist  -ES     Time Frame (Toileting Goal 1, OT) long term goal (LTG);10 days  -ES     Progress/Outcome (Toileting Goal 1, OT) goal revised this date  -ES     Row Name 04/06/23 1454          Grooming Goal 1 (OT)    Activity/Device (Grooming Goal 1, OT) grooming skills, all  -ES     [place] (Grooming Goal 1, OT) standby assist  -ES     Time Frame (Grooming Goal 1, OT) long term goal (LTG);10 days  -ES     Progress/Outcome (Grooming Goal 1, OT) goal revised this date  -ES     Row Name 04/06/23 1454          Strength Goal 1 (OT)    Strength Goal 1 (OT) Pt will increase BUE strength 1 muscle grade for increased UE strength required for ADL transfers and task completion  -ES     Time Frame (Strength Goal 1, OT) long term goal (LTG);10 days  -ES      Progress/Outcome (Strength Goal 1, OT) new goal  -ES     Row Name 04/06/23 1454          Problem Specific Goal 1 (OT)    Problem Specific Goal 1 (OT) patient will improve endurance to fair plus for adls  -ES     Time Frame (Problem Specific Goal 1, OT) long term goal (LTG);10 days  -ES     Progress/Outcome (Problem Specific Goal 1, OT) goal revised this date  -ES     Row Name 04/06/23 2383          Therapy Assessment/Plan (OT)    Planned Therapy Interventions (OT) activity tolerance training;BADL retraining;functional balance retraining;occupation/activity based interventions;patient/caregiver education/training;strengthening exercise;transfer/mobility retraining  -ES           User Key  (r) = Recorded By, (t) = Taken By, (c) = Cosigned By    Initials Name Provider Type    Mary Davis, OTR/L, CSRS Occupational Therapist               Clinical Impression     Row Name 04/06/23 1449          Plan of Care Review    Plan of Care Reviewed With patient  -ES     Progress no change  -ES     Outcome Evaluation Occupational Therapy reevaluation completed this session to monitor patient progress towards goals established per plan of care. Patient has made slow progress towards goals per plan of care, goals revised this date to reflect patient progress in acute care setting. Patient continues to demonstrate deficits related to strength, balance, safety awareness, tolerance, transfers and mobility that impede patient independence with activities of daily living. Patient would benefit from continued skilled OT intervention.  -ES     Row Name 04/06/23 1442          Therapy Assessment/Plan (OT)    Rehab Potential (OT) good, to achieve stated therapy goals  -ES     Criteria for Skilled Therapeutic Interventions Met (OT) yes;meets criteria;skilled treatment is necessary  -ES     Therapy Frequency (OT) 5 times/wk  -ES     Row Name 04/06/23 144          Therapy Plan Review/Discharge Plan (OT)    Anticipated Discharge Disposition  (OT) sub acute care setting  -ES     Row Name 04/06/23 1448          Vital Signs    O2 Delivery Pre Treatment nasal cannula  -ES     O2 Delivery Intra Treatment nasal cannula  -ES     O2 Delivery Post Treatment nasal cannula  -ES     Row Name 04/06/23 1448          Positioning and Restraints    Pre-Treatment Position in bed  -ES     Post Treatment Position bed  -ES           User Key  (r) = Recorded By, (t) = Taken By, (c) = Cosigned By    Initials Name Provider Type    ES Mary Jiang, OTR/L, CSRS Occupational Therapist               Outcome Measures     Row Name 04/06/23 1455          How much help from another is currently needed...    Bathing (including washing, rinsing, and drying) 1  -ES     Toileting (which includes using toilet bed pan or urinal) 1  -ES     Putting on and taking off regular upper body clothing 1  -ES     Taking care of personal grooming (such as brushing teeth) 2  -ES     Eating meals 3  -ES     Row Name 04/06/23 1100 04/06/23 0715       How much help from another person do you currently need...    Turning from your back to your side while in flat bed without using bedrails? 2  -PHYLLIS (r) SW (t) PHYLLIS (c) 2  -BE    Moving from lying on back to sitting on the side of a flat bed without bedrails? 1  -PHYLLIS (r) SW (t) PHYLLIS (c) 1  -BE    Moving to and from a bed to a chair (including a wheelchair)? 1  -PHYLLIS (r) SW (t) PHYLLIS (c) 1  -BE    Standing up from a chair using your arms (e.g., wheelchair, bedside chair)? 1  -PHYLLIS (r) SW (t) PHYLLIS (c) 1  -BE    Climbing 3-5 steps with a railing? 1  -PHYLLIS (r) SW (t) PHYLLIS (c) 1  -BE    To walk in hospital room? 1  -PHYLLIS (r) SW (t) PHYLLIS (c) 1  -BE    AM-PAC 6 Clicks Score (PT) 7  -PHYLLIS (r) SW (t) 7  -BE    Highest level of mobility 2 --> Bed activities/dependent transfer  -PHYLLIS (r) SW (t) 2 --> Bed activities/dependent transfer  -BE    Row Name 04/06/23 1454          Functional Assessment    Outcome Measure Options AM-PAC 6 Clicks Daily Activity (OT);Optimal Instrument  -ES     Row Name  04/06/23 1455          Optimal Instrument    Optimal Instrument Optimal - 3  -ES     Bending/Stooping 5  -ES     Standing 5  -ES     Reaching 2  -ES           User Key  (r) = Recorded By, (t) = Taken By, (c) = Cosigned By    Initials Name Provider Type    Micky Malave, PT Physical Therapist    Mary Davis, OTR/L, CSRS Occupational Therapist    Serina Rodney, RN Registered Nurse    Beverley John, PT Student PT Student                Occupational Therapy Education     Title: PT OT SLP Therapies (In Progress)     Topic: Occupational Therapy (In Progress)     Point: ADL training (In Progress)     Description:   Instruct learner(s) on proper safety adaptation and remediation techniques during self care or transfers.   Instruct in proper use of assistive devices.              Learning Progress Summary           Patient Acceptance, E, NR by RK at 4/3/2023 0436    Acceptance, E, VU by LISE at 3/26/2023 1010                   Point: Home exercise program (In Progress)     Description:   Instruct learner(s) on appropriate technique for monitoring, assisting and/or progressing therapeutic exercises/activities.              Learning Progress Summary           Patient Acceptance, E, NR by RK at 4/3/2023 0436    Acceptance, E, VU by AC at 3/26/2023 1010                   Point: Precautions (In Progress)     Description:   Instruct learner(s) on prescribed precautions during self-care and functional transfers.              Learning Progress Summary           Patient Acceptance, E, NR by RK at 4/3/2023 0436    Acceptance, E, VU by AC at 3/26/2023 1010                   Point: Body mechanics (In Progress)     Description:   Instruct learner(s) on proper positioning and spine alignment during self-care, functional mobility activities and/or exercises.              Learning Progress Summary           Patient Acceptance, E, NR by RK at 4/3/2023 0436    Acceptance, E, VU by AC at 3/26/2023 1010                                User Key     Initials Effective Dates Name Provider Type Discipline    RK 01/16/23 -  Harika Vela, RN Registered Nurse Nurse     06/16/21 -  Rosalva Pal OT Occupational Therapist OT              OT Recommendation and Plan  Planned Therapy Interventions (OT): activity tolerance training, BADL retraining, functional balance retraining, occupation/activity based interventions, patient/caregiver education/training, strengthening exercise, transfer/mobility retraining  Therapy Frequency (OT): 5 times/wk  Plan of Care Review  Plan of Care Reviewed With: patient  Progress: no change  Outcome Evaluation: Occupational Therapy reevaluation completed this session to monitor patient progress towards goals established per plan of care. Patient has made slow progress towards goals per plan of care, goals revised this date to reflect patient progress in acute care setting. Patient continues to demonstrate deficits related to strength, balance, safety awareness, tolerance, transfers and mobility that impede patient independence with activities of daily living. Patient would benefit from continued skilled OT intervention.     Time Calculation:    Time Calculation- OT     Row Name 04/06/23 1456             Time Calculation- OT    OT Received On 04/06/23  -ES      OT Goal Re-Cert Due Date 04/15/23  -ES         Timed Charges    72682 - OT Therapeutic Exercise Minutes 10  -ES         Untimed Charges    OT Eval/Re-eval Minutes 17  -ES         Total Minutes    Timed Charges Total Minutes 10  -ES      Untimed Charges Total Minutes 17  -ES       Total Minutes 27  -ES            User Key  (r) = Recorded By, (t) = Taken By, (c) = Cosigned By    Initials Name Provider Type    ES Mary Jiang OTR/L, CSRS Occupational Therapist              Therapy Charges for Today     Code Description Service Date Service Provider Modifiers Qty    [id number]  OT THER PROC EA 15 MIN 4/6/2023 Mary Jiang, OTR/L, CSRS GO 1    [id number]   OT RE-EVAL 2 4/6/2023 Mary Jiang, OTR/L, CSRS GO 1               ERWIN Jose/L, CSRS  4/6/2023

## 2023-04-06 NOTE — PLAN OF CARE
Goal Outcome Evaluation:      When I went to put pt. On bipap he did not want to wear it. I told him I would come back around 12 to see if he was ready to put it on. He still did not want to wear it but did agree to put it on. He ripped the mask off after that. RN said he called and talked with his wife and promised her he would wear the bipap. Placed him back on it about 0305.

## 2023-04-06 NOTE — THERAPY TREATMENT NOTE
Acute Care - Physical Therapy Treatment Note  MI[facility]     Patient Name: Stephen Aguero  : 1945  MRN: [id number]  Today's Date: 2023      Visit Dx:     ICD-10-CM ICD-9-CM   1. Hypoglycemia  E16.2 251.2   2. Pneumonia of both lungs due to infectious organism, unspecified part of lung  J18.9 483.8   3. Hypercapnia  R06.89 786.09   4. Somnolence  R40.0 780.09   5. Sepsis, due to unspecified organism, unspecified whether acute organ dysfunction present  A41.9 038.9     995.91   6. Dysphagia, oropharyngeal  R13.12 787.22   7. Decreased activities of daily living (ADL)  Z78.9 V49.89   8. Difficulty walking  R26.2 719.7   9. Chronic obstructive pulmonary disease, unspecified COPD type  J44.9 496   10. Chronic respiratory failure, unspecified whether with hypoxia or hypercapnia  J96.10 518.83     Patient Active Problem List   Diagnosis   • Acute respiratory failure with hypercapnia   • Hypoglycemia   • Diabetes mellitus   • COPD (chronic obstructive pulmonary disease)   • Pneumonia   • Acute UTI (urinary tract infection)   • Obesity hypoventilation syndrome   • Tachycardia   • New onset a-fib   • Mycoplasma pneumonia     Past Medical History:   Diagnosis Date   • Diabetes mellitus    • Elevated cholesterol    • GERD (gastroesophageal reflux disease)    • Hypertension      Past Surgical History:   Procedure Laterality Date   • ABDOMINAL SURGERY     • APPENDECTOMY     • EYE SURGERY       PT Assessment (last 12 hours)     PT Evaluation and Treatment     Row Name 23 1100          Physical Therapy Time and Intention    Subjective Information no complaints (P)   -SW     Document Type therapy note (daily note) (P)   -SW     Mode of Treatment individual therapy;physical therapy (P)   -SW     Patient Effort fair (P)   -SW     Symptoms Noted During/After Treatment none (P)   -SW     Row Name 23 1100          Safety Issues, Functional Mobility    Impairments Affecting Function (Mobility)  balance;endurance/activity tolerance;strength;shortness of breath (P)   -SW     Row Name 04/06/23 1100          Motor Skills    Therapeutic Exercise -- (P)   Supine exercises x10 reps each side, straight leg raise, heel slides, ankle pumps, and isometric hip abduction/adduction.  -SW     Row Name             Wound 03/23/23 1443 Bilateral groin    Wound - Properties Group Placement Date: 03/23/23  -DD Placement Time: 1443  -DD Side: Bilateral  -DD Orientation: --  -DD Location: groin  -DD    Retired Wound - Properties Group Placement Date: 03/23/23  -DD Placement Time: 1443  -DD Side: Bilateral  -DD Orientation: --  -DD Location: groin  -DD    Retired Wound - Properties Group Date first assessed: 03/23/23  -DD Time first assessed: 1443  -DD Side: Bilateral  -DD Location: groin  -DD    Row Name             Wound 03/23/23 1443 Bilateral lower leg    Wound - Properties Group Placement Date: 03/23/23  -DD Placement Time: 1443  -DD Side: Bilateral  -DD Orientation: lower  -DD Location: leg  -DD    Retired Wound - Properties Group Placement Date: 03/23/23  -DD Placement Time: 1443  -DD Side: Bilateral  -DD Orientation: lower  -DD Location: leg  -DD    Retired Wound - Properties Group Date first assessed: 03/23/23  -DD Time first assessed: 1443  -DD Side: Bilateral  -DD Location: leg  -DD    Row Name             Wound 03/23/23 1444 Bilateral anterior foot    Wound - Properties Group Placement Date: 03/23/23  -DD Placement Time: 1444  -DD Side: Bilateral  -DD Orientation: anterior  -DD Location: foot  -DD    Retired Wound - Properties Group Placement Date: 03/23/23  -DD Placement Time: 1444  -DD Side: Bilateral  -DD Orientation: anterior  -DD Location: foot  -DD    Retired Wound - Properties Group Date first assessed: 03/23/23  -DD Time first assessed: 1444  -DD Side: Bilateral  -DD Location: foot  -DD    Row Name             Wound 03/23/23 1445 penis    Wound - Properties Group Placement Date: 03/23/23  -DD Placement Time:  1445 -DD Location: penis  -DD    Retired Wound - Properties Group Placement Date: 03/23/23 -DD Placement Time: 1445 -DD Location: penis  -DD    Retired Wound - Properties Group Date first assessed: 03/23/23 -DD Time first assessed: 1445 -DD Location: penis  -DD    Row Name             Wound Left proximal arm Blisters    Wound - Properties Group Side: Left  -MP Orientation: proximal  -MP Location: arm  -MP Primary Wound Type: Blisters  -MP Additional Comments: weeping  -MP    Retired Wound - Properties Group Side: Left  -MP Orientation: proximal  -MP Location: arm  -MP Primary Wound Type: Blisters  -MP Additional Comments: weeping  -MP    Retired Wound - Properties Group Side: Left  -MP Location: arm  -MP Primary Wound Type: Blisters  -MP Additional Comments: weeping  -MP    Row Name 04/06/23 1100          Positioning and Restraints    Pre-Treatment Position in bed (P)   -SW     Post Treatment Position bed (P)   -SW     In Bed supine;call light within reach;with family/caregiver (P)   -SW     Row Name 04/06/23 1100          Progress Summary (PT)    Progress Toward Functional Goals (PT) progress toward functional goals is gradual (P)   -SW     Daily Progress Summary (PT) Pt tolerated supine exercises with minimal difficulty and no complaints. Pt would continue to benefit from physical therapy services while in the hospital. (P)   -SW           User Key  (r) = Recorded By, (t) = Taken By, (c) = Cosigned By    Initials Name Provider Type    Clara Ventura, RN Registered Nurse    Venessa Engel, RN Registered Nurse    Beverley John, PATTIE Student PT Student                Physical Therapy Education     Title: PT OT SLP Therapies (In Progress)     Topic: Physical Therapy (In Progress)     Point: Mobility training (In Progress)     Learning Progress Summary           Patient Acceptance, E, NR by LOUISE at 4/3/2023 0436    Acceptance, E, VU by KARINE at 3/27/2023 0952                               User Key      Initials Effective Dates Name Provider Type Discipline    RK 01/16/23 -  Harika Vela, RN Registered Nurse Nurse    JL 01/11/23 -  Shaka Iglesias, PT Student PT Student PT              PT Recommendation and Plan     Progress Summary (PT)  Progress Toward Functional Goals (PT): (P) progress toward functional goals is gradual  Daily Progress Summary (PT): (P) Pt tolerated supine exercises with minimal difficulty and no complaints. Pt would continue to benefit from physical therapy services while in the hospital.   Outcome Measures     Row Name 04/06/23 1100 04/05/23 1113 04/04/23 1400       How much help from another person do you currently need...    Turning from your back to your side while in flat bed without using bedrails? 2 (P)   -SW 2  -DP 2  -PHYLLIS (r) SW (t) PHYLLIS (c)    Moving from lying on back to sitting on the side of a flat bed without bedrails? 1 (P)   -SW 1  -DP 2  -PHYLLIS (r) SW (t) PHYLLIS (c)    Moving to and from a bed to a chair (including a wheelchair)? 1 (P)   -SW 1  -DP 1  -PHYLLIS (r) SW (t) PHYLLIS (c)    Standing up from a chair using your arms (e.g., wheelchair, bedside chair)? 1 (P)   -SW 1  -DP 1  -PHYLLIS (r) SW (t) PHYLLIS (c)    Climbing 3-5 steps with a railing? 1 (P)   -SW 1  -DP 1  -PHYLLIS (r) SW (t) PHYLLIS (c)    To walk in hospital room? 1 (P)   -SW 1  -DP 1  -PHYLLIS (r) SW (t) PHYLLIS (c)    AM-PAC 6 Clicks Score (PT) 7 (P)   -SW 7  -DP 8  -PHYLLIS (r) SW (t)       Functional Assessment    Outcome Measure Options -- AM-PAC 6 Clicks Basic Mobility (PT)  -DP --          User Key  (r) = Recorded By, (t) = Taken By, (c) = Cosigned By    Initials Name Provider Type    DP Selvin Rodriguez, PT Physical Therapist    Micky Malave, PT Physical Therapist    Beverley John, PT Student PT Student                 Time Calculation:    PT Charges     Row Name 04/06/23 1148             Timed Charges    77984 - PT Therapeutic Exercise Minutes 15 (P)   -SW         Total Minutes    Timed Charges Total Minutes 15 (P)   -SW       Total  Minutes 15 (P)   -            User Key  (r) = Recorded By, (t) = Taken By, (c) = Cosigned By    Initials Name Provider Type    Beverley John, PT Student PT Student              Therapy Charges for Today     Code Description Service Date Service Provider Modifiers Qty    68254330936 HC PT THER PROC EA 15 MIN 4/6/2023 Beverley Hu PT Student GP 1          PT G-Codes  Outcome Measure Options: AM-PAC 6 Clicks Basic Mobility (PT)  AM-PAC 6 Clicks Score (PT): (P) 7  AM-PAC 6 Clicks Score (OT): 9    Beverley Hu PT Student  4/6/2023

## 2023-04-07 LAB
ARTERIAL PATENCY WRIST A: ABNORMAL
ARTERIAL PATENCY WRIST A: ABNORMAL
BASE EXCESS BLDA CALC-SCNC: 13 MMOL/L (ref -2–2)
BASE EXCESS BLDA CALC-SCNC: 13 MMOL/L (ref -2–2)
BDY SITE: ABNORMAL
BDY SITE: ABNORMAL
CA-I BLDA-SCNC: 1.15 MMOL/L (ref 1.13–1.32)
CA-I BLDA-SCNC: 1.15 MMOL/L (ref 1.13–1.32)
CHLORIDE BLDA-SCNC: 96 MMOL/L (ref 98–106)
CHLORIDE BLDA-SCNC: 96 MMOL/L (ref 98–106)
COHGB MFR BLD: 1.1 % (ref 0–1.5)
COHGB MFR BLD: 1.1 % (ref 0–1.5)
FHHB: 8.7 % (ref 0–5)
FHHB: 8.7 % (ref 0–5)
GLUCOSE BLDA-MCNC: 162 MG/DL (ref 70–99)
GLUCOSE BLDA-MCNC: 162 MG/DL (ref 70–99)
GLUCOSE BLDC GLUCOMTR-MCNC: 138 MG/DL (ref 70–99)
GLUCOSE BLDC GLUCOMTR-MCNC: 138 MG/DL (ref 70–99)
GLUCOSE BLDC GLUCOMTR-MCNC: 139 MG/DL (ref 70–99)
GLUCOSE BLDC GLUCOMTR-MCNC: 139 MG/DL (ref 70–99)
GLUCOSE BLDC GLUCOMTR-MCNC: 144 MG/DL (ref 70–99)
GLUCOSE BLDC GLUCOMTR-MCNC: 144 MG/DL (ref 70–99)
GLUCOSE BLDC GLUCOMTR-MCNC: 148 MG/DL (ref 70–99)
GLUCOSE BLDC GLUCOMTR-MCNC: 148 MG/DL (ref 70–99)
GLUCOSE BLDC GLUCOMTR-MCNC: 199 MG/DL (ref 70–99)
GLUCOSE BLDC GLUCOMTR-MCNC: 199 MG/DL (ref 70–99)
HCO3 BLDA-SCNC: 39.8 MMOL/L (ref 22–26)
HCO3 BLDA-SCNC: 39.8 MMOL/L (ref 22–26)
HGB BLDA-MCNC: 14.9 G/DL (ref 13.8–16.4)
HGB BLDA-MCNC: 14.9 G/DL (ref 13.8–16.4)
INHALED O2 CONCENTRATION: 28 %
INHALED O2 CONCENTRATION: 28 %
LACTATE BLDA-SCNC: 1.71 MMOL/L (ref 0.5–2)
LACTATE BLDA-SCNC: 1.71 MMOL/L (ref 0.5–2)
METHGB BLD QL: 0.3 % (ref 0–1.5)
METHGB BLD QL: 0.3 % (ref 0–1.5)
MODALITY: ABNORMAL
MODALITY: ABNORMAL
NOTE: ABNORMAL
NOTE: ABNORMAL
OXYHGB MFR BLDV: 89.9 % (ref 94–99)
OXYHGB MFR BLDV: 89.9 % (ref 94–99)
PCO2 BLDA: 58.4 MM HG (ref 35–45)
PCO2 BLDA: 58.4 MM HG (ref 35–45)
PH BLDA: 7.45 PH UNITS (ref 7.35–7.45)
PH BLDA: 7.45 PH UNITS (ref 7.35–7.45)
PO2 BLD: 211 MM[HG] (ref 0–500)
PO2 BLD: 211 MM[HG] (ref 0–500)
PO2 BLDA: 59.1 MM HG (ref 80–100)
PO2 BLDA: 59.1 MM HG (ref 80–100)
POTASSIUM BLDA-SCNC: 3.89 MMOL/L (ref 3.5–5)
POTASSIUM BLDA-SCNC: 3.89 MMOL/L (ref 3.5–5)
SAO2 % BLDCOA: 91.2 % (ref 95–99)
SAO2 % BLDCOA: 91.2 % (ref 95–99)
SODIUM BLDA-SCNC: 137.6 MMOL/L (ref 136–146)
SODIUM BLDA-SCNC: 137.6 MMOL/L (ref 136–146)

## 2023-04-07 PROCEDURE — 82805 BLOOD GASES W/O2 SATURATION: CPT | Performed by: INTERNAL MEDICINE

## 2023-04-07 PROCEDURE — 36600 WITHDRAWAL OF ARTERIAL BLOOD: CPT | Performed by: INTERNAL MEDICINE

## 2023-04-07 PROCEDURE — 94660 CPAP INITIATION&MGMT: CPT

## 2023-04-07 PROCEDURE — 82375 ASSAY CARBOXYHB QUANT: CPT | Performed by: INTERNAL MEDICINE

## 2023-04-07 PROCEDURE — 94668 MNPJ CHEST WALL SBSQ: CPT

## 2023-04-07 PROCEDURE — 99233 SBSQ HOSP IP/OBS HIGH 50: CPT | Performed by: INTERNAL MEDICINE

## 2023-04-07 PROCEDURE — 94664 DEMO&/EVAL PT USE INHALER: CPT

## 2023-04-07 PROCEDURE — 94799 UNLISTED PULMONARY SVC/PX: CPT

## 2023-04-07 PROCEDURE — 25010000002 FUROSEMIDE PER 20 MG: Performed by: NURSE PRACTITIONER

## 2023-04-07 PROCEDURE — 94761 N-INVAS EAR/PLS OXIMETRY MLT: CPT

## 2023-04-07 PROCEDURE — 97110 THERAPEUTIC EXERCISES: CPT

## 2023-04-07 PROCEDURE — 63710000001 INSULIN REGULAR HUMAN PER 5 UNITS: Performed by: INTERNAL MEDICINE

## 2023-04-07 PROCEDURE — 83050 HGB METHEMOGLOBIN QUAN: CPT | Performed by: INTERNAL MEDICINE

## 2023-04-07 PROCEDURE — 82962 GLUCOSE BLOOD TEST: CPT

## 2023-04-07 RX ADMIN — GLIPIZIDE 5 MG: 5 TABLET ORAL at 09:15

## 2023-04-07 RX ADMIN — IPRATROPIUM BROMIDE AND ALBUTEROL SULFATE 3 ML: 2.5; .5 SOLUTION RESPIRATORY (INHALATION) at 06:32

## 2023-04-07 RX ADMIN — IPRATROPIUM BROMIDE AND ALBUTEROL SULFATE 3 ML: 2.5; .5 SOLUTION RESPIRATORY (INHALATION) at 19:23

## 2023-04-07 RX ADMIN — IPRATROPIUM BROMIDE AND ALBUTEROL SULFATE 3 ML: 2.5; .5 SOLUTION RESPIRATORY (INHALATION) at 11:33

## 2023-04-07 RX ADMIN — DULOXETINE HYDROCHLORIDE 60 MG: 30 CAPSULE, DELAYED RELEASE ORAL at 09:15

## 2023-04-07 RX ADMIN — CARVEDILOL 25 MG: 25 TABLET, FILM COATED ORAL at 20:16

## 2023-04-07 RX ADMIN — CARVEDILOL 25 MG: 25 TABLET, FILM COATED ORAL at 09:16

## 2023-04-07 RX ADMIN — FUROSEMIDE 40 MG: 10 INJECTION, SOLUTION INTRAMUSCULAR; INTRAVENOUS at 09:15

## 2023-04-07 RX ADMIN — INSULIN HUMAN 2 UNITS: 100 INJECTION, SOLUTION PARENTERAL at 12:54

## 2023-04-07 RX ADMIN — APIXABAN 5 MG: 5 TABLET, FILM COATED ORAL at 09:16

## 2023-04-07 RX ADMIN — FAMOTIDINE 40 MG: 20 TABLET ORAL at 09:15

## 2023-04-07 RX ADMIN — Medication 10 ML: at 09:16

## 2023-04-07 RX ADMIN — FUROSEMIDE 40 MG: 10 INJECTION, SOLUTION INTRAMUSCULAR; INTRAVENOUS at 20:16

## 2023-04-07 RX ADMIN — IPRATROPIUM BROMIDE AND ALBUTEROL SULFATE 3 ML: 2.5; .5 SOLUTION RESPIRATORY (INHALATION) at 00:31

## 2023-04-07 RX ADMIN — APIXABAN 5 MG: 5 TABLET, FILM COATED ORAL at 20:16

## 2023-04-07 RX ADMIN — SENNOSIDES AND DOCUSATE SODIUM 1 TABLET: 8.6; 5 TABLET ORAL at 20:16

## 2023-04-07 NOTE — THERAPY TREATMENT NOTE
Acute Care - Physical Therapy Treatment Note   Alan     Patient Name: Stephen Aguero  : 1945  MRN: [id number]  Today's Date: 2023      Visit Dx:     ICD-10-CM ICD-9-CM   1. Hypoglycemia  E16.2 251.2   2. Pneumonia of both lungs due to infectious organism, unspecified part of lung  J18.9 483.8   3. Hypercapnia  R06.89 786.09   4. Somnolence  R40.0 780.09   5. Sepsis, due to unspecified organism, unspecified whether acute organ dysfunction present  A41.9 038.9     995.91   6. Dysphagia, oropharyngeal  R13.12 787.22   7. Decreased activities of daily living (ADL)  Z78.9 V49.89   8. Difficulty walking  R26.2 719.7   9. Chronic obstructive pulmonary disease, unspecified COPD type  J44.9 496   10. Chronic respiratory failure, unspecified whether with hypoxia or hypercapnia  J96.10 518.83     Patient Active Problem List   Diagnosis   • Acute respiratory failure with hypercapnia   • Hypoglycemia   • Diabetes mellitus   • COPD (chronic obstructive pulmonary disease)   • Pneumonia   • Acute UTI (urinary tract infection)   • Obesity hypoventilation syndrome   • Tachycardia   • New onset a-fib   • Mycoplasma pneumonia     Past Medical History:   Diagnosis Date   • Diabetes mellitus    • Elevated cholesterol    • GERD (gastroesophageal reflux disease)    • Hypertension      Past Surgical History:   Procedure Laterality Date   • ABDOMINAL SURGERY     • APPENDECTOMY     • EYE SURGERY       PT Assessment (last 12 hours)     PT Evaluation and Treatment     Row Name 23 1149          Physical Therapy Time and Intention    Subjective Information no complaints  -RH     Document Type therapy note (daily note)  -     Mode of Treatment individual therapy;physical therapy  -     Patient Effort fair  -     Row Name 23 1149          Hip (Therapeutic Exercise)    Hip (Therapeutic Exercise) AAROM (active assistive range of motion);isometric exercises  -     Hip AAROM (Therapeutic Exercise)  bilateral;aBduction;aDduction;10 repetitions;2 sets;supine  -     Hip Isometrics (Therapeutic Exercise) bilateral;gluteal sets;10 repetitions;2 sets;supine  -     Row Name 04/07/23 1149          Knee (Therapeutic Exercise)    Knee (Therapeutic Exercise) AAROM (active assistive range of motion);isometric exercises;strengthening exercise  -     Knee AAROM (Therapeutic Exercise) bilateral;flexion;supine;10 repetitions;2 sets;extension  -     Knee Isometrics (Therapeutic Exercise) quad sets;supine;10 repetitions;2 sets  -     Knee Strengthening (Therapeutic Exercise) bilateral;SAQ (short arc quad);10 repetitions;2 sets  -     Row Name 04/07/23 1149          Ankle (Therapeutic Exercise)    Ankle (Therapeutic Exercise) AAROM (active assistive range of motion)  -     Ankle AAROM (Therapeutic Exercise) dorsiflexion;plantarflexion;supine;10 repetitions;2 sets  -     Row Name             Wound 03/23/23 1443 Bilateral groin    Wound - Properties Group Placement Date: 03/23/23  -DD Placement Time: 1443  -DD Side: Bilateral  -DD Orientation: --  -DD Location: groin  -DD    Retired Wound - Properties Group Placement Date: 03/23/23  -DD Placement Time: 1443  -DD Side: Bilateral  -DD Orientation: --  -DD Location: groin  -DD    Retired Wound - Properties Group Date first assessed: 03/23/23  -DD Time first assessed: 1443  -DD Side: Bilateral  -DD Location: groin  -DD    Row Name             Wound 03/23/23 1443 Bilateral lower leg    Wound - Properties Group Placement Date: 03/23/23  -DD Placement Time: 1443  -DD Side: Bilateral  -DD Orientation: lower  -DD Location: leg  -DD    Retired Wound - Properties Group Placement Date: 03/23/23  -DD Placement Time: 1443  -DD Side: Bilateral  -DD Orientation: lower  -DD Location: leg  -DD    Retired Wound - Properties Group Date first assessed: 03/23/23  -DD Time first assessed: 1443  -DD Side: Bilateral  -DD Location: leg  -DD    Row Name             Wound 03/23/23 1444  Bilateral anterior foot    Wound - Properties Group Placement Date: 03/23/23 -DD Placement Time: 1444  -DD Side: Bilateral  -DD Orientation: anterior  -DD Location: foot  -DD    Retired Wound - Properties Group Placement Date: 03/23/23 -DD Placement Time: 1444  -DD Side: Bilateral  -DD Orientation: anterior  -DD Location: foot  -DD    Retired Wound - Properties Group Date first assessed: 03/23/23 -DD Time first assessed: 1444  -DD Side: Bilateral  -DD Location: foot  -DD    Row Name             Wound 03/23/23 1445 penis    Wound - Properties Group Placement Date: 03/23/23 -DD Placement Time: 1445  -DD Location: penis  -DD    Retired Wound - Properties Group Placement Date: 03/23/23 -DD Placement Time: 1445 -DD Location: penis  -DD    Retired Wound - Properties Group Date first assessed: 03/23/23 -DD Time first assessed: 1445  -DD Location: penis  -DD    Row Name             Wound Left proximal arm Blisters    Wound - Properties Group Side: Left  -MP Orientation: proximal  -MP Location: arm  -MP Primary Wound Type: Blisters  -MP Additional Comments: weeping  -MP    Retired Wound - Properties Group Side: Left  -MP Orientation: proximal  -MP Location: arm  -MP Primary Wound Type: Blisters  -MP Additional Comments: weeping  -MP    Retired Wound - Properties Group Side: Left  -MP Location: arm  -MP Primary Wound Type: Blisters  -MP Additional Comments: weeping  -MP    Row Name 04/07/23 1149          Vital Signs    O2 Delivery Intra Treatment nasal cannula  -RH     Row Name 04/07/23 1149          Progress Summary (PT)    Progress Toward Functional Goals (PT) progress toward functional goals is gradual  -RH           User Key  (r) = Recorded By, (t) = Taken By, (c) = Cosigned By    Initials Name Provider Type    Clara Ventura RN Registered Nurse    RH Giorgio Otero PTA Physical Therapist Assistant    DD Venessa Glasgow RN Registered Nurse                Physical Therapy Education     Title: PT OT SLP Therapies  (In Progress)     Topic: Physical Therapy (In Progress)     Point: Mobility training (In Progress)     Learning Progress Summary           Patient Acceptance, E, NR by LOUISE at 4/3/2023 0436    Acceptance, E, VU by KARINE at 3/27/2023 0936                               User Key     Initials Effective Dates Name Provider Type Discipline    LOUISE 01/16/23 -  Harika Vela, RN Registered Nurse Nurse    KARINE 01/11/23 -  Shaka Iglesias, PATTIE Student PT Student PT              PT Recommendation and Plan     Progress Summary (PT)  Progress Toward Functional Goals (PT): progress toward functional goals is gradual   Outcome Measures     Row Name 04/07/23 1100 04/06/23 1100 04/05/23 1113       How much help from another person do you currently need...    Turning from your back to your side while in flat bed without using bedrails? 1  - 2  -PHYLLIS (r) SW (t) PHYLLIS (c) 2  -DP    Moving from lying on back to sitting on the side of a flat bed without bedrails? 1  - 1  -PHYLLIS (r) SW (t) PHYLLIS (c) 1  -DP    Moving to and from a bed to a chair (including a wheelchair)? 1  - 1  -PHYLLIS (r) SW (t) PHYLLIS (c) 1  -DP    Standing up from a chair using your arms (e.g., wheelchair, bedside chair)? 1  - 1  -PHYLLIS (r) SW (t) PHYLLIS (c) 1  -DP    Climbing 3-5 steps with a railing? 1  - 1  -PHYLLIS (r) SW (t) PHYLLIS (c) 1  -DP    To walk in hospital room? 1  - 1  -PHYLLIS (r) SW (t) PHYLLIS (c) 1  -DP    AM-PAC 6 Clicks Score (PT) 6  -RH 7  -PHYLLIS (r) SW (t) 7  -DP       Functional Assessment    Outcome Measure Options -- -- AM-PAC 6 Clicks Basic Mobility (PT)  -DP    Row Name 04/04/23 1400             How much help from another person do you currently need...    Turning from your back to your side while in flat bed without using bedrails? 2  -PHYLLIS (r) SW (t) PHYLLIS (c)      Moving from lying on back to sitting on the side of a flat bed without bedrails? 2  -PHYLLIS (r) SW (t) PHYLLIS (c)      Moving to and from a bed to a chair (including a wheelchair)? 1  -PHYLLIS (r) SW (t) PHYLLIS (c)      Standing up from a chair  using your arms (e.g., wheelchair, bedside chair)? 1  -PHYLLIS (r) SW (t) PHYLLIS (c)      Climbing 3-5 steps with a railing? 1  -PHYLLIS (r) SW (t) PHYLLIS (c)      To walk in hospital room? 1  -PHYLLIS (r) SW (t) PHYLLIS (c)      AM-PAC 6 Clicks Score (PT) 8  -PHYLLIS (r) SW (t)            User Key  (r) = Recorded By, (t) = Taken By, (c) = Cosigned By    Initials Name Provider Type     Giorgio Otero PTA Physical Therapist Assistant    Selvin Pemberton, PT Physical Therapist    Micky Malave, PT Physical Therapist    Beverley John, PT Student PT Student                 Time Calculation:    PT Charges     Row Name 04/07/23 1152             Time Calculation    PT Received On 04/07/23  -RH         Timed Charges    70233 - PT Therapeutic Exercise Minutes 24  -RH         Total Minutes    Timed Charges Total Minutes 24  -RH       Total Minutes 24  -RH            User Key  (r) = Recorded By, (t) = Taken By, (c) = Cosigned By    Initials Name Provider Type     Giorgio Otero PTA Physical Therapist Assistant              Therapy Charges for Today     Code Description Service Date Service Provider Modifiers Qty    31878348074 HC PT THER PROC EA 15 MIN 4/7/2023 Giorgio Otero PTA GP 2          PT G-Codes  Outcome Measure Options: AM-PAC 6 Clicks Daily Activity (OT), Optimal Instrument  AM-PAC 6 Clicks Score (PT): 6  AM-PAC 6 Clicks Score (OT): 9    Giorgio Otero PTA  4/7/2023

## 2023-04-07 NOTE — PROGRESS NOTES
Pulmonary / Critical Care Progress Note      Patient Name: Stephen Aguero  : 1945  MRN: [id number]  Attending:  Marco Bishop MD  Date of admission: 3/22/2023    Subjective   Subjective   Follow-up for hypoxic respiratory failure, volume overload, bilateral pleural effusions, mycoplasma pneumonia, JULITA/OHS.    3/29 right thoracentesis with drainage of 500 mL.  Cultures and cytology negative.  3/30 left thoracentesis with drainage of 900 mL.  Cultures and cytology negative.    Over last 24 hours, remained on DuoNeb every 6 hours.  Has been on Lasix 40 mg twice daily.  Has been using NIPPV for    No acute events overnight.  Wore NIPPV, BiPAP  with 28% FiO2.    This morning,  Lying in bed, sleeping, arousable, on BiPAP.  Continues to diurese well  Dyspnea improved  Overall feeling better  No nausea, fevers or chills  No chest pain  Bed ridden, weak and fatigued  Awaiting rehab placement      Review of Systems  Constitutional symptoms: Fatigue and weakness, otherwise denied complaints   Ear, nose, throat: Denied complaints  Cardiovascular:  Denied complaints  Respiratory: Dyspnea and cough, otherwise some dyspnea  Hematologic: Denied complaints  Neurologic: Denied complaints  Skin: Denied complaints    Objective   Objective     Vitals:   Temp:  [97.3 °F (36.3 °C)-98 °F (36.7 °C)] 97.8 °F (36.6 °C)  Heart Rate:  [80-97] 86  Resp:  [18-20] 18  BP: (101-133)/(61-75) 126/75  Flow (L/min):  [2-3.5] 2    Physical Exam   Vital Signs Reviewed    General:  WDWN, alert, NAD on 2 L NC  HEENT:  PERRL, EOMI.  OP, nares clear  Chest:  good aeration, diminsihed bilaterally, tympanic to percussion, no work of breathing noted   CV: RRR, no MGR, pulses 2+, equal  Abd:  Soft, NT, ND, + BS, obese  EXT:  no clubbing, no cyanosis, decreasing bilateral lower extremity edema with chronic venous stasis changes  Neuro:  A&Ox3, CN grossly intact, no focal deficits  Skin: No rashes or lesions noted    Result Review    Result Review:  I  have personally reviewed the results from the time of this admission to 4/7/2023 07:11 EDT and agree with these findings:  [x]  Laboratory  [x]  Microbiology  [x]  Radiology  [x]  EKG/Telemetry   [x]  Cardiology/Vascular   []  Pathology  []  Old records  []  Other:  Most notable findings include:    3/29 and 3/30 pleural fluid cultures negative.  Cytology negative for malignant cells    3/22 urine culture Morganella Morgagni  Mycoplasma IgM positive        Lab 04/06/23  0405 04/05/23  0442 04/04/23  0415 04/03/23  1034 04/03/23  0539 04/02/23  0434 04/01/23  0438   WBC 7.39 7.06 7.45  --  8.67 7.86 7.72   HEMOGLOBIN 13.7 13.8 13.8  --  14.6 13.9 13.6   HEMATOCRIT 41.7 41.2 41.6  --  44.4 43.2 42.0   PLATELETS 175 183 196  --  176 205 191   SODIUM 138 139 138 136  --  137 137   POTASSIUM 4.0 3.9 3.8 4.1  --  4.3 3.9   CHLORIDE 97* 99 99 97*  --  98 100   CO2 37.3* 32.8* 38.6* 34.9*  --  34.1* 38.3*   BUN 26* 27* 27* 27*  --  27* 29*   CREATININE 0.31* 0.67* 0.63* 0.83  --  0.68* 0.66*   GLUCOSE 208* 139* 151* 209*  --  151* 152*   CALCIUM 8.9 8.9 9.0 8.9  --  8.8 8.2*   PHOSPHORUS 2.4* 2.7 3.2 3.2  --  2.8 2.8   TOTAL PROTEIN  --   --   --   --   --   --  6.3   ALBUMIN 2.6* 2.6* 2.6* 2.5*  --  2.6* 2.4*   GLOBULIN  --   --   --   --   --   --  3.9     Assessment & Plan   Assessment / Plan     Active Hospital Problems:  Active Hospital Problems    Diagnosis    • **Acute respiratory failure with hypercapnia    • Mycoplasma pneumonia    • New onset a-fib    • Tachycardia    • Hypoglycemia    • Obesity hypoventilation syndrome    • Diabetes mellitus    • COPD (chronic obstructive pulmonary disease)    • Pneumonia    • Acute UTI (urinary tract infection)      Impression:  Acute hypoxic and hypercapnic respiratory failure requiring NIPPV  COPD exacerbation  Mycoplasma pneumonia  Bilateral pleural effusions  Bibasilar atelectasis  Volume overload  Acute decompensated diastolic congestive heart failure  JULITA/OHS: compliant  with CPAP  Urinary tract infection secondary to Morganella  Altered mental status  Toxic/metabolic encephalopathy  Type 2 diabetes with hyperglycemia  Morbid obesity, BMI 43.2  Hyponatremia, clinically insignificant  Hypokalemia     Plan:  -Continue to wean O2 to keep sat greater than 90%.  -Continue NIPPV 20/12 at night and as needed days.  -Appreciate RT  assistance in arranging NIV for home.  Patient is supposed to get AVAPS vent today  Mr Aguero has chronic respiratory failure secondary to COPD. Additionally, Mr Aguero's respiratory failure is compounded by his underlying JULITA/OHS; therefore he will require IVAPS AE mode, which provides targeted tidal/minute volume, back up rate and battery back up. Bilevel has been considered and ruled out due to the lack of AVAPS mode and backup battery on home bilevel unit. Failure to provide non invasive ventilation for Mr Aguero's chronic respiratory failure increases patient's risk of acute on chronic respiratory failure, rehospitalization, and mortality.  We can try to use the machine while he is in hospital today.  Can likely be discharged on 8 to the rehab if he is able to tolerate tonight.  -Cultures negative to date.  Has completed course of cefepime.  -Continue Lasix 40 mg IV twice daily.  Trend renal panel and electrolytes.  -Previous bilateral thoracentesis consistent with volume overload.  -Continue nebulizers and bronchopulmonary hygiene.  -Encourage I-S and flutter valve.  -Continue Eliquis for A-fib.  -Continue wound care for lower extremity chronic venous stasis wounds.  -Encourage mobilization.  Out of bed to chair.  -PT/OT on board.  -Awaiting rehab placement.    -Okay from pulmonary standpoint to [facility].  -We will need BiPAP 20/12 to wear at night and with naps at rehab.  -Patient will be set up with home NIV after discharge from rehab.  -We will need to follow-up in our clinic in 3  weeks.  -Will need outpatient PFTs.    We will sign off now.  Please call with questions.     DVT prophylaxis: Eliquis  Medical DVT prophylaxis orders are present.    CODE STATUS:   Code Status (Patient has no pulse and is not breathing): CPR (Attempt to Resuscitate)  Medical Interventions (Patient has pulse or is breathing): Full Support    Labs, imaging, microbiology, notes and medications personally reviewed  Discussed with primary service and bedside nurse.    Electronically signed by Destin Bailon MD, 04/07/23, 7:11 AM EDT.

## 2023-04-07 NOTE — PLAN OF CARE
Goal Outcome Evaluation:  Plan of Care Reviewed With: patient        Progress: no change  Outcome Evaluation: Patient resting comfortably on Bipap this morning on settings of 20/12 28%. Nasal mask is being used.

## 2023-04-07 NOTE — PLAN OF CARE
Goal Outcome Evaluation: pt turned q2h.  Unable to get oob.  Anticipate dc [facility] tomorrow.

## 2023-04-07 NOTE — CONSULTS
"Nutrition Services    Patient Name: Stephen Aguero  YOB: 1945  MRN: [id number]  Admission date: 3/22/2023      CLINICAL NUTRITION ASSESSMENT      Reason for Assessment  Follow-up protocol     H&P:    Past Medical History:   Diagnosis Date   • Diabetes mellitus    • Elevated cholesterol    • GERD (gastroesophageal reflux disease)    • Hypertension         Current Problems:   Active Hospital Problems    Diagnosis    • **Acute respiratory failure with hypercapnia    • Mycoplasma pneumonia    • New onset a-fib    • Tachycardia    • Hypoglycemia    • Obesity hypoventilation syndrome    • Diabetes mellitus    • COPD (chronic obstructive pulmonary disease)    • Pneumonia    • Acute UTI (urinary tract infection)         Nutrition/Diet History         Narrative     Continues to have variable intake throughout admission.      Diet office reports patient is requesting only chocolate oral nutrition supplements.  He has been drinking those.  Will update orders accordingly.       Anthropometrics        Current Height, Weight Height: 180.3 cm (71\")  Weight: (!) 141 kg (310 lb 3 oz)   Current BMI Body mass index is 43.26 kg/m².       Weight Hx  Wt Readings from Last 30 Encounters:   03/22/23 2112 (!) 141 kg (310 lb 3 oz)   03/22/23 1611 (!) 140 kg (308 lb 13.8 oz)            Wt Change Observation Unable to determine.       Estimated/Assessed Needs       Energy Requirements 30 kcal/kg IBW   EST Needs (kcal/day) 2259 kcal        Protein Requirements 1.0 g/kg IBW   EST Daily Needs (g/day) 75       Fluid Requirements 25 ml/kg IBW    Estimated Needs (mL/day) 1883 ml      Labs/Medications         Pertinent Labs Reviewed.   Results from last 7 days   Lab Units 04/07/23  0811 04/06/23  0405 04/05/23  0442 04/04/23  0415 04/02/23  0434 04/01/23  0438   SODIUM mmol/L  --  138 139 138   < > 137   SODIUM, ARTERIAL mmol/L 137.6  --   --   --   --   --    POTASSIUM mmol/L  --  4.0 3.9 3.8   < > 3.9   CHLORIDE mmol/L  --  97* 99 99 "   < > 100   CO2 mmol/L  --  37.3* 32.8* 38.6*   < > 38.3*   BUN mg/dL  --  26* 27* 27*   < > 29*   CREATININE mg/dL  --  0.31* 0.67* 0.63*   < > 0.66*   CALCIUM mg/dL  --  8.9 8.9 9.0   < > 8.2*   BILIRUBIN mg/dL  --   --   --   --   --  0.7   ALK PHOS U/L  --   --   --   --   --  106   ALT (SGPT) U/L  --   --   --   --   --  18   AST (SGOT) U/L  --   --   --   --   --  23   GLUCOSE mg/dL  --  208* 139* 151*   < > 152*   GLUCOSE, ARTERIAL mg/dL 162*  --   --   --   --   --     < > = values in this interval not displayed.     Results from last 7 days   Lab Units 04/06/23  0405 04/05/23  0442 04/04/23  0415   MAGNESIUM mg/dL 2.1 2.1 2.2   PHOSPHORUS mg/dL 2.4* 2.7 3.2   HEMOGLOBIN g/dL 13.7 13.8 13.8   HEMATOCRIT % 41.7 41.2 41.6     COVID19   Date Value Ref Range Status   03/22/2023 Not Detected Not Detected - Ref. Range Final     No results found for: HGBA1C      Pertinent Medications Reviewed.     Current Nutrition Orders & Evaluation of Intake       Oral Nutrition     Current PO Diet Diet: Cardiac Diets; Healthy Heart (2-3 Na+); Texture: Mechanical Ground (NDD 2); Fluid Consistency: Thin (IDDSI 0)   Supplement Orders Placed This Encounter      Dietary Nutrition Supplements Boost Glucose Control (Glucerna Shake); chocolate       Malnutrition Severity Assessment                Nutrition Diagnosis         Nutrition Dx Problem 1 Inadequate energy Intake related to decreased ability to consume sufficient energy as evidenced by report of minimal PO intake.       Nutrition Intervention         Boost Glucose Control TID  +570 kcal, 48 g pro per day      Medical Nutrition Therapy/Nutrition Education          Learner     Readiness Patient  Education not appropriate at this time     Method     Response N/A  N/A     Monitor/Evaluation        Monitor Per protocol, PO intake, Supplement intake, Weight, POC/GOC       Nutrition Discharge Plan         To be determined       Electronically signed by:  Justina Dawson RD  04/07/23  12:18 EDT

## 2023-04-07 NOTE — PROGRESS NOTES
[facility]     Progress Note    Patient Name: Stephen Aguero  : 1945  MRN: [id number]  Primary Care Physician:  Papi Vasquez MD  Date of admission: 3/22/2023      Subjective   Brief summary.  Patient admitted with respiratory failure and UTI along with decreased level of consciousness.        HPI:  Follow-up on shortness of breath, comfortable, using BiPAP, no shortness of breath noted.  Patient sleepy most of the time.     Review of Systems     Denies fever or chills.  Swelling coming down  No shortness of breath      Objective     Vitals:   Temp:  [97.3 °F (36.3 °C)-98 °F (36.7 °C)] 97.7 °F (36.5 °C)  Heart Rate:  [80-97] 85  Resp:  [16-20] 18  BP: (104-127)/(46-75) 122/65  Flow (L/min):  [2-3.5] 2    Physical Exam :     Elderly morbidly obese male, tired looking  Wearing BiPAP .  Neck supple  Heart irregular,  Lungs diminished breath sounds few crackles  Abdomen obese and soft, morbidly obese difficult to palpate  Extremities with 3+ edema with chronic venous stasis changes      Result Review:  I have personally reviewed the results from the time of this admission to 2023 16:48 EDT and agree with these findings:  [x]  Laboratory  [x]  Microbiology  [x]  Radiology  []  EKG/Telemetry   []  Cardiology/Vascular   []  Pathology  []  Old records  []  Other:             Assessment / Plan       Active Hospital Problems:  Active Hospital Problems    Diagnosis    • **Acute respiratory failure with hypercapnia    • Mycoplasma pneumonia    • New onset a-fib    • Tachycardia    • Hypoglycemia    • Obesity hypoventilation syndrome    • Diabetes mellitus    • COPD (chronic obstructive pulmonary disease)    • Pneumonia    • Acute UTI (urinary tract infection)        Plan:     Patient improving.  Remains weak and lethargic.  Will benefit from inpatient rehab.  Discussed with pulmonologist.  Cleared for discharge.  We will discharge him tomorrow to inpatient rehab        DVT prophylaxis:  Medical DVT  prophylaxis orders are present.    CODE STATUS:   Code Status (Patient has no pulse and is not breathing): CPR (Attempt to Resuscitate)  Medical Interventions (Patient has pulse or is breathing): Full Support                Electronically signed by Marco Bishop MD, 04/07/23, 4:49 PM EDT.      .                              .    .      .

## 2023-04-08 VITALS
RESPIRATION RATE: 22 BRPM | OXYGEN SATURATION: 100 % | HEIGHT: 71 IN | WEIGHT: 310.19 LBS | DIASTOLIC BLOOD PRESSURE: 58 MMHG | HEART RATE: 88 BPM | SYSTOLIC BLOOD PRESSURE: 104 MMHG | TEMPERATURE: 97.7 F | RESPIRATION RATE: 22 BRPM | HEART RATE: 88 BPM | WEIGHT: 310.19 LBS | HEIGHT: 71 IN | SYSTOLIC BLOOD PRESSURE: 104 MMHG | DIASTOLIC BLOOD PRESSURE: 58 MMHG | TEMPERATURE: 97.7 F | OXYGEN SATURATION: 100 % | BODY MASS INDEX: 43.43 KG/M2 | BODY MASS INDEX: 43.43 KG/M2

## 2023-04-08 PROBLEM — J96.12 CHRONIC RESPIRATORY FAILURE WITH HYPERCAPNIA: Chronic | Status: ACTIVE | Noted: 2023-04-08

## 2023-04-08 PROBLEM — E16.2 HYPOGLYCEMIA: Status: RESOLVED | Noted: 2023-03-23 | Resolved: 2023-04-08

## 2023-04-08 PROBLEM — J96.02 ACUTE RESPIRATORY FAILURE WITH HYPERCAPNIA: Status: RESOLVED | Noted: 2023-01-01 | Resolved: 2023-01-01

## 2023-04-08 PROBLEM — J15.7 MYCOPLASMA PNEUMONIA: Status: RESOLVED | Noted: 2023-01-01 | Resolved: 2023-01-01

## 2023-04-08 PROBLEM — R00.0 TACHYCARDIA: Status: RESOLVED | Noted: 2023-03-24 | Resolved: 2023-04-08

## 2023-04-08 LAB
GLUCOSE BLDC GLUCOMTR-MCNC: 132 MG/DL (ref 70–99)
GLUCOSE BLDC GLUCOMTR-MCNC: 132 MG/DL (ref 70–99)
GLUCOSE BLDC GLUCOMTR-MCNC: 227 MG/DL (ref 70–99)
GLUCOSE BLDC GLUCOMTR-MCNC: 227 MG/DL (ref 70–99)

## 2023-04-08 PROCEDURE — 94664 DEMO&/EVAL PT USE INHALER: CPT

## 2023-04-08 PROCEDURE — 94799 UNLISTED PULMONARY SVC/PX: CPT

## 2023-04-08 PROCEDURE — 82962 GLUCOSE BLOOD TEST: CPT

## 2023-04-08 PROCEDURE — 63710000001 INSULIN REGULAR HUMAN PER 5 UNITS: Performed by: INTERNAL MEDICINE

## 2023-04-08 PROCEDURE — 97110 THERAPEUTIC EXERCISES: CPT

## 2023-04-08 PROCEDURE — 25010000002 FUROSEMIDE PER 20 MG: Performed by: NURSE PRACTITIONER

## 2023-04-08 PROCEDURE — 94761 N-INVAS EAR/PLS OXIMETRY MLT: CPT

## 2023-04-08 PROCEDURE — 94668 MNPJ CHEST WALL SBSQ: CPT

## 2023-04-08 PROCEDURE — 94660 CPAP INITIATION&MGMT: CPT

## 2023-04-08 RX ORDER — INSULIN GLARGINE 300 U/ML
30 INJECTION, SOLUTION SUBCUTANEOUS DAILY
Qty: 3 ML | Refills: 0 | Status: ON HOLD
Start: 2023-04-08 | End: 2023-04-28

## 2023-04-08 RX ORDER — IPRATROPIUM BROMIDE AND ALBUTEROL SULFATE 2.5; .5 MG/3ML; MG/3ML
3 SOLUTION RESPIRATORY (INHALATION) EVERY 6 HOURS PRN
Status: DISCONTINUED | OUTPATIENT
Start: 2023-04-08 | End: 2023-04-08 | Stop reason: HOSPADM

## 2023-04-08 RX ORDER — INSULIN GLARGINE 300 U/ML
30 INJECTION, SOLUTION SUBCUTANEOUS DAILY
Qty: 3 ML | Refills: 0
Start: 2023-04-08 | End: 2023-04-08 | Stop reason: SDUPTHER

## 2023-04-08 RX ORDER — IPRATROPIUM BROMIDE AND ALBUTEROL SULFATE 2.5; .5 MG/3ML; MG/3ML
3 SOLUTION RESPIRATORY (INHALATION) EVERY 6 HOURS PRN
Qty: 360 ML | Refills: 0
Start: 2023-04-08 | End: 2023-04-13 | Stop reason: CLARIF

## 2023-04-08 RX ADMIN — IPRATROPIUM BROMIDE AND ALBUTEROL SULFATE 3 ML: 2.5; .5 SOLUTION RESPIRATORY (INHALATION) at 00:46

## 2023-04-08 RX ADMIN — APIXABAN 5 MG: 5 TABLET, FILM COATED ORAL at 08:50

## 2023-04-08 RX ADMIN — FAMOTIDINE 40 MG: 20 TABLET ORAL at 08:51

## 2023-04-08 RX ADMIN — INSULIN HUMAN 3 UNITS: 100 INJECTION, SOLUTION PARENTERAL at 13:00

## 2023-04-08 RX ADMIN — GLIPIZIDE 5 MG: 5 TABLET ORAL at 08:50

## 2023-04-08 RX ADMIN — DULOXETINE HYDROCHLORIDE 60 MG: 30 CAPSULE, DELAYED RELEASE ORAL at 08:50

## 2023-04-08 RX ADMIN — SENNOSIDES AND DOCUSATE SODIUM 1 TABLET: 8.6; 5 TABLET ORAL at 08:51

## 2023-04-08 RX ADMIN — IPRATROPIUM BROMIDE AND ALBUTEROL SULFATE 3 ML: 2.5; .5 SOLUTION RESPIRATORY (INHALATION) at 06:46

## 2023-04-08 RX ADMIN — Medication 10 ML: at 08:49

## 2023-04-08 RX ADMIN — POLYETHYLENE GLYCOL 3350 17 G: 17 POWDER, FOR SOLUTION ORAL at 08:49

## 2023-04-08 RX ADMIN — FUROSEMIDE 40 MG: 10 INJECTION, SOLUTION INTRAMUSCULAR; INTRAVENOUS at 08:49

## 2023-04-08 RX ADMIN — CARVEDILOL 25 MG: 25 TABLET, FILM COATED ORAL at 08:51

## 2023-04-08 RX ADMIN — TRIAMCINOLONE ACETONIDE 1 APPLICATION: 1 OINTMENT TOPICAL at 11:00

## 2023-04-08 NOTE — THERAPY TREATMENT NOTE
Acute Care - Physical Therapy Treatment Note  [facility]     Patient Name: Stephen Aguero  : 1945  MRN: [id number]  Today's Date: 2023      Visit Dx:     ICD-10-CM ICD-9-CM   1. Hypoglycemia  E16.2 251.2   2. Pneumonia of both lungs due to infectious organism, unspecified part of lung  J18.9 483.8   3. Hypercapnia  R06.89 786.09   4. Somnolence  R40.0 780.09   5. Sepsis, due to unspecified organism, unspecified whether acute organ dysfunction present  A41.9 038.9     995.91   6. Dysphagia, oropharyngeal  R13.12 787.22   7. Decreased activities of daily living (ADL)  Z78.9 V49.89   8. Difficulty walking  R26.2 719.7   9. Chronic obstructive pulmonary disease, unspecified COPD type  J44.9 496   10. Chronic respiratory failure, unspecified whether with hypoxia or hypercapnia  J96.10 518.83     Patient Active Problem List   Diagnosis   • Acute respiratory failure with hypercapnia   • Hypoglycemia   • Diabetes mellitus   • COPD (chronic obstructive pulmonary disease)   • Pneumonia   • Acute UTI (urinary tract infection)   • Obesity hypoventilation syndrome   • Tachycardia   • New onset a-fib   • Mycoplasma pneumonia     Past Medical History:   Diagnosis Date   • Diabetes mellitus    • Elevated cholesterol    • GERD (gastroesophageal reflux disease)    • Hypertension      Past Surgical History:   Procedure Laterality Date   • ABDOMINAL SURGERY     • APPENDECTOMY     • EYE SURGERY       PT Assessment (last 12 hours)     PT Evaluation and Treatment     Row Name 23 0928          Physical Therapy Time and Intention    Subjective Information complains of;weakness  -RH     Document Type therapy note (daily note)  -RH     Mode of Treatment physical therapy;individual therapy  -RH     Patient Effort fair  -RH     Comment Pt requesting bedpan at end of therex.  -RH     Row Name             Wound 23 1443 Bilateral groin    Wound - Properties Group Placement Date: 23  -DD Placement Time:   -DD Side:  Bilateral  -DD Orientation: --  -DD Location: groin  -DD    Retired Wound - Properties Group Placement Date: 03/23/23  -DD Placement Time: 1443  -DD Side: Bilateral  -DD Orientation: --  -DD Location: groin  -DD    Retired Wound - Properties Group Date first assessed: 03/23/23  -DD Time first assessed: 1443  -DD Side: Bilateral  -DD Location: groin  -DD    Row Name             Wound 03/23/23 1443 Bilateral lower leg    Wound - Properties Group Placement Date: 03/23/23  -DD Placement Time: 1443  -DD Side: Bilateral  -DD Orientation: lower  -DD Location: leg  -DD    Retired Wound - Properties Group Placement Date: 03/23/23  -DD Placement Time: 1443  -DD Side: Bilateral  -DD Orientation: lower  -DD Location: leg  -DD    Retired Wound - Properties Group Date first assessed: 03/23/23  -DD Time first assessed: 1443  -DD Side: Bilateral  -DD Location: leg  -DD    Row Name             Wound 03/23/23 1444 Bilateral anterior foot    Wound - Properties Group Placement Date: 03/23/23  -DD Placement Time: 1444  -DD Side: Bilateral  -DD Orientation: anterior  -DD Location: foot  -DD    Retired Wound - Properties Group Placement Date: 03/23/23  -DD Placement Time: 1444  -DD Side: Bilateral  -DD Orientation: anterior  -DD Location: foot  -DD    Retired Wound - Properties Group Date first assessed: 03/23/23  -DD Time first assessed: 1444  -DD Side: Bilateral  -DD Location: foot  -DD    Row Name             Wound 03/23/23 1445 penis    Wound - Properties Group Placement Date: 03/23/23  -DD Placement Time: 1445  -DD Location: penis  -DD    Retired Wound - Properties Group Placement Date: 03/23/23  -DD Placement Time: 1445  -DD Location: penis  -DD    Retired Wound - Properties Group Date first assessed: 03/23/23  -DD Time first assessed: 1445  -DD Location: penis  -DD    Row Name             Wound Left proximal arm Blisters    Wound - Properties Group Side: Left  -MP Orientation: proximal  -MP Location: arm  -MP Primary Wound Type:  Blisters  -MP Additional Comments: weeping  -MP    Retired Wound - Properties Group Side: Left  -MP Orientation: proximal  -MP Location: arm  -MP Primary Wound Type: Blisters  -MP Additional Comments: weeping  -MP    Retired Wound - Properties Group Side: Left  -MP Location: arm  -MP Primary Wound Type: Blisters  -MP Additional Comments: weeping  -MP    Row Name 04/08/23 0928          Vital Signs    O2 Delivery Intra Treatment --  Pt on 2L with nasal cannula.  -           User Key  (r) = Recorded By, (t) = Taken By, (c) = Cosigned By    Initials Name Provider Type    MP Clara Álvarez, KYLE Registered Nurse     Giorgio Otero, LEVY Physical Therapist Assistant    Venessa Engel RN Registered Nurse               Bilateral Lower Extremity   Exercise  Reps  Sets    Short arc quads   10 2   Heel slides  10 2   Ankle pumps  10 2   Quad sets  10 2   Straight leg raise  10 2   Hip ab/adduction 10 2        Physical Therapy Education     Title: PT OT SLP Therapies (In Progress)     Topic: Physical Therapy (In Progress)     Point: Mobility training (In Progress)     Learning Progress Summary           Patient Acceptance, E, NR by LOUISE at 4/3/2023 0436    Acceptance, E, VU by KARINE at 3/27/2023 0928                               User Key     Initials Effective Dates Name Provider Type Discipline    LOUISE 01/16/23 -  Harika Vela, RN Registered Nurse Nurse    KARINE 01/11/23 -  Shaka Iglesias, PATTIE Student PT Student PT              PT Recommendation and Plan     Progress Summary (PT)  Progress Toward Functional Goals (PT): progress toward functional goals is gradual   Outcome Measures     Row Name 04/08/23 0900 04/07/23 1100 04/06/23 1100       How much help from another person do you currently need...    Turning from your back to your side while in flat bed without using bedrails? 1  - 1  -RH 2  -PHYLLIS (r) SW (t) PHYLLIS (c)    Moving from lying on back to sitting on the side of a flat bed without bedrails? 1  -RH 1  -RH 1  -PHYLLIS (r) SW (t)  PHYLLIS (c)    Moving to and from a bed to a chair (including a wheelchair)? 1  -RH 1  -RH 1  -PHYLLIS (r) SW (t) PHYLLIS (c)    Standing up from a chair using your arms (e.g., wheelchair, bedside chair)? 1  -RH 1  -RH 1  -PHYLLIS (r) SW (t) PHYLLIS (c)    Climbing 3-5 steps with a railing? 1  -RH 1  -RH 1  -PHYLLIS (r) SW (t) PHYLLIS (c)    To walk in hospital room? 1  -RH 1  -RH 1  -PHYLLIS (r) SW (t) PHYLLIS (c)    AM-PAC 6 Clicks Score (PT) 6  -RH 6  -RH 7  -PHYLLIS (r) SW (t)    Row Name 04/05/23 1113             How much help from another person do you currently need...    Turning from your back to your side while in flat bed without using bedrails? 2  -DP      Moving from lying on back to sitting on the side of a flat bed without bedrails? 1  -DP      Moving to and from a bed to a chair (including a wheelchair)? 1  -DP      Standing up from a chair using your arms (e.g., wheelchair, bedside chair)? 1  -DP      Climbing 3-5 steps with a railing? 1  -DP      To walk in hospital room? 1  -DP      AM-PAC 6 Clicks Score (PT) 7  -DP         Functional Assessment    Outcome Measure Options AM-PAC 6 Clicks Basic Mobility (PT)  -DP            User Key  (r) = Recorded By, (t) = Taken By, (c) = Cosigned By    Initials Name Provider Type    Giorgio Nunes PTA Physical Therapist Assistant    Selvin Pemberton, PT Physical Therapist    Micky Malave, PT Physical Therapist    Beverley John, PT Student PT Student                 Time Calculation:    PT Charges     Row Name 04/08/23 0926             Time Calculation    PT Received On 04/08/23  -RH         Timed Charges    90840 - PT Therapeutic Exercise Minutes 23  -RH         Total Minutes    Timed Charges Total Minutes 23  -RH       Total Minutes 23  -RH            User Key  (r) = Recorded By, (t) = Taken By, (c) = Cosigned By    Initials Name Provider Type    Giorgio Nunes PTA Physical Therapist Assistant              Therapy Charges for Today     Code Description Service Date Service Provider  Modifiers Qty    61239871666 HC PT THER PROC EA 15 MIN 4/7/2023 Giorgio Otero, PTA GP 2    94632504086 HC PT THER PROC EA 15 MIN 4/8/2023 Giorgio Otero, LEVY GP 2          PT G-Codes  Outcome Measure Options: AM-PAC 6 Clicks Daily Activity (OT), Optimal Instrument  AM-PAC 6 Clicks Score (PT): 6  AM-PAC 6 Clicks Score (OT): 9    Giorgio Otero PTA  4/8/2023

## 2023-04-08 NOTE — PROGRESS NOTES
Respiratory Therapist Broncho-Pulmonary Hygiene Progress Note      Patient Name:  Stephen Aguero  YOB: 1945    Stephen Aguero meets the qualification for Level 2 of the Bronco-Pulmonary Hygiene Protocol. This was based on my daily patient assessment and includes review of chest x-ray results, cough ability and quality, oxygenation, secretions or risk for secretion development and patient mobility.     Broncho-Pulmonary Hygiene Assessment:    Level of Movement: Actively changing positions without assistance  Alert/ oriented/ cooperative    Breath Sounds: Clear to slightly diminished    Cough: Strong, effective    Chest X-Ray: Possible signs of consolidation and/or atelectasis or clear.     Sputum Productions: None or small amount of thin or watery secretions with effective cough    History and Physical: Chronic condition    SpO2 to Oxygen Need: greater than 92% on room air or  less than 3L nasal canula    Current SpO2 is: 96 on 2L  Based on this information, I have completed the following interventions: Aerobika with bronchodialtor medication or TID      Electronically signed by Radha Delatorre RRT, 04/08/23, 6:53 AM EDT.

## 2023-04-08 NOTE — DISCHARGE SUMMARY
[facility]         DISCHARGE SUMMARY    Patient Name: Stephen Aguero  : 1945  MRN: [id number]    Date of Admission: 3/22/2023  Date of Discharge: 2023  Primary Care Physician: Papi Vasquez MD    Consults     Date and Time Order Name Status Description    3/24/2023  1:50 PM Inpatient Cardiology Consult      3/22/2023  7:33 PM Inpatient Pulmonology Consult Completed     3/22/2023  6:25 PM General MD Inpatient Consult            Presenting Problem:   Somnolence [R40.0]  Hypercapnia [R06.89]  Hypoglycemia [E16.2]  Acute respiratory failure with hypercapnia [J96.02]  Pneumonia of both lungs due to infectious organism, unspecified part of lung [J18.9]  Sepsis, due to unspecified organism, unspecified whether acute organ dysfunction present [A41.9]    Active and Resolved Hospital Problems:  Active Hospital Problems    Diagnosis POA   • Chronic respiratory failure with hypercapnia [J96.12] Yes   • New onset a-fib [I48.91] No   • Obesity hypoventilation syndrome [E66.2] Yes   • Diabetes mellitus [E11.9] Yes   • COPD (chronic obstructive pulmonary disease) [J44.9] Yes      Resolved Hospital Problems    Diagnosis POA   • **Acute respiratory failure with hypercapnia [J96.02] Yes   • Mycoplasma pneumonia [J15.7] Clinically Undetermined   • Tachycardia [R00.0] No   • Hypoglycemia [E16.2] Yes   • Pneumonia [J18.9] Yes   • Acute UTI (urinary tract infection) [N39.0] Yes         Hospital Course     Hospital Course:  Stephen Aguero is a 77 y.o. male admitted to hospital for decreased level of consciousness.  Patient was hypoglycemic on arrival to ER with CO2 retention.  Patient has been noncompliant has not been seen by physician for 7 to 8 months in the past.  And he is mostly bedbound.  Patient was found to be hypercapnic and respiratory failure along with hypoglycemic.  Patient had received his insulin.  Patient was admitted to ICU.    Intensivist/cardiologist was also consulted.  Patient  had rapid A-fib which was treated with beta-blockers.  He was also started on apixaban.  He was diagnosed with sepsis and pneumonia, further testing revealed mycoplasma.  Patient was placed on BiPAP for his acute respiratory failure on chronic with hypercapnia.  His hypoglycemia resolved.  He was also diagnosed with UTI and treated accordingly.    His sugars started to improve patient has hypoventilation syndrome from morbid obesity, diuretics were used and patient improved.  His chest x-ray showed bilateral pleural effusion and patient had tapping on both lungs.    Patient is slowly improved more alert and oriented and using BiPAP.  Patient is not able to get up and move around much.  Physical therapy and Occupational Therapy consulted and rehab was recommended.  Patient's family is unable to take care of him.  Patient will be discharged to inpatient rehab on BiPAP setting of 20/12 at 28% of oxygen.    Today is stable, no new issues, awake and alert when examined.  Denies any pain.  Using his BiPAP.    He will be discharged to inpatient rehab ambulance        DISCHARGE Follow Up Recommendations for labs and diagnostics:     PT and OT  Use of BiPAP and regular basis during the nighttime and as needed during the daytime  Monitor labs      Day of Discharge     Vital Signs:  Temp:  [97.7 °F (36.5 °C)-98.3 °F (36.8 °C)] 97.7 °F (36.5 °C)  Heart Rate:  [85-99] 88  Resp:  [18-22] 22  BP: (102-132)/(53-73) 104/58  Flow (L/min):  [2] 2    Physical Exam:    Elderly morbidly obese male not in acute distress awake and alert.  Heart regular.  Lungs distant sounds but diminished breath sounds abdomen is extremely obese nontender.  Extremities with 2+ edema with chronic changes, improving.      Pertinent  and/or Most Recent Results     LAB RESULTS:      Lab 04/07/23  0811 04/06/23  0405 04/05/23  0442 04/04/23  0415 04/03/23  0539 04/02/23  0434   WBC  --  7.39 7.06 7.45 8.67 7.86   HEMOGLOBIN  --  13.7 13.8 13.8 14.6 13.9    HEMATOCRIT  --  41.7 41.2 41.6 44.4 43.2   PLATELETS  --  175 183 196 176 205   MCV  --  99.0* 97.4* 97.7* 100.7* 99.5*   LACTATE, ARTERIAL 1.71  --   --   --   --   --          Lab 04/07/23  0811 04/06/23 0405 04/05/23 0442 04/04/23 0415 04/03/23 1034 04/03/23  0539 04/02/23  0434   SODIUM  --  138 139 138 136  --  137   SODIUM, ARTERIAL 137.6  --   --   --   --   --   --    POTASSIUM  --  4.0 3.9 3.8 4.1  --  4.3   CHLORIDE  --  97* 99 99 97*  --  98   CO2  --  37.3* 32.8* 38.6* 34.9*  --  34.1*   ANION GAP  --  3.7* 7.2 0.4* 4.1*  --  4.9*   BUN  --  26* 27* 27* 27*  --  27*   CREATININE  --  0.31* 0.67* 0.63* 0.83  --  0.68*   EGFR  --  120.6 95.6 97.4 89.6  --  95.1   GLUCOSE  --  208* 139* 151* 209*  --  151*   GLUCOSE, ARTERIAL 162*  --   --   --   --   --   --    CALCIUM  --  8.9 8.9 9.0 8.9  --  8.8   IONIZED CALCIUM 1.15  --   --   --   --   --   --    MAGNESIUM  --  2.1 2.1 2.2  --  2.1 2.2   PHOSPHORUS  --  2.4* 2.7 3.2 3.2  --  2.8         Lab 04/06/23 0405 04/05/23 0442 04/04/23 0415 04/03/23 1034 04/02/23  0434   ALBUMIN 2.6* 2.6* 2.6* 2.5* 2.6*                     Lab 04/07/23  0811 04/01/23  1559   PH, ARTERIAL 7.451* 7.354   PCO2, ARTERIAL 58.4* 70.2*   PO2 ART 59.1* 64.2*   O2 SATURATION ART 91.2* 90.4*   FIO2 28  --    HCO3 ART 39.8* 38.2*   BASE EXCESS ART 13.0* 9.4*   CARBOXYHEMOGLOBIN 1.1 1.0     Brief Urine Lab Results  (Last result in the past 365 days)      Color   Clarity   Blood   Leuk Est   Nitrite   Protein   CREAT   Urine HCG        03/22/23 1605 Dark Yellow   Turbid   Large (3+)   Moderate (2+)   Positive   30 mg/dL (1+)               Microbiology Results (last 10 days)     Procedure Component Value - Date/Time    Respiratory Culture - Sputum, Cough [[id number]] Collected: 03/31/23 1237    Lab Status: Final result Specimen: Sputum from Cough Updated: 03/31/23 1337     Respiratory Culture Rejected     Gram Stain Moderate (3+) WBCs per low power field      Moderate (3+)  Epithelial cells per low power field      No organisms seen    Narrative:      Specimen rejected due to oropharyngeal contamination. Please reorder and recollect specimen if clinically necessary.    AFB Culture - Body Fluid, Pleural Cavity [[id number]] Collected: 03/30/23 1521    Lab Status: Preliminary result Specimen: Body Fluid from Pleural Cavity Updated: 04/06/23 1545     AFB Culture No AFB isolated at 1 week     AFB Stain No acid fast bacilli seen on direct smear    Body Fluid Culture - Body Fluid, Pleural Cavity [[id number]] Collected: 03/30/23 1521    Lab Status: Final result Specimen: Body Fluid from Pleural Cavity Updated: 04/02/23 0820     Body Fluid Culture No growth at 3 days     Gram Stain No organisms seen    Fungus Culture - Body Fluid, Pleural Cavity [[id number]] Collected: 03/30/23 1521    Lab Status: Preliminary result Specimen: Body Fluid from Pleural Cavity Updated: 04/06/23 1545     Fungus Culture No fungus isolated at 1 week    Anaerobic Culture - Pleural Fluid, Pleural Cavity [[id number]]  (Normal) Collected: 03/30/23 1520    Lab Status: Final result Specimen: Pleural Fluid from Pleural Cavity Updated: 04/04/23 0654     Anaerobic Culture No anaerobes isolated at 5 days    AFB Culture - Body Fluid, Pleural Cavity [[id number]] Collected: 03/29/23 1624    Lab Status: Preliminary result Specimen: Body Fluid from Pleural Cavity Updated: 04/05/23 1716     AFB Culture No AFB isolated at 1 week     AFB Stain No acid fast bacilli seen on direct smear    Body Fluid Culture - Body Fluid, Pleural Cavity [[id number]] Collected: 03/29/23 1624    Lab Status: Final result Specimen: Body Fluid from Pleural Cavity Updated: 04/01/23 0817     Body Fluid Culture No growth at 3 days     Gram Stain No organisms seen    Anaerobic Culture - Pleural Fluid, Pleural Cavity [[id number]]  (Normal) Collected: 03/29/23 1624    Lab Status: Final result Specimen: Pleural Fluid from Pleural Cavity Updated: 04/03/23 0934     Anaerobic  Culture No anaerobes isolated at 5 days    Fungus Culture - Body Fluid, Pleural Cavity [[id number]] Collected: 03/29/23 1624    Lab Status: Preliminary result Specimen: Body Fluid from Pleural Cavity Updated: 04/05/23 1716     Fungus Culture No fungus isolated at 1 week    Legionella Antigen, Urine - Urine, Urine, Clean Catch [[id number]]  (Normal) Collected: 03/29/23 1623    Lab Status: Final result Specimen: Urine, Clean Catch Updated: 03/29/23 1705     LEGIONELLA ANTIGEN, URINE Negative    S. Pneumo Ag Urine or CSF - Urine, Urine, Clean Catch [[id number]]  (Normal) Collected: 03/29/23 1623    Lab Status: Final result Specimen: Urine, Clean Catch Updated: 03/29/23 1705     Strep Pneumo Ag Negative    MRSA Screen, PCR (Inpatient) - Swab, Nares [[id number]]  (Normal) Collected: 03/29/23 1623    Lab Status: Final result Specimen: Swab from Nares Updated: 03/29/23 1746     MRSA PCR No MRSA Detected    Narrative:      The negative predictive value of this diagnostic test is high and should only be used to consider de-escalating anti-MRSA therapy. A positive result may indicate colonization with MRSA and must be correlated clinically.          PROCEDURES:    [unfilled]    CT Head Without Contrast    Result Date: 3/29/2023  Impression:   1. 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 No CT evidence of acute infarction, mass or intracranial hemorrhage     Dipak Quispe M.D.       Electronically Signed and Approved By: Dipak Quispe M.D. on 3/29/2023 at 15:20             CT Head Without Contrast    Result Date: 3/22/2023  Impression:   CT scan of the head without IV contrast demonstrating diffuse atrophy and white matter changes.  No acute intracranial abnormality is seen.  Postoperative changes are seen in the right orbital and right frontal sinus region.  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     MORENA KINGSLEY MD       Electronically Signed and Approved By: MORENA KINGSLEY MD on 3/22/2023 at 16:37              CT Chest Without Contrast Diagnostic    Result Date: 3/29/2023  Impression:   1. Small to moderate bilateral pleural effusions 2. Mild cardiomegaly 3. Prominent perihilar and basilar airspace disease favored to represent pulmonary edema.  Pneumonia would also be in the differential. 4. Secretions within the trachea 5. Small amount of ascites in the upper abdomen.  Suspected anasarca. 6. Previous cholecystectomy     Dipak Quispe M.D.       Electronically Signed and Approved By: Dipak Quispe M.D. on 3/29/2023 at 16:36             XR Chest 1 View    Result Date: 4/1/2023  Impression:  No significant change in comparison to 3/30/2023.       MORENA KINGSLEY MD       Electronically Signed and Approved By: MORENA KINGSLEY MD on 4/01/2023 at 18:28             XR Chest 1 View    Result Date: 3/30/2023  Impression:   1. No evidence pneumothorax status post left-sided thoracentesis 2. Slight improvement in diffuse bilateral airspace disease.       LEIGH BARRY MD       Electronically Signed and Approved By: LEIGH BARRY MD on 3/30/2023 at 15:18             XR Chest 1 View    Result Date: 3/30/2023  Impression:   1. Hazy bilateral airspace opacities, which could reflect pulmonary edema or pneumonia. 2. Cardiomegaly with small bilateral pleural effusions.       KAMI SCHROEDER MD       Electronically Signed and Approved By: KAMI SCHROEDRE MD on 3/30/2023 at 2:06             XR Chest 1 View    Result Date: 3/29/2023  Impression:   Less fluid is seen in the right pleural space post thoracentesis.  No pneumothorax is seen.       MORENA KINGSLEY MD       Electronically Signed and Approved By: MORENA KINGSLEY MD on 3/29/2023 at 16:23             XR Chest 1 View    Result Date: 3/28/2023  Impression:   Cardiomegaly, unchanged.  Worsening multifocal pneumonia or edema.       MORENA KINGSLEY MD       Electronically Signed and Approved By: MORENA KINGSLEY MD on 3/28/2023 at 20:57             XR Abdomen KUB    Result Date:  3/23/2023  Impression:   1. No acute process identified. 2. No radiographic evidence of nephrolithiasis.     KAMI SCHROEDER MD       Electronically Signed and Approved By: KAMI SCHROEDER MD on 3/23/2023 at 10:18                       Results for orders placed during the hospital encounter of 03/22/23    Adult Transthoracic Echo Complete W/ Cont if Necessary Per Protocol    Interpretation Summary  •  Left ventricular ejection fraction appears to be 51 - 55%.  •  Estimated right ventricular systolic pressure from tricuspid regurgitation is mildly elevated (35-45 mmHg).  •  Mild pulmonary hypertension is present.  •  Mild mitral valve regurgitation is present      Labs Pending at Discharge:  Pending Labs     Order Current Status    AFB Culture - Body Fluid, Pleural Cavity Preliminary result    AFB Culture - Body Fluid, Pleural Cavity Preliminary result    Fungus Culture - Body Fluid, Pleural Cavity Preliminary result    Fungus Culture - Body Fluid, Pleural Cavity Preliminary result            Discharge Details        Discharge Medications      New Medications      Instructions Start Date   apixaban 5 MG tablet tablet  Commonly known as: ELIQUIS   5 mg, Oral, Every 12 Hours Scheduled      ipratropium-albuterol 0.5-2.5 mg/3 ml nebulizer  Commonly known as: DUO-NEB  Replaces: Combivent Respimat  MCG/ACT inhaler   3 mL, Nebulization, Every 6 Hours PRN         Changes to Medications      Instructions Start Date   Toujeo Max SoloStar 300 UNIT/ML solution pen-injector injection  Generic drug: Insulin Glargine (2 Unit Dial)  What changed: how much to take   30 Units, Subcutaneous, Daily         Continue These Medications      Instructions Start Date   allopurinol 300 MG tablet  Commonly known as: ZYLOPRIM   300 mg, Oral, Daily      carvedilol 25 MG tablet  Commonly known as: COREG   25 mg, Oral, 2 Times Daily      DULoxetine 60 MG capsule  Commonly known as: CYMBALTA   60 mg, Oral, Daily      famotidine 20 MG  tablet  Commonly known as: PEPCID   20 mg, Oral, 2 Times Daily      furosemide 80 MG tablet  Commonly known as: LASIX   80 mg, Oral, Daily      metFORMIN 500 MG tablet  Commonly known as: GLUCOPHAGE   2 tablets, Oral, 2 Times Daily      potassium chloride 10 MEQ CR tablet   1 tablet, Oral, 2 Times Daily,        triamcinolone 0.1 % cream  Commonly known as: KENALOG   1 application, Topical, 2 Times Daily         Stop These Medications    benazepril 40 MG tablet  Commonly known as: LOTENSIN     Combivent Respimat  MCG/ACT inhaler  Generic drug: ipratropium-albuterol  Replaced by: ipratropium-albuterol 0.5-2.5 mg/3 ml nebulizer     glipizide 10 MG tablet  Commonly known as: GLUCOTROL     HumaLOG KwikPen 100 UNIT/ML solution pen-injector  Generic drug: Insulin Lispro (1 Unit Dial)            Allergies   Allergen Reactions   • Sulfa Antibiotics Unknown - Low Severity         Discharge Disposition:    Rehab Facility or Unit (DC - External)    Diet:  Heart healthy 1800 ADA calorie diet      Discharge Activity:     Activity Instructions     Activity as Tolerated              No future appointments.    Additional Instructions for the Follow-ups that You Need to Schedule     Discharge Follow-up with PCP   As directed       Currently Documented PCP:    Papi Vasquez MD    PCP Phone Number:    [phone]     Follow Up Details: Post discharge from rehab         Discharge Follow-up with Specified Provider: Dr. RUIZ in 3 weeks   As directed      To: Dr. RUIZ in 3 weeks               Time spent on Discharge including face to face service: 35 minutes.            I have dictated this note utilizing Dragon Dictation.             Please note that portions of this note were completed with a voice recognition program.             Part of this note may be an electronic transcription/translation of spoken language to printed text         using the Dragon Dictation System.       Electronically signed by Marco Bishop MD,  04/08/23, 1:04 PM EDT.

## 2023-04-08 NOTE — PLAN OF CARE
Goal Outcome Evaluation:      Pt slept all hs without issues. Pt did request bed pain several times last night for bm and had difficulty passing stool.  Was given senna to help promote a healthy bm.  Refused to be turned several times last night. Due to being comfortable and not wanting to be in pain. Educated Pt on need to be turned and skin breakdown. Voiced understanding but still did not want to turn. Agreed to shifting weight in bed at that time instead.

## 2023-04-08 NOTE — PLAN OF CARE
Goal Outcome Evaluation: Pt was placed on BIPAP 20/12 @ 28% , no issues at this time.

## 2023-04-08 NOTE — PLAN OF CARE
Goal Outcome Evaluation:  Plan of Care Reviewed With: patient        Progress: no change  Outcome Evaluation: Patient wore bipap all night until morning breathing treatment. Placed on 2L tolerating well.

## 2023-04-13 PROBLEM — K74.60 CIRRHOSIS OF LIVER: Status: ACTIVE | Noted: 2023-01-01

## 2023-04-13 PROBLEM — R41.82 ALTERED MENTAL STATUS: Status: ACTIVE | Noted: 2023-04-13

## 2023-04-13 PROBLEM — J96.01 ACUTE RESPIRATORY FAILURE WITH HYPOXIA: Status: ACTIVE | Noted: 2023-01-01

## 2023-04-13 PROBLEM — J90 PLEURAL EFFUSION: Status: ACTIVE | Noted: 2023-01-01

## 2023-04-13 NOTE — ED PROVIDER NOTES
Observation goals PRIOR TO DISCHARGE      -diagnostic tests and consults completed and resulted: met.   -vital signs normal or at patient baseline: met  -tolerating oral intake to maintain hydration: met   - CT surgery eval- met.      Time: 3:55 PM EDT  Date of encounter:  4/13/2023  Independent Historian/Clinical History and Information was obtained by:   Patient  Chief Complaint: Shortness of Breath    History is limited by: N/A    History of Present Illness:  Patient is a 77 y.o. year old male who presents to the emergency department for evaluation of shortness of breath. Patient's family has provided additional HPI info. Patient was at rehab for therapy and he took pain medicine, which made him fall asleep. His O2 saturation at the time was 89%. His O2 saturation fell down to 82-83%, was not responsive. He claims chest pain. Patient does not take oxygen, uses CPAP machine. He is on blood thinner, Eliquis.    HPI    Patient Care Team  Primary Care Provider: Papi Vasquez MD    Past Medical History:     Allergies   Allergen Reactions   • Sulfa Antibiotics Unknown - High Severity   • Sulfa Antibiotics Unknown - Low Severity     Past Medical History:   Diagnosis Date   • BPH (benign prostatic hyperplasia)    • Chronic a-fib    • Chronic pain    • Diabetes    • Diabetes mellitus    • Elevated cholesterol    • Elevated PSA    • GERD (gastroesophageal reflux disease)    • High blood pressure    • Hypertension    • Sleep apnea      Past Surgical History:   Procedure Laterality Date   • ABDOMINAL SURGERY     • APPENDECTOMY     • BACK SURGERY     • COLONOSCOPY  2018    [facility]   • EYE FOREIGN BODY REMOVAL     • EYE SURGERY     • FACIAL COSMETIC SURGERY     • LAPAROSCOPIC CHOLECYSTECTOMY     • LEG SURGERY     • NECK SURGERY       Family History   Problem Relation Age of Onset   • Cancer Sister 55       Home Medications:  Prior to Admission medications    Medication Sig Start Date End Date Taking? Authorizing Provider   allopurinol (ZYLOPRIM) 300 MG tablet Take 300 mg by mouth Daily.    Steven Mojica MD   allopurinol (ZYLOPRIM) 300 MG tablet Take 1 tablet by mouth Daily. 12/20/22   Steven Mojica MD   apixaban (ELIQUIS) 5 MG tablet tablet  Take 1 tablet by mouth Every 12 (Twelve) Hours for 30 days. Indications: Atrial Fibrillation 4/8/23 5/8/23  Marco Bishop MD   benazepril (LOTENSIN) 40 MG tablet Take 40 mg by mouth Daily.    Steven Mojica MD   carvedilol (COREG) 25 MG tablet Take 25 mg by mouth 2 (Two) Times a Day With Meals.    Steven Mojica MD   carvedilol (COREG) 25 MG tablet Take 1 tablet by mouth 2 (Two) Times a Day. 12/20/22   Steven Mojica MD   cetirizine (zyrTEC) 10 MG tablet Take 10 mg by mouth Daily.    Steven Mojica MD   DULoxetine (CYMBALTA) 30 MG capsule Take 30 mg by mouth Daily.    Steven Mojica MD   DULoxetine (CYMBALTA) 60 MG capsule Take 1 capsule by mouth Daily. 12/20/22   Steven Mojica MD   ergocalciferol (ERGOCALCIFEROL) 1.25 MG (68989 UT) capsule Take 50,000 Units by mouth 1 (One) Time Per Week.    Steven Mojica MD   famotidine (PEPCID) 20 MG tablet Take 20 mg by mouth 2 (Two) Times a Day.    Steven Mojica MD   famotidine (PEPCID) 20 MG tablet Take 1 tablet by mouth 2 (Two) Times a Day. 12/20/22   Steven Mojica MD   furosemide (Lasix) 80 MG tablet Take 0.5 tablets by mouth Daily for 15 days. 3/4/22 3/19/22  Marco Bishop MD   furosemide (LASIX) 80 MG tablet Take 1 tablet by mouth Daily. 12/20/22   Steven Mojica MD   glipizide (GLUCOTROL) 10 MG tablet Take 10 mg by mouth 2 (Two) Times a Day Before Meals.    Steven Mojica MD   HumaLOG KwikPen 100 UNIT/ML solution pen-injector 3 (Three) Times a Day. 12/9/21   Steven Mojica MD   HYDROcodone-acetaminophen (Norco)  MG per tablet Take 1 tablet by mouth Every 6 (Six) Hours As Needed.    Steven Mojica MD   ipratropium-albuterol (DUO-NEB) 0.5-2.5 mg/3 ml nebulizer Take 3 mL by nebulization Every 6 (Six) Hours As Needed for Shortness of Air for up to 90 days. 4/8/23 7/7/23  Marco Bishop MD   metFORMIN (GLUCOPHAGE) 500 MG tablet Take 1,000 mg by mouth 2 (Two) Times a Day With Meals.     Steven Mojica MD   metFORMIN (GLUCOPHAGE) 500 MG tablet Take 2 tablets by mouth 2 (Two) Times a Day. 12/20/22   Steven Mojica MD   potassium chloride 10 MEQ CR tablet Take 1 tablet by mouth 2 (Two) Times a Day.   12/20/22   Provider, Historical, MD   Toujeo Max SoloStar 300 UNIT/ML solution pen-injector injection Inject 60-70 Units under the skin into the appropriate area as directed Daily. 12/9/21   Provider, Historical, MD   Toujeo Max SoloStar 300 UNIT/ML solution pen-injector injection Inject 30 Units under the skin into the appropriate area as directed Daily for 20 days. 4/8/23 4/28/23  Marco Bishop MD   triamcinolone (KENALOG) 0.1 % cream Apply 1 application topically to the appropriate area as directed 2 (Two) Times a Day. 12/20/22   Steven Mojica MD        Social History:   Social History     Tobacco Use   • Smoking status: Former     Types: Cigarettes   • Smokeless tobacco: Never   Vaping Use   • Vaping Use: Never used   Substance Use Topics   • Alcohol use: Never   • Drug use: Never         Review of Systems:  Review of Systems   Constitutional: Negative for chills and fever.   HENT: Negative for congestion, rhinorrhea and sore throat.    Eyes: Negative for pain and visual disturbance.   Respiratory: Positive for shortness of breath. Negative for apnea, cough and chest tightness.    Cardiovascular: Positive for chest pain. Negative for palpitations.   Gastrointestinal: Negative for abdominal pain, diarrhea, nausea and vomiting.   Genitourinary: Negative for difficulty urinating and dysuria.   Musculoskeletal: Negative for joint swelling and myalgias.   Skin: Negative for color change.   Neurological: Negative for seizures and headaches.   Psychiatric/Behavioral: Negative.    All other systems reviewed and are negative.       Physical Exam:  BP 96/57 (BP Location: Left arm, Patient Position: Lying) Comment: RN aware  Pulse 77   Temp 97.7 °F (36.5 °C) (Oral)   Resp 18   SpO2  94%     Physical Exam  Vitals and nursing note reviewed.   Constitutional:       General: He is not in acute distress.     Appearance: Normal appearance. He is not toxic-appearing.      Comments: Weak in General   HENT:      Head: Normocephalic and atraumatic.      Jaw: There is normal jaw occlusion.   Eyes:      General: Lids are normal.      Extraocular Movements: Extraocular movements intact.      Conjunctiva/sclera: Conjunctivae normal.      Pupils: Pupils are equal, round, and reactive to light.   Cardiovascular:      Rate and Rhythm: Normal rate and regular rhythm.      Pulses: Normal pulses.      Heart sounds: Normal heart sounds.   Pulmonary:      Effort: Pulmonary effort is normal. No respiratory distress.      Breath sounds: Normal breath sounds. No wheezing or rhonchi.   Abdominal:      General: Abdomen is flat.      Palpations: Abdomen is soft.      Tenderness: There is no abdominal tenderness. There is no guarding or rebound.   Musculoskeletal:         General: Normal range of motion.      Cervical back: Normal range of motion and neck supple.      Right lower le+ Edema present.      Left lower le+ Edema present.   Skin:     General: Skin is warm and dry.   Neurological:      Mental Status: He is alert and oriented to person, place, and time. Mental status is at baseline.      Comments: Doesn't Follow Commands Well   Psychiatric:         Mood and Affect: Mood normal.                  Procedures:  Procedures      Medical Decision Making:      Comorbidities that affect care:    Hyperlipidemia, Diabetes, Hypertension    External Notes reviewed:          The following orders were placed and all results were independently analyzed by me:  Orders Placed This Encounter   Procedures   • Blood Culture - Blood,   • Blood Culture - Blood,   • XR Chest 1 View   • CT Chest With Contrast Diagnostic   • CT Head Without Contrast   • MRI Brain Without Contrast   • [place] Draw   • Comprehensive Metabolic Panel    • BNP   • Single High Sensitivity Troponin T   • CBC Auto Differential   • ABG with Co-Ox and Electrolytes   • Lactic Acid, Plasma   • STAT Lactic Acid, Reflex   • Magnesium   • STAT Lactic Acid, Reflex   • NPO Diet NPO Type: Strict NPO   • Undress & Gown   • Cardiac Monitoring   • Continuous Pulse Oximetry   • Vital Signs   • Code Status and Medical Interventions:   • General MD Inpatient Consult   • Inpatient Cardiology Consult   • Inpatient Pulmonology Consult   • Oxygen Therapy- Nasal Cannula; 2 LPM; Titrate for SPO2: equal to or greater than, 92%   • NIPPV (CPAP or BIPAP)   • POC Glucose Once   • POC Glucose Once   • POC Glucose Once   • POC Glucose Once   • POC Glucose Once   • ECG 12 Lead ED Triage Standing Order; SOA   • Insert Peripheral IV   • Inpatient Admission   • CBC & Differential   • Green Top (Gel)   • Lavender Top   • Gold Top - SST   • Light Blue Top       Medications Given in the Emergency Department:  Medications   sodium chloride 0.9 % flush 10 mL (has no administration in time range)   cefTRIAXone (ROCEPHIN) IVPB 2 g (0 g Intravenous Stopped 4/13/23 1655)   AZITHROMYCIN 500 MG/250 ML 0.9% NS IVPB (vial-mate) (0 mg Intravenous Stopped 4/13/23 1720)   sodium chloride 0.9 % bolus 1,000 mL (0 mL Intravenous Stopped 4/13/23 1642)   iopamidol (ISOVUE-370) 76 % injection 100 mL (100 mL Intravenous Given 4/13/23 1705)   dextrose (D50W) (25 g/50 mL) IV injection 25 g (25 g Intravenous Given 4/13/23 1742)        ED Course:    ED Course as of 04/13/23 2207   u Apr 13, 2023 2202 EKG:    Rhythm: atrial fibrillation  Rate: 89  Axis: normal  Intervals: normal  ST Segment: no elevations    EKG Comparison: no change    Interpreted by me   [BN]      ED Course User Index  [BN] Jeffry Kramer MD       Labs:    Lab Results (last 24 hours)     Procedure Component Value Units Date/Time    ABG with Co-Ox and Electrolytes [[id number]]  (Abnormal) Collected: 04/13/23 1514    Specimen: Arterial Blood from Arm,  Right Updated: 04/13/23 1518     pH, Arterial 7.477 pH units      pCO2, Arterial 51.3 mm Hg      pO2, Arterial 69.2 mm Hg      HCO3, Arterial 37.1 mmol/L      Base Excess, Arterial 11.5 mmol/L      O2 Saturation, Arterial 94.2 %      Hemoglobin, Blood Gas 15.0 g/dL      Carboxyhemoglobin 1.7 %      Methemoglobin 0.30 %      Oxyhemoglobin 92.3 %      FHHB 5.7 %      Sushil's Test Positive     Note --     Site Arterial: right radial     Modality Nasal Cannula     FIO2 28 %      Flow Rate 2 lpm      Sodium, Arterial 137.5 mmol/L      Potassium, Arterial 4.15 mmol/L      Ionized Calcium, Arterial 1.07 mmol/L      Chloride, Arterial 95 mmol/L      Glucose, Arterial 79 mg/dL      Lactate, Arterial 3.55 mmol/L      PO2/FIO2 247    CBC & Differential [[id number]]  (Abnormal) Collected: 04/13/23 1526    Specimen: Blood Updated: 04/13/23 1536    Narrative:      The following orders were created for panel order CBC & Differential.  Procedure                               Abnormality         Status                     ---------                               -----------         ------                     CBC Auto Differential[[id number]]        Abnormal            Final result                 Please view results for these tests on the individual orders.    Comprehensive Metabolic Panel [[id number]]  (Abnormal) Collected: 04/13/23 1526    Specimen: Blood Updated: 04/13/23 1555     Glucose 83 mg/dL      BUN 27 mg/dL      Creatinine 1.01 mg/dL      Sodium 141 mmol/L      Potassium 4.4 mmol/L      Comment: Slight hemolysis detected by analyzer. Results may be affected.        Chloride 95 mmol/L      CO2 37.8 mmol/L      Calcium 9.0 mg/dL      Total Protein 7.1 g/dL      Albumin 2.8 g/dL      ALT (SGPT) 23 U/L      AST (SGOT) 31 U/L      Alkaline Phosphatase 129 U/L      Total Bilirubin 1.0 mg/dL      Globulin 4.3 gm/dL      A/G Ratio 0.7 g/dL      BUN/Creatinine Ratio 26.7     Anion Gap 8.2 mmol/L      eGFR 76.1 mL/min/1.73      Narrative:      GFR Normal >60  Chronic Kidney Disease <60  Kidney Failure <15    The GFR formula is only valid for adults with stable renal function between ages 18 and 70.    BNP [[id number]]  (Normal) Collected: 04/13/23 1526    Specimen: Blood Updated: 04/13/23 1553     proBNP 436.0 pg/mL     Narrative:      Among patients with dyspnea, NT-proBNP is highly sensitive for the detection of acute congestive heart failure. In addition NT-proBNP of <300 pg/ml effectively rules out acute congestive heart failure with 99% negative predictive value.      Single High Sensitivity Troponin T [[id number]]  (Abnormal) Collected: 04/13/23 1526    Specimen: Blood Updated: 04/13/23 1555     HS Troponin T 24 ng/L     Narrative:      High Sensitive Troponin T Reference Range:  <10.0 ng/L- Negative Female for AMI  <15.0 ng/L- Negative Male for AMI  >=10 - Abnormal Female indicating possible myocardial injury.  >=15 - Abnormal Male indicating possible myocardial injury.   Clinicians would have to utilize clinical acumen, EKG, Troponin, and serial changes to determine if it is an Acute Myocardial Infarction or myocardial injury due to an underlying chronic condition.         CBC Auto Differential [[id number]]  (Abnormal) Collected: 04/13/23 1526    Specimen: Blood Updated: 04/13/23 1536     WBC 9.98 10*3/mm3      RBC 4.42 10*6/mm3      Hemoglobin 14.6 g/dL      Hematocrit 43.0 %      MCV 97.3 fL      MCH 33.0 pg      MCHC 34.0 g/dL      RDW 15.6 %      RDW-SD 56.0 fl      MPV 10.7 fL      Platelets 212 10*3/mm3      Neutrophil % 60.2 %      Lymphocyte % 22.9 %      Monocyte % 13.7 %      Eosinophil % 2.2 %      Basophil % 0.7 %      Immature Grans % 0.3 %      Neutrophils, Absolute 6.00 10*3/mm3      Lymphocytes, Absolute 2.29 10*3/mm3      Monocytes, Absolute 1.37 10*3/mm3      Eosinophils, Absolute 0.22 10*3/mm3      Basophils, Absolute 0.07 10*3/mm3      Immature Grans, Absolute 0.03 10*3/mm3      nRBC 0.0 /100 WBC     Blood Culture  - Blood, Arm, Right [[id number]] Collected: 04/13/23 1526    Specimen: Blood from Arm, Right Updated: 04/13/23 1534    Blood Culture - Blood, Arm, Left [[id number]] Collected: 04/13/23 1526    Specimen: Blood from Arm, Left Updated: 04/13/23 1533    Lactic Acid, Plasma [[id number]]  (Abnormal) Collected: 04/13/23 1526    Specimen: Blood Updated: 04/13/23 1604     Lactate 4.2 mmol/L     Magnesium [[id number]]  (Normal) Collected: 04/13/23 1526    Specimen: Blood Updated: 04/13/23 1611     Magnesium 2.0 mg/dL     POC Glucose Once [[id number]]  (Abnormal) Collected: 04/13/23 1550    Specimen: Blood Updated: 04/13/23 1715     Glucose 62 mg/dL      Comment: Serial Number: [id number]:  [id number]       POC Glucose Once [[id number]]  (Abnormal) Collected: 04/13/23 1703    Specimen: Blood Updated: 04/13/23 1705     Glucose 62 mg/dL      Comment: Serial Number: [id number]:  [id number]       POC Glucose Once [[id number]]  (Abnormal) Collected: 04/13/23 1733    Specimen: Blood Updated: 04/13/23 1754     Glucose 59 mg/dL      Comment: Serial Number: [id number]:  [id number]       POC Glucose Once [[id number]]  (Normal) Collected: 04/13/23 1751    Specimen: Blood Updated: 04/13/23 1754     Glucose 83 mg/dL      Comment: Serial Number: [id number]:  [id number]       POC Glucose Once [[id number]]  (Abnormal) Collected: 04/13/23 1819    Specimen: Blood Updated: 04/13/23 1821     Glucose 118 mg/dL      Comment: Serial Number: [id number]:  [id number]       STAT Lactic Acid, Reflex [[id number]]  (Abnormal) Collected: 04/13/23 1853    Specimen: Blood Updated: 04/13/23 1923     Lactate 3.7 mmol/L            Imaging:    CT Head Without Contrast    Result Date: 4/13/2023  PROCEDURE: CT HEAD WO CONTRAST  COMPARISON:  [facility], CT, CT HEAD WO CONTRAST, 3/29/2023, 14:59. INDICATIONS: headache, TRAUMA  PROTOCOL:   Standard imaging protocol performed    RADIATION:   DLP: 954.7 mGy*cm   MA and/or KV was  adjusted to minimize radiation dose.     TECHNIQUE: After obtaining the patient's consent, CT images were obtained without non-ionic intravenous contrast material.  FINDINGS:  There is no evidence for acute intracranial hemorrhage.  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  No definitive acute focal ischemia is observed. There is no evidence for abnormal cerebral edema. No mass effect or midline shift is seen. The ventricular system is nondilated. The basilar cisterns are patent. The skull is intact without displaced fracture.  Postsurgical changes are seen along the right frontal calvarium extending along the right frontal recess, stable as compared to the previous study.  The paranasal sinuses and mastoid air cells demonstrate no air fluid levels.      No evidence of hemorrhage, mass effect or midline shift. No acute process identified.     SHIELA SELF MD       Electronically Signed and Approved By: SHIELA SELF MD on 4/13/2023 at 17:29             CT Chest With Contrast Diagnostic    Result Date: 4/13/2023  PROCEDURE: CT CHEST W CONTRAST DIAGNOSTIC  COMPARISON:  [facility], CT, CT CHEST WO CONTRAST DIAGNOSTIC, 3/29/2023, 14:59. INDICATIONS: TRAUMA  TECHNIQUE: After obtaining the patient's consent, CT images were obtained with non-ionic intravenous contrast material.   PROTOCOL:   Standard imaging protocol performed    RADIATION:   DLP: 882.1 mGy*cm   Automated exposure control was utilized to minimize radiation dose. CONTRAST: 100 cc Isovue 370 I.V. LABS:   eGFR: >60 ml/min/1.73m2  FINDINGS: No significant focal filling defects are observed to indicate the presence of pulmonary embolism.  Moderate to large sized left-sided pleural effusion present.  The heart appears enlarged.  No pericardial effusion identified.  Atherosclerotic calcifications are present including within the coronary arteries.  Atelectatic changes are present within the  dependent portions of the lungs bilaterally, left greater than right.  Interlobular septal thickening is present.  No significant hilar, mediastinal, or axillary lymphadenopathy is seen. A normal aortic arch branching pattern is noted. The thyroid gland is unremarkable. The esophagus is unremarkable.  The limited evaluation of the upper abdomen demonstrates no definitive acute abnormalities.  There is a small amount of perihepatic ascites.  The spleen appears enlarged.  Surface nodularity is present within the liver.  Prominent periportal nodes are present.  No acute osseous abnormalities are seen.         1. No evidence for pulmonary embolism. 2. Cardiomegaly with mild to moderate pulmonary edema pattern with moderate to large sized pleural effusion present on the left.  No significant consolidation identified. 3. Cirrhotic morphology of the liver with portal venous hypertension with splenomegaly and a small amount of ascites. 4. Ancillary findings as described above..    SHIELA SELF MD       Electronically Signed and Approved By: SHIELA SELF MD on 4/13/2023 at 17:40             XR Chest 1 View    Result Date: 4/13/2023  PROCEDURE: XR CHEST 1 VW  COMPARISON: [facility], CR, XR CHEST 1 VW, 3/29/2023, 16:01.  [facility], CT, CT CHEST WO CONTRAST DIAGNOSTIC, 3/29/2023, 14:59.  [facility], CR, XR CHEST 1 VW, 4/01/2023, 18:08.  INDICATIONS: SOA Triage Protocol  FINDINGS:  Cardiac silhouette remains enlarged.  Diffuse bilateral airspace opacities have not significantly changed compared to 4/1/2023 chest x-ray.  No pneumothorax is seen.        Stable appearance of diffuse bilateral airspace opacities, which may be due to pulmonary edema and/or pneumonia.       EDIS FENG MD       Electronically Signed and Approved By: EDIS FENG MD on 4/13/2023 at 15:34                 Differential Diagnosis and Discussion:    Dyspnea: Differential diagnosis includes but is not  limited to metabolic acidosis, neurological disorders, psychogenic, asthma, pneumothorax, upper airway obstruction, COPD, pneumonia, noncardiogenic pulmonary edema, interstitial lung disease, anemia, congestive heart failure, and pulmonary embolism    All labs were reviewed and interpreted by me.  All X-rays impressions were independently interpreted by me.  EKG was interpreted by me.  CT scan radiology impression was interpreted by me.     The patient´s CBC that was reviewed and interpreted by me shows no abnormalities of critical concern. Of note, there is no anemia requiring a blood transfusion and the platelet count is acceptable.  The patient´s CMP that was reviewed and interpretted by me shows no abnormalities of critical concern. Of note, the patient´s sodium and potassium are acceptable. The patient´s liver enzymes are unremarkable. The patient´s renal function (creatinine) is preserved. The patient has a normal anion gap.  Lactic acid is 4.2.  Troponin is 24.  CT scan of the chest shows a large pleural effusion.  CT head is negative for acute intracranial abnormalities.  Chest x-ray is negative for infiltrate.    MDM  Number of Diagnoses or Management Options  Acute respiratory failure, unspecified whether with hypoxia or hypercapnia  Diagnosis management comments: The differential diagnosis for dyspnea can be extensive and may include several conditions such as pneumonia, chronic obstructive pulmonary disease (COPD), asthma, pulmonary edema, pulmonary embolism, anemia, heart failure, and many others.    In this case, the patient had a CT scan that ruled out pulmonary embolism but showed a pleural effusion, which can be a potential cause of dyspnea. Additionally, the presence of hypoxia, or low oxygen levels in the blood, can also be a sign of a serious underlying condition.    Given the presence of both altered mental status and shortness of breath, admission to the hospital is warranted for close  monitoring and management. The pleural effusion may require drainage, and the patient may require supplemental oxygen, intravenous antibiotics, or other interventions depending on the underlying cause of their symptoms. Close monitoring of the patient's vital signs, oxygen levels, and cardiac function is essential to prevent further complications.  Case was discussed with Dr. Bishop who agrees with admission.    Overall, admission to the hospital is necessary for this patient with altered mental status, shortness of breath, pleural effusion, and hypoxia for prompt evaluation, diagnosis, and treatment of their underlying condition.       Amount and/or Complexity of Data Reviewed  Clinical lab tests: reviewed  Tests in the radiology section of CPT®: reviewed  Tests in the medicine section of CPT®: reviewed  Discussion of test results with the performing providers: yes  Discuss the patient with other providers: yes  Independent visualization of images, tracings, or specimens: yes    Risk of Complications, Morbidity, and/or Mortality  Presenting problems: moderate  Management options: moderate         Critical Care Note: Total Critical Care time of 45 minutes. Total critical care time documented does not include time spent on separately billed procedures for services of nurses or physician assistants. I personally saw and examined the patient. I have reviewed all diagnostic interpretations and treatment plans as written. I was present for the key portions of any procedures performed and the inclusive time noted in any critical care statement. Critical care time includes patient management by me, time spent at the patients bedside,  time to review lab and imaging results, discussing patient care, documentation in the medical record, and time spent with family or caregiver.    Patient Care Considerations:    DUPLEX ULTRASOUND: I considered ordering a duplex ultrasound, however the patient is low risk for dvt and has no  signs of arterial occlusion.      Consultants/Shared Management Plan:    PCP: I have discussed the case with Dr. Bishop who agrees to accept the patient for admission.    Social Determinants of Health:    Patient has presented with family members who are responsible, reliable and will ensure follow up care.      Disposition and Care Coordination:    Admit:   Through independent evaluation of the patient's history, physical, and imperical data, the patient meets criteria for observation/admission to the hospital.        Final diagnoses:   Acute respiratory failure, unspecified whether with hypoxia or hypercapnia        ED Disposition     ED Disposition   Decision to Admit    Condition   --    Comment   Level of Care: Progressive Care [20]   Diagnosis: Acute respiratory failure with hypoxia [773320]   Admitting Physician: MARCO BISHOP [[id number]]   Certification: I Certify That Inpatient Hospital Services Are Medically Necessary For Greater Than 2 Midnights             Documentation assistance provided by Brendan Aleman acting as scribe for Marco Bishop MD. Information recorded by the scribe was done at my direction and has been verified and validated by me.     This medical record created using voice recognition software.           Brendan Aleman  04/13/23 [id number]       Jeffry Kramer MD  04/13/23 [id number]

## 2023-04-13 NOTE — H&P
[facility]   HISTORY AND PHYSICAL    Patient Name: Stephen Aguero  : 1945  MRN: [id number]  Primary Care Physician:  Papi Vasquez MD  Date of admission: 2023    Subjective   Subjective     Chief Complaint:   Decreased level of consciousness, shortness of breath      HPI:    Stephen Aguero is a 77 y.o. male With multiple medical problems including chronic respiratory failure with hypoxia and hypercapnia, diabetes, COPD, recent mycoplasma pneumonia, who was discharged from hospital on  after a prolonged stay in hospital from .  Patient was sent to [facility] rehab hospital.  He was found lethargic and unresponsive this afternoon and was sent to hospital.  In the ER work-up showed mild hypoglycemia as well as large pleural effusion and hypoxia.  Patient now being admitted to medical service.  Patient's daughter and wife at bedside reports he was doing slightly better in rehab and participating in therapy for the last 2 days.  There is no report of nausea vomiting diarrhea or fever.  Work-up in the ER included CT of the chest which showed large pleural effusion as well as cirrhotic liver.  Patient is arousable with difficulty, not answering any questions.  No shortness of breath at this time.        Review of Systems:    Fatigue and weakness.  Shortness of breath.  No fever chills.  Lethargy.  Decreased level of consciousness.  Inability to move out of bed.  Leg edema    Personal History     Past Medical History:   Diagnosis Date   • BPH (benign prostatic hyperplasia)    • Chronic a-fib    • Chronic pain    • Diabetes    • Diabetes mellitus    • Elevated cholesterol    • Elevated PSA    • GERD (gastroesophageal reflux disease)    • High blood pressure    • Hypertension    • Sleep apnea        Past Surgical History:   Procedure Laterality Date   • ABDOMINAL SURGERY     • APPENDECTOMY     • BACK SURGERY     • COLONOSCOPY      [facility]   • EYE FOREIGN BODY REMOVAL     • EYE SURGERY      • FACIAL COSMETIC SURGERY     • LAPAROSCOPIC CHOLECYSTECTOMY     • LEG SURGERY     • NECK SURGERY         Family History: family history includes Cancer (age of onset: 55) in his sister. Otherwise pertinent FHx was reviewed and not pertinent to current issue.    Social History:  reports that he has quit smoking. His smoking use included cigarettes. He has never used smokeless tobacco. He reports that he does not drink alcohol and does not use drugs.    Home Medications:  DULoxetine, HYDROcodone-acetaminophen, Insulin Aspart, Insulin Glargine (2 Unit Dial), Magnesium Hydroxide, albuterol, allopurinol, apixaban, carvedilol, ergocalciferol, famotidine, furosemide, insulin detemir, ipratropium-albuterol, metFORMIN, ondansetron, polyethylene glycol, potassium chloride ER, and triamcinolone      Allergies:  Allergies   Allergen Reactions   • Sulfa Antibiotics Unknown - High Severity   • Sulfa Antibiotics Unknown - Low Severity       Objective   Objective     Vitals:   Heart Rate:  [] 85  Resp:  [18-23] 18  BP: (103-121)/(62-78) 103/62  Flow (L/min):  [4] 4    Physical Exam    Elderly male morbidly obese, lethargic and confused responding to painful stimulus with opening eyes only.  HEENT reveals left pupil reactive to light right eye sunken and had chronic trauma and enophthalmos.  Oral mucosa dry.  Poor hygiene  Heart irregular.  Lungs diminished breath sounds bilaterally  Dullness to percussion  Abdomen morbidly obese nontender  Extremities with 2-3+ edema  Neurologically arousable with painful stimulus moving extremities.      I have personally reviewed the results from the time of this admission to 4/13/2023 19:38 EDT and agree with these findings:  [x]  Laboratory  []  Microbiology  [x]  Radiology  []  EKG/Telemetry   []  Cardiology/Vascular   []  Pathology  []  Old records  []  Other:    CBC:    WBC   Date Value Ref Range Status   04/13/2023 9.98 3.40 - 10.80 10*3/mm3 Final     RBC   Date Value Ref Range  Status   04/13/2023 4.42 4.14 - 5.80 10*6/mm3 Final     Hemoglobin   Date Value Ref Range Status   04/13/2023 14.6 13.0 - 17.7 g/dL Final     Hematocrit   Date Value Ref Range Status   04/13/2023 43.0 37.5 - 51.0 % Final     MCV   Date Value Ref Range Status   04/13/2023 97.3 (H) 79.0 - 97.0 fL Final     MCH   Date Value Ref Range Status   04/13/2023 33.0 26.6 - 33.0 pg Final     MCHC   Date Value Ref Range Status   04/13/2023 34.0 31.5 - 35.7 g/dL Final     RDW   Date Value Ref Range Status   04/13/2023 15.6 (H) 12.3 - 15.4 % Final     RDW-SD   Date Value Ref Range Status   04/13/2023 56.0 (H) 37.0 - 54.0 fl Final     MPV   Date Value Ref Range Status   04/13/2023 10.7 6.0 - 12.0 fL Final     Platelets   Date Value Ref Range Status   04/13/2023 212 140 - 450 10*3/mm3 Final     Neutrophil %   Date Value Ref Range Status   04/13/2023 60.2 42.7 - 76.0 % Final     Lymphocyte %   Date Value Ref Range Status   04/13/2023 22.9 19.6 - 45.3 % Final     Monocyte %   Date Value Ref Range Status   04/13/2023 13.7 (H) 5.0 - 12.0 % Final     Eosinophil %   Date Value Ref Range Status   04/13/2023 2.2 0.3 - 6.2 % Final     Basophil %   Date Value Ref Range Status   04/13/2023 0.7 0.0 - 1.5 % Final     Immature Grans %   Date Value Ref Range Status   04/13/2023 0.3 0.0 - 0.5 % Final     Neutrophils, Absolute   Date Value Ref Range Status   04/13/2023 6.00 1.70 - 7.00 10*3/mm3 Final     Lymphocytes, Absolute   Date Value Ref Range Status   04/13/2023 2.29 0.70 - 3.10 10*3/mm3 Final     Monocytes, Absolute   Date Value Ref Range Status   04/13/2023 1.37 (H) 0.10 - 0.90 10*3/mm3 Final     Eosinophils, Absolute   Date Value Ref Range Status   04/13/2023 0.22 0.00 - 0.40 10*3/mm3 Final     Basophils, Absolute   Date Value Ref Range Status   04/13/2023 0.07 0.00 - 0.20 10*3/mm3 Final     Immature Grans, Absolute   Date Value Ref Range Status   04/13/2023 0.03 0.00 - 0.05 10*3/mm3 Final     nRBC   Date Value Ref Range Status   04/13/2023  0.0 0.0 - 0.2 /100 WBC Final        BMP:    Lab Results   Component Value Date    GLUCOSE 83 04/13/2023    BUN 27 (H) 04/13/2023    CREATININE 1.01 04/13/2023    BCR 26.7 (H) 04/13/2023    K 4.4 04/13/2023    CO2 37.8 (H) 04/13/2023    CALCIUM 9.0 04/13/2023    ALBUMIN 2.8 (L) 04/13/2023    LABIL2 1.0 (L) 09/27/2020    AST 31 04/13/2023    ALT 23 04/13/2023        CT Head Without Contrast    Result Date: 4/13/2023  No evidence of hemorrhage, mass effect or midline shift. No acute process identified.     SHIELA SELF MD       Electronically Signed and Approved By: SHIELA SELF MD on 4/13/2023 at 17:29             CT Chest With Contrast Diagnostic    Result Date: 4/13/2023    1. No evidence for pulmonary embolism. 2. Cardiomegaly with mild to moderate pulmonary edema pattern with moderate to large sized pleural effusion present on the left.  No significant consolidation identified. 3. Cirrhotic morphology of the liver with portal venous hypertension with splenomegaly and a small amount of ascites. 4. Ancillary findings as described above..    SHIELA SELF MD       Electronically Signed and Approved By: SHIELA SELF MD on 4/13/2023 at 17:40             XR Chest 1 View    Result Date: 4/13/2023    Stable appearance of diffuse bilateral airspace opacities, which may be due to pulmonary edema and/or pneumonia.       EDIS FENG MD       Electronically Signed and Approved By: EDIS FENG MD on 4/13/2023 at 15:34                      Assessment & Plan   Assessment / Plan       Current Diagnosis:  Active Hospital Problems    Diagnosis    • **Acute respiratory failure with hypoxia    • Altered mental status    • Pleural effusion    • Cirrhosis of liver    • Atrial fibrillation    • COPD (chronic obstructive pulmonary disease)    • Diabetes mellitus    • Chronic venous stasis dermatitis    • Sleep apnea      Plan:   Patient brought back from [facility] rehab for decreased level of consciousness and some shortness of  breath and hypoxia.  Work-up reveals pleural effusion reaccumulating.  Mild to moderate hypoxia with hypercapnia.  At this point we will place patient back on BiPAP.  IV Lasix.  Patient CT confirming cirrhotic features  We will check ammonia level.  Add Aldactone.  DuoNeb.  IV Pepcid.  Pulmonary and cardiac consult, notified.  Discussed with patient's family, daughter and wife at bedside  Patient's CODE STATUS will be full code.  Continue aggressive measures.  Overall poor prognosis given multiple issues.        DVT prophylaxis:  No DVT prophylaxis order currently exists.    GI Prophylaxis:    Pepcid    CODE STATUS:    Level Of Support Discussed With: Health Care Surrogate  Code Status (Patient has no pulse and is not breathing): CPR (Attempt to Resuscitate)  Medical Interventions (Patient has pulse or is breathing): Full Support    Admission Status:  I believe this patient meets inpatient status.             I have dictated this note utilizing Dragon Dictation.             Please note that portions of this note were completed with a voice recognition program.             Part of this note may be an electronic transcription/translation of spoken language to printed text         using the Dragon Dictation System.       Electronically signed by Marco Bishop MD, 04/13/23, 7:33 PM EDT.    Total time spent with in evaluation and management: 47 minutes

## 2023-04-14 NOTE — PROGRESS NOTES
[facility]     Progress Note    Patient Name: Stephen Aguero  : 1945  MRN: [id number]  Primary Care Physician:  Papi Vasquez MD  Date of admission: 2023      Subjective   Brief summary.  Patient admitted with decreased level of consciousness and shortness of breath and hypoxia      HPI:  Sleepy this morning not responding much to commands.  Breathing comfortably.  No agitation    Review of Systems     No report of fever chills.  No nausea vomiting.  Not eating much sometime it sometimes does not.  No shortness of breath reported.  Confused      Objective     Vitals:   Temp:  [97.3 °F (36.3 °C)-98.4 °F (36.9 °C)] 98.4 °F (36.9 °C)  Heart Rate:  [] 86  Resp:  [16-18] 18  BP: ()/(45-65) 100/65  Flow (L/min):  [2-4] 2    Physical Exam :     Elderly male obese not in acute distress.  Neck supple.  Heart regular.  Lungs diminished breath sounds.  Abdomen extremely obese.  Extremities 3+ edema.  Neuro moving all extremities      Result Review:  I have personally reviewed the results from the time of this admission to 2023 17:50 EDT and agree with these findings:  [x]  Laboratory  []  Microbiology  [x]  Radiology  []  EKG/Telemetry   []  Cardiology/Vascular   []  Pathology  []  Old records  []  Other:    MRI negative for acute event    Ammonia level high    Assessment / Plan       Active Hospital Problems:  Active Hospital Problems    Diagnosis    • **Acute respiratory failure with hypoxia    • Altered mental status    • Pleural effusion    • Cirrhosis of liver    • Atrial fibrillation    • COPD (chronic obstructive pulmonary disease)    • Diabetes mellitus    • Chronic venous stasis dermatitis    • Sleep apnea        Plan:   Appears to have cirrhosis related issues.  We will add lactulose.  Continue diuretics, decrease Lasix to once a day.  Increase Aldactone to 50 twice daily  Appreciate consultants help  Monitor labs       DVT prophylaxis:  Medical DVT prophylaxis orders are  present.    CODE STATUS:   Level Of Support Discussed With: Health Care Surrogate  Code Status (Patient has no pulse and is not breathing): CPR (Attempt to Resuscitate)  Medical Interventions (Patient has pulse or is breathing): Full Support            Electronically signed by Marco Bishop MD, 04/14/23, 5:50 PM EDT.

## 2023-04-14 NOTE — PLAN OF CARE
Goal Outcome Evaluation:         Patient placed on bipap 18/10 30% tonight for respiratory failure and hypoxia. He has tolerated it well.

## 2023-04-14 NOTE — CONSULTS
[facility]   Consult Note    Patient Name: Stephen Aguero  : 1945  MRN: [id number]  Primary Care Physician:  Papi Vasquez MD  Referring Physician: No ref. provider found  Date of admission: 2023    Consults  Subjective   Subjective     Reason for Consult/ Chief Complaint: Left-sided pleural effusion    History of Present Illness  Stephen Aguero is a 77 y.o. male admitted with altered mental status found to have hepatic encephalopathy  Patient is obtunded he is unable to give any history  Brought in for confusion  Has elevated ammonia  Has asterixis  Has CT scan showing moderate left-sided effusion    Review of Systems   Unable to obtain due to mental status  Personal History     Past Medical History:   Diagnosis Date   • BPH (benign prostatic hyperplasia)    • Chronic a-fib    • Chronic pain    • Diabetes    • Diabetes mellitus    • Elevated cholesterol    • Elevated PSA    • GERD (gastroesophageal reflux disease)    • High blood pressure    • Hypertension    • Sleep apnea        Past Surgical History:   Procedure Laterality Date   • ABDOMINAL SURGERY     • APPENDECTOMY     • BACK SURGERY     • COLONOSCOPY      [facility]   • EYE FOREIGN BODY REMOVAL     • EYE SURGERY     • FACIAL COSMETIC SURGERY     • LAPAROSCOPIC CHOLECYSTECTOMY     • LEG SURGERY     • NECK SURGERY         Family History: family history includes Cancer (age of onset: 55) in his sister. Otherwise pertinent FHx was reviewed and not pertinent to current issue.    Social History:  reports that he has quit smoking. His smoking use included cigarettes. He has never used smokeless tobacco. He reports that he does not drink alcohol and does not use drugs.    Home Medications:   DULoxetine, HYDROcodone-acetaminophen, Insulin Aspart, Insulin Glargine (2 Unit Dial), Magnesium Hydroxide, albuterol, allopurinol, apixaban, carvedilol, ergocalciferol, famotidine, furosemide, insulin detemir, metFORMIN, ondansetron, polyethylene glycol,  potassium chloride ER, and triamcinolone    Allergies:  Allergies   Allergen Reactions   • Sulfa Antibiotics Unknown - High Severity   • Sulfa Antibiotics Unknown - Low Severity       Objective    Objective     Vitals:  Temp:  [97.3 °F (36.3 °C)-97.7 °F (36.5 °C)] 97.3 °F (36.3 °C)  Heart Rate:  [] 93  Resp:  [16-23] 18  BP: ()/(45-78) 99/45  Flow (L/min):  [2-4] 2    Physical Exam  Ill-appearing male  Obese  Does have asterixis  Diminished breath sounds both bases left greater than right  Result Review    Result Review:  I have personally reviewed the results from the time of this admission to 4/14/2023 15:00 EDT and agree with these findings:  [x]  Laboratory  [x]  Microbiology  [x]  Radiology  [x]  EKG/Telemetry   [x]  Cardiology/Vascular   [x]  Pathology  []  Old records  []  Other:    Most notable findings include: CT scan showing pleural effusion    Assessment & Plan   Assessment / Plan     Brief Patient Summary:  Stephen Aguero is a 77 y.o. male who admitted to the hospital with altered mental status found to have pleural effusion    Active Hospital Problems:  Active Hospital Problems    Diagnosis    • **Acute respiratory failure with hypoxia    • Altered mental status    • Pleural effusion    • Cirrhosis of liver    • Atrial fibrillation    • COPD (chronic obstructive pulmonary disease)    • Diabetes mellitus    • Chronic venous stasis dermatitis    • Sleep apnea        Plan:   At this time CT scan reviewed patient does have small effusion  Suspected effusion likely secondary to cirrhosis and congestive heart failure  We will likely always have some sort of degree of effusion  No indication for emergent thoracentesis at this time as he is too altered to set up to cooperate  Continue with lactulose\  Continue with Lasix  Resume Aldactone  We will start patient's home NIV for his chronic hypercapnic respiratory failure    I personally reviewed all imaging, laboratory data, and I spoke with  respiratory therapy, and nursing regarding the patient's care, have also spoken with the patient's primary admitting physician regarding his plan of care.      Electronically signed by Ramses Luis DO, 04/14/23, 3:00 PM EDT.

## 2023-04-14 NOTE — PLAN OF CARE
Goal Outcome Evaluation:   Pt very lethargic. Responds to painful stimuli. Only would say his first name would not answer any other questions. Bath given. Noted multiple wounds to body.  Wound Consult ordered. Oral medication held due to lethargy. Incont of bowel and bladder. Q 2 turns.

## 2023-04-14 NOTE — CONSULTS
[facility]   Cardiology Consult Note    Patient Name: Stephen Aguero  : 1945  MRN: [id number]  Primary Care Physician:  Papi Vasquez MD  Referring Physician: Marco Bishop MD  Date of admission: 2023    Subjective   Subjective     Reason for Consultation : Volume overload, concerns for congestive heart failure    Chief Complaint : Shortness of breath, lethargy, decreased level of consciousness    History is incomplete due to altered mental status.  Most history is obtained by reviewing medical records and talking of the providers.    HPI:  Stephen Aguero is a 77 y.o. male with COPD, diabetes, recent mycoplasma pneumonia, cirrhosis liver, atrial fibrillation.  He was recently discharged in the hospital to rehab on 2023 after being treated for pneumonia with sepsis.  She was sent back to the emergency room because of unresponsiveness and difficulty in breathing.  In the emergency room, patient underwent CT scan of the chest which showed large pleural effusions.  He was also noted to be hypoxic.  He was admitted to internal medicine service and was started on IV diuretics along with lactulose.    Patient is very lethargic and not able to give any history.  He reports feeling fine.    Review of Systems   Unable to obtain review of systems due to altered mental status    Personal History     Past Medical History:   Diagnosis Date   • BPH (benign prostatic hyperplasia)    • Chronic a-fib    • Chronic pain    • Diabetes    • Diabetes mellitus    • Elevated cholesterol    • Elevated PSA    • GERD (gastroesophageal reflux disease)    • High blood pressure    • Hypertension    • Sleep apnea           Family History: family history includes Cancer (age of onset: 55) in his sister. Otherwise pertinent FHx was reviewed and not pertinent to current issue.    Social History:  reports that he has quit smoking. His smoking use included cigarettes. He has never used smokeless tobacco. He reports that he  does not drink alcohol and does not use drugs.    Home Medications:  DULoxetine, HYDROcodone-acetaminophen, Insulin Aspart, Insulin Glargine (2 Unit Dial), Magnesium Hydroxide, albuterol, allopurinol, apixaban, carvedilol, ergocalciferol, famotidine, furosemide, insulin detemir, metFORMIN, ondansetron, polyethylene glycol, potassium chloride ER, and triamcinolone    Allergies:  Allergies   Allergen Reactions   • Sulfa Antibiotics Unknown - High Severity   • Sulfa Antibiotics Unknown - Low Severity       Objective    Objective     Vitals:   Temp:  [97.3 °F (36.3 °C)-97.7 °F (36.5 °C)] 97.5 °F (36.4 °C)  Heart Rate:  [] 57  Resp:  [16-23] 16  BP: ()/(54-78) 116/56  Flow (L/min):  [2-4] 2      Physical Exam:   Constitutional: Lethargic, sleepy   Eyes: PERRLA, sclerae anicteric, no conjunctival injection   HENT: NCAT, mucous membranes moist   Neck: Supple, no thyromegaly, no lymphadenopathy, trachea midline   Respiratory: Bilateral faint crackles heard, no wheezing   Cardiovascular: Irregular, no murmurs, rubs, or gallops, palpable pedal pulses bilaterally   Gastrointestinal: Positive bowel sounds, soft, nontender, nondistended   Musculoskeletal:  no clubbing or cyanosis to extremities   Psychiatric: Not assessed due to somnolence   Neurologic: Not assessed due to somnolence   Skin: No rashes     Result Review    Result Review:  I have personally reviewed the results from the time of this admission to 4/14/2023 09:44 EDT and agree with these findings:  [x]  Laboratory  []  Microbiology  [x]  Radiology  [x]  EKG/Telemetry   [x]  Cardiology/Vascular   []  Pathology  [x]  Old records  []  Other:  Most notable findings include:     CMP        4/9/2023    04:00 4/13/2023    15:14 4/13/2023    15:26 4/14/2023    04:11   CMP   Glucose 134   79   83   93     BUN 23    27   20     Creatinine 0.72    1.01   0.81     EGFR 93.5    76.1   90.2     Sodium 137   137.5   141   139     Potassium 4.2    4.4   3.9     Chloride  95    95   95     Calcium 8.9    9.0   8.9     Total Protein 6.9    7.1   7.0     Albumin 2.6    2.8   2.7     Globulin 4.3    4.3   4.3     Total Bilirubin 0.7    1.0   1.1     Alkaline Phosphatase 128    129   125     AST (SGOT) 39    31   31     ALT (SGPT) 30    23   22     Albumin/Globulin Ratio 0.6    0.7   0.6     BUN/Creatinine Ratio 31.9    26.7   24.7     Anion Gap 4.9    8.2   5.7        CBC        4/9/2023    04:00 4/13/2023    15:26 4/14/2023    04:11   CBC   WBC 8.81   9.98   8.23     RBC 4.38   4.42   4.60     Hemoglobin 14.5   14.6   14.9     Hematocrit 42.9   43.0   45.1     MCV 97.9   97.3   98.0     MCH 33.1   33.0   32.4     MCHC 33.8   34.0   33.0     RDW 15.6   15.6   15.5     Platelets 181   212   207        Lab Results   Component Value Date    TROPONINT 24 (H) 04/13/2023     Results for orders placed during the hospital encounter of 03/22/23    Adult Transthoracic Echo Complete W/ Cont if Necessary Per Protocol    Interpretation Summary  •  Left ventricular ejection fraction appears to be 51 - 55%.  •  Estimated right ventricular systolic pressure from tricuspid regurgitation is mildly elevated (35-45 mmHg).  •  Mild pulmonary hypertension is present.  •  Mild mitral valve regurgitation is present        EKG showed atrial fibrillation, right bundle branch block    Assessment & Plan   Assessment / Plan     Brief Patient Summary:  Stephen Aguero is a 77 y.o. male with COPD, diabetes, recent mycoplasma pneumonia, cirrhosis liver, atrial fibrillation, who is admitted with increasing shortness of breath, lethargy and confusion    Active Hospital Problems:  Active Hospital Problems    Diagnosis    • **Acute respiratory failure with hypoxia    • Altered mental status    • Pleural effusion    • Cirrhosis of liver    • Atrial fibrillation    • COPD (chronic obstructive pulmonary disease)    • Diabetes mellitus    • Chronic venous stasis dermatitis    • Sleep apnea      Volume overload : Normal  proBNP.  Also has pleural effusion and ascites.  Likely secondary to cirrhosis of liver itself.    Chronic atrial fibrillation : Ventricular rates reasonably well controlled    Cirrhosis of liver  Altered mental status : Possible hepatic encephalopathy , ?  Medication induced    Plan:     Continue IV Lasix today with close monitoring of electrolytes and renal function  Spironolactone 25 mg daily, dose to be titrated up as tolerated  We will decrease the dose of Coreg to 6.25 mg twice daily due to low blood pressure    Management of cirrhosis, altered mental status per primary team : Discussed with Dr. Bishop          Electronically signed by Cecilio June MD, 04/14/23, 9:44 AM EDT.

## 2023-04-14 NOTE — SIGNIFICANT NOTE
Wound Eval / Progress Noted    [facility]     Patient Name: Stephen Aguero  : 1945  MRN: [id number]  Today's Date: 2023                 Admit Date: 2023    Visit Dx:    ICD-10-CM ICD-9-CM   1. Acute respiratory failure, unspecified whether with hypoxia or hypercapnia  J96.00 518.81       Patient Active Problem List   Diagnosis   • Uncontrolled diabetes mellitus with hyperglycemia (HCC)   • Elevated cholesterol   • High blood pressure   • Sleep apnea   • Screening for colon cancer   • Elevated PSA   • Benign prostatic hyperplasia with urinary frequency   • Generalized weakness   • Pedal edema   • Chronic venous stasis dermatitis   • Hypoxia   • Microscopic hematuria   • Diabetes mellitus   • COPD (chronic obstructive pulmonary disease)   • Obesity hypoventilation syndrome   • Atrial fibrillation   • Chronic respiratory failure with hypercapnia   • Acute respiratory failure with hypoxia   • Altered mental status   • Pleural effusion   • Cirrhosis of liver        Past Medical History:   Diagnosis Date   • BPH (benign prostatic hyperplasia)    • Chronic a-fib    • Chronic pain    • Diabetes    • Diabetes mellitus    • Elevated cholesterol    • Elevated PSA    • GERD (gastroesophageal reflux disease)    • High blood pressure    • Hypertension    • Sleep apnea         Past Surgical History:   Procedure Laterality Date   • ABDOMINAL SURGERY     • APPENDECTOMY     • BACK SURGERY     • COLONOSCOPY      [facility]   • EYE FOREIGN BODY REMOVAL     • EYE SURGERY     • FACIAL COSMETIC SURGERY     • LAPAROSCOPIC CHOLECYSTECTOMY     • LEG SURGERY     • NECK SURGERY           Physical Assessment:  Wound 22 1712 Right lower leg Traumatic (Active)   Wound Image   23 0937   Dressing Appearance open to air 23 0937   Closure None 23 0937   Base dry;red 23 0937   Periwound blistered 23 0937   Periwound Temperature warm 23 0937   Periwound Skin Turgor soft 23 0937   Edges  rolled/closed 04/14/23 0937   Drainage Amount none 04/14/23 0937   Care, Wound cleansed with;soap and water 04/14/23 0937   Dressing Care open to air 04/14/23 0937       Wound 03/23/23 1443 Bilateral groin (Active)   Dressing Appearance open to air 04/14/23 0937   Closure None 04/14/23 0937   Base moist;red;pink 04/14/23 0937   Periwound moist;intact 04/14/23 0937   Periwound Temperature warm 04/14/23 0937   Periwound Skin Turgor soft 04/14/23 0937   Edges rolled/closed 04/14/23 0937   Drainage Amount none 04/14/23 0937   Care, Wound cleansed with;sterile normal saline 04/14/23 0937   Dressing Care open to air 04/14/23 0937       Wound 04/13/23 2030 Left lower leg Traumatic (Active)   Wound Image   04/14/23 0937   Dressing Appearance open to air 04/14/23 0937   Closure None 04/14/23 0937   Base moist;red 04/14/23 0937   Periwound dry 04/14/23 0937   Periwound Temperature warm 04/14/23 0937   Periwound Skin Turgor soft 04/14/23 0937   Edges open 04/14/23 0937   Drainage Characteristics/Odor serosanguineous 04/14/23 0937   Drainage Amount scant 04/14/23 0937   Care, Wound cleansed with;soap and water;sterile normal saline 04/14/23 0937   Dressing Care open to air 04/14/23 0937       Wound 04/13/23 2030 Right posterior elbow Traumatic (Active)   Wound Image   04/14/23 0937   Dressing Appearance dressing loose 04/14/23 0937   Closure None 04/14/23 0937   Base moist;red 04/14/23 0937   Periwound ecchymotic;intact;dry 04/14/23 0937   Periwound Temperature warm 04/14/23 0937   Periwound Skin Turgor soft 04/14/23 0937   Edges open 04/14/23 0937   Drainage Characteristics/Odor serosanguineous 04/14/23 0937   Drainage Amount small 04/14/23 0937   Care, Wound cleansed with;sterile normal saline 04/14/23 0937   Dressing Care dressing applied;petroleum-based;non-adherent;gauze;silicone;border dressing 04/14/23 0937   Periwound Care absorptive dressing applied 04/14/23 0937       Wound 04/13/23 2030 Right cheek Abrasion (Active)    Dressing Appearance open to air 04/14/23 0937   Closure None 04/14/23 0937   Base dry;scab 04/14/23 0937   Periwound dry;intact 04/14/23 0937   Periwound Temperature warm 04/14/23 0937   Periwound Skin Turgor soft 04/14/23 0937   Edges rolled/closed 04/14/23 0937   Drainage Amount none 04/14/23 0937   Care, Wound cleansed with;sterile normal saline 04/14/23 0937   Dressing Care open to air 04/14/23 0937       Wound 04/14/23 Left posterior elbow Skin Tear (Active)   Wound Image   04/14/23 0937   Dressing Appearance dressing loose 04/14/23 0937   Closure None 04/14/23 0937   Base moist;red 04/14/23 0937   Periwound dry;intact 04/14/23 0937   Periwound Temperature warm 04/14/23 0937   Periwound Skin Turgor soft 04/14/23 0937   Edges open 04/14/23 0937   Drainage Characteristics/Odor serosanguineous 04/14/23 0937   Drainage Amount scant 04/14/23 0937   Care, Wound cleansed with;sterile normal saline 04/14/23 0937   Dressing Care dressing applied;petroleum-based;non-adherent;gauze;silicone;border dressing 04/14/23 0937   Periwound Care absorptive dressing applied 04/14/23 0937      Wound Check / Follow-up:  Patient seen today for wound consult. Thick crusting noted to right cheek. Recommending BID application of bacitracin. Skin tears noted to bilateral elbows. Recommending skin tear protocol. BLE and lower back with dry, scaly skin. Recommending application of triamcinolone ointment. Blistering is noted to right lower extremity. Recommending daily dressing changes with non-adherent petroleum based gauze for protection. Left anterior lower extremity with open wounds noted, likely from previous blistering. Recommending daily dressing changes with silver impregnated hydrofiber. Bruising noted to bilateral gluteal aspects. Recommending skin protection. Specialty mattress ordered.     Implement every two hour turns, offload heels, keep patient free from moisture.      Impression: Skin tears to bilateral elbows. Dry skin.  Thick crusting to right cheek.      Short term goals:  Regain skin integrity, moisture prevention, pressure reduction, skin protection, skin care, topical treatment.      Kathi Moreira RN    4/14/2023    13:31 EDT

## 2023-04-14 NOTE — CASE MANAGEMENT/SOCIAL WORK
Discharge Planning Assessment  [facility]     Patient Name: Stephen Aguero  MRN: [id number]  Today's Date: 4/14/2023    Admit Date: 4/13/2023    Plan: Pt is a re-admit from [facility] rehab. SW spoke with wife, wife plans for Pt to return to [facility] rehab. SW will make referral. Pt has good support at home. At this time Pt's wife is also taking care of her twin sister who just had surgery, Pt's daughter Alessandra will be helping with discharge planning. Wife would like all information be to be shared with daughter as well. SW/CM will continue to follow for discharge needs.   Discharge Needs Assessment     Row Name 04/14/23 0929       Living Environment    People in Home spouse    Current Living Arrangements extended care facility    Duration at Residence From last admission Pt discharged to [facility] rehab. Pt's wife would like Pt to return to [facility] rehab.    Potentially Unsafe Housing Conditions none    Primary Care Provided by self;spouse/significant other    Provides Primary Care For no one, unable/limited ability to care for self    Family Caregiver if Needed child(rach), adult;spouse    Quality of Family Relationships helpful;non-existent;supportive       Resource/Environmental Concerns    Resource/Environmental Concerns none    Transportation Concerns none       Food Insecurity    Within the past 12 months, you worried that your food would run out before you got the money to buy more. Never true    Within the past 12 months, the food you bought just didn't last and you didn't have money to get more. Never true       Transition Planning    Patient/Family Anticipates Transition to inpatient rehabilitation facility    Transportation Anticipated health plan transportation       Discharge Needs Assessment    Equipment Currently Used at Home other (see comments)  Pt has Astral Vent through [employer].    Concerns to be Addressed discharge planning    Anticipated Changes Related to Illness none    Equipment Needed  After Discharge none    Discharge Coordination/Progress Pt is a re-admit from [facility] rehab. SW spoke with wife, wife plans for Pt to return to [facility] rehab. SW will make referral. Pt has good support at home. At this time Pt's wife is also taking care of her twin sister who just had surgery, Pt's daughter Alessandra will be helping with discharge planning. Wife would like all information be to be shared with daughter as well. SW/CM will continue to follow for discharge needs.               Discharge Plan     Row Name 04/14/23 0933       Plan    Plan Pt is a re-admit from [facility] rehab. SW spoke with wife, wife plans for Pt to return to [facility] rehab. SW will make referral. Pt has good support at home. At this time Pt's wife is also taking care of her twin sister who just had surgery, Pt's daughter Alessandra will be helping with discharge planning. Wife would like all information be to be shared with daughter as well. SW/CM will continue to follow for discharge needs.              Continued Care and Services - Admitted Since 4/13/2023    Coordination has not been started for this encounter.     Selected Continued Care - Prior Encounters Includes continued care and service providers with selected services from prior encounters from 1/13/2023 to 4/14/2023    Discharged on 4/8/2023 Admission date: 3/22/2023 - Discharge disposition: Rehab Facility or Unit (DC - External)    Destination     Service Provider Selected Services Address Phone Fax Patient Preferred    [facility] [address] [place] [phone] --                    Expected Discharge Date and Time     Expected Discharge Date Expected Discharge Time    Apr 16, 2023          Demographic Summary     Row Name 04/14/23 0926       General Information    Admission Type inpatient    Arrived From emergency department    Referral Source admission list    Reason for  Consult discharge planning    Preferred Language English       Contact Information    Permission Granted to Share Info With lay caregiver    Contact Information Obtained for lay caregiver       Lay Caregiver Information    Name, Lay Caregiver Emilee Aguero    Phone, Lay Caregiver [phone]               Functional Status     Row Name 04/14/23 0928       Functional Status    Usual Activity Tolerance moderate    Current Activity Tolerance moderate       Physical Activity    On average, how many days per week do you engage in moderate to strenuous exercise (like a brisk walk)? 0 days    On average, how many minutes do you engage in exercise at this level? 0 min    Number of minutes of exercise per week 0       Assessment of Health Literacy    How often do you have someone help you read hospital materials? Often    How often do you have problems learning about your medical condition because of difficulty understanding written information? Often    How often do you have a problem understanding what is told to you about your medical condition? Often    How confident are you filling out medical forms by yourself? A little bit    Health Literacy Fair       Functional Status, IADL    Medications assistive person;independent    Meal Preparation assistive person    Housekeeping assistive person    Laundry assistive person    Shopping assistive person       Mental Status    General Appearance WDL WDL       Mental Status Summary    Recent Changes in Mental Status/Cognitive Functioning no changes       Employment/    Employment Status retired    Current or Previous Occupation not applicable               Psychosocial    No documentation.                Abuse/Neglect    No documentation.                Legal     Row Name 04/14/23 0929       Financial Resource Strain    How hard is it for you to pay for the very basics like food, housing, medical care, and heating? Not very       Financial/Legal    Source of Income  social security    Application for Public Assistance not applied       Legal    Criminal Activity/Legal Involvement none               Substance Abuse    No documentation.                Patient Forms    No documentation.                   Mayra Patricia

## 2023-04-15 NOTE — PROGRESS NOTES
Pulmonary / Critical Care Progress Note      Patient Name: Stephen Aguero  : 1945  MRN: [id number]  Primary Care Physician:  Ppai Vasquez MD  Date of admission: 2023    Subjective   Subjective   Follow-up for AMS and pleural effusion    Over past 24 hours: CT scan of the chest was done.  Remained on Eliquis.  Remained on Lasix 40 mg daily.  Continued with spironolactone    No acute events overnight.     This morning,  No issues with with nightly CPAP.  Overall better.  Has some shortness of breath.  No cough or phlegm  No nausea or vomiting    Review of Systems  General: Positive fatigue, No Fever, HEENT: No dysphagia, No Visual Changes, no rhinorrhea  Respiratory:  + Productive cough,+Dyspnea, negative phlegm, No Pleuritic Pain, + wheezing, no hemoptysis  Cardiovascular: Denies chest pain, denies palpitations,+CHANEL, No Chest Pressure  Gastrointestinal:  No Abdominal Pain, No Nausea, No Vomiting, No Diarrhea  Genitourinary:  No Dysuria, No Frequency, No Hesitancy  Musculoskeletal: No muscle pain or swelling  Endocrine:  No Heat Intolerance, No Cold Intolerance  Neurologic:  No Confusion, no Dysarthria, No Headaches  Skin:  No Rash, No Open Wounds      Objective   Objective     Vitals:   Temp:  [97.3 °F (36.3 °C)-98.7 °F (37.1 °C)] 97.3 °F (36.3 °C)  Heart Rate:  [86-93] 87  Resp:  [16-18] 16  BP: ()/(45-68) 120/45  Flow (L/min):  [2-4] 2  Physical Exam   Vital Signs Reviewed   General:  WDWN, Alert, NAD.    HEENT:  PERRL, EOMI.  OP, nares clear, no sinus tenderness  Neck:  Supple, no JVD, no thyromegaly  Chest:  good aeration, clear to auscultation bilaterally, tympanic to percussion bilaterally, no work of breathing noted  CV: RRR, no MGR, pulses 2+, equal.  Abd:  Soft, NT, ND, + BS, no HSM  EXT:  no clubbing, no cyanosis, no edema  Neuro:  A&Ox3, CN grossly intact, no focal deficits.  Skin: No rashes or lesions noted      Result Review    Result Review:  I have personally reviewed the  results from the time of this admission to 4/15/2023 10:25 EDT and agree with these findings:  [x]  Laboratory  [x]  Microbiology  [x]  Radiology  [x]  EKG/Telemetry   [x]  Cardiology/Vascular   []  Pathology  []  Old records  []  Other:  Most notable findings include: CO2 35.2    Assessment & Plan   Assessment / Plan     Active Hospital Problems:  Active Hospital Problems    Diagnosis    • **Acute respiratory failure with hypoxia    • Altered mental status    • Pleural effusion    • Cirrhosis of liver    • Atrial fibrillation    • COPD (chronic obstructive pulmonary disease)    • Diabetes mellitus    • Chronic venous stasis dermatitis    • Sleep apnea        Impression:   Small pleural effusion   AMS  Cirrhosis of liver   A fib  COPD  DM  Sleep apnea    Plan:   Suspected effusion likely secondary to cirrhosis and congestive heart failure  Small pleural effusion stable- continue to monitor.  No need for thoracentesis, monitor clinically for now.   Continue with lactulose for cirrhosis.   Continue Lasix IV.  Continue Aldactone.  Continue patient's NIV for chronic hypercapnic respiratory failure  Encourage I-S  Encourage up ad ruth.     I personally reviewed all imaging, laboratory data, and I spoke with respiratory therapy, and nursing regarding the patient's care, have also spoken with the patient's primary admitting physician regarding his plan of care.       DVT prophylaxis:  Medical DVT prophylaxis orders are present.    CODE STATUS:   Level Of Support Discussed With: Health Care Surrogate  Code Status (Patient has no pulse and is not breathing): CPR (Attempt to Resuscitate)  Medical Interventions (Patient has pulse or is breathing): Full Support    This visit was performed by BOTH a physician and an APC. I personally evaluated and examined the patient. I performed all aspects of MDM as documented. , I have reviewed and confirmed the accuracy of the patient's history as documented in this note. and I have  reexamined the patient and the results are consistent with the previously documented exam. I have updated the documentation as necessary.     Maria E Brizuela PA-C   12:57 EDT  04/13/2023    I personally reviewed pertinent labs, imaging and provider notes. Discussed with bedside nurse and will discuss with primary service.     Electronically signed by Destin Bailon MD, 4/15/2023, 10:25 EDT.

## 2023-04-15 NOTE — PROGRESS NOTES
[facility]     Progress Note    Patient Name: Stephen Aguero  : 1945  MRN: [id number]  Primary Care Physician:  Papi Vasquez MD  Date of admission: 2023    Subjective   Subjective     Chief Complaint: Patient is alert and oriented at this time but has been episodes of confusion disorientation he has probably hepatic encephalopathy has already been on lactulose and has eaten his breakfast this morning discussed the case with the nursing he is already being seen by wound care management of blistering cellulitis of the extremities which is covered with the dressings pleural effusion has been evaluated and was thought to be most likely because of cirrhosis of liver    HPI: Patient with history of cirrhosis of liver cellulitis    Review of Systems   All systems were reviewed and negative except for: Reviewed    Objective   Objective     Vitals:   Temp:  [97.3 °F (36.3 °C)-98.7 °F (37.1 °C)] 97.3 °F (36.3 °C)  Heart Rate:  [86-92] 87  Resp:  [16-18] 16  BP: (100-120)/(45-68) 120/45  Flow (L/min):  [2-4] 2    Physical Exam    Constitutional: Awake, alert   Eyes: PERRLA, sclerae anicteric, no conjunctival injection   HENT: NCAT, mucous membranes moist   Neck: Supple, no thyromegaly, no lymphadenopathy, trachea midline   Respiratory: Clear to auscultation bilaterally, nonlabored respirations    Cardiovascular: RRR, no murmurs, rubs, or gallops, palpable pedal pulses bilaterally   Gastrointestinal: Positive bowel sounds, soft, nontender, nondistended   Musculoskeletal: No bilateral ankle edema, no clubbing or cyanosis to extremities   Psychiatric: Appropriate affect, cooperative   Neurologic: Oriented x 3, strength symmetric in all extremities, Cranial Nerves grossly intact to confrontation, speech clear   Skin: No rashes patient has extensive cellulitis  Time alert and oriented but has had malaise and extreme confusion  Result Review    Result Review:  I have personally reviewed the results from the  time of this admission to 4/15/2023 11:45 EDT and agree with these findings:  [x]  Laboratory low-grade and  []  Microbiology  [x]  Radiology  pleuraleffusion  []  EKG/Telemetry   []  Cardiology/Vascular   []  Pathology  []  Old records  []  Other:  Most notable findings include: Continue the current management patient has anemia elevated ammonia level    Assessment & Plan   Assessment / Plan     Brief Patient Summary:  Stephen Aguero is a 77 y.o. male who elevated ammonia levels with CHF    Active Hospital Problems:  Active Hospital Problems    Diagnosis    • **Acute respiratory failure with hypoxia    • Altered mental status    • Pleural effusion    • Cirrhosis of liver    • Atrial fibrillation    • COPD (chronic obstructive pulmonary disease)    • Diabetes mellitus    • Chronic venous stasis dermatitis    • Sleep apnea        Plan:   Cirrhosis of her liver with CHF    DVT prophylaxis:  Medical DVT prophylaxis orders are present.    CODE STATUS:   Level Of Support Discussed With: Health Care Surrogate  Code Status (Patient has no pulse and is not breathing): CPR (Attempt to Resuscitate)  Medical Interventions (Patient has pulse or is breathing): Full Support    Disposition:  I expect patient to be discharged after the patient has been stabilized.    Electronically signed by Alex Boo MD, 04/15/23, 11:45 AM EDT.      Part of this note may be an electronic transcription/translation of spoken language to printed text using the Dragon Dictation System.

## 2023-04-15 NOTE — PLAN OF CARE
Goal Outcome Evaluation:   Alert to person and , confused to situation and time.  More alert toward end of shift than beginning.  Speech clear.  Wore home noninvasive ventilation machine approx 2 hours of shift.  Large BM, incontient.  Ate pudding and drank water without problems. VSS. WCTM

## 2023-04-15 NOTE — PLAN OF CARE
Goal Outcome Evaluation:   Confusion continues.  Home trilogy when napping and at night.  Daughter visited

## 2023-04-16 NOTE — PROGRESS NOTES
Pulmonary / Critical Care Progress Note      Patient Name: Stephen Aguero  : 1945  MRN: [id number]  Primary Care Physician:  Papi Vasquez MD  Date of admission: 2023    Subjective   Subjective   Follow-up for AMS and pleural effusion    Over past 24 hours: Remained on Eliquis.  Remained on Lasix 40 mg daily.  Continued with spironolactone    No acute events overnight.     Sleeping in bed.  CPAP currently used.  Easily rousable.  No complaints overnight.  No shortness of air.  SPO2 in high 90s throughout the night.  Has no significant leg swelling.   No chest pain or chest tightness.     Review of Systems  General: Positive fatigue, No Fever, HEENT: No dysphagia, No Visual Changes, no rhinorrhea  Respiratory:  + Productive cough,+Dyspnea, negative phlegm, No Pleuritic Pain, + wheezing, no hemoptysis  Cardiovascular: Denies chest pain, denies palpitations,+CHANEL, No Chest Pressure  Gastrointestinal:  No Abdominal Pain, No Nausea, No Vomiting, No Diarrhea  Genitourinary:  No Dysuria, No Frequency, No Hesitancy  Musculoskeletal: No muscle pain or swelling  Endocrine:  No Heat Intolerance, No Cold Intolerance  Neurologic:  No Confusion, no Dysarthria, No Headaches  Skin:  No Rash, No Open Wounds      Objective   Objective     Vitals:   Temp:  [97.5 °F (36.4 °C)-98.8 °F (37.1 °C)] 97.7 °F (36.5 °C)  Heart Rate:  [84-92] 92  Resp:  [16-18] 18  BP: (100-125)/(48-63) 125/48  Flow (L/min):  [2] 2  Physical Exam   Vital Signs Reviewed   General:  WDWN, sleeping but awakens for exam   HEENT:  PERRL, EOMI.  OP, nares clear, no sinus tenderness  Neck:  Supple, no JVD, no thyromegaly  Chest:  good aeration, clear to auscultation bilaterally, tympanic to percussion bilaterally, no work of breathing noted  CV: RRR, no MGR, pulses 2+, equal.  Abd:  Soft, NT, ND, + BS, no HSM  EXT:  no clubbing, no cyanosis, no edema  Neuro:  A&Ox3, CN grossly intact, no focal deficits.  Skin: No rashes or lesions noted      Result  Review    Result Review:  I have personally reviewed the results from the time of this admission to 4/16/2023 07:11 EDT and agree with these findings:  [x]  Laboratory  [x]  Microbiology  [x]  Radiology  [x]  EKG/Telemetry   [x]  Cardiology/Vascular   []  Pathology  []  Old records  []  Other:  Most notable findings include: CO2 35.2    Assessment & Plan   Assessment / Plan     Active Hospital Problems:  Active Hospital Problems    Diagnosis    • **Acute respiratory failure with hypoxia    • Altered mental status    • Pleural effusion    • Cirrhosis of liver    • Atrial fibrillation    • COPD (chronic obstructive pulmonary disease)    • Diabetes mellitus    • Chronic venous stasis dermatitis    • Sleep apnea        Impression:   Small pleural effusion   AMS  Cirrhosis of liver   A fib  COPD  DM  Sleep apnea    Plan:   Suspected effusion likely secondary to cirrhosis and congestive heart failure  Small pleural effusion stable- continue to monitor.  No need for thoracentesis, monitor clinically for now.   Continue with lactulose for cirrhosis.   Continue Lasix IV. Add a dose of metolazone 10 mg oral once.   Continue Aldactone.  Continue patient's NIV for chronic hypercapnic respiratory failure  Encourage I-S  Encourage up ad ruth.     I personally reviewed all imaging, laboratory data, and I spoke with respiratory therapy, and nursing regarding the patient's care, have also spoken with the patient's primary admitting physician regarding his plan of care.       DVT prophylaxis:  Medical DVT prophylaxis orders are present.    CODE STATUS:   Level Of Support Discussed With: Health Care Surrogate  Code Status (Patient has no pulse and is not breathing): CPR (Attempt to Resuscitate)  Medical Interventions (Patient has pulse or is breathing): Full Support    This visit was performed by BOTH a physician and an APC. I personally evaluated and examined the patient. I performed all aspects of MDM as documented. , I have reviewed  and confirmed the accuracy of the patient's history as documented in this note. and I have reexamined the patient and the results are consistent with the previously documented exam. I have updated the documentation as necessary.     I personally reviewed pertinent labs, imaging and provider notes. Discussed with bedside nurse and will discuss with primary service.     Electronically signed by Destin Bailon MD, 4/16/2023, 07:11 EDT.      Maria E Brizuela PA-C  04/16/23

## 2023-04-16 NOTE — PROGRESS NOTES
[facility]     Cardiology Progress Note    Patient Name: Stephen Aguero  : 1945  MRN: [id number]  Primary Care Physician:  Papi Vasquez MD  Date of admission: 2023    Subjective   Subjective     No acute events overnight. Continues to be confused at times.     Review of Systems   Could not be done.     Objective   Objective     Vitals:   Temp:  [97.3 °F (36.3 °C)-98.8 °F (37.1 °C)] 98.8 °F (37.1 °C)  Heart Rate:  [87-92] 87  Resp:  [16] 16  BP: (100-120)/(45-57) 109/48  Flow (L/min):  [2] 2  Physical Exam   NAD   Confused   Breathing non-labored  IRRR  Abdomen Soft    Scheduled Meds:apixaban, 5 mg, Oral, Q12H  bacitracin, 1 application, Topical, Q12H  carvedilol, 6.25 mg, Oral, BID  famotidine, 40 mg, Oral, Daily  furosemide, 40 mg, Intravenous, Daily  lactulose, 30 g, Oral, BID  senna-docusate sodium, 1 tablet, Oral, BID  spironolactone, 50 mg, Oral, BID  triamcinolone, 1 application, Topical, Daily      Continuous Infusions:        Result Review    Result Review:  I have personally reviewed the results from the time of this admission to 4/15/2023 22:57 EDT and agree with these findings:  [x]  Laboratory  []  Microbiology  [x]  Radiology  [x]  EKG/Telemetry   [x]  Cardiology/Vascular   []  Pathology  []  Old records  []  Other:  Most notable findings include:     CBC        2023    15:26 2023    04:11 4/15/2023    05:47   CBC   WBC 9.98   8.23   7.46     RBC 4.42   4.60   4.19     Hemoglobin 14.6   14.9   13.8     Hematocrit 43.0   45.1   41.1     MCV 97.3   98.0   98.1     MCH 33.0   32.4   32.9     MCHC 34.0   33.0   33.6     RDW 15.6   15.5   15.6     Platelets 212   207   211       CMP        2023    15:14 2023    15:26 2023    04:11 4/15/2023    05:47   CMP   Glucose 79   83   93   135     BUN  27   20   17     Creatinine  1.01   0.81   0.84     EGFR  76.1   90.2   89.3     Sodium 137.5   141   139   141     Potassium  4.4   3.9   3.8     Chloride  95   95   96      Calcium  9.0   8.9   8.9     Total Protein  7.1   7.0   6.8     Albumin  2.8   2.7   2.7     Globulin  4.3   4.3   4.1     Total Bilirubin  1.0   1.1   1.0     Alkaline Phosphatase  129   125   121     AST (SGOT)  31   31   33     ALT (SGPT)  23   22   21     Albumin/Globulin Ratio  0.7   0.6   0.7     BUN/Creatinine Ratio  26.7   24.7   20.2     Anion Gap  8.2   5.7   9.8        CARDIAC LABS:      Lab 04/13/23  1526 04/09/23  0400   PROBNP 436.0 299.2   HSTROP T 24*  --         Assessment & Plan   Assessment / Plan     Brief Patient Summary:  Stephen Aguero is a 77 y.o. male with COPD, diabetes, recent mycoplasma pneumonia, cirrhosis liver, atrial fibrillation, who is admitted with increasing shortness of breath, lethargy and confusion    Active Hospital Problems:  Active Hospital Problems    Diagnosis    • **Acute respiratory failure with hypoxia    • Altered mental status    • Pleural effusion    • Cirrhosis of liver    • Atrial fibrillation    • COPD (chronic obstructive pulmonary disease)    • Diabetes mellitus    • Chronic venous stasis dermatitis    • Sleep apnea        Volume overload : Related to liver cirrhosis. ProBNP WNL.  LVEF normal.   Chronic atrial fibrillation : Ventricular rate well controlled. On Eliquis and Coreg.   Altered mental status : Possible hepatic encephalopathy, on lactulose.        Plan:   Continue IV Lasix as per primary team with close monitoring of electrolytes and renal function  Spironolactone 50 mg daily  Continue Coreg and Eliquis  Management of cirrhosis, altered mental status per primary team    Will follow as needed. Please call with questions or concerns.     CODE STATUS:   Level Of Support Discussed With: Health Care Surrogate  Code Status (Patient has no pulse and is not breathing): CPR (Attempt to Resuscitate)  Medical Interventions (Patient has pulse or is breathing): Full Support      Electronically signed by Noel Gil MD, 04/15/23, 10:57 PM EDT.

## 2023-04-16 NOTE — PLAN OF CARE
Goal Outcome Evaluation:  Plan of Care Reviewed With: patient        Progress: improving  Outcome Evaluation: Alert to person. VSS.  Wore triology around 6 hours this shift.  Less confused this shift than last PM.  BRIANNA

## 2023-04-16 NOTE — PROGRESS NOTES
[facility]     Progress Note    Patient Name: Stephen Aguero  : 1945  MRN: [id number]  Primary Care Physician:  Papi Vasquez MD  Date of admission: 2023    Subjective   Subjective     Chief Complaint: With cirrhosis of liver and encephalopathy is ammonia levels have improved we will continue the same he is also being followed by pulmonary medicine    HPI: Patient admitted because of cirrhosis of the liver and encephalopathy has had pleural effusion CHF and respiratory infection    Review of Systems   All systems were reviewed and negative except for: Has been reviewed    Objective   Objective     Vitals:   Temp:  [97.7 °F (36.5 °C)-98.8 °F (37.1 °C)] 98.2 °F (36.8 °C)  Heart Rate:  [84-92] 88  Resp:  [16-18] 18  BP: (109-125)/(48-63) 113/58  Flow (L/min):  [2] 2    Physical Exam    Constitutional: Awake, alert   Eyes: PERRLA, sclerae anicteric, no conjunctival injection   HENT: NCAT, mucous membranes moist   Neck: Supple, no thyromegaly, no lymphadenopathy, trachea midline   Respiratory: Clear to auscultation bilaterally, nonlabored respirations    Cardiovascular: RRR, no murmurs, rubs, or gallops, palpable pedal pulses bilaterally   Gastrointestinal: Positive bowel sounds, soft, nontender, nondistended   Musculoskeletal: No bilateral ankle edema, no clubbing or cyanosis to extremities   Psychiatric: Appropriate affect, cooperative   Neurologic: Oriented x 3, strength symmetric in all extremities, Cranial Nerves grossly intact to confrontation, speech clear   Skin: No rashes   No change  Result Review    Result Review:  I have personally reviewed the results from the time of this admission to 2023 11:58 EDT and agree with these findings:  [x]  Laboratory awaited ammonia level  []  Microbiology  []  Radiology  []  EKG/Telemetry   []  Cardiology/Vascular   []  Pathology  []  Old records  []  Other:  Most notable findings include: Elevated anemia levels pleural effusion probably because of  cirrhosis    Assessment & Plan   Assessment / Plan     Brief Patient Summary:  Stephen Aguero is a 77 y.o. male who patient with cirrhosis of liver and hepatic encephalopathy    Active Hospital Problems:  Active Hospital Problems    Diagnosis    • **Acute respiratory failure with hypoxia    • Altered mental status    • Pleural effusion    • Cirrhosis of liver    • Atrial fibrillation    • COPD (chronic obstructive pulmonary disease)    • Diabetes mellitus    • Chronic venous stasis dermatitis    • Sleep apnea        Plan:   Continue current management    DVT prophylaxis:  Medical DVT prophylaxis orders are present.    CODE STATUS:   Level Of Support Discussed With: Health Care Surrogate  Code Status (Patient has no pulse and is not breathing): CPR (Attempt to Resuscitate)  Medical Interventions (Patient has pulse or is breathing): Full Support    Disposition:  I expect patient to be discharged after the patient has been stabilized.    Electronically signed by Alex Boo MD, 04/16/23, 11:58 AM EDT.      Part of this note may be an electronic transcription/translation of spoken language to printed text using the Dragon Dictation System.

## 2023-04-17 NOTE — PROGRESS NOTES
[facility]     Cardiology Progress Note    Patient Name: Stephen Aguero  : 1945  MRN: [id number]  Primary Care Physician:  Papi Vasquez MD  Date of admission: 2023    Subjective   Subjective     Chief Complaint: Follow-up visit for volume overload    Interval HPI:    Patient reports improvement in shortness of breath.  He is alert and awake today but sluggish to respond.  Answers most of the questions appropriately.    Review of Systems   All systems were reviewed and negative except for: Shortness of breath and fatigue.  Negative for chest pain or palpitations.    Objective   Objective     Vitals:   Temp:  [97.5 °F (36.4 °C)-98.4 °F (36.9 °C)] 97.5 °F (36.4 °C)  Heart Rate:  [81-98] 98  Resp:  [14-20] 20  BP: ()/(49-75) 110/52  Flow (L/min):  [2] 2  Physical Exam      General : Alert, awake, no acute distress  CVS : Irregularly irregular, no murmur, rubs or gallops  Lungs: Clear to auscultation bilaterally, no crackles or rhonchi  Abdomen: Soft, nontender, bowel sounds heard in all 4 quadrants  Extremities: Warm, well-perfused, 1+ edema bilaterally, chronic venous stasis changes present    Scheduled Meds:apixaban, 5 mg, Oral, Q12H  bacitracin, 1 application, Topical, Q12H  carvedilol, 6.25 mg, Oral, BID  famotidine, 40 mg, Oral, Daily  furosemide, 40 mg, Intravenous, Daily  lactulose, 30 g, Oral, BID  senna-docusate sodium, 1 tablet, Oral, BID  spironolactone, 50 mg, Oral, BID  triamcinolone, 1 application, Topical, Daily           Result Review    Result Review:  I have personally reviewed the results from the time of this admission to 2023 08:18 EDT and agree with these findings:  [x]  Laboratory  []  Microbiology  [x]  Radiology  [x]  EKG/Telemetry   [x]  Cardiology/Vascular   []  Pathology  []  Old records  []  Other:  Most notable findings include:     CBC        4/15/2023    05:47 2023    05:25 2023    04:42   CBC   WBC 7.46   7.37   8.30     RBC 4.19   4.54    4.47     Hemoglobin 13.8   14.7   14.7     Hematocrit 41.1   44.3   43.9     MCV 98.1   97.6   98.2     MCH 32.9   32.4   32.9     MCHC 33.6   33.2   33.5     RDW 15.6   15.5   15.3     Platelets 211   225   207       CMP        4/15/2023    05:47 4/16/2023    05:25 4/17/2023    04:42   CMP   Glucose 135   142   135     BUN 17   17   14     Creatinine 0.84   0.77   0.78     EGFR 89.3   91.6   91.3     Sodium 141   139   136     Potassium 3.8   3.6   3.7     Chloride 96   95   97     Calcium 8.9   8.8   8.7     Total Protein 6.8   6.9      Albumin 2.7   2.6      Globulin 4.1   4.3      Total Bilirubin 1.0   1.0      Alkaline Phosphatase 121   128      AST (SGOT) 33   33      ALT (SGPT) 21   22      Albumin/Globulin Ratio 0.7   0.6      BUN/Creatinine Ratio 20.2   22.1   17.9     Anion Gap 9.8   11.0   6.6        CARDIAC LABS:      Lab 04/13/23  1526   PROBNP 436.0   HSTROP T 24*      Results for orders placed during the hospital encounter of 03/22/23    Adult Transthoracic Echo Complete W/ Cont if Necessary Per Protocol    Interpretation Summary  •  Left ventricular ejection fraction appears to be 51 - 55%.  •  Estimated right ventricular systolic pressure from tricuspid regurgitation is mildly elevated (35-45 mmHg).  •  Mild pulmonary hypertension is present.  •  Mild mitral valve regurgitation is present      Assessment & Plan   Assessment / Plan     Brief Patient Summary:  Stephen Aguero is a 77 y.o. male with  COPD, diabetes, recent mycoplasma pneumonia, cirrhosis liver, atrial fibrillation, who is admitted with increasing shortness of breath, lethargy and confusion    Active Hospital Problems:  Active Hospital Problems    Diagnosis    • **Acute respiratory failure with hypoxia    • Altered mental status    • Pleural effusion    • Cirrhosis of liver    • Atrial fibrillation    • COPD (chronic obstructive pulmonary disease)    • Diabetes mellitus    • Chronic venous stasis dermatitis    • Sleep apnea      Volume  overload : Normal proBNP.  Also has pleural effusion and ascites.  Likely secondary to cirrhosis of liver itself.  Improving with diuretics     Chronic atrial fibrillation : Ventricular rates reasonably well controlled     Cirrhosis of liver  Altered mental status : Possible hepatic encephalopathy , ammonia remains elevated, clinically improved      Plan:     Continue IV Lasix, spironolactone  Received 10 mg metolazone yesterday    Continue carvedilol for rate control, Eliquis for anticoagulation    We will follow intermittently while inpatient       CODE STATUS:   Level Of Support Discussed With: Health Care Surrogate  Code Status (Patient has no pulse and is not breathing): CPR (Attempt to Resuscitate)  Medical Interventions (Patient has pulse or is breathing): Full Support      Electronically signed by Cecilio June MD, 04/17/23, 8:18 AM EDT.

## 2023-04-17 NOTE — PLAN OF CARE
Goal Outcome Evaluation:  Plan of Care Reviewed With: patient        Progress: no change (First session for evaluation)  Outcome Evaluation: Patient presents with limitations of balance, strength, endurance/activity tolerance and cognition/safety awareness which impede his ability to perform ADL and transfers as prior.  The skills of a therapist will be required to safely and effectively implement treatment plan to restore maximal level of function.

## 2023-04-17 NOTE — PROGRESS NOTES
Pulmonary / Critical Care Progress Note      Patient Name: Stephen Aguero  : 1945  MRN: [id number]  Primary Care Physician:  Papi Vasquez MD  Date of admission: 2023    Subjective   Subjective   Follow-up for AMS and pleural effusion.    No acute events overnight.     This morning,  Lying in bed on 2 L nasal cannula  Denies any dyspnea  Nonproductive cough  Awake and alert eating his breakfast  Continues to diurese  No accurate I&O's  No chest pain  No fever or chills  Weak and fatigued    Review of Systems  General: Positive fatigue, No Fever  HEENT: No dysphagia, No Visual Changes, no rhinorrhea  Respiratory:  + Productive cough, + Dyspnea, negative phlegm, No Pleuritic Pain, no wheezing, no hemoptysis  Cardiovascular: Denies chest pain, denies palpitations, + CHANEL, No Chest Pressure  Gastrointestinal:  No Abdominal Pain, No Nausea, No Vomiting, No Diarrhea  Genitourinary:  No Dysuria, No Frequency, No Hesitancy  Musculoskeletal: No muscle pain or swelling    Objective   Objective     Vitals:   Temp:  [97.5 °F (36.4 °C)-98.4 °F (36.9 °C)] 97.7 °F (36.5 °C)  Heart Rate:  [81-98] 88  Resp:  [14-20] 18  BP: ()/(49-75) 109/61  Flow (L/min):  [2] 2    Physical Exam   Vital Signs Reviewed   General:  WDWN male, awake, NAD on 2 L NC  HEENT:  PERRL, EOMI.  OP, nares clear, no sinus tenderness  Neck:  Supple, no JVD, no thyromegaly  Chest:  good aeration, clear to auscultation bilaterally, tympanic to percussion bilaterally, no work of breathing noted  CV: RRR, no MGR, pulses 2+, equal  Abd:  Soft, NT, ND, + BS, no HSM  EXT:  no clubbing, no cyanosis, no edema  Neuro:  A&Ox3, CN grossly intact, no focal deficits  Skin: No rashes or lesions noted, chronic venous stasis changes bilateral lower extremities    Result Review    Result Review:  I have personally reviewed the results from the time of this admission to 2023 14:21 EDT and agree with these findings:  [x]  Laboratory  [x]   Microbiology  [x]  Radiology  [x]  EKG/Telemetry   [x]  Cardiology/Vascular   []  Pathology  []  Old records  []  Other:  Most notable findings include:         Lab 04/17/23  0442 04/16/23  0525 04/15/23  0547 04/14/23  0411 04/13/23  1526 04/13/23  1514   WBC 8.30 7.37 7.46 8.23 9.98  --    HEMOGLOBIN 14.7 14.7 13.8 14.9 14.6  --    HEMATOCRIT 43.9 44.3 41.1 45.1 43.0  --    PLATELETS 207 225 211 207 212  --    SODIUM 136 139 141 139 141  --    SODIUM, ARTERIAL  --   --   --   --   --  137.5   POTASSIUM 3.7 3.6 3.8 3.9 4.4  --    CHLORIDE 97* 95* 96* 95* 95*  --    CO2 32.4* 33.0* 35.2* 38.3* 37.8*  --    BUN 14 17 17 20 27*  --    CREATININE 0.78 0.77 0.84 0.81 1.01  --    GLUCOSE 135* 142* 135* 93 83  --    GLUCOSE, ARTERIAL  --   --   --   --   --  79   CALCIUM 8.7 8.8 8.9 8.9 9.0  --    TOTAL PROTEIN  --  6.9 6.8 7.0 7.1  --    ALBUMIN  --  2.6* 2.7* 2.7* 2.8*  --    GLOBULIN  --  4.3 4.1 4.3 4.3  --      3/29 and 3/30 thoracentesis, transudative    Assessment & Plan   Assessment / Plan     Active Hospital Problems:  Active Hospital Problems    Diagnosis    • **Acute respiratory failure with hypoxia    • Altered mental status    • Pleural effusion    • Cirrhosis of liver    • Atrial fibrillation    • COPD (chronic obstructive pulmonary disease)    • Diabetes mellitus    • Chronic venous stasis dermatitis    • Sleep apnea      Impression:   Altered mental status  Metabolic encephalopathy  Hyperammonia  Cirrhosis of liver   Small pleural effusion: previous bilateral thoracentesis 3/29 and 3/30   Acute cardiogenic pulmonary edema  Decompensated cirrhosis  Acute on chronic hypoxic hypercapnic respiratory failure: on 2 L home and recent set up of astral vent  Recent Mycoplasma pneumonia  Recent Morganella UTI  OHS/JULITA  Acute decompensated diastolic heart failure  A fib  COPD without exacerbation  type 2 diabetes with hyperglycemia     Plan:   -On 2 L nasal cannula.  -Continue home astral vent at night and as needed  days with naps.  -Wean O2 to keep SPO2 greater than 90%  -Continue Lasix 40 mg IV daily.  Continue Aldactone 50 mg p.o. BID.  Trend renal panel and electrolytes.  Replace potassium orally  -Cardiology on board appreciate assistance.  -Continue Eliquis.  -Continue lactulose twice daily.  -Encourage mobilization.  Out of bed to chair.  -PT/OT on board.  -Will need to complete rehab at [facility] upon discharge.     DVT prophylaxis:  Medical DVT prophylaxis orders are present.    CODE STATUS:   Level Of Support Discussed With: Health Care Surrogate  Code Status (Patient has no pulse and is not breathing): CPR (Attempt to Resuscitate)  Medical Interventions (Patient has pulse or is breathing): Full Support    I personally reviewed pertinent labs, imaging and provider notes. Discussed with bedside nurse and will discuss with primary service.     Electronically signed by CLIFTON Carmona, 04/17/23, 1:13 PM EDT.    I, Dr. Mateus Ewing, have spent more than 50% of the total time managing the patient in this encounter today.  This included personally reviewing all pertinent labs, imaging, microbiology and documentation. Also discussing the case with the patient and any available family, the admitting physician and any available ancillary staff.    Electronically signed by Mateus Ewing MD, 4/17/2023, 14:21 EDT.

## 2023-04-17 NOTE — CONSULTS
[facility]  Initial Inpatient Consult Note    Referring Provider: Hospitalist    Reason for Consultation: Cirrhosis by imaging    Subjective     History of present illness:    77 y.o. male admitted with congestive heart failure, volume overload and a pleural effusion home asked to see after he had imaging suggestive of cirrhosis with splenomegaly and trace ascites.  Patient was admitted with worsening shortness of breath and functional decline.  He has a history of recent pneumonia.  There is no family at the bedside.  CT scan of the chest showed cirrhotic liver as described above.  He is currently answer questions and denies any history of cirrhosis.  He denies any history of alcohol abuse.  Does have longstanding diabetes.  He denies any abdominal pain and notes that his breathing seems to be better.    Past Medical History:  Past Medical History:   Diagnosis Date   • BPH (benign prostatic hyperplasia)    • Chronic a-fib    • Chronic pain    • Diabetes    • Diabetes mellitus    • Elevated cholesterol    • Elevated PSA    • GERD (gastroesophageal reflux disease)    • High blood pressure    • Hypertension    • Sleep apnea      Past Surgical History:  Past Surgical History:   Procedure Laterality Date   • ABDOMINAL SURGERY     • APPENDECTOMY     • BACK SURGERY     • COLONOSCOPY  2018    [facility]   • EYE FOREIGN BODY REMOVAL     • EYE SURGERY     • FACIAL COSMETIC SURGERY     • LAPAROSCOPIC CHOLECYSTECTOMY     • LEG SURGERY     • NECK SURGERY        Social History:   Social History     Tobacco Use   • Smoking status: Former     Types: Cigarettes   • Smokeless tobacco: Never   Substance Use Topics   • Alcohol use: Never      Family History:  Family History   Problem Relation Age of Onset   • Cancer Sister 55       Home Meds:  Medications Prior to Admission   Medication Sig Dispense Refill Last Dose   • albuterol (PROVENTIL) (2.5 MG/3ML) 0.083% nebulizer solution Take 2.5 mg by nebulization Every 4  (Four) Hours As Needed for Wheezing.   4/13/2023   • allopurinol (ZYLOPRIM) 300 MG tablet Take 1 tablet by mouth Daily.   4/13/2023 at 0900   • apixaban (ELIQUIS) 5 MG tablet tablet Take 1 tablet by mouth Every 12 (Twelve) Hours for 30 days. Indications: Atrial Fibrillation 60 tablet 0 4/13/2023 at 0900   • carvedilol (COREG) 25 MG tablet Take 1 tablet by mouth 2 (Two) Times a Day With Meals.   4/13/2023 at 0630   • DULoxetine (CYMBALTA) 30 MG capsule Take 2 capsules by mouth Daily.   4/13/2023 at 0900   • ergocalciferol (ERGOCALCIFEROL) 1.25 MG (66909 UT) capsule Take 1 capsule by mouth 1 (One) Time Per Week.   4/8/2023   • famotidine (PEPCID) 20 MG tablet Take 1 tablet by mouth 2 (Two) Times a Day.   4/13/2023 at 0900   • furosemide (LASIX) 80 MG tablet Take 1 tablet by mouth Daily.   4/13/2023 at 0900   • HYDROcodone-acetaminophen (NORCO) 5-325 MG per tablet Take 1 tablet by mouth Every 8 (Eight) Hours As Needed.   4/13/2023 at 1145   • Insulin Aspart (novoLOG) 100 UNIT/ML injection Per sliding scale:  151 - 200 = 3 units  201 - 250 = 6 units  251 - 300 = 9 units  351 - 400 = 12 units, etc.      • insulin detemir (LEVEMIR) 100 UNIT/ML injection Inject 30 Units under the skin into the appropriate area as directed Daily.   4/13/2023 at 0900   • Magnesium Hydroxide (DULCOLAX PO) Take 10 mg by mouth Every Night.   4/12/2023   • metFORMIN (GLUCOPHAGE) 500 MG tablet Take 2 tablets by mouth 2 (Two) Times a Day.   4/13/2023 at 0630   • ondansetron (ZOFRAN) 4 MG tablet Take 1 tablet by mouth Every 8 (Eight) Hours As Needed for Nausea or Vomiting.   4/9/2023   • polyethylene glycol (MiraLax) 17 g packet Take 17 g by mouth Daily.   4/13/2023 at 0900   • potassium chloride ER (K-TAB) 20 MEQ tablet controlled-release ER tablet Take 1 tablet by mouth Daily.     4/13/2023 at 0900   • triamcinolone (KENALOG) 0.1 % cream Apply 1 application topically to the appropriate area as directed 2 (Two) Times a Day.   4/13/2023 at 0900   •  Cindy Henri SoloStar 300 UNIT/ML solution pen-injector injection Inject 30 Units under the skin into the appropriate area as directed Daily for 20 days. 3 mL 0      Current Meds:   apixaban, 5 mg, Oral, Q12H  bacitracin, 1 application, Topical, Q12H  carvedilol, 6.25 mg, Oral, BID  famotidine, 40 mg, Oral, Daily  furosemide, 40 mg, Intravenous, Daily  lactulose, 30 g, Oral, BID  miconazole, , Topical, Q12H  potassium chloride, 40 mEq, Oral, Once  senna-docusate sodium, 1 tablet, Oral, BID  spironolactone, 50 mg, Oral, BID  triamcinolone, 1 application, Topical, Daily      Allergies:  Allergies   Allergen Reactions   • Sulfa Antibiotics Unknown - High Severity   • Sulfa Antibiotics Unknown - Low Severity     Review of Systems  Pertinent items are noted in HPI, all other systems reviewed and negative         Vital Signs  Temp:  [97.5 °F (36.4 °C)-98.4 °F (36.9 °C)] 97.7 °F (36.5 °C)  Heart Rate:  [81-98] 88  Resp:  [14-20] 18  BP: ()/(49-75) 109/61  Physical Exam:  General Appearance:    Alert, cooperative, in no acute distress   Head:    Normocephalic, without obvious abnormality, atraumatic   Eyes:          conjunctivae and sclerae normal, no   icterus   Throat:   no thrush, oral mucosa moist   Neck:   Supple, no adenopathy   Lungs:     Clear to auscultation bilaterally    Heart:    Regular rhythm and normal rate    Chest Wall:    No abnormalities observed   Abdomen:     Soft, nondistended, nontender; normal bowel sounds   Extremities:   no edema, no redness   Skin:   No bruising or rash   Psychiatric:  normal mood and insight     Results Review:  [x]  Laboratory   [x]  Radiology  []  Pathology      I reviewed the patient's new clinical results.    Results from last 7 days   Lab Units 04/17/23 0442 04/16/23  0525 04/15/23  0547   WBC 10*3/mm3 8.30 7.37 7.46   HEMOGLOBIN g/dL 14.7 14.7 13.8   HEMATOCRIT % 43.9 44.3 41.1   PLATELETS 10*3/mm3 207 225 211     Results from last 7 days   Lab Units 04/17/23 0442  04/16/23  0525 04/15/23  0547 04/14/23  0411   SODIUM mmol/L 136 139 141 139   POTASSIUM mmol/L 3.7 3.6 3.8 3.9   CHLORIDE mmol/L 97* 95* 96* 95*   CO2 mmol/L 32.4* 33.0* 35.2* 38.3*   BUN mg/dL 14 17 17 20   CREATININE mg/dL 0.78 0.77 0.84 0.81   CALCIUM mg/dL 8.7 8.8 8.9 8.9   BILIRUBIN mg/dL  --  1.0 1.0 1.1   ALK PHOS U/L  --  128* 121* 125*   ALT (SGPT) U/L  --  22 21 22   AST (SGOT) U/L  --  33 33 31   GLUCOSE mg/dL 135* 142* 135* 93         Lab Results   Lab Value Date/Time    LIPASE 19 09/27/2020 2015       Radiology:  MRI Brain Without Contrast   Final Result       1. No acute ischemic change   2. Postsurgical changes, encephalomalacia associated with postsurgical changes involving the right    frontal sinus and orbit   3. White matter changes compatible small-vessel ischemic disease in this age group.   4. Opacification of the mastoid air cells bilaterally.  Please correlate for mastoiditis, otitis    media                    ARGELIA TINSLEY MD          Electronically Signed and Approved By: ARGELIA TINSLEY MD on 4/14/2023 at 8:48                           CT Chest With Contrast Diagnostic   Final Result       1. No evidence for pulmonary embolism.   2. Cardiomegaly with mild to moderate pulmonary edema pattern with moderate to large sized pleural    effusion present on the left.  No significant consolidation identified.   3. Cirrhotic morphology of the liver with portal venous hypertension with splenomegaly and a small    amount of ascites.   4. Ancillary findings as described above..            SHIELA SELF MD          Electronically Signed and Approved By: SHIELA SELF MD on 4/13/2023 at 17:40                           CT Head Without Contrast   Final Result   No evidence of hemorrhage, mass effect or midline shift. No acute process identified.                SHIELA SELF MD          Electronically Signed and Approved By: SHIELA SELF MD on 4/13/2023 at 17:29                           XR Chest 1 View    Final Result       Stable appearance of diffuse bilateral airspace opacities, which may be due to pulmonary edema    and/or pneumonia.                        EDIS FENG MD          Electronically Signed and Approved By: EDIS FENG MD on 4/13/2023 at 15:34                                Assessment & Plan       Acute respiratory failure with hypoxia    Sleep apnea    Chronic venous stasis dermatitis    Diabetes mellitus    COPD (chronic obstructive pulmonary disease)    Atrial fibrillation    Altered mental status    Pleural effusion    Cirrhosis of liver       Plan:  Patient does appear to have cirrhotic appearing liver however he does not have thrombocytopenia or significant change in his liver enzymes apart from a slight elevation of his alkaline phosphatase.  Imaging showed no focal liver lesions and I suspect he does have compensated underlying cirrhosis.  From a GI perspective I do not think he needs different treatment for his suspected cirrhosis at that time other than continued diuresis and functional rehab.  I will be available as needed for any changes in his clinical status I discussed this with both the patient and his nurse.      I discussed the patients findings and my recommendations with patient and nursing staff.    Savage Najera MD

## 2023-04-17 NOTE — THERAPY EVALUATION
Patient Name: Stephen Aguero  : 1945    MRN: [id number]                              Today's Date: 2023       Admit Date: 2023    Visit Dx:     ICD-10-CM ICD-9-CM   1. Acute respiratory failure, unspecified whether with hypoxia or hypercapnia  J96.00 518.81   2. Decreased activities of daily living (ADL)  Z78.9 V49.89     Patient Active Problem List   Diagnosis   • Uncontrolled diabetes mellitus with hyperglycemia (HCC)   • Elevated cholesterol   • High blood pressure   • Sleep apnea   • Screening for colon cancer   • Elevated PSA   • Benign prostatic hyperplasia with urinary frequency   • Generalized weakness   • Pedal edema   • Chronic venous stasis dermatitis   • Hypoxia   • Microscopic hematuria   • Diabetes mellitus   • COPD (chronic obstructive pulmonary disease)   • Obesity hypoventilation syndrome   • Atrial fibrillation   • Chronic respiratory failure with hypercapnia   • Acute respiratory failure with hypoxia   • Altered mental status   • Pleural effusion   • Cirrhosis of liver     Past Medical History:   Diagnosis Date   • BPH (benign prostatic hyperplasia)    • Chronic a-fib    • Chronic pain    • Diabetes    • Diabetes mellitus    • Elevated cholesterol    • Elevated PSA    • GERD (gastroesophageal reflux disease)    • High blood pressure    • Hypertension    • Sleep apnea      Past Surgical History:   Procedure Laterality Date   • ABDOMINAL SURGERY     • APPENDECTOMY     • BACK SURGERY     • COLONOSCOPY      [facility]   • EYE FOREIGN BODY REMOVAL     • EYE SURGERY     • FACIAL COSMETIC SURGERY     • LAPAROSCOPIC CHOLECYSTECTOMY     • LEG SURGERY     • NECK SURGERY        General Information     Row Name 23 1608          OT Time and Intention    Document Type evaluation  -AV     Mode of Treatment individual therapy;occupational therapy  -AV     Row Name 23 1608          General Information    Patient Profile Reviewed yes  -AV     Prior Level of Function --  Per EMR review,  patient required assistance with ADL and transfers using rolling walker.  No home oxygen.  Further specifics are unknown.  -AV     Existing Precautions/Restrictions fall;oxygen therapy device and L/min  -AV     Barriers to Rehab none identified  -AV     Row Name 04/17/23 1608          Occupational Profile    Reason for Services/Referral (Occupational Profile) Patient is a 77year old male admitted to [facility] on April 13, 2023 with shortness of air and decreased level of arousal.  He is currently in PCU 2/on 2 L O2 with sats 98%.   OT consulted due to impaired ADL/transfer independence.  No previous OT services for current condition.  -AV     Row Name 04/17/23 1608          Living Environment    People in Home spouse  -AV     Row Name 04/17/23 1608          Home Main Entrance    Number of Stairs, Main Entrance two  -AV     Row Name 04/17/23 1608          Cognition    Orientation Status (Cognition) --  Alert, pleasantly confused.  Able to follow some commands with repeated directions required  -AV     Row Name 04/17/23 1608          Safety Issues, Functional Mobility    Impairments Affecting Function (Mobility) balance;cognition;endurance/activity tolerance;strength  -AV           User Key  (r) = Recorded By, (t) = Taken By, (c) = Cosigned By    Initials Name Provider Type    Emigdio Gonzales OT Occupational Therapist                 Mobility/ADL's     Row Name 04/17/23 1610          Transfers    Comment, (Transfers) Unable to fully transfer supine to long sitting with max assist  -AV     Row Name 04/17/23 1610          Activities of Daily Living    BADL Assessment/Intervention --  Requires assistance to feed self with set up.  Max to total assistance for further ADLs.  Incontinent.  -AV           User Key  (r) = Recorded By, (t) = Taken By, (c) = Cosigned By    Initials Name Provider Type    Emigdio Gonzales OT Occupational Therapist               Obj/Interventions     Row Name 04/17/23 1613           Sensory Assessment (Somatosensory)    Sensory Assessment (Somatosensory) UE sensation intact  -AV     Sensory Assessment Sensory awareness to light touch intact BUE  -AV     Row Name 04/17/23 1610          Vision Assessment/Intervention    Vision Assessment Comment Blind right eye  -AV     Row Name 04/17/23 1610          Range of Motion Comprehensive    General Range of Motion bilateral upper extremity ROM WFL  -AV     Comment, General Range of Motion AROM  -AV     Row Name 04/17/23 1610          Strength Comprehensive (MMT)    Comment, General Manual Muscle Testing (MMT) Assessment 4 -/5 bilateral upper extremities  -AV     Row Name 04/17/23 1610          Motor Skills    Motor Skills coordination;functional endurance  -AV     Coordination --  Right dominant-not tested  -AV     Functional Endurance Poor  -AV     Row Name 04/17/23 1610          Balance    Comment, Balance Impaired  -AV           User Key  (r) = Recorded By, (t) = Taken By, (c) = Cosigned By    Initials Name Provider Type    Emigdio Gonzales OT Occupational Therapist               Goals/Plan     Row Name 04/17/23 1612          Transfer Goal 1 (OT)    Activity/Assistive Device (Transfer Goal 1, OT) transfers, all;walker, rolling  -AV     [place] Level/Cues Needed (Transfer Goal 1, OT) standby assist;verbal cues required  -AV     Time Frame (Transfer Goal 1, OT) long term goal (LTG);10 days  -AV     [facility] Name 04/17/23 1612          Bathing Goal 1 (OT)    Activity/Device (Bathing Goal 1, OT) bathing skills, all  -AV     [place] Level/Cues Needed (Bathing Goal 1, OT) standby assist;set-up required;verbal cues required  -AV     Time Frame (Bathing Goal 1, OT) long term goal (LTG);10 days  -AV     [facility] Name 04/17/23 1612          Dressing Goal 1 (OT)    Activity/Device (Dressing Goal 1, OT) dressing skills, all  -AV     [place]/Cues Needed (Dressing Goal 1, OT) standby assist;set-up required;verbal cues required  -AV     Time Frame (Dressing  Goal 1, OT) long term goal (LTG);10 days  -AV     Row Name 04/17/23 1612          Toileting Goal 1 (OT)    Activity/Device (Toileting Goal 1, OT) toileting skills, all;raised toilet seat  -AV     [place] Level/Cues Needed (Toileting Goal 1, OT) set-up required;standby assist;verbal cues required  -AV     Time Frame (Toileting Goal 1, OT) long term goal (LTG);10 days  -AV     Row Name 04/17/23 1612          Grooming Goal 1 (OT)    Activity/Device (Grooming Goal 1, OT) grooming skills, all  -AV     [place] (Grooming Goal 1, OT) standby assist;set-up required;verbal cues required  -AV     Time Frame (Grooming Goal 1, OT) long term goal (LTG);10 days  -AV     Row Name 04/17/23 1612          Strength Goal 1 (OT)    Strength Goal 1 (OT) Patient will demonstrate 4/5 bilateral upper extremities to increase ADL and transfer independence.  -AV     Time Frame (Strength Goal 1, OT) long term goal (LTG);10 days  -AV     [facility] Name 04/17/23 1612          Problem Specific Goal 1 (OT)    Problem Specific Goal 1 (OT) Patient will demonstrate fair endurance/activity tolerance needed to support ADLs.  -AV     Time Frame (Problem Specific Goal 1, OT) long term goal (LTG);10 days  -AV     Row Name 04/17/23 1612          Therapy Assessment/Plan (OT)    Planned Therapy Interventions (OT) activity tolerance training;BADL retraining;functional balance retraining;occupation/activity based interventions;patient/caregiver education/training;transfer/mobility retraining;strengthening exercise  -AV           User Key  (r) = Recorded By, (t) = Taken By, (c) = Cosigned By    Initials Name Provider Type    AV Emigdio Alamo, OT Occupational Therapist               Clinical Impression     Row Name 04/17/23 1611          Pain Assessment    Additional Documentation Pain Scale: FACES Pre/Post-Treatment (Group)  -AV     [facility] Name 04/17/23 1611          Pain Scale: FACES Pre/Post-Treatment    Pain: FACES Scale, Pretreatment 0-->no hurt  -AV      Posttreatment Pain Rating 0-->no hurt  -AV     Row Name 04/17/23 1611          Plan of Care Review    Plan of Care Reviewed With patient  -AV     Progress no change  First session for evaluation  -AV     Outcome Evaluation Patient presents with limitations of balance, strength, endurance/activity tolerance and cognition/safety awareness which impede his ability to perform ADL and transfers as prior.  The skills of a therapist will be required to safely and effectively implement treatment plan to restore maximal level of function.  -AV     Row Name 04/17/23 1611          Therapy Assessment/Plan (OT)    Patient/Family Therapy Goal Statement (OT) None stated  -AV     Rehab Potential (OT) good, to achieve stated therapy goals  -AV     Criteria for Skilled Therapeutic Interventions Met (OT) yes;meets criteria;skilled treatment is necessary  -AV     Therapy Frequency (OT) 5 times/wk  -AV     Row Name 04/17/23 1611          Therapy Plan Review/Discharge Plan (OT)    Anticipated Discharge Disposition (OT) inpatient rehabilitation facility  -AV     Row Name 04/17/23 1611          Vital Signs    O2 Delivery Pre Treatment nasal cannula  2  -AV     O2 Delivery Intra Treatment nasal cannula  2  -AV     O2 Delivery Post Treatment nasal cannula  2  -AV           User Key  (r) = Recorded By, (t) = Taken By, (c) = Cosigned By    Initials Name Provider Type    AV Emigdio Alamo, OT Occupational Therapist               Outcome Measures     Row Name 04/17/23 1613          How much help from another is currently needed...    Putting on and taking off regular lower body clothing? 1  -AV     Bathing (including washing, rinsing, and drying) 2  -AV     Toileting (which includes using toilet bed pan or urinal) 1  -AV     Putting on and taking off regular upper body clothing 2  -AV     Taking care of personal grooming (such as brushing teeth) 2  -AV     Eating meals 2  -AV     AM-PAC 6 Clicks Score (OT) 10  -AV     Row Name 04/17/23 0958           How much help from another person do you currently need...    Turning from your back to your side while in flat bed without using bedrails? 3  -RG     Moving from lying on back to sitting on the side of a flat bed without bedrails? 2  -RG     Moving to and from a bed to a chair (including a wheelchair)? 2  -RG     Standing up from a chair using your arms (e.g., wheelchair, bedside chair)? 1  -RG     Climbing 3-5 steps with a railing? 1  -RG     To walk in hospital room? 1  -RG     AM-PAC 6 Clicks Score (PT) 10  -RG     Highest level of mobility 4 --> Transferred to chair/commode  -RG     Row Name 04/17/23 1613          Functional Assessment    Outcome Measure Options AM-PAC 6 Clicks Daily Activity (OT);Optimal Instrument  -AV     Row Name 04/17/23 1613          Optimal Instrument    Optimal Instrument Optimal - 3  -AV     Bending/Stooping 5  -AV     Standing 5  -AV     Reaching 1  -AV     From the list, choose the 3 activities you would most like to be able to do without any difficulty Bending/stooping;Standing;Reaching  -AV     Total Score Optimal - 3 11  -AV           User Key  (r) = Recorded By, (t) = Taken By, (c) = Cosigned By    Initials Name Provider Type    Emigdio Gonzales OT Occupational Therapist    Eva Faye, RN Registered Nurse                Occupational Therapy Education     Title: PT OT SLP Therapies (In Progress)     Topic: Occupational Therapy (In Progress)     Point: ADL training (Done)     Description:   Instruct learner(s) on proper safety adaptation and remediation techniques during self care or transfers.   Instruct in proper use of assistive devices.              Learning Progress Summary           Patient Acceptance, E, VU by KATT at 4/17/2023 1614                   Point: Home exercise program (Not Started)     Description:   Instruct learner(s) on appropriate technique for monitoring, assisting and/or progressing therapeutic exercises/activities.              Learner  Progress:  Not documented in this visit.          Point: Precautions (Not Started)     Description:   Instruct learner(s) on prescribed precautions during self-care and functional transfers.              Learner Progress:  Not documented in this visit.          Point: Body mechanics (Not Started)     Description:   Instruct learner(s) on proper positioning and spine alignment during self-care, functional mobility activities and/or exercises.              Learner Progress:  Not documented in this visit.                      User Key     Initials Effective Dates Name Provider Type Discipline    AV 06/16/21 -  Emigdio Alamo OT Occupational Therapist OT              OT Recommendation and Plan  Planned Therapy Interventions (OT): activity tolerance training, BADL retraining, functional balance retraining, occupation/activity based interventions, patient/caregiver education/training, transfer/mobility retraining, strengthening exercise  Therapy Frequency (OT): 5 times/wk  Plan of Care Review  Plan of Care Reviewed With: patient  Progress: no change (First session for evaluation)  Outcome Evaluation: Patient presents with limitations of balance, strength, endurance/activity tolerance and cognition/safety awareness which impede his ability to perform ADL and transfers as prior.  The skills of a therapist will be required to safely and effectively implement treatment plan to restore maximal level of function.     Time Calculation:    Time Calculation- OT     Row Name 04/17/23 1615             Time Calculation- OT    OT Received On 04/17/23  -AV      OT Goal Re-Cert Due Date 04/26/23  -AV         Untimed Charges    OT Eval/Re-eval Minutes 35  -AV         Total Minutes    Untimed Charges Total Minutes 35  -AV       Total Minutes 35  -AV            User Key  (r) = Recorded By, (t) = Taken By, (c) = Cosigned By    Initials Name Provider Type    AV Emigdio Alamo OT Occupational Therapist              Therapy Charges for Today      Code Description Service Date Service Provider Modifiers Qty    [id number] HC OT EVAL MOD COMPLEXITY 3 4/17/2023 Emigdio Alamo, [place] GO 1               Emigdio Alamo OT  4/17/2023

## 2023-04-17 NOTE — PROGRESS NOTES
[facility]     Progress Note    Patient Name: Stephen Aguero  : 1945  MRN: [id number]  Primary Care Physician:  Papi Vasquez MD  Date of admission: 2023      Subjective   Brief summary.  Follow-up on volume overload, CHF and pleural effusion    HPI:  Patient more awake and alert now, ammonia level coming down.    Review of Systems     Fatigue, no chest pain or shortness of breath  Tolerating food  No nausea vomiting      Objective     Vitals:   Temp:  [97.5 °F (36.4 °C)-98.4 °F (36.9 °C)] 97.5 °F (36.4 °C)  Heart Rate:  [81-98] 98  Resp:  [14-20] 20  BP: ()/(49-75) 110/52  Flow (L/min):  [2] 2    Physical Exam :     Elderly obese male not in acute distress.  Awake and alert today.  Heart regular.  Lungs diminished breath sounds.  Abdomen obese and soft.  Extremities edema 3+      Result Review:  I have personally reviewed the results from the time of this admission to 2023 09:43 EDT and agree with these findings:  []  Laboratory  []  Microbiology  []  Radiology  []  EKG/Telemetry   []  Cardiology/Vascular   []  Pathology  []  Old records  []  Other:           Assessment / Plan       Active Hospital Problems:  Active Hospital Problems    Diagnosis    • **Acute respiratory failure with hypoxia    • Altered mental status    • Pleural effusion    • Cirrhosis of liver    • Atrial fibrillation    • COPD (chronic obstructive pulmonary disease)    • Diabetes mellitus    • Chronic venous stasis dermatitis    • Sleep apnea        Plan:   Improving pneumonia coming down.  Continue lactulose.  Continue Aldactone.  Already on beta-blocker Coreg.  Patient's family is requesting GI eval for cirrhosis we will consult Dr. Najera  PT and OT  Evaluate for rehab       DVT prophylaxis:  Medical DVT prophylaxis orders are present.    CODE STATUS:   Level Of Support Discussed With: Health Care Surrogate  Code Status (Patient has no pulse and is not breathing): CPR (Attempt to Resuscitate)  Medical  Interventions (Patient has pulse or is breathing): Full Support            Electronically signed by Marco Bishpo MD, 04/17/23, 9:43 AM EDT.

## 2023-04-17 NOTE — PLAN OF CARE
Goal Outcome Evaluation:  Plan of Care Reviewed With: patient        Progress: no change  Outcome Evaluation: Alert and Orientated x3 overnight; VSS; complained of pain x1 relieved with PRN pain medications; no s/s of distress noted overnight; will continue to monitor;

## 2023-04-18 NOTE — PLAN OF CARE
Goal Outcome Evaluation:      Pt alert but confused all hs. Pt wore his trilagy off and on all hs.  Pt would frequently pull it off and say that he did not want to wear it. The machine would be removed for a few hrs then replaced. Pt wore it for most of the hs.  Pt turned frequently. Pt noted pulling off leads and covers. Reported feeling sick to his stomach after taking his medications. No bm noted after lactulose admin. But resided shortly. Reported generalized pain and prn medication admin.

## 2023-04-18 NOTE — PLAN OF CARE
Goal Outcome Evaluation:   VSS. Patient increasingly confused throughout shift regardless of multiple attempts to reorient. Pulling lines, gown and IV's. Safety sitter initiated.

## 2023-04-18 NOTE — PROGRESS NOTES
[facility]     Progress Note    Patient Name: Stephen Aguero  : 1945  MRN: [id number]  Primary Care Physician:  Papi Vasquez MD  Date of admission: 2023      Subjective   Brief summary.  Follow-up on volume overload, CHF and pleural effusion    HPI:  Patient seen earlier this morning, daughter at bedside.  More alert and oriented still not eating much.  Still confused at times.    Review of Systems     No shortness of breath, no chest pain, no nausea vomiting, no diarrhea      Objective     Vitals:   Temp:  [97.3 °F (36.3 °C)-98.4 °F (36.9 °C)] 97.3 °F (36.3 °C)  Heart Rate:  [] 102  Resp:  [18-22] 18  BP: ()/(46-61) 99/56  Flow (L/min):  [2] 2    Physical Exam :     Elderly obese male not in acute distress.  Awake and alert today.  Oriented to time and place  Heart regular.  Lungs diminished breath sounds.  Abdomen obese and soft.  Nontender  Extremities edema 3+      Result Review:  I have personally reviewed the results from the time of this admission to 2023 15:47 EDT and agree with these findings:  [x]  Laboratory  []  Microbiology  []  Radiology  []  EKG/Telemetry   []  Cardiology/Vascular   []  Pathology  []  Old records  []  Other:           Assessment / Plan       Active Hospital Problems:  Active Hospital Problems    Diagnosis    • **Acute respiratory failure with hypoxia    • Altered mental status    • Pleural effusion    • Cirrhosis of liver    • Atrial fibrillation    • COPD (chronic obstructive pulmonary disease)    • Diabetes mellitus    • Chronic venous stasis dermatitis    • Sleep apnea        Plan:   Patient slowly improving, ammonia level down.  GI consulted, appreciate input  Discussed with patient's daughter in detail  Continue treatment per consultants.  Palliative care consult.  Discharge to inpatient rehab in couple of       DVT prophylaxis:  Medical DVT prophylaxis orders are present.    CODE STATUS:   Level Of Support Discussed With: Health Care  Surrogate  Code Status (Patient has no pulse and is not breathing): CPR (Attempt to Resuscitate)  Medical Interventions (Patient has pulse or is breathing): Full Support              Electronically signed by Marco Bishop MD, 04/18/23, 3:48 PM EDT.  .

## 2023-04-18 NOTE — CONSULTS
Patient is currently on pcu 2. Updated by primary rn regarding patients current condition. Patient is confusing moaning in bed during visit. Primary rn has medicated patient with prn medications. No family at bedside. Call placed to patients spouse and daughter- no answer, left message requesting return call. Palliative care will continue to follow to support and assist.    JOSEE Hdz, RN  Palliative Care

## 2023-04-18 NOTE — NURSING NOTE
Pt confused and attempting to get out of bed. Pt has slept all hs without an issue. Pt pulling off 02, heart monitor and 02 sensor. Pt cursing and confused. Pt thinks that he is in his daughters house and can not be reoriented. Attempted to redirect patient without success. Pt escalated and started to become combative security was called for assistance with patient.     Due to the sudden confusion and agitation ordered chest x ray and abg.  Both obtained at this time.

## 2023-04-18 NOTE — PROGRESS NOTES
Pulmonary / Critical Care Progress Note      Patient Name: Stephen Aguero  : 1945  MRN: [id number]  Primary Care Physician:  Papi Vasquez MD  Date of admission: 2023    Subjective   Subjective   Follow-up for AMS and pleural effusion.    Has some delerium overnight    This morning,  Lying in bed on 2 L nasal cannula  CHANEL and scant dry cough unchanged  Nonproductive cough  Continues to diurese well  No chest pain  No fever or chills  Weak and fatigued    Review of Systems  General: Positive fatigue, No Fever  Respiratory:  + Productive cough, + Dyspnea, negative phlegm, No Pleuritic Pain, no wheezing, no hemoptysis  Cardiovascular: Denies chest pain, denies palpitations, + CHANEL, No Chest Pressure  Gastrointestinal:  No Abdominal Pain, No Nausea, No Vomiting, No Diarrhea      Objective   Objective     Vitals:   Temp:  [97.7 °F (36.5 °C)-98.4 °F (36.9 °C)] 98.1 °F (36.7 °C)  Heart Rate:  [] 103  Resp:  [18-22] 20  BP: ()/(46-61) 105/46  Flow (L/min):  [2] 2    Physical Exam   Vital Signs Reviewed   General:  WDWN male, awake, NAD on 2 L NC  HEENT:  PERRL, EOMI.  OP, nares clear  Chest:  good aeration, clear to auscultation bilaterally, tympanic to percussion bilaterally, no work of breathing noted  CV: RRR, no MGR, pulses 2+, equal  Abd:  Soft, NT, ND, + BS, no HSM  EXT:  no clubbing, no cyanosis, no edema  Neuro:  A&Ox3, CN grossly intact, no focal deficits  Skin: No rashes or lesions noted, chronic venous stasis changes bilateral lower extremities    Result Review    Result Review:  I have personally reviewed the results from the time of this admission to 2023 11:14 EDT and agree with these findings:  [x]  Laboratory  [x]  Microbiology  [x]  Radiology  [x]  EKG/Telemetry   [x]  Cardiology/Vascular   []  Pathology  []  Old records  []  Other:  Most notable findings include:         Lab 23  0442 23  0525 04/15/23  0547 23  0411 23  1526 23  1514   WBC  8.30 7.37 7.46 8.23 9.98  --    HEMOGLOBIN 14.7 14.7 13.8 14.9 14.6  --    HEMATOCRIT 43.9 44.3 41.1 45.1 43.0  --    PLATELETS 207 225 211 207 212  --    SODIUM 136 139 141 139 141  --    SODIUM, ARTERIAL  --   --   --   --   --  137.5   POTASSIUM 3.7 3.6 3.8 3.9 4.4  --    CHLORIDE 97* 95* 96* 95* 95*  --    CO2 32.4* 33.0* 35.2* 38.3* 37.8*  --    BUN 14 17 17 20 27*  --    CREATININE 0.78 0.77 0.84 0.81 1.01  --    GLUCOSE 135* 142* 135* 93 83  --    GLUCOSE, ARTERIAL  --   --   --   --   --  79   CALCIUM 8.7 8.8 8.9 8.9 9.0  --    TOTAL PROTEIN  --  6.9 6.8 7.0 7.1  --    ALBUMIN  --  2.6* 2.7* 2.7* 2.8*  --    GLOBULIN  --  4.3 4.1 4.3 4.3  --      3/29 and 3/30 thoracentesis, transudative    ABG from last night personally reviewed showing no acute hypoxemia or hypercapnia  Chest x-ray overnight personally reviewed showing no change in bilateral airspace disease      Assessment & Plan   Assessment / Plan     Active Hospital Problems:  Active Hospital Problems    Diagnosis    • **Acute respiratory failure with hypoxia    • Altered mental status    • Pleural effusion    • Cirrhosis of liver    • Atrial fibrillation    • COPD (chronic obstructive pulmonary disease)    • Diabetes mellitus    • Chronic venous stasis dermatitis    • Sleep apnea      Impression:   Altered mental status  Metabolic encephalopathy  Hyperammonia  Cirrhosis of liver   Small pleural effusion: previous bilateral thoracentesis 3/29 and 3/30   Acute cardiogenic pulmonary edema  Decompensated cirrhosis  Acute on chronic hypoxic hypercapnic respiratory failure: on 2 L home and recent set up of astral vent  Recent Mycoplasma pneumonia  Recent Morganella UTI  OHS/JULITA  Acute decompensated diastolic heart failure  A fib  COPD without exacerbation  type 2 diabetes with hyperglycemia  Acute hyperactive delerium     Plan:   We will start as needed Haldol for acute hyperactive delirium.  Medications reviewed to remove any offending agents contributing  to delirium  - appreciate GI assistance.  Cirrhosis appears to be compensated  -Continue home astral vent at night and as needed days with naps.  Do overnight oximetry on home astral device to see if oxygen needs to be bled into it.  If so, we can coordinate this at his discharge  -Wean O2 to keep SPO2 greater than 90%  -Continue Lasix 40 mg IV daily.  Continue Aldactone 50 mg p.o. BID.  Trend renal panel and electrolytes.   -Cardiology on board appreciate assistance.  -Continue Eliquis.  -Continue lactulose twice daily.  -Encourage mobilization.  Out of bed to chair.  -PT/OT on board.  -Will need to complete rehab at [facility] upon discharge.     DVT prophylaxis:  Medical DVT prophylaxis orders are present.    CODE STATUS:   Level Of Support Discussed With: Health Care Surrogate  Code Status (Patient has no pulse and is not breathing): CPR (Attempt to Resuscitate)  Medical Interventions (Patient has pulse or is breathing): Full Support    I personally reviewed pertinent labs, imaging and provider notes. Discussed with bedside nurse and will discuss with primary service.     Electronically signed by Mateus Ewing MD, 4/18/2023, 11:14 EDT.

## 2023-04-19 LAB
FUNGUS WND CULT: NORMAL
FUNGUS WND CULT: NORMAL
MYCOBACTERIUM SPEC CULT: NORMAL
MYCOBACTERIUM SPEC CULT: NORMAL
NIGHT BLUE STAIN TISS: NORMAL
NIGHT BLUE STAIN TISS: NORMAL

## 2023-04-19 NOTE — PLAN OF CARE
Goal Outcome Evaluation:  Plan of Care Reviewed With: patient        Progress: no change  Outcome Evaluation: Patient alert to self. Safety remains at bedside for patient pulling lines and tubes. He wore his home CPAP all night. VSS. Call light within reach

## 2023-04-19 NOTE — THERAPY TREATMENT NOTE
Patient Name: Stephen Aguero  : 1945    MRN: [id number]                              Today's Date: 2023       Admit Date: 2023    Visit Dx:     ICD-10-CM ICD-9-CM   1. Acute respiratory failure, unspecified whether with hypoxia or hypercapnia  J96.00 518.81   2. Decreased activities of daily living (ADL)  Z78.9 V49.89     Patient Active Problem List   Diagnosis   • Uncontrolled diabetes mellitus with hyperglycemia (HCC)   • Elevated cholesterol   • High blood pressure   • Sleep apnea   • Screening for colon cancer   • Elevated PSA   • Benign prostatic hyperplasia with urinary frequency   • Generalized weakness   • Pedal edema   • Chronic venous stasis dermatitis   • Hypoxia   • Microscopic hematuria   • Diabetes mellitus   • COPD (chronic obstructive pulmonary disease)   • Obesity hypoventilation syndrome   • Atrial fibrillation   • Chronic respiratory failure with hypercapnia   • Acute respiratory failure with hypoxia   • Altered mental status   • Pleural effusion   • Cirrhosis of liver     Past Medical History:   Diagnosis Date   • BPH (benign prostatic hyperplasia)    • Chronic a-fib    • Chronic pain    • Diabetes    • Diabetes mellitus    • Elevated cholesterol    • Elevated PSA    • GERD (gastroesophageal reflux disease)    • High blood pressure    • Hypertension    • Sleep apnea      Past Surgical History:   Procedure Laterality Date   • ABDOMINAL SURGERY     • APPENDECTOMY     • BACK SURGERY     • COLONOSCOPY  2018    [facility]   • EYE FOREIGN BODY REMOVAL     • EYE SURGERY     • FACIAL COSMETIC SURGERY     • LAPAROSCOPIC CHOLECYSTECTOMY     • LEG SURGERY     • NECK SURGERY        General Information     Row Name 23 1559          OT Time and Intention    Document Type therapy note (daily note)  -AC     Mode of Treatment individual therapy;occupational therapy  -AC     Row Name 23 1559          General Information    Patient Profile Reviewed yes  -AC     Existing  Precautions/Restrictions fall;oxygen therapy device and L/min  -     Row Name 04/19/23 1559          Cognition    Orientation Status (Cognition) oriented to;person  -     Row Name 04/19/23 1558          Safety Issues, Functional Mobility    Impairments Affecting Function (Mobility) balance;cognition;endurance/activity tolerance;strength  -           User Key  (r) = Recorded By, (t) = Taken By, (c) = Cosigned By    Initials Name Provider Type    Rosalva Perez, [place] Occupational Therapist                 Mobility/ADL's     Row Name 04/19/23 1553          Bed Mobility    Comment, (Bed Mobility) patient laying supine upon OT arrival in the room.  -           User Key  (r) = Recorded By, (t) = Taken By, (c) = Cosigned By    Initials Name Provider Type    Rosalva Perez OT Occupational Therapist               Obj/Interventions     Row Name 04/19/23 1600          Sensory Assessment (Somatosensory)    Sensory Assessment (Somatosensory) sensation intact  -     Row Name 04/19/23 1600          Vision Assessment/Intervention    Vision Assessment Comment patient kept eyes closed throughout treatment.  -     Row Name 04/19/23 1600          Range of Motion Comprehensive    General Range of Motion bilateral upper extremity ROM WFL  -[facility] Name 04/19/23 1600          Strength Comprehensive (MMT)    Comment, General Manual Muscle Testing (MMT) Assessment 4-/5  -     Row Name 04/19/23 1600          Shoulder (Therapeutic Exercise)    Shoulder (Therapeutic Exercise) AAROM (active assistive range of motion)  -     Shoulder AAROM (Therapeutic Exercise) bilateral;flexion;extension;aBduction;aDduction;supine;other (see comments)  6 reps x2 sets with increased tactile and verbal cues  -     Row Name 04/19/23 1600          Elbow/Forearm (Therapeutic Exercise)    Elbow/Forearm (Therapeutic Exercise) AAROM (active assistive range of motion)  -     Elbow/Forearm AAROM (Therapeutic Exercise)  bilateral;flexion;extension;supine;other (see comments)  6 reps x2 sets with increased cues for participation  -AC     Row Name 04/19/23 1600          Motor Skills    Functional Endurance poor  -AC     Therapeutic Exercise shoulder;elbow/forearm  -AC           User Key  (r) = Recorded By, (t) = Taken By, (c) = Cosigned By    Initials Name Provider Type    Rosalva Perez OT Occupational Therapist               Goals/Plan    No documentation.                Clinical Impression     Row Name 04/19/23 1603          Pain Scale: FACES Pre/Post-Treatment    Pain: FACES Scale, Pretreatment 0-->no hurt  -AC     Posttreatment Pain Rating 0-->no hurt  -AC     Row Name 04/19/23 1603          Plan of Care Review    Plan of Care Reviewed With patient  -     Progress no change  -     Outcome Evaluation patient very lethargic this date and required increased cues for upper extremity exercises this date. Patient to contiue with therapy in order to maximize independence with adls.  -AC           User Key  (r) = Recorded By, (t) = Taken By, (c) = Cosigned By    Initials Name Provider Type    Rosalva Perez OT Occupational Therapist               Outcome Measures     Row Name 04/19/23 1604          How much help from another is currently needed...    Putting on and taking off regular lower body clothing? 1  -AC     Bathing (including washing, rinsing, and drying) 2  -AC     Toileting (which includes using toilet bed pan or urinal) 1  -AC     Putting on and taking off regular upper body clothing 2  -AC     Taking care of personal grooming (such as brushing teeth) 2  -AC     Eating meals 2  -AC     AM-PAC 6 Clicks Score (OT) 10  -AC     Row Name 04/19/23 0730          How much help from another person do you currently need...    Turning from your back to your side while in flat bed without using bedrails? 3  -MM     Moving from lying on back to sitting on the side of a flat bed without bedrails? 2  -MM     Moving to and from a  bed to a chair (including a wheelchair)? 2  -MM     Standing up from a chair using your arms (e.g., wheelchair, bedside chair)? 1  -MM     Climbing 3-5 steps with a railing? 1  -MM     To walk in hospital room? 1  -MM     AM-PAC 6 Clicks Score (PT) 10  -MM     Highest level of mobility 4 --> Transferred to chair/commode  -MM     Row Name 04/19/23 1604          Functional Assessment    Outcome Measure Options AM-PAC 6 Clicks Daily Activity (OT);Optimal Instrument  -AC     Row Name 04/19/23 1604          Optimal Instrument    Optimal Instrument Optimal - 3  -AC     Bending/Stooping 5  -AC     Standing 5  -AC     Reaching 1  -AC           User Key  (r) = Recorded By, (t) = Taken By, (c) = Cosigned By    Initials Name Provider Type    Chris Crenshaw, RN Registered Nurse    Rosalva Perez OT Occupational Therapist                Occupational Therapy Education     Title: PT OT SLP Therapies (In Progress)     Topic: Occupational Therapy (In Progress)     Point: ADL training (Done)     Description:   Instruct learner(s) on proper safety adaptation and remediation techniques during self care or transfers.   Instruct in proper use of assistive devices.              Learning Progress Summary           Patient Acceptance, E, VU by AV at 4/17/2023 2164                   Point: Home exercise program (Not Started)     Description:   Instruct learner(s) on appropriate technique for monitoring, assisting and/or progressing therapeutic exercises/activities.              Learner Progress:  Not documented in this visit.          Point: Precautions (Not Started)     Description:   Instruct learner(s) on prescribed precautions during self-care and functional transfers.              Learner Progress:  Not documented in this visit.          Point: Body mechanics (Not Started)     Description:   Instruct learner(s) on proper positioning and spine alignment during self-care, functional mobility activities and/or exercises.               Learner Progress:  Not documented in this visit.                      User Key     Initials Effective Dates Name Provider Type Discipline    AV 06/16/21 -  Emigdio Alamo OT Occupational Therapist OT              OT Recommendation and Plan     Plan of Care Review  Plan of Care Reviewed With: patient  Progress: no change  Outcome Evaluation: patient very lethargic this date and required increased cues for upper extremity exercises this date. Patient to contiue with therapy in order to maximize independence with adls.     Time Calculation:    Time Calculation- OT     Row Name 04/19/23 1605             Time Calculation- OT    OT Received On 04/19/23  -AC         Timed Charges    15978 - OT Therapeutic Exercise Minutes 8  -AC         Total Minutes    Timed Charges Total Minutes 8  -AC       Total Minutes 8  -AC            User Key  (r) = Recorded By, (t) = Taken By, (c) = Cosigned By    Initials Name Provider Type    AC Rosalva Pal OT Occupational Therapist              Therapy Charges for Today     Code Description Service Date Service Provider Modifiers Qty    83121325129 HC OT THER PROC EA 15 MIN 4/19/2023 Rosalva Pal OT GO 1               Rosalva Pal OT  4/19/2023

## 2023-04-19 NOTE — PROGRESS NOTES
Pulmonary / Critical Care Progress Note      Patient Name: Stephen Aguero  : 1945  MRN: [id number]  Primary Care Physician:  Papi Vasquez MD  Date of admission: 2023    Subjective   Subjective   Follow-up for AMS and pleural effusion.    Has some delerium overnight    This morning,  Much more pleasant last night with less agitation.  Resting well this morning on 2 L of oxygen  Dyspnea improved with minimal cough  No sputum production noted  Continues to diurese well  No chest pain  No fever or chills  Weak and fatigued    Review of Systems  General: Positive fatigue, No Fever  Respiratory:  + Productive cough, + Dyspnea, negative phlegm, No Pleuritic Pain, no wheezing, no hemoptysis  Cardiovascular: Denies chest pain, denies palpitations, + CHANEL, No Chest Pressure  Gastrointestinal:  No Abdominal Pain, No Nausea, No Vomiting, No Diarrhea      Objective   Objective     Vitals:   Temp:  [97.2 °F (36.2 °C)-97.7 °F (36.5 °C)] 97.5 °F (36.4 °C)  Heart Rate:  [79-97] 93  Resp:  [18] 18  BP: ()/(58-70) 112/64  Flow (L/min):  [2-3] 2    Physical Exam   Vital Signs Reviewed   General:  WDWN male, awake, NAD on 2 L NC  HEENT:  PERRL, EOMI.  OP, nares clear  Chest:  good aeration, clear to auscultation bilaterally, tympanic to percussion bilaterally, no work of breathing noted  CV: RRR, no MGR, pulses 2+, equal  Abd:  Soft, NT, ND, + BS, no HSM  EXT:  no clubbing, no cyanosis, no edema  Neuro:  A&Ox3, CN grossly intact, no focal deficits  Skin: No rashes or lesions noted, chronic venous stasis changes bilateral lower extremities    Result Review    Result Review:  I have personally reviewed the results from the time of this admission to 2023 11:33 EDT and agree with these findings:  [x]  Laboratory  [x]  Microbiology  [x]  Radiology  [x]  EKG/Telemetry   [x]  Cardiology/Vascular   []  Pathology  []  Old records  []  Other:  Most notable findings include:         Lab 23  0508 23  9812  04/16/23  0525 04/15/23  0547 04/14/23  0411 04/13/23  1526 04/13/23  1514   WBC 8.14 8.30 7.37 7.46 8.23 9.98  --    HEMOGLOBIN 16.1 14.7 14.7 13.8 14.9 14.6  --    HEMATOCRIT 48.6 43.9 44.3 41.1 45.1 43.0  --    PLATELETS 216 207 225 211 207 212  --    SODIUM 136 136 139 141 139 141  --    SODIUM, ARTERIAL  --   --   --   --   --   --  137.5   POTASSIUM 4.2 3.7 3.6 3.8 3.9 4.4  --    CHLORIDE 93* 97* 95* 96* 95* 95*  --    CO2 35.5* 32.4* 33.0* 35.2* 38.3* 37.8*  --    BUN 17 14 17 17 20 27*  --    CREATININE 1.13 0.78 0.77 0.84 0.81 1.01  --    GLUCOSE 192* 135* 142* 135* 93 83  --    GLUCOSE, ARTERIAL  --   --   --   --   --   --  79   CALCIUM 9.7 8.7 8.8 8.9 8.9 9.0  --    TOTAL PROTEIN 7.5  --  6.9 6.8 7.0 7.1  --    ALBUMIN 2.9*  --  2.6* 2.7* 2.7* 2.8*  --    GLOBULIN 4.6  --  4.3 4.1 4.3 4.3  --      3/29 and 3/30 thoracentesis, transudative    ABG from last night personally reviewed showing no acute hypoxemia or hypercapnia  Chest x-ray overnight personally reviewed showing no change in bilateral airspace disease      Assessment & Plan   Assessment / Plan     Active Hospital Problems:  Active Hospital Problems    Diagnosis    • **Acute respiratory failure with hypoxia    • Altered mental status    • Pleural effusion    • Cirrhosis of liver    • Atrial fibrillation    • COPD (chronic obstructive pulmonary disease)    • Diabetes mellitus    • Chronic venous stasis dermatitis    • Sleep apnea      Impression:   Altered mental status  Metabolic encephalopathy  Hyperammonia  Cirrhosis of liver   Small pleural effusion: previous bilateral thoracentesis 3/29 and 3/30   Acute cardiogenic pulmonary edema  Decompensated cirrhosis  Acute on chronic hypoxic hypercapnic respiratory failure: on 2 L home and recent set up of astral vent  Recent Mycoplasma pneumonia  Recent Morganella UTI  OHS/JULITA  Acute decompensated diastolic heart failure  A fib  COPD without exacerbation  type 2 diabetes with hyperglycemia  Acute  hyperactive delerium     Plan:   Continue delirium precautions with as needed Haldol  appreciate GI assistance.  Cirrhosis appears to be compensated  Continue home astral vent at night and as needed days with naps.    Wean O2 to keep SPO2 greater than 90%  Continue current doses of Lasix and Aldactone.  Trend renal panel and electrolytes.   Cardiology on board appreciate assistance.  Continue Eliquis.  Continue lactulose twice daily.  Encourage mobilization.  Out of bed to chair.  [facility] for rehab     DVT prophylaxis:  Medical DVT prophylaxis orders are present.    CODE STATUS:   Level Of Support Discussed With: Health Care Surrogate  Code Status (Patient has no pulse and is not breathing): CPR (Attempt to Resuscitate)  Medical Interventions (Patient has pulse or is breathing): Full Support    I personally reviewed pertinent labs, imaging and provider notes. Discussed with bedside nurse and will discuss with primary service.     Electronically signed by Mateus Ewing MD, 4/19/2023, 11:33 EDT.

## 2023-04-19 NOTE — PLAN OF CARE
Goal Outcome Evaluation:   VSS. Mostly confused all day. Was able to wake up and tell me his name and  around 1500. Medicated for pain meds, see MAR. Will continue to monitor.

## 2023-04-19 NOTE — NURSING NOTE
"Patient is currently on pcu 2. Updated by primary rn regarding patients current condition- no family at bedside during visit. Call placed to patients family to discuss goals of care. Discussed with patients daughter, who reports patients spouse has \"some anxiety.\" Patients daughter reports patient does not have any advanced care planning documents. Discussed goals of care and care options as well as code status. Patients daughter requests time to speak with patients spouse regarding goals of care and code status and will update palliative care after conversation with patients spouse. Palliative care will continue to follow to support and assist.    JOSEE Hdz, RN  Palliative Care    "

## 2023-04-19 NOTE — PROGRESS NOTES
[facility]     Progress Note    Patient Name: Stephen Aguero  : 1945  MRN: [id number]  Primary Care Physician:  Papi Vasquez MD  Date of admission: 2023      Subjective   Brief summary.  Follow-up on volume overload, CHF and pleural effusion    HPI:  Patient seen earlier this morning,  Sleepy  No shortness of breath  No increased confusion             Review of Systems     No shortness of breath, no chest pain, no nausea vomiting, no diarrhea      Objective     Vitals:   Temp:  [97.2 °F (36.2 °C)-97.5 °F (36.4 °C)] 97.4 °F (36.3 °C)  Heart Rate:  [79-96] 95  Resp:  [18] 18  BP: ()/(57-80) 94/57  Flow (L/min):  [2-3] 2    Physical Exam :     Elderly obese male not in acute distress.  Arousable but not communicating much  Heart regular.  Lungs diminished breath sounds.  Abdomen obese and soft.  Nontender  Extremities edema 3+      Result Review:  I have personally reviewed the results from the time of this admission to 2023 16:40 EDT and agree with these findings:  [x]  Laboratory  []  Microbiology  []  Radiology  []  EKG/Telemetry   []  Cardiology/Vascular   []  Pathology  []  Old records  []  Other:       Reviewed, ammonia normalized to 59    Assessment / Plan       Active Hospital Problems:  Active Hospital Problems    Diagnosis    • **Acute respiratory failure with hypoxia    • Altered mental status    • Pleural effusion    • Cirrhosis of liver    • Atrial fibrillation    • COPD (chronic obstructive pulmonary disease)    • Diabetes mellitus    • Chronic venous stasis dermatitis    • Sleep apnea        Plan:   Patient slowly improving, ammonia level normal today  Appreciate consultants help  Patient not able to move much not participating in therapy at this point  Increase activity with assistance  Discharge to rehab in 1 to 2 days       DVT prophylaxis:  Medical DVT prophylaxis orders are present.    CODE STATUS:   Level Of Support Discussed With: Health Care Surrogate  Code  Status (Patient has no pulse and is not breathing): CPR (Attempt to Resuscitate)  Medical Interventions (Patient has pulse or is breathing): Full Support              Electronically signed by Marco Bishop MD, 04/19/23, 3:48 PM EDT.  .

## 2023-04-20 NOTE — NURSING NOTE
RRT called at 1756, RRT RN responded. Eliceo, primary RN reported change in mental status, hypotension, and pt c/o CP 10/10. BP was 84/38 on arrival. RRT RN ordered troponin, CMP, CK, ABG, CXR, and EKG. See results. RRT RN gave 250 ml bolus per RRT protocol for hypotension. Pt not responsive to fluids, Dr. Bishop notified. Dr. Bishop ordered additional 500 ml bolus, and stated pt could remain on 4nt if BP improved after 500 ml bolus. BP responsive, currently 94/55. Per Dr. Bishop, pt is to remain on 4NT with flex monitor and continuous fluids. PD RN

## 2023-04-20 NOTE — PROGRESS NOTES
Pulmonary / Critical Care Progress Note      Patient Name: Stephen Aguero  : 1945  MRN: [id number]  Primary Care Physician:  Papi Vasquez MD  Date of admission: 2023    Subjective   Subjective   Follow-up for AMS and pleural effusion.    Has some delerium overnight    This morning,  On 2 L of oxygen  Wore NIPPV overnight  Is sleepy but arousable this morning  Ammonia level improved, being trended by primary  Dyspnea unchanged and scant dry cough unchanged  No sputum production noted  Continues to diurese well  No chest pain  No fever or chills  Weak and fatigued awaiting rehab bed      Review of Systems  General: Positive fatigue, No Fever  Respiratory:  + Productive cough, + Dyspnea, negative phlegm, No Pleuritic Pain, no wheezing, no hemoptysis  Cardiovascular: Denies chest pain, denies palpitations, + CHANEL, No Chest Pressure  Gastrointestinal:  No Abdominal Pain, No Nausea, No Vomiting, No Diarrhea      Objective   Objective     Vitals:   Temp:  [97.2 °F (36.2 °C)-97.7 °F (36.5 °C)] 97.3 °F (36.3 °C)  Heart Rate:  [91-99] 92  Resp:  [16-18] 16  BP: ()/(54-68) 114/54  Flow (L/min):  [2] 2    Physical Exam   Vital Signs Reviewed   General:  WDWN male, awake, NAD  HEENT:  PERRL, EOMI.  OP, nares clear  Chest:  good aeration, trace rhonchi bilaterally, tympanic to percussion bilaterally, no work of breathing noted  CV: RRR, no MGR, pulses 2+, equal  Abd:  Soft, NT, ND, + BS, no HSM  EXT:  no clubbing, no cyanosis, no edema  Neuro:  A&Ox3, CN grossly intact, no focal deficits  Skin: No rashes or lesions noted, chronic venous stasis changes bilateral lower extremities    Result Review    Result Review:  I have personally reviewed the results from the time of this admission to 2023 11:35 EDT and agree with these findings:  [x]  Laboratory  [x]  Microbiology  [x]  Radiology  [x]  EKG/Telemetry   [x]  Cardiology/Vascular   []  Pathology  []  Old records  []  Other:  Most notable findings  include:         Lab 04/19/23  0508 04/17/23  0442 04/16/23  0525 04/15/23  0547 04/14/23  0411 04/13/23  1526 04/13/23  1514   WBC 8.14 8.30 7.37 7.46 8.23 9.98  --    HEMOGLOBIN 16.1 14.7 14.7 13.8 14.9 14.6  --    HEMATOCRIT 48.6 43.9 44.3 41.1 45.1 43.0  --    PLATELETS 216 207 225 211 207 212  --    SODIUM 136 136 139 141 139 141  --    SODIUM, ARTERIAL  --   --   --   --   --   --  137.5   POTASSIUM 4.2 3.7 3.6 3.8 3.9 4.4  --    CHLORIDE 93* 97* 95* 96* 95* 95*  --    CO2 35.5* 32.4* 33.0* 35.2* 38.3* 37.8*  --    BUN 17 14 17 17 20 27*  --    CREATININE 1.13 0.78 0.77 0.84 0.81 1.01  --    GLUCOSE 192* 135* 142* 135* 93 83  --    GLUCOSE, ARTERIAL  --   --   --   --   --   --  79   CALCIUM 9.7 8.7 8.8 8.9 8.9 9.0  --    TOTAL PROTEIN 7.5  --  6.9 6.8 7.0 7.1  --    ALBUMIN 2.9*  --  2.6* 2.7* 2.7* 2.8*  --    GLOBULIN 4.6  --  4.3 4.1 4.3 4.3  --      3/29 and 3/30 thoracentesis, transudative      Assessment & Plan   Assessment / Plan     Active Hospital Problems:  Active Hospital Problems    Diagnosis    • **Acute respiratory failure with hypoxia    • Altered mental status    • Pleural effusion    • Cirrhosis of liver    • Atrial fibrillation    • COPD (chronic obstructive pulmonary disease)    • Diabetes mellitus    • Chronic venous stasis dermatitis    • Sleep apnea      Impression:   Altered mental status  Metabolic encephalopathy  Hyperammonia  Cirrhosis of liver   Small pleural effusion: previous bilateral thoracentesis 3/29 and 3/30   Acute cardiogenic pulmonary edema  Decompensated cirrhosis  Acute on chronic hypoxic hypercapnic respiratory failure: on 2 L home and recent set up of astral vent  Recent Mycoplasma pneumonia  Recent Morganella UTI  OHS/JULITA  Acute decompensated diastolic heart failure  A fib  COPD without exacerbation  type 2 diabetes with hyperglycemia  Acute hyperactive delerium     Plan:   Continue delirium precautions with as needed Haldol  Continue home astral vent at night and as  needed days with naps.    Wean O2 to keep SPO2 greater than 90%  Continue current doses of Lasix and Aldactone.  Trend renal panel and electrolytes.   Continue Eliquis.  Continue lactulose twice daily.  Ammonia level has improved  Encourage mobilization.  Out of bed to chair.  Going to [facility] for rehab  Appreciate palliative care assistance     DVT prophylaxis:  Medical DVT prophylaxis orders are present.    CODE STATUS:   Level Of Support Discussed With: Health Care Surrogate  Code Status (Patient has no pulse and is not breathing): CPR (Attempt to Resuscitate)  Medical Interventions (Patient has pulse or is breathing): Full Support      I personally reviewed pertinent labs, imaging and provider notes. Discussed with  primary service.     Electronically signed by Mateus Ewing MD, 4/20/2023, 11:35 EDT.

## 2023-04-20 NOTE — PLAN OF CARE
Goal Outcome Evaluation:  Plan of Care Reviewed With: patient        Progress: no change  Outcome Evaluation: Patient has been complaining of back pain throughout the night. Medicated per mar. Patient noted improvement and is resting at this time. Wore home unit for the majority of the night, on NC currently. VSS. Call light within reach.

## 2023-04-20 NOTE — PLAN OF CARE
Goal Outcome Evaluation:  Plan of Care Reviewed With: patient           Outcome Evaluation: Patient presents with decreased strength, transfers and ambulation.  Skilled physical therapy services will be required to address these mobility deficits.  Recommend subacute/extended-care placement upon discharge from the hospital

## 2023-04-20 NOTE — PLAN OF CARE
Goal Outcome Evaluation:              Outcome Evaluation: Transfer from PCU2 this shift. Patient AOx2. Wound and skin care completed per orders. Hypotensive, MD made aware of this. Weaned to room air when awake. Placed on home trilogy when asleep. Patient c/o 10/10 chest pain, felt like pressure in the center of his chest, radiating to left shoulder and jaw. Vital signs and blood glucose obtained, RRT called. See RRT RN orders. RRT RN remains at bedside at this time.

## 2023-04-20 NOTE — THERAPY EVALUATION
Acute Care - Physical Therapy Initial Evaluation  [facility]     Patient Name: Stephen Aguero  : 1945  MRN: [id number]  Today's Date: 2023     Admit date: 2023     Referring Physician: Marco Bishop MD     Surgery Date:* No surgery found *               Visit Dx:     ICD-10-CM ICD-9-CM   1. Acute respiratory failure, unspecified whether with hypoxia or hypercapnia  J96.00 518.81   2. Decreased activities of daily living (ADL)  Z78.9 V49.89   3. Difficulty in walking  R26.2 719.7     Patient Active Problem List   Diagnosis   • Uncontrolled diabetes mellitus with hyperglycemia (HCC)   • Elevated cholesterol   • High blood pressure   • Sleep apnea   • Screening for colon cancer   • Elevated PSA   • Benign prostatic hyperplasia with urinary frequency   • Generalized weakness   • Pedal edema   • Chronic venous stasis dermatitis   • Hypoxia   • Microscopic hematuria   • Diabetes mellitus   • COPD (chronic obstructive pulmonary disease)   • Obesity hypoventilation syndrome   • Atrial fibrillation   • Chronic respiratory failure with hypercapnia   • Acute respiratory failure with hypoxia   • Altered mental status   • Pleural effusion   • Cirrhosis of liver     Past Medical History:   Diagnosis Date   • BPH (benign prostatic hyperplasia)    • Chronic a-fib    • Chronic pain    • Diabetes    • Diabetes mellitus    • Elevated cholesterol    • Elevated PSA    • GERD (gastroesophageal reflux disease)    • High blood pressure    • Hypertension    • Sleep apnea      Past Surgical History:   Procedure Laterality Date   • ABDOMINAL SURGERY     • APPENDECTOMY     • BACK SURGERY     • COLONOSCOPY      [facility]   • EYE FOREIGN BODY REMOVAL     • EYE SURGERY     • FACIAL COSMETIC SURGERY     • LAPAROSCOPIC CHOLECYSTECTOMY     • LEG SURGERY     • NECK SURGERY       PT Assessment (last 12 hours)     PT Evaluation and Treatment     Row Name 23 1100          Physical Therapy Time and Intention    Subjective  Information no complaints  -PHYLLIS     Document Type evaluation  -PHYLLIS     Mode of Treatment individual therapy;physical therapy  -PHYLLIS     Patient Effort poor  -PHYLLIS     Row Name 04/20/23 1100          General Information    Patient Observations poorly cooperative;lethargic  -PHYLLIS     Row Name 04/20/23 1100          Range of Motion (ROM)    Range of Motion ROM is WFL;bilateral lower extremities  -PHYLLIS     Row Name 04/20/23 1100          Strength (Manual Muscle Testing)    Strength (Manual Muscle Testing) bilateral lower extremities  pt unable to follow commands for formal MMT  -PHYLLIS     Row Name 04/20/23 1100          Bed Mobility    Bed Mobility bed mobility (all) activities;supine-sit  -PHYLLIS     All Activities, [place] (Bed Mobility) dependent (less than 25% patient effort)  -PHYLLIS     Supine-Sit [place] (Bed Mobility) dependent (less than 25% patient effort)  -PHYLLIS     Row Name 04/20/23 1100          Transfers    Comment, (Transfers) --  pt not safe for standing transfer  -PHYLLIS     Row Name 04/20/23 1100          Safety Issues, Functional Mobility    Safety Issues Affecting Function (Mobility) ability to follow commands;insight into deficits/self-awareness  -PHYLLIS     Impairments Affecting Function (Mobility) balance;endurance/activity tolerance;pain;strength;motor control  -PHYLLIS     Row Name 04/20/23 1100          Balance    Balance Assessment sitting static balance  -PHYLLIS     Static Sitting Balance dependent  -PHYLLIS     Position, Sitting Balance unsupported;sitting edge of bed  pt not assisting with any mobility  -PHYLLIS     Row Name             Wound 04/13/23 2030 Bilateral perineum MASD (Moisture associated skin damage)    Wound - Properties Group Placement Date: 04/13/23 -TH Placement Time: 2030 -TH Present on Hospital Admission: Y  -TH Side: Bilateral  -TH Location: perineum  -TH Primary Wound Type: MASD  -TH    Retired Wound - Properties Group Placement Date: 04/13/23 -TH Placement Time: 2030 -TH Present on Hospital Admission: Y   -TH Side: Bilateral  -TH Location: perineum  -TH Primary Wound Type: MASD  -TH    Retired Wound - Properties Group Date first assessed: 04/13/23 -TH Time first assessed: 2030 -TH Present on Hospital Admission: Y  -TH Side: Bilateral  -TH Location: perineum  -TH Primary Wound Type: MASD  -TH    Row Name             Wound 04/13/23 2030 Left lower leg Traumatic    Wound - Properties Group Placement Date: 04/13/23 -TH Placement Time: 2030 -TH Present on Hospital Admission: Y  -TH Side: Left  -TH Orientation: lower  -TH Location: leg  -TH Primary Wound Type: Traumatic  -TH    Retired Wound - Properties Group Placement Date: 04/13/23 -TH Placement Time: 2030 -TH Present on Hospital Admission: Y  -TH Side: Left  -TH Orientation: lower  -TH Location: leg  -TH Primary Wound Type: Traumatic  -TH    Retired Wound - Properties Group Date first assessed: 04/13/23 -TH Time first assessed: 2030 -TH Present on Hospital Admission: Y  -TH Side: Left  -TH Location: leg  -TH Primary Wound Type: Traumatic  -TH    Row Name             Wound 04/13/23 2030 Right posterior elbow Traumatic    Wound - Properties Group Placement Date: 04/13/23 -TH Placement Time: 2030 -TH Present on Hospital Admission: Y  -TH Side: Right  -TH Orientation: posterior  -TH Location: elbow  -TH Primary Wound Type: Traumatic  -TH    Retired Wound - Properties Group Placement Date: 04/13/23 -TH Placement Time: 2030 -TH Present on Hospital Admission: Y  -TH Side: Right  -TH Orientation: posterior  -TH Location: elbow  -TH Primary Wound Type: Traumatic  -TH    Retired Wound - Properties Group Date first assessed: 04/13/23 -TH Time first assessed: 2030 -TH Present on Hospital Admission: Y  -TH Side: Right  -TH Location: elbow  -TH Primary Wound Type: Traumatic  -TH    Row Name             Wound 03/03/22 1712 Right lower leg Traumatic    Wound - Properties Group Placement Date: 03/03/22  -BM Placement Time: 1712  -BM Side: Right  -BM Orientation: lower   -BM Location: leg  -BM Primary Wound Type: Traumatic  -BM    Retired Wound - Properties Group Placement Date: 03/03/22  -BM Placement Time: 1712  -BM Side: Right  -BM Orientation: lower  -BM Location: leg  -BM Primary Wound Type: Traumatic  -BM    Retired Wound - Properties Group Date first assessed: 03/03/22  -BM Time first assessed: 1712  -BM Side: Right  -BM Location: leg  -BM Primary Wound Type: Traumatic  -BM    Row Name             Wound 03/23/23 1443 Bilateral groin    Wound - Properties Group Placement Date: 03/23/23  -DD Placement Time: 1443  -DD Side: Bilateral  -DD Orientation: --  -DD Location: groin  -DD    Retired Wound - Properties Group Placement Date: 03/23/23  -DD Placement Time: 1443  -DD Side: Bilateral  -DD Orientation: --  -DD Location: groin  -DD    Retired Wound - Properties Group Date first assessed: 03/23/23  -DD Time first assessed: 1443  -DD Side: Bilateral  -DD Location: groin  -DD    Row Name             Wound 04/13/23 2030 Right cheek Abrasion    Wound - Properties Group Placement Date: 04/13/23 -TH Placement Time: 2030 -TH Present on Hospital Admission: Y  -TH Side: Right  -TH Location: cheek  -TH Primary Wound Type: Abrasion  -TH    Retired Wound - Properties Group Placement Date: 04/13/23 -TH Placement Time: 2030 -TH Present on Hospital Admission: Y  -TH Side: Right  -TH Location: cheek  -TH Primary Wound Type: Abrasion  -TH    Retired Wound - Properties Group Date first assessed: 04/13/23 -TH Time first assessed: 2030 -TH Present on Hospital Admission: Y  -TH Side: Right  -TH Location: cheek  -TH Primary Wound Type: Abrasion  -TH    Row Name             Wound 04/14/23 Left posterior elbow Skin Tear    Wound - Properties Group Placement Date: 04/14/23  -NH Present on Hospital Admission: Y  -NH Side: Left  -NH Orientation: posterior  -NH Location: elbow  -NH Primary Wound Type: Skin tear  -NH    Retired Wound - Properties Group Placement Date: 04/14/23  -NH Present on Hospital  Admission: Y  -NH Side: Left  -NH Orientation: posterior  -NH Location: elbow  -NH Primary Wound Type: Skin tear  -NH    Retired Wound - Properties Group Date first assessed: 04/14/23  -NH Present on Hospital Admission: Y  -NH Side: Left  -NH Location: elbow  -NH Primary Wound Type: Skin tear  -NH    Row Name 04/20/23 1100          Plan of Care Review    Plan of Care Reviewed With patient  -PHYLLIS     Outcome Evaluation Patient presents with decreased strength, transfers and ambulation.  Skilled physical therapy services will be required to address these mobility deficits.  Recommend subacute/extended-care placement upon discharge from the hospital  -PHYLLIS     Row Name 04/20/23 1100          Therapy Assessment/Plan (PT)    Rehab Potential (PT) good, to achieve stated therapy goals  -PHYLLIS     Criteria for Skilled Interventions Met (PT) skilled treatment is necessary  -PHYLLIS     Therapy Frequency (PT) 3 times/wk  -PHYLLIS     Predicted Duration of Therapy Intervention (PT) 10 days  -PHYLLIS     Problem List (PT) problems related to;balance;cognition;motor control;mobility;strength;pain  -PHYLLIS     Activity Limitations Related to Problem List (PT) unable to ambulate safely;unable to transfer safely  -PHYLLIS     Row Name 04/20/23 1100          PT Evaluation Complexity    History, PT Evaluation Complexity no personal factors and/or comorbidities  -PHYLLIS     Examination of Body Systems (PT Eval Complexity) total of 4 or more elements  -PHYLLIS     Clinical Presentation (PT Evaluation Complexity) stable  -PHYLLIS     Clinical Decision Making (PT Evaluation Complexity) low complexity  -PHYLLIS     Overall Complexity (PT Evaluation Complexity) low complexity  -PHYLLIS     Row Name 04/20/23 1100          Therapy Plan Review/Discharge Plan (PT)    Therapy Plan Review (PT) evaluation/treatment results reviewed;participants voiced agreement with care plan;participants included;patient  -PHYLLIS     Row Name 04/20/23 1100          Physical Therapy Goals    Transfer Goal Selection (PT)  transfer, PT goal 1  -PHYLLIS     Gait Training Goal Selection (PT) gait training, PT goal 1  -PHYLLIS     Row Name 04/20/23 1100          Transfer Goal 1 (PT)    Activity/Assistive Device (Transfer Goal 1, PT) transfers, all  -PHYLLIS     [place] Level/Cues Needed (Transfer Goal 1, PT) minimum assist (75% or more patient effort)  -PHYLLIS     Time Frame (Transfer Goal 1, PT) long term goal (LTG);10 days  -PHYLLIS     Row Name 04/20/23 1100          Gait Training Goal 1 (PT)    Activity/Assistive Device (Gait Training Goal 1, PT) gait (walking locomotion);walker, rolling  -PHYLLIS     [place] Level (Gait Training Goal 1, PT) moderate assist (50-74% patient effort)  -PHYLLIS     Distance (Gait Training Goal 1, PT) 10  -PHYLLIS     Time Frame (Gait Training Goal 1, PT) long term goal (LTG);10 days  -PHYLLIS           User Key  (r) = Recorded By, (t) = Taken By, (c) = Cosigned By    Initials Name Provider Type    NH Kathi Moreira, RN Registered Nurse     Mayank Myles, RN Registered Nurse    Micky Malave, PT Physical Therapist    DD Venessa Glasgow, RN Registered Nurse    Lisa Cornelius RN Registered Nurse                Physical Therapy Education     Title: PT OT SLP Therapies (In Progress)     Topic: Physical Therapy (Done)     Point: Mobility training (Done)     Learning Progress Summary           Patient Acceptance, E,TB, VU by PHYLLIS at 4/20/2023 1155                   Point: Precautions (Done)     Learning Progress Summary           Patient Acceptance, E,TB, VU by PHYLLIS at 4/20/2023 1155                               User Key     Initials Effective Dates Name Provider Type Discipline    PHYLLIS 06/03/21 -  Micky Perkins, PT Physical Therapist PT              PT Recommendation and Plan  Anticipated Discharge Disposition (PT): sub acute care setting, extended care facility  Planned Therapy Interventions (PT): balance training, bed mobility training, gait training, strengthening, transfer training  Therapy Frequency (PT): 3 times/wk  Plan  of Care Reviewed With: patient  Outcome Evaluation: Patient presents with decreased strength, transfers and ambulation.  Skilled physical therapy services will be required to address these mobility deficits.  Recommend subacute/extended-care placement upon discharge from the hospital   Outcome Measures     Row Name 04/20/23 1100             How much help from another person do you currently need...    Turning from your back to your side while in flat bed without using bedrails? 2  -PHYLLIS      Moving from lying on back to sitting on the side of a flat bed without bedrails? 1  -PHYLLIS      Moving to and from a bed to a chair (including a wheelchair)? 1  -PHYLLIS      Standing up from a chair using your arms (e.g., wheelchair, bedside chair)? 1  -PHYLLIS      Climbing 3-5 steps with a railing? 1  -PHYLLIS      To walk in hospital room? 1  -PHYLLIS      AM-PAC 6 Clicks Score (PT) 7  -PHYLLIS         Functional Assessment    Outcome Measure Options AM-PAC 6 Clicks Basic Mobility (PT)  -PHYLLIS            User Key  (r) = Recorded By, (t) = Taken By, (c) = Cosigned By    Initials Name Provider Type    Micky Malave PT Physical Therapist                 Time Calculation:    PT Charges     Row Name 04/20/23 1151             Time Calculation    PT Received On 04/20/23  -PHYLLIS      PT Goal Re-Cert Due Date 04/29/23  -PHYLLIS         Untimed Charges    PT Eval/Re-eval Minutes 31  -PHYLLIS         Total Minutes    Untimed Charges Total Minutes 31  -PHYLLIS       Total Minutes 31  -PHYLLIS            User Key  (r) = Recorded By, (t) = Taken By, (c) = Cosigned By    Initials Name Provider Type    Micky Malave PT Physical Therapist              Therapy Charges for Today     Code Description Service Date Service Provider Modifiers Qty    24752337908 HC PT EVAL LOW COMPLEXITY 3 4/20/2023 Micky Perkins PT GP 1          PT G-Codes  Outcome Measure Options: AM-PAC 6 Clicks Basic Mobility (PT)  AM-PAC 6 Clicks Score (PT): 7  AM-PAC 6 Clicks Score (OT): 10    Micky Perkins  PT  4/20/2023

## 2023-04-20 NOTE — PROGRESS NOTES
[facility]     Progress Note    Patient Name: Stephen Aguero  : 1945  MRN: [id number]  Primary Care Physician:  Papi Vasquez MD  Date of admission: 2023      Subjective   Brief summary.  Follow-up on volume overload, CHF and pleural effusion    HPI:  Patient seen earlier this morning,  Refusing BiPAP today.  Did not used it.  Not answering much questions, awake and alert.     Review of Systems     No fever chills, no shortness of breath      Objective     Vitals:   Temp:  [97.2 °F (36.2 °C)-97.7 °F (36.5 °C)] 97.2 °F (36.2 °C)  Heart Rate:  [] 103  Resp:  [16-24] 24  BP: ()/(54-68) 102/58  Flow (L/min):  [2] 2    Physical Exam :     Elderly obese male not in acute distress.  Arousable but not communicating much  Heart regular.  Lungs diminished breath sounds.  Abdomen obese and soft.  Nontender  Extremities edema 1-2+      Result Review:  I have personally reviewed the results from the time of this admission to 2023 16:52 EDT and agree with these findings:  [x]  Laboratory  []  Microbiology  []  Radiology  []  EKG/Telemetry   []  Cardiology/Vascular   []  Pathology  []  Old records  []  Other:           Assessment / Plan       Active Hospital Problems:  Active Hospital Problems    Diagnosis    • **Acute respiratory failure with hypoxia    • Altered mental status    • Pleural effusion    • Cirrhosis of liver    • Atrial fibrillation    • COPD (chronic obstructive pulmonary disease)    • Diabetes mellitus    • Chronic venous stasis dermatitis    • Sleep apnea        Plan:   Patient mental status improved  Breathing status better  Refusing BiPAP  At this point patient's volume status also improved  Decrease Aldactone to 100 mg once a day  Ready for discharge to nursing home  Discussed with  and discharge planner         DVT prophylaxis:  Medical DVT prophylaxis orders are present.    CODE STATUS:   Level Of Support Discussed With: Health Care Surrogate  Code Status  (Patient has no pulse and is not breathing): CPR (Attempt to Resuscitate)  Medical Interventions (Patient has pulse or is breathing): Full Support                Electronically signed by Marco Bishop MD, 04/20/23, 4:54 PM EDT.    .

## 2023-04-21 NOTE — THERAPY TREATMENT NOTE
Acute Care - Physical Therapy Progress Note  [facility]     Patient Name: Stephen Aguero  : 1945  MRN: [id number]  Today's Date: 2023      Visit Dx:     ICD-10-CM ICD-9-CM   1. Acute respiratory failure, unspecified whether with hypoxia or hypercapnia  J96.00 518.81   2. Decreased activities of daily living (ADL)  Z78.9 V49.89   3. Difficulty in walking  R26.2 719.7     Patient Active Problem List   Diagnosis   • Uncontrolled diabetes mellitus with hyperglycemia (HCC)   • Elevated cholesterol   • High blood pressure   • Sleep apnea   • Screening for colon cancer   • Elevated PSA   • Benign prostatic hyperplasia with urinary frequency   • Generalized weakness   • Pedal edema   • Chronic venous stasis dermatitis   • Hypoxia   • Microscopic hematuria   • Diabetes mellitus   • COPD (chronic obstructive pulmonary disease)   • Obesity hypoventilation syndrome   • Atrial fibrillation   • Chronic respiratory failure with hypercapnia   • Acute respiratory failure with hypoxia   • Altered mental status   • Pleural effusion   • Cirrhosis of liver     Past Medical History:   Diagnosis Date   • BPH (benign prostatic hyperplasia)    • Chronic a-fib    • Chronic pain    • Diabetes    • Diabetes mellitus    • Elevated cholesterol    • Elevated PSA    • GERD (gastroesophageal reflux disease)    • High blood pressure    • Hypertension    • Sleep apnea      Past Surgical History:   Procedure Laterality Date   • ABDOMINAL SURGERY     • APPENDECTOMY     • BACK SURGERY     • COLONOSCOPY      [facility]   • EYE FOREIGN BODY REMOVAL     • EYE SURGERY     • FACIAL COSMETIC SURGERY     • LAPAROSCOPIC CHOLECYSTECTOMY     • LEG SURGERY     • NECK SURGERY       PT Assessment (last 12 hours)     PT Evaluation and Treatment     Row Name 23 1600          Physical Therapy Time and Intention    Subjective Information no complaints  -CS     Document Type therapy note (daily note)  -CS     Mode of Treatment individual therapy;physical  therapy  -     Patient Effort poor  -     Row Name 04/21/23 1600          Motor Skills    Therapeutic Exercise hip;knee;ankle  -     Row Name 04/21/23 1600          Hip (Therapeutic Exercise)    Hip (Therapeutic Exercise) PROM (passive range of motion)  -     Hip PROM (Therapeutic Exercise) bilateral;flexion;extension;aBduction;aDduction;supine;10 repetitions  -     Row Name 04/21/23 1600          Knee (Therapeutic Exercise)    Knee (Therapeutic Exercise) PROM (passive range of motion)  -     Knee PROM (Therapeutic Exercise) bilateral;flexion;extension;supine;10 repetitions  -     Row Name 04/21/23 1600          Ankle (Therapeutic Exercise)    Ankle (Therapeutic Exercise) PROM (passive range of motion)  -     Ankle PROM (Therapeutic Exercise) bilateral;dorsiflexion;plantarflexion;supine;10 repetitions  -     Row Name             Wound 04/13/23 2030 Bilateral perineum MASD (Moisture associated skin damage)    Wound - Properties Group Placement Date: 04/13/23 -TH Placement Time: 2030 -TH Present on Hospital Admission: Y  -TH Side: Bilateral  -TH Location: perineum  -TH Primary Wound Type: MASD  -TH    Retired Wound - Properties Group Placement Date: 04/13/23 -TH Placement Time: 2030 -TH Present on Hospital Admission: Y  -TH Side: Bilateral  -TH Location: perineum  -TH Primary Wound Type: MASD  -TH    Retired Wound - Properties Group Date first assessed: 04/13/23 -TH Time first assessed: 2030 -TH Present on Hospital Admission: Y  -TH Side: Bilateral  -TH Location: perineum  -TH Primary Wound Type: MASD  -TH    Row Name             Wound 04/13/23 2030 Left lower leg Traumatic    Wound - Properties Group Placement Date: 04/13/23 -TH Placement Time: 2030 -TH Present on Hospital Admission: Y  -TH Side: Left  -TH Orientation: lower  -TH Location: leg  -TH Primary Wound Type: Traumatic  -TH    Retired Wound - Properties Group Placement Date: 04/13/23 -TH Placement Time: 2030 -TH Present on  Hospital Admission: Y  -TH Side: Left  -TH Orientation: lower  -TH Location: leg  -TH Primary Wound Type: Traumatic  -TH    Retired Wound - Properties Group Date first assessed: 04/13/23 -TH Time first assessed: 2030 -TH Present on Hospital Admission: Y  -TH Side: Left  -TH Location: leg  -TH Primary Wound Type: Traumatic  -TH    Row Name             Wound 04/13/23 2030 Right posterior elbow Traumatic    Wound - Properties Group Placement Date: 04/13/23 -TH Placement Time: 2030 -TH Present on Hospital Admission: Y  -TH Side: Right  -TH Orientation: posterior  -TH Location: elbow  -TH Primary Wound Type: Traumatic  -TH    Retired Wound - Properties Group Placement Date: 04/13/23 -TH Placement Time: 2030 -TH Present on Hospital Admission: Y  -TH Side: Right  -TH Orientation: posterior  -TH Location: elbow  -TH Primary Wound Type: Traumatic  -TH    Retired Wound - Properties Group Date first assessed: 04/13/23 -TH Time first assessed: 2030 -TH Present on Hospital Admission: Y  -TH Side: Right  -TH Location: elbow  -TH Primary Wound Type: Traumatic  -TH    Row Name             Wound 03/03/22 1712 Right lower leg Traumatic    Wound - Properties Group Placement Date: 03/03/22  -BM Placement Time: 1712  -BM Side: Right  -BM Orientation: lower  -BM Location: leg  -BM Primary Wound Type: Traumatic  -BM    Retired Wound - Properties Group Placement Date: 03/03/22  -BM Placement Time: 1712  -BM Side: Right  -BM Orientation: lower  -BM Location: leg  -BM Primary Wound Type: Traumatic  -BM    Retired Wound - Properties Group Date first assessed: 03/03/22  -BM Time first assessed: 1712  -BM Side: Right  -BM Location: leg  -BM Primary Wound Type: Traumatic  -BM    Row Name             Wound 03/23/23 1443 Bilateral groin    Wound - Properties Group Placement Date: 03/23/23  -DD Placement Time: 1443  -DD Side: Bilateral  -DD Orientation: --  -DD Location: groin  -DD    Retired Wound - Properties Group Placement Date:  03/23/23  -DD Placement Time: 1443  -DD Side: Bilateral  -DD Orientation: --  -DD Location: groin  -DD    Retired Wound - Properties Group Date first assessed: 03/23/23 -DD Time first assessed: 1443  -DD Side: Bilateral  -DD Location: groin  -DD    Row Name             Wound 04/13/23 2030 Right cheek Abrasion    Wound - Properties Group Placement Date: 04/13/23 -TH Placement Time: 2030 -TH Present on Hospital Admission: Y  -TH Side: Right  -TH Location: cheek  -TH Primary Wound Type: Abrasion  -TH    Retired Wound - Properties Group Placement Date: 04/13/23 -TH Placement Time: 2030 -TH Present on Hospital Admission: Y  -TH Side: Right  -TH Location: cheek  -TH Primary Wound Type: Abrasion  -TH    Retired Wound - Properties Group Date first assessed: 04/13/23 -TH Time first assessed: 2030 -TH Present on Hospital Admission: Y  -TH Side: Right  -TH Location: cheek  -TH Primary Wound Type: Abrasion  -TH    Row Name             Wound 04/14/23 Left posterior elbow Skin Tear    Wound - Properties Group Placement Date: 04/14/23  -NH Present on Hospital Admission: Y  -NH Side: Left  -NH Orientation: posterior  -NH Location: elbow  -NH Primary Wound Type: Skin tear  -NH    Retired Wound - Properties Group Placement Date: 04/14/23  -NH Present on Hospital Admission: Y  -NH Side: Left  -NH Orientation: posterior  -NH Location: elbow  -NH Primary Wound Type: Skin tear  -NH    Retired Wound - Properties Group Date first assessed: 04/14/23  -NH Present on Hospital Admission: Y  -NH Side: Left  -NH Location: elbow  -NH Primary Wound Type: Skin tear  -NH          User Key  (r) = Recorded By, (t) = Taken By, (c) = Cosigned By    Initials Name Provider Type    Kathi David, RN Registered Nurse    Mayank Pleitez, RN Registered Nurse    Elton Urrutia PTA Physical Therapist Assistant    Venessa Engel, RN Registered Nurse    Lisa Cornelius RN Registered Nurse                Physical Therapy Education      Title: PT OT SLP Therapies (In Progress)     Topic: Physical Therapy (Done)     Point: Mobility training (Done)     Learning Progress Summary           Patient Acceptance, E,TB, VU by PHYLLIS at 4/20/2023 1155                   Point: Precautions (Done)     Learning Progress Summary           Patient Acceptance, E,TB, VU by PHYLLIS at 4/20/2023 1155                               User Key     Initials Effective Dates Name Provider Type Discipline    PHYLLIS 06/03/21 -  Micky Perkins PT Physical Therapist PT              PT Recommendation and Plan         Outcome Measures     Row Name 04/21/23 1600 04/20/23 1100          How much help from another person do you currently need...    Turning from your back to your side while in flat bed without using bedrails? 2  -CS 2  -PHYLLIS     Moving from lying on back to sitting on the side of a flat bed without bedrails? 1  -CS 1  -PHYLLIS     Moving to and from a bed to a chair (including a wheelchair)? 1  -CS 1  -PHYLLIS     Standing up from a chair using your arms (e.g., wheelchair, bedside chair)? 1  -CS 1  -PHYLLIS     Climbing 3-5 steps with a railing? 1  -CS 1  -PHYLLIS     To walk in hospital room? 1  -CS 1  -PHYLLIS     AM-PAC 6 Clicks Score (PT) 7  -CS 7  -PHYLLIS        Functional Assessment    Outcome Measure Options AM-PAC 6 Clicks Basic Mobility (PT)  -CS AM-PAC 6 Clicks Basic Mobility (PT)  -PHYLLIS           User Key  (r) = Recorded By, (t) = Taken By, (c) = Cosigned By    Initials Name Provider Type    PHYLLIS Micky Perkins PT Physical Therapist    Elton Urrutia PTA Physical Therapist Assistant                 Time Calculation:    PT Charges     Row Name 04/21/23 1637             Time Calculation    Start Time 1411  -CS      PT Received On 04/21/23  -CS         Timed Charges    88896 - PT Therapeutic Exercise Minutes 10  -CS         Total Minutes    Timed Charges Total Minutes 10  -CS       Total Minutes 10  -CS            User Key  (r) = Recorded By, (t) = Taken By, (c) = Cosigned By    Initials Name  Provider Type     Elton Herrera PTA Physical Therapist Assistant              Therapy Charges for Today     Code Description Service Date Service Provider Modifiers Qty    [id number] HC PT THER PROC EA 15 MIN 4/21/2023 Elton Herrera PTA GP 1          PT G-Codes  Outcome Measure Options: AM-PAC 6 Clicks Basic Mobility (PT)  AM-PAC 6 Clicks Score (PT): 7  AM-PAC 6 Clicks Score (OT): 7    Elton Herrera PTA  4/21/2023

## 2023-04-21 NOTE — PROGRESS NOTES
[facility]     Progress Note    Patient Name: Stephen Aguero  : 1945  MRN: [id number]  Primary Care Physician:  Papi Vasquez MD  Date of admission: 2023      Subjective   Brief summary.  Follow-up on volume overload, CHF and pleural effusion    HPI:  Called to see patient patient's family wants to discuss patient's prognosis with me.  Patient was seen earlier this morning and again in the afternoon.  He is lethargic and sleepy.  Minimally responsive to commands daughter reports he is doing better.  He has responded to her.    Patient had a rough night yesterday.  He became hypotensive requiring fluids.  Also had acute confusion and fall.  This morning he is sleepy.  His blood pressure improved .     Review of Systems   .  Fatigue and weakness.  No chest pain reported.  No shortness of breath  No fever chills, no shortness of breath  No nausea vomiting or diarrhea.  Confused  Not using BiPAP      Objective     Vitals:   Temp:  [97.4 °F (36.3 °C)-98.8 °F (37.1 °C)] 97.7 °F (36.5 °C)  Heart Rate:  [] 98  Resp:  [17-20] 18  BP: ()/(38-76) 114/51    Physical Exam :     Elderly obese male not in acute distress.  Arousable but not communicating much.  Neck supple.  Heart regular.  Lungs diminished breath sounds bilaterally.  Abdomen obese and soft.  Nontender  Extremities edema 1-2+      Result Review:  I have personally reviewed the results from the time of this admission to 2023 16:56 EDT and agree with these findings:  [x]  Laboratory  []  Microbiology  [x]  Radiology  []  EKG/Telemetry   []  Cardiology/Vascular   []  Pathology  []  Old records  []  Other:           Assessment / Plan       Active Hospital Problems:  Active Hospital Problems    Diagnosis    • **Acute respiratory failure with hypoxia    • Altered mental status    • Pleural effusion    • Cirrhosis of liver    • Atrial fibrillation    • COPD (chronic obstructive pulmonary disease)    • Diabetes mellitus    • Chronic  venous stasis dermatitis    • Sleep apnea        Plan:   Progressively declining after a couple of days of improvement.  Patient has been reluctant and refusing BiPAP.  Mental status worse probably combination of CO2 retention as well as hyperammonemia related to cirrhosis.  Patient's family wants to know what stage of cirrhosis he is in.  He was already seen by gastro.  Discussed with patient's daughter that to come to conclusion he may need liver biopsy  Considering his overall health patient's family declined.  Also due to his declining mental status I will consult neurologist for opinion, could be new onset dementia with worsening symptoms.  We will check an MRI to rule out any other etiologies.  For hypotension fluid administered.  DC if fluids now blood pressure stable.  Diuretics decreased.  Discussed with patient's daughter and wife separately, explained patient's overall condition.  Given multiple problems poor prognosis and recovery will be slow if at all.  Patient's family will consider palliative care.  We will consult palliative care team to evaluate and discuss goals of care.  For now we will continue aggressive treatment    .         DVT prophylaxis:  Medical DVT prophylaxis orders are present.    CODE STATUS:   Level Of Support Discussed With: Health Care Surrogate  Code Status (Patient has no pulse and is not breathing): CPR (Attempt to Resuscitate)  Medical Interventions (Patient has pulse or is breathing): Full Support      .Total  time spent in patient care, approximately .  51.minutes.  I personally saw and examined the patient. I have reviewed all diagnostic interpretations including labs and x-rays, also I have reviewed treatment plans as written. I was present for care of my patient, time includes patient management by me, time spent at the patients bedside/exam,  time to review lab and imaging results, discussing patient care with nursing staff, consultants and documentation in the medical  record.  Also additional time spent with family or caregiver discussing patient's condition as well as discharge planning.            Electronically signed by Marco Bishop MD, 04/21/23, 5:02 PM EDT.      .

## 2023-04-21 NOTE — SIGNIFICANT NOTE
04/21/23 0200   Provider Notification   Reason for Communication Status update   Provider Name [facility]   Notification Route Phone call     Patient Fall   Response OK thank you
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
## 2023-04-21 NOTE — PROGRESS NOTES
Pulmonary / Critical Care Progress Note      Patient Name: Stephen Aguero  : 1945  MRN: [id number]  Primary Care Physician:  Papi Vasquez MD  Date of admission: 2023    Subjective   Subjective   Follow-up for AMS and pleural effusion.    Patient had some confusion overnight and had a fall but did not have any injuries    This morning,  Remains on 2 L of oxygen and tolerated NIPPV overnight  Did have episode of low blood pressure and responded well to IV fluids  More awake this morning and doing well  Dyspnea unchanged  No coughing  No sputum production noted  No chest pain  No fever or chills  Weak and fatigued   awaiting rehab bed     Review of Systems  General: Positive fatigue, No Fever  Respiratory:  no cough, + Dyspnea, negative phlegm, No Pleuritic Pain, no wheezing, no hemoptysis  Cardiovascular: Denies chest pain, denies palpitations, + CHANEL, No Chest Pressure  Gastrointestinal:  No Abdominal Pain, No Nausea, No Vomiting, No Diarrhea      Objective   Objective     Vitals:   Temp:  [97.2 °F (36.2 °C)-98.8 °F (37.1 °C)] 97.7 °F (36.5 °C)  Heart Rate:  [] 90  Resp:  [17-24] 18  BP: ()/(38-76) 109/72  Flow (L/min):  [2] 2    Physical Exam   Vital Signs Reviewed   General:  WDWN male, awake, NAD  HEENT:  PERRL, EOMI.  OP, nares clear  Chest:  good aeration, scant rhonchi bilaterally, tympanic to percussion bilaterally, no work of breathing noted  CV: RRR, no MGR, pulses 2+, equal  Abd:  Soft, NT, ND, + BS, no HSM  EXT:  no clubbing, no cyanosis, no edema  Neuro:  A&Ox3, CN grossly intact, no focal deficits  Skin: No rashes or lesions noted, chronic venous stasis changes bilateral lower extremities    Result Review    Result Review:  I have personally reviewed the results from the time of this admission to 2023 11:11 EDT and agree with these findings:  [x]  Laboratory  [x]  Microbiology  [x]  Radiology  [x]  EKG/Telemetry   [x]  Cardiology/Vascular   []  Pathology  []  Old  records  []  Other:  Most notable findings include:         Lab 04/21/23  0455 04/20/23  1806 04/19/23  0508 04/17/23  0442 04/16/23  0525 04/15/23  0547   WBC 8.61  --  8.14 8.30 7.37 7.46   HEMOGLOBIN 15.8  --  16.1 14.7 14.7 13.8   HEMATOCRIT 46.4  --  48.6 43.9 44.3 41.1   PLATELETS 230  --  216 207 225 211   SODIUM 131* 135* 136 136 139 141   POTASSIUM 4.4 4.7 4.2 3.7 3.6 3.8   CHLORIDE 92* 91* 93* 97* 95* 96*   CO2 29.4* 34.4* 35.5* 32.4* 33.0* 35.2*   BUN 23 23 17 14 17 17   CREATININE 1.15 1.30* 1.13 0.78 0.77 0.84   GLUCOSE 167* 237* 192* 135* 142* 135*   CALCIUM 8.9 9.6 9.7 8.7 8.8 8.9   TOTAL PROTEIN 7.4 7.9 7.5  --  6.9 6.8   ALBUMIN 2.9* 3.0* 2.9*  --  2.6* 2.7*   GLOBULIN 4.5 4.9 4.6  --  4.3 4.1     3/29 and 3/30 thoracentesis, transudative      Assessment & Plan   Assessment / Plan     Active Hospital Problems:  Active Hospital Problems    Diagnosis    • **Acute respiratory failure with hypoxia    • Altered mental status    • Pleural effusion    • Cirrhosis of liver    • Atrial fibrillation    • COPD (chronic obstructive pulmonary disease)    • Diabetes mellitus    • Chronic venous stasis dermatitis    • Sleep apnea      Impression:   Altered mental status  Metabolic encephalopathy  Hyperammonia  Cirrhosis of liver   Small pleural effusion: previous bilateral thoracentesis 3/29 and 3/30   Acute cardiogenic pulmonary edema  Decompensated cirrhosis  Acute on chronic hypoxic hypercapnic respiratory failure: on 2 L home and recent set up of astral vent  Recent Mycoplasma pneumonia  Recent Morganella UTI  OHS/JULITA  Acute decompensated diastolic heart failure  A fib  COPD without exacerbation  type 2 diabetes with hyperglycemia  Acute hyperactive delerium  Hypotension     Plan:   Continue delirium precautions with as needed Haldol  Continue home astral vent at night and as needed days with naps.    Wean O2 to keep SPO2 greater than 90%  DC IV fluids.  Blood pressure not adequate  Change Lasix to 40 mg p.o.  daily and Aldactone 100 mg daily  Trend renal panel and electrolytes  Continue Eliquis.  Continue lactulose twice daily.  Ammonia level has improved  Encourage mobilization.  Out of bed to chair.  [facility]  Appreciate palliative care assistance     DVT prophylaxis:  Medical DVT prophylaxis orders are present.    CODE STATUS:   Level Of Support Discussed With: Health Care Surrogate  Code Status (Patient has no pulse and is not breathing): CPR (Attempt to Resuscitate)  Medical Interventions (Patient has pulse or is breathing): Full Support      I personally reviewed pertinent labs, imaging and provider notes. Discussed with  primary service.     Electronically signed by Mateus Ewing MD, 4/21/2023, 11:11 EDT.

## 2023-04-21 NOTE — SIGNIFICANT NOTE
04/21/23 0200   Family Notification   Reason for Communication Status Update   Family Member's Name Emilee   Family Member's Relationship to Patient Spouse   Notification Route Phone call     Patient fall.  Emilee wife notified.

## 2023-04-21 NOTE — SIGNIFICANT NOTE
Wound Eval / Progress Noted     Alan     Patient Name: Stephen Aguero  : 1945  MRN: [id number]  Today's Date: 2023                 Admit Date: 2023    Visit Dx:    ICD-10-CM ICD-9-CM   1. Acute respiratory failure, unspecified whether with hypoxia or hypercapnia  J96.00 518.81   2. Decreased activities of daily living (ADL)  Z78.9 V49.89   3. Difficulty in walking  R26.2 719.7       Patient Active Problem List   Diagnosis    Uncontrolled diabetes mellitus with hyperglycemia (HCC)    Elevated cholesterol    High blood pressure    Sleep apnea    Screening for colon cancer    Elevated PSA    Benign prostatic hyperplasia with urinary frequency    Generalized weakness    Pedal edema    Chronic venous stasis dermatitis    Hypoxia    Microscopic hematuria    Diabetes mellitus    COPD (chronic obstructive pulmonary disease)    Obesity hypoventilation syndrome    Atrial fibrillation    Chronic respiratory failure with hypercapnia    Acute respiratory failure with hypoxia    Altered mental status    Pleural effusion    Cirrhosis of liver        Past Medical History:   Diagnosis Date    BPH (benign prostatic hyperplasia)     Chronic a-fib     Chronic pain     Diabetes     Diabetes mellitus     Elevated cholesterol     Elevated PSA     GERD (gastroesophageal reflux disease)     High blood pressure     Hypertension     Sleep apnea         Past Surgical History:   Procedure Laterality Date    ABDOMINAL SURGERY      APPENDECTOMY      BACK SURGERY      COLONOSCOPY      [facility]    EYE FOREIGN BODY REMOVAL      EYE SURGERY      FACIAL COSMETIC SURGERY      LAPAROSCOPIC CHOLECYSTECTOMY      LEG SURGERY      NECK SURGERY           Physical Assessment:  Wound 22 1712 Right lower leg Traumatic (Active)   Dressing Appearance intact;no drainage 23 1355   Closure WILVER 23 2200   Base red 23 1355   Periwound intact;redness 23 1355   Edges rolled/closed 23 1355   Care, Wound cleansed  with;soap and water 04/21/23 1355   Periwound Care topical treatment applied 04/21/23 1355       Wound 03/23/23 1443 Bilateral groin (Active)   Dressing Appearance open to air 04/21/23 1355   Closure None 04/21/23 1355   Base moist;red;bleeding 04/21/23 1355   Periwound macerated;redness 04/21/23 1355   Edges open;rolled/closed 04/21/23 1355   Drainage Characteristics/Odor bleeding controlled;serosanguineous 04/21/23 1355   Drainage Amount small 04/21/23 1355   Care, Wound cleansed with;soap and water 04/20/23 2200   Dressing Care open to air 04/21/23 1355   Periwound Care topical treatment applied 04/20/23 2200       Wound 04/13/23 2030 Bilateral perineum MASD (Moisture associated skin damage) (Active)   Dressing Appearance open to air 04/21/23 1355   Base moist;red;bleeding 04/21/23 1355   Periwound redness;macerated;moist 04/21/23 1355   Edges open;rolled/closed 04/21/23 1355   Drainage Characteristics/Odor serosanguineous;bleeding controlled 04/21/23 1355   Drainage Amount small 04/21/23 1355   Care, Wound cleansed with;soap and water 04/20/23 2200   Dressing Care open to air 04/20/23 2200   Periwound Care topical treatment applied 04/20/23 2200       Wound 04/13/23 2030 Left lower leg Traumatic (Active)   Wound Image    04/21/23 1355   Dressing Appearance intact;moist drainage 04/21/23 1355   Closure WILVER 04/20/23 2200   Base red;moist 04/21/23 1355   Periwound intact;dry;redness 04/21/23 1355   Edges open;rolled/closed 04/21/23 1355   Drainage Characteristics/Odor serosanguineous 04/21/23 1355   Drainage Amount small 04/21/23 1355   Care, Wound cleansed with;sterile normal saline 04/21/23 1355   Dressing Care dressing applied;hydrofiber;silver impregnated;gauze;abdominal binder utilized 04/21/23 1355   Periwound Care absorptive dressing applied;topical treatment applied 04/21/23 1355       Wound 04/13/23 2030 Right posterior elbow Traumatic (Active)   Wound Image   04/21/23 1355   Dressing Appearance  intact;moist drainage 04/21/23 1355   Closure WILVER 04/20/23 2200   Base moist;pink 04/21/23 1355   Periwound intact;pink;ecchymotic 04/21/23 1355   Periwound Temperature warm 04/21/23 1355   Periwound Skin Turgor soft 04/21/23 1355   Edges open 04/21/23 1355   Drainage Characteristics/Odor serosanguineous 04/21/23 1355   Drainage Amount scant 04/21/23 1355   Care, Wound cleansed with;sterile normal saline 04/21/23 1355   Dressing Care dressing applied;border dressing;non-adherent;petroleum-based;silicone 04/21/23 1355   Periwound Care absorptive dressing applied 04/21/23 1355       Wound 04/13/23 2030 Right cheek Abrasion (Active)   Wound Image   04/21/23 1355   Dressing Appearance open to air 04/21/23 1355   Base dry;scab 04/21/23 1355   Periwound intact;dry 04/21/23 1355   Edges rolled/closed 04/21/23 1355   Drainage Amount none 04/21/23 1355   Care, Wound cleansed with;sterile normal saline 04/21/23 1355       Wound 04/14/23 Left posterior elbow Skin Tear (Active)   Wound Image   04/21/23 1355   Dressing Appearance intact;no drainage 04/21/23 1355   Closure WILVER 04/20/23 2200   Base pink;red;scab 04/21/23 1355   Periwound intact;pink 04/21/23 1355   Edges rolled/closed 04/21/23 1355   Drainage Amount none 04/21/23 1355   Care, Wound cleansed with;sterile normal saline 04/21/23 1355   Dressing Care dressing applied;border dressing;non-adherent;petroleum-based;silicone 04/21/23 1355   Periwound Care absorptive dressing applied 04/21/23 1355        Wound Check / Follow-up:  Patient seen today for wound follow-up. Patient with dressings to BLE.   Dressings removed. No open areas or wounds to right lower extremity. No weeping or drainage. LLE with three small areas of red moist tissue. Will recommend continue dressing changes with silver impregnated hydrofiber and gauze to LLE. Recommending to continue topical treatment to BLE and feet with Triamcinolone after diligent skin care.   Skin tears to bilateral elbows remain  present, recommending skin tear protocol.   Crusting noted to right side of face, no signs of infection or drainage. Recommending to continue cleansing and site care.   Perineum and Skin folds continue to have MASD with erosion with creases causing bleeding at times. Recommending to continue moisture prevention as well as topical treatments and skin protection measures.   Bruising noted to gluteal aspect with no pressure associated.   Specialty mattress in use at present time. Will recommend bariatric specialty mattress due to patient's size and difficulties turning in bed with current mattress / frame.     Impression: Shallow wounds to LLE, Dry scaly skin to BLE / feet. MASD with erosion with skin creases and to perineum. Crusting to right side of face. Skin tears to bilateral elbows    Short term goals:  Regain skin integrity. Skin care / hygiene with topical treatment. Skin protection, moisture prevention. Site care and daily dressing change to LLE.    Martha Jones RN    4/21/2023    17:51 EDT

## 2023-04-21 NOTE — CONSULTS
"Nutrition Services    Patient Name: Stephen Aguero  YOB: 1945  MRN: [id number]  Admission date: 4/13/2023      CLINICAL NUTRITION ASSESSMENT      Reason for Assessment  Follow-up protocol     H&P:    Past Medical History:   Diagnosis Date   • BPH (benign prostatic hyperplasia)    • Chronic a-fib    • Chronic pain    • Diabetes    • Diabetes mellitus    • Elevated cholesterol    • Elevated PSA    • GERD (gastroesophageal reflux disease)    • High blood pressure    • Hypertension    • Sleep apnea         Current Problems:   Active Hospital Problems    Diagnosis    • **Acute respiratory failure with hypoxia    • Altered mental status    • Pleural effusion    • Cirrhosis of liver    • Atrial fibrillation    • COPD (chronic obstructive pulmonary disease)    • Diabetes mellitus    • Chronic venous stasis dermatitis    • Sleep apnea         Nutrition/Diet History         Narrative     Pt followed by RD for LOS. Pt is at low risk per nutrition risk screening. No acute nutrition concerns or interventions at this time. RD will continue to follow and monitor per protocol.  Several wounds related to fall and MASD noted with no pressure areas identified per nursing assessment.        Anthropometrics        Current Height, Weight Height: 182.9 cm (72\")  Weight: 130 kg (286 lb 6 oz)   Current BMI Body mass index is 38.84 kg/m².       Weight Hx  Wt Readings from Last 30 Encounters:   04/13/23 2325 130 kg (286 lb 6 oz)   03/22/23 2112 (!) 141 kg (310 lb 3 oz)   03/22/23 1611 (!) 140 kg (308 lb 13.8 oz)   03/02/22 1049 131 kg (288 lb 2.3 oz)   04/16/21 0000 135 kg (298 lb)   05/13/19 0000 117 kg (258 lb 2 oz)   02/11/19 0000 119 kg (261 lb 4 oz)            Wt Change Observation Wt change/decrease of 23.7 # due to diuresis, Lasix 40 mg fernandez and Spironolactone 50 mg twice daily added on 4/20/23.  IV fluids were also administered on 4/20/23,  Patient received 750 ml total NS via peripheral line.      Estimated/Assessed " Needs       Energy Requirements    EST Needs (kcal/day) 25 kcal/kg adjusted body wt 98kg - 2450 kcals       Protein Requirements    EST Daily Needs (g/day) 08-1.0 g/kg adjusted body wt - 78-98 grams       Fluid Requirements     Estimated Needs (mL/day) 25 ml/kg of adjusted body wt - 2450 ml     Labs/Medications         Pertinent Labs Reviewed.   Results from last 7 days   Lab Units 04/21/23  0455 04/20/23  1806 04/19/23  0508   SODIUM mmol/L 131* 135* 136   POTASSIUM mmol/L 4.4 4.7 4.2   CHLORIDE mmol/L 92* 91* 93*   CO2 mmol/L 29.4* 34.4* 35.5*   BUN mg/dL 23 23 17   CREATININE mg/dL 1.15 1.30* 1.13   CALCIUM mg/dL 8.9 9.6 9.7   BILIRUBIN mg/dL 0.8 0.8 0.9   ALK PHOS U/L 152* 158* 148*   ALT (SGPT) U/L 28 31 30   AST (SGOT) U/L 35 41* 40   GLUCOSE mg/dL 167* 237* 192*     Results from last 7 days   Lab Units 04/21/23  0455   HEMOGLOBIN g/dL 15.8   HEMATOCRIT % 46.4     COVID19   Date Value Ref Range Status   03/22/2023 Not Detected Not Detected - Ref. Range Final     Lab Results   Component Value Date    HGBA1C 8.40 (H) 03/02/2022         Pertinent Medications Reviewed.     Current Nutrition Orders & Evaluation of Intake       Oral Nutrition     Current PO Diet Diet: Cardiac Diets, Diabetic Diets; Healthy Heart (2-3 Na+); Consistent Carbohydrate; Texture: Regular Texture (IDDSI 7); Fluid Consistency: Thin (IDDSI 0)   Supplement No active supplement orders       Malnutrition Severity Assessment                Nutrition Diagnosis         Nutrition Dx Problem 1 No nutrition diagnosis at this time.       Nutrition Intervention         No intervention indicated.      Medical Nutrition Therapy/Nutrition Education          Learner     Readiness N/A  N/A     Method     Response N/A  N/A     Monitor/Evaluation        Monitor Per protocol.       Nutrition Discharge Plan         No nutrition needs identified at this time.       Electronically signed by:  Columba Varner RD  04/21/23 08:01 EDT

## 2023-04-21 NOTE — PLAN OF CARE
Goal Outcome Evaluation:              Outcome Evaluation: Patient alert with direct questions, but confused when asking other type of questions, given pain medications per orders, had MRI, vitals stable, new mattress ordered.

## 2023-04-22 NOTE — PROGRESS NOTES
[facility]   Progress Note    Patient Name: Stephen Aguero  : 1945  MRN: [id number]  Primary Care Physician: Papi Vasquez MD  Date of admission: 2023    Subjective   Subjective     Chief Complaint:  Follow-up on delirium and other medical conditions  History of Present Illness  History was obtained from the patient's daughter and the nursing staff  Patient has been restless and agitated since last night.  He has pulled out several IVs  Oftentimes he does not respond to instructions  The daughter had questions about palliative and hospice care        Review of Systems   Constitutional: Positive for activity change.   Psychiatric/Behavioral: Positive for agitation, confusion, decreased concentration and hallucinations.       Objective   Objective     Vitals:  Temp:  [97.2 °F (36.2 °C)-98.2 °F (36.8 °C)] 97.2 °F (36.2 °C)  Heart Rate:  [] 83  Resp:  [18-20] 20  BP: (101-138)/(55-88) 101/55    Physical Exam  Constitutional:       Appearance: He is obese. He is ill-appearing.   Cardiovascular:      Rate and Rhythm: Normal rate.      Pulses: Normal pulses.   Pulmonary:      Effort: Pulmonary effort is normal.   Abdominal:      General: Bowel sounds are normal.   Neurological:      General: No focal deficit present.      Mental Status: He is disoriented.      Comments: Oriented to self only         Result Review    Result Review:  I have personally reviewed the results from the time of this admission to 23 4:31 PM EDT and agree with these findings:  [x]  Laboratory  [x]  Microbiology  [x]  Radiology  [x]  EKG/Telemetry   []  Cardiology/Vascular   []  Pathology  []  Old records  []  Other:    Assessment & Plan   Assessment / Plan    Acute Delirium  Possible underlying Dementia    Active Hospital Problems:  Active Hospital Problems    Diagnosis    • **Acute respiratory failure with hypoxia    • Altered mental status    • Pleural effusion    • Cirrhosis of liver    • Atrial fibrillation     • COPD (chronic obstructive pulmonary disease)    • Diabetes mellitus    • Chronic venous stasis dermatitis    • Sleep apnea        Plan:   Discussed  the patient's condition with the patient's daughter and wife    Explained the goals of curative treatment versus palliative treatment    Explained to the family that based on his condition patient's demise is not imminent .    Palliative care will be directed at decreasing the intensity of the delirium, alleviating pain and maintaining maximum comfort.    The wife decided to make the patient a DNR and opted for palliation focused treatment    Start Seroquel 25 mg at bedtime  Stop Norco  Start oxycodone 10 mg every 6 hours around the clock  Start morphine 2 mg IV every 4 hours as needed  Continue Haldol  Discontinue telemetry monitoring                  Electronically signed by Oneal Patel MD, 04/22/23, 4:31 PM EDT.

## 2023-04-22 NOTE — PLAN OF CARE
Goal Outcome Evaluation:  Plan of Care Reviewed With: patient           Outcome Evaluation: Pt restless and moaning. Medicated for pain X1 this shift. Transferred to new ordered specialty bed. Continues attempts to get out of bed, frequent redirection and distractions provided. VS WDL. Continuing to monitor patient more frequently.

## 2023-04-22 NOTE — PROGRESS NOTES
Pulmonary / Critical Care Progress Note      Patient Name: Stephen Aguero  : 1945  MRN: [id number]  Primary Care Physician:  Papi Vasquez MD  Date of admission: 2023    Subjective   Subjective   Follow-up for AMS and pleural effusion.    Patient had some confusion overnight and had a fall but did not have any injuries    This morning,  Very confused  Unable to obtain history    Review of Systems  Unable to obtain altered    Objective   Objective     Vitals:   Temp:  [97.2 °F (36.2 °C)-98.2 °F (36.8 °C)] 97.2 °F (36.2 °C)  Heart Rate:  [] 83  Resp:  [18-20] 20  BP: (101-138)/(55-88) 101/55    Physical Exam   Vital Signs Reviewed   General: Obese chronically ill  HEENT:  PERRL, EOMI.  OP, nares clear  Chest:   Poor air exchange  Neuro:   Confused  Skin: No rashes or lesions noted, chronic venous stasis changes bilateral lower extremities    Result Review    Result Review:  I have personally reviewed the results from the time of this admission to 2023 18:25 EDT and agree with these findings:  [x]  Laboratory  [x]  Microbiology  [x]  Radiology  [x]  EKG/Telemetry   [x]  Cardiology/Vascular   []  Pathology  []  Old records  []  Other:  Most notable findings include:         Lab 23  0824 23  0532 23  0455 23  1806 23  0508 23  0442 23  0525   WBC  --  8.81 8.61  --  8.14 8.30 7.37   HEMOGLOBIN  --  16.9 15.8  --  16.1 14.7 14.7   HEMATOCRIT  --  50.6 46.4  --  48.6 43.9 44.3   PLATELETS  --  220 230  --  216 207 225   SODIUM 130*  --  131* 135* 136 136 139   POTASSIUM 4.3  --  4.4 4.7 4.2 3.7 3.6   CHLORIDE 92*  --  92* 91* 93* 97* 95*   CO2 29.3*  --  29.4* 34.4* 35.5* 32.4* 33.0*   BUN -  23 23 17 14 17   CREATININE 0.86  --  1.15 1.30* 1.13 0.78 0.77   GLUCOSE 192*  --  167* 237* 192* 135* 142*   CALCIUM 9.4  --  8.9 9.6 9.7 8.7 8.8   TOTAL PROTEIN 7.6  --  7.4 7.9 7.5  --  6.9   ALBUMIN 2.9*  --  2.9* 3.0* 2.9*  --  2.6*   GLOBULIN 4.7  --   4.5 4.9 4.6  --  4.3     3/29 and 3/30 thoracentesis, transudative      Assessment & Plan   Assessment / Plan     Active Hospital Problems:  Active Hospital Problems    Diagnosis    • **Acute respiratory failure with hypoxia    • Altered mental status    • Pleural effusion    • Cirrhosis of liver    • Atrial fibrillation    • COPD (chronic obstructive pulmonary disease)    • Diabetes mellitus    • Chronic venous stasis dermatitis    • Sleep apnea      Impression:   Altered mental status  Metabolic encephalopathy  Hyperammonia  Cirrhosis of liver   Small pleural effusion: previous bilateral thoracentesis 3/29 and 3/30   Acute cardiogenic pulmonary edema  Decompensated cirrhosis  Acute on chronic hypoxic hypercapnic respiratory failure: on 2 L home and recent set up of astral vent  Recent Mycoplasma pneumonia  Recent Morganella UTI  OHS/JULITA  Acute decompensated diastolic heart failure  A fib  COPD without exacerbation  type 2 diabetes with hyperglycemia  Acute hyperactive delerium  Hypotension     Plan:   Continue delirium precautions with as needed Haldol  Continue home astral vent at night and as needed days with naps.    Wean O2 to keep SPO2 greater than 90%  Continue lactulose    Prognosis seems poor    Electronically signed by Ramses Luis DO, 4/22/2023, 18:25 EDT.

## 2023-04-22 NOTE — PLAN OF CARE
Goal Outcome Evaluation:  Plan of Care Reviewed With: patient           Outcome Evaluation: Patient alert to self. On room air. Moans throughout shift. PRN Norco and Haldol administered as ordered. Now a DNR/DNI. Family wants palliative care. Wound care and skin care completed. No other issues/needs at this time.

## 2023-04-23 NOTE — PLAN OF CARE
Goal Outcome Evaluation:  Plan of Care Reviewed With: patient           Outcome Evaluation: Patient responds to verbal stimuli. Moaning throughout the shift. On room air. PRN Morphine administered as ordered for pain. Wound care and skin care completed. No other issues/needs at this time.

## 2023-04-23 NOTE — PROGRESS NOTES
Pulmonary / Critical Care Progress Note      Patient Name: Stephen Aguero  : 1945  MRN: [id number]  Primary Care Physician:  Papi Vasquez MD  Date of admission: 2023    Subjective   Subjective   Follow-up for AMS and pleural effusion.    Patient had some confusion overnight and had a fall but did not have any injuries    This morning,  Very confused  Unable to obtain history    Review of Systems  Unable to obtain altered    Objective   Objective     Vitals:   Temp:  [97.4 °F (36.3 °C)-98.1 °F (36.7 °C)] 98 °F (36.7 °C)  Heart Rate:  [77-99] 99  Resp:  [12-18] 18  BP: ()/(53-85) 125/83  Flow (L/min):  [2] 2    Physical Exam   Vital Signs Reviewed   General: Obese chronically ill  HEENT:  PERRL, EOMI.  OP, nares clear  Chest:   Poor air exchange  Neuro:   Confused  Skin: No rashes or lesions noted, chronic venous stasis changes bilateral lower extremities    Result Review    Result Review:  I have personally reviewed the results from the time of this admission to 2023 18:20 EDT and agree with these findings:  [x]  Laboratory  [x]  Microbiology  [x]  Radiology  [x]  EKG/Telemetry   [x]  Cardiology/Vascular   []  Pathology  []  Old records  []  Other:  Most notable findings include:         Lab 23  0824 23  0532 23  0455 23  1806 23  0508 23  0442   WBC  --  8.81 8.61  --  8.14 8.30   HEMOGLOBIN  --  16.9 15.8  --  16.1 14.7   HEMATOCRIT  --  50.6 46.4  --  48.6 43.9   PLATELETS  --  220 230  --  216 207   SODIUM 130*  --  131* 135* 136 136   POTASSIUM 4.3  --  4.4 4.7 4.2 3.7   CHLORIDE 92*  --  92* 91* 93* 97*   CO2 29.3*  --  29.4* 34.4* 35.5* 32.4*   BUN  14   CREATININE 0.86  --  1.15 1.30* 1.13 0.78   GLUCOSE 192*  --  167* 237* 192* 135*   CALCIUM 9.4  --  8.9 9.6 9.7 8.7   TOTAL PROTEIN 7.6  --  7.4 7.9 7.5  --    ALBUMIN 2.9*  --  2.9* 3.0* 2.9*  --    GLOBULIN 4.7  --  4.5 4.9 4.6  --      3/29 and 3/30 thoracentesis,  transudative      Assessment & Plan   Assessment / Plan     Active Hospital Problems:  Active Hospital Problems    Diagnosis    • **Acute respiratory failure with hypoxia    • Altered mental status    • Pleural effusion    • Cirrhosis of liver    • Atrial fibrillation    • COPD (chronic obstructive pulmonary disease)    • Diabetes mellitus    • Chronic venous stasis dermatitis    • Sleep apnea      Impression:   Altered mental status  Metabolic encephalopathy  Hyperammonia  Cirrhosis of liver   Small pleural effusion: previous bilateral thoracentesis 3/29 and 3/30   Acute cardiogenic pulmonary edema  Decompensated cirrhosis  Acute on chronic hypoxic hypercapnic respiratory failure: on 2 L home and recent set up of astral vent  Recent Mycoplasma pneumonia  Recent Morganella UTI  OHS/JULITA  Acute decompensated diastolic heart failure  A fib  COPD without exacerbation  type 2 diabetes with hyperglycemia  Acute hyperactive delerium  Hypotension     Plan:   Continue delirium precautions with as needed Haldol  Continue home astral vent at night and as needed days with naps.    Cont O2  Continue lactulose    Prognosis seems poor    Electronically signed by Ramses Luis DO, 4/23/2023, 18:20 EDT.

## 2023-04-23 NOTE — PROGRESS NOTES
[facility] Note    Patient Name: Stephen Aguero  : 1945  MRN: [id number]  Primary Care Physician: Papi Vasquez MD  Date of admission: 2023    Subjective   Subjective     Chief Complaint:     History of Present Illness        Review of Systems    Objective   Objective     Vitals:  Temp:  [97.2 °F (36.2 °C)-98.1 °F (36.7 °C)] 97.4 °F (36.3 °C)  Heart Rate:  [77-91] 91  Resp:  [12-20] 16  BP: ()/(53-85) 104/81  Flow (L/min):  [2] 2    Physical Exam    Result Review    Result Review:  I have personally reviewed the results from the time of this admission to 23 12:38 PM EDT and agree with these findings:  [x]  Laboratory  []  Microbiology  []  Radiology  []  EKG/Telemetry   []  Cardiology/Vascular   []  Pathology  []  Old records  []  Other:    Assessment & Plan   Assessment / Plan       Active Hospital Problems:  Active Hospital Problems    Diagnosis    • **Acute respiratory failure with hypoxia    • Altered mental status    • Pleural effusion    • Cirrhosis of liver    • Atrial fibrillation    • COPD (chronic obstructive pulmonary disease)    • Diabetes mellitus    • Chronic venous stasis dermatitis    • Sleep apnea        Plan:                     Electronically signed by Oneal Patel MD, 23, 12:38 PM EDT.            [facility] Note    Patient Name: Stephen Aguero  : 1945  MRN: [id number]  Primary Care Physician: Papi Vasquez MD  Date of admission: 2023    Subjective   Subjective     Chief Complaint:  Follow-up on delirium and other medical conditions  History of Present Illness  Patient is awake and alert  Does not respond to questions  Nursing note reviewed  Patient is less agitated since yesterday        Review of Systems   Constitutional: Positive for activity change.   Psychiatric/Behavioral: Positive for agitation, confusion, decreased concentration and hallucinations.       Objective   Objective     Vitals:  Temp:   [97.2 °F (36.2 °C)-98.2 °F (36.8 °C)] 97.2 °F (36.2 °C)  Heart Rate:  [] 83  Resp:  [18-20] 20  BP: (101-138)/(55-88) 101/55    Physical Exam  Constitutional:       Appearance: He is obese. He is ill-appearing.   Cardiovascular:      Rate and Rhythm: Normal rate.      Pulses: Normal pulses.   Pulmonary:      Effort: Pulmonary effort is normal.   Abdominal:      General: Bowel sounds are normal.   Neurological:      General: No focal deficit present.      Mental Status: He is disoriented.      Comments: Oriented to self only         Result Review    Result Review:    [x]  Laboratory  [x]  Microbiology  [x]  Radiology  [x]  EKG/Telemetry   []  Cardiology/Vascular   []  Pathology  []  Old records  []  Other:    Assessment & Plan   Assessment / Plan    Acute Delirium  Possible underlying Dementia    Active Hospital Problems:  Active Hospital Problems    Diagnosis    • **Acute respiratory failure with hypoxia    • Altered mental status    • Pleural effusion    • Cirrhosis of liver    • Atrial fibrillation    • COPD (chronic obstructive pulmonary disease)    • Diabetes mellitus    • Chronic venous stasis dermatitis    • Sleep apnea        Plan:   Decrease to Seroquel 12.5 mg at bedtime  Decrease to oxycodone 5 mg every 8 hours  Continue morphine as needed  Continue Haldol as needed  Continue palliative care                  Electronically signed by Oneal Patel MD, 04/22/23, 4:31 PM EDT.

## 2023-04-23 NOTE — PLAN OF CARE
Goal Outcome Evaluation:  Plan of Care Reviewed With: patient    Pt resting between care throughout shift. Medicated for agitation X1. Held pain medication. Respirations 11 SpO2 98% room air. Skin care provided. Frequent turns. No needs observed at this time. Continuing care.

## 2023-04-24 NOTE — PLAN OF CARE
Goal Outcome Evaluation:              Outcome Evaluation: Skin care provided as ordered. Patient transitoned over to comfort measures only after talks with primary RN, Pallative care, and Dr Bishop. Patient medicated for pain throughout shift and given haldol for anxiety as well. Patient shows restlessness as a major sign of pain. Monitoring for other non-verbal pain indicators and medicating when approptirate. family educated and informed about plan as well. Patient remains on specialty matress.  Problem: Noninvasive Ventilation Acute  Goal: Effective Unassisted Ventilation and Oxygenation  Outcome: Unable to Meet, Plan Revised     Problem: Skin Injury Risk Increased  Goal: Skin Health and Integrity  Outcome: Unable to Meet, Plan Revised     Problem: Fall Injury Risk  Goal: Absence of Fall and Fall-Related Injury  Outcome: Unable to Meet, Plan Revised     Problem: Adult Inpatient Plan of Care  Goal: Plan of Care Review  Outcome: Unable to Meet, Plan Revised  Flowsheets (Taken 4/24/2023 1736)  Outcome Evaluation: Skin care provided as ordered. Patient transitoned over to comfort measures only after talks with primary RN, Pallative care, and Dr Bishop. Patient medicated for pain throughout shift and given haldol for anxiety as well. Patient shows restlessness as a major sign of pain. Monitoring for other non-verbal pain indicators and medicating when approptirate. family educated and informed about plan as well. Patient remains on specialty matress.  Goal: Patient-Specific Goal (Individualized)  Outcome: Unable to Meet, Plan Revised  Goal: Absence of Hospital-Acquired Illness or Injury  Outcome: Unable to Meet, Plan Revised  Goal: Optimal Comfort and Wellbeing  Outcome: Unable to Meet, Plan Revised  Goal: Readiness for Transition of Care  Outcome: Unable to Meet, Plan Revised     Problem: Palliative Care  Goal: Enhanced Quality of Life  Outcome: Unable to Meet, Plan Revised

## 2023-04-24 NOTE — NURSING NOTE
Patient fell at 1840. Patient was found on floor on left side of bed sitting with head leaning back against upper rail. Patient was just rounded on at 1810 and medicated for comfort. Patient resting on his back at this time and family was updated prior to fall and after. MD notified, house notified. Vitals not taken post fall, as patient is comfort measures only at this time.

## 2023-04-24 NOTE — PROGRESS NOTES
[facility]     Progress Note    Patient Name: Stephen Aguero  : 1945  MRN: [id number]  Primary Care Physician:  Papi Vasquez MD  Date of admission: 2023      Subjective   Brief summary.  Follow-up on volume overload, CHF and pleural effusion    HPI:  Called to see patient patient's family wants to discuss patient's prognosis with me again today and wants to make him comfort care.  He is lethargic and sleepy.  Minimally responsive to commands.  Not talking and answering questions, refusing BiPAP and pulling out.  Pulling IVs.     .     Review of Systems   .  Fatigue and weakness.  Lethargic, minimal shortness of breath.  No nausea vomiting diarrhea.  Poor p.o. intake      Objective     Vitals:   Temp:  [97.5 °F (36.4 °C)-98.1 °F (36.7 °C)] 98.1 °F (36.7 °C)  Heart Rate:  [] 101  Resp:  [18-20] 20  BP: ()/(46-66) 106/46  Flow (L/min):  [2] 2    Physical Exam :     Elderly obese male not in acute distress.  Stuporous  Neck supple.  Heart regular.  Lungs diminished breath sounds bilaterally.  Abdomen obese and soft.  Nontender  Extremities edema 1-2+      Result Review:  I have personally reviewed the results from the time of this admission to 2023 18:10 EDT and agree with these findings:  [x]  Laboratory  []  Microbiology  [x]  Radiology  []  EKG/Telemetry   []  Cardiology/Vascular   []  Pathology  []  Old records  []  Other:       Reviewed ABGs and other labs    Assessment / Plan       Active Hospital Problems:  Active Hospital Problems    Diagnosis    • **Acute respiratory failure with hypoxia    • Altered mental status    • Pleural effusion    • Cirrhosis of liver    • Atrial fibrillation    • COPD (chronic obstructive pulmonary disease)    • Diabetes mellitus    • Chronic venous stasis dermatitis    • Sleep apnea        Plan:   Patient seen by neurologist and other consultants all reports suggestive of poor prognosis.  Patient's family insistent on making comfort core.  Has  already had discussion with palliative care.  Discussed with wife and daughter at this point labs are stable no imminent death at this time but considering patient's overall health and poor motivation his prognosis is guarded.  Discussed with both daughter and mother together and that they prefer to have him comfort care understands that he may not survive long enough wants to stop all the medications.  They prefer to keep him here in hospital.  We will change his CODE STATUS to comfort care  .         DVT prophylaxis:  Mechanical DVT prophylaxis orders are present.    CODE STATUS:   Level Of Support Discussed With: Next of Kin (If No Surrogate)  Code Status (Patient has no pulse and is not breathing): No CPR (Do Not Attempt to Resuscitate)  Medical Interventions (Patient has pulse or is breathing): Comfort Measures      .Total  time spent in patient care, approximately 43 minutes.  I personally saw and examined the patient. I have reviewed all diagnostic interpretations including labs and x-rays, also I have reviewed treatment plans as written. I was present for care of my patient, time includes patient management by me, time spent at the patients bedside/exam,  time to review lab and imaging results, discussing patient care with nursing staff, consultants and documentation in the medical record.  Also additional time spent with family / caregiver, wife and daughter discussing patient's condition as well as discharge planning.              Electronically signed by Marco Bishop MD, 04/24/23, 6:13 PM EDT.      .

## 2023-04-24 NOTE — SIGNIFICANT NOTE
04/24/23 1156   SOCIAL WORK ASSESSMENT   Referral Source physician   Reason for Consult palliative care   Visit Type ongoing   Met with daughter in person and spouse over phone. Discussed pt's continued decline and care options. RN reported pt's respirations at 11 this morning and pain medication was stopped. Pt was unresponsive, but came around at time of palliative visit and presented as agitated and restless as evidenced by moaning, pulling off NC, and continued attempts to get out off bed. Pt was incoherent and constantly moaning while RN and daughter attempted to soothe pt.     Educated on full treatment vs. Comfort measures/hosparus.     Family is hesitant to pursue comfort measures without MD opinion. Contacted Dr. Bishop and he referenced conversation from Friday, which was well documented in MD note. Shared note with family.     Gave family time to discuss. MD will be at hospital later today to talk with family.     UPDATE: Family spoke with MD and has decided on comfort measures. Spoke with family about discharge options and continuing home medications. Family wants to stop medications and goal at this time is to get symptoms under control. Pt constantly moaning, grimacing and presenting with terminal restlessness and agitation. Educated family on symptoms management with Morphine and Ativan.     BHARGAV Hernandez

## 2023-04-24 NOTE — CONSULTS
"Nutrition Services    Patient Name: Stephen Aguero  YOB: 1945  MRN: [id number]  Admission date: 4/13/2023      CLINICAL NUTRITION ASSESSMENT      Reason for Assessment  Update to Assessment     H&P:    Past Medical History:   Diagnosis Date   • BPH (benign prostatic hyperplasia)    • Chronic a-fib    • Chronic pain    • Diabetes    • Diabetes mellitus    • Elevated cholesterol    • Elevated PSA    • GERD (gastroesophageal reflux disease)    • High blood pressure    • Hypertension    • Sleep apnea         Current Problems:   Active Hospital Problems    Diagnosis    • **Acute respiratory failure with hypoxia    • Altered mental status    • Pleural effusion    • Cirrhosis of liver    • Atrial fibrillation    • COPD (chronic obstructive pulmonary disease)    • Diabetes mellitus    • Chronic venous stasis dermatitis    • Sleep apnea         Nutrition/Diet History         Narrative     Intake of Heart Healthy/Consistent Carbohydrate diet is significantly low with not intake of meals in the past 24 hours.  Patient requires total assistance with eating.  Wife Emilee and Daughter Ambika present and verified the patient is not eating anything despite their attempts to assist with feeding.  They will be discussing prognosis with physician but indicated verbally that if the condition does not start to resolve they are not interested in a feeding tube at this time.  They will discuss all options with the physician.       Anthropometrics        Current Height, Weight Height: 182.9 cm (72\")  Weight: 130 kg (286 lb 6 oz)   Current BMI Body mass index is 38.84 kg/m².       Weight Hx  Wt Readings from Last 30 Encounters:   04/13/23 2325 130 kg (286 lb 6 oz)   03/22/23 2112 (!) 141 kg (310 lb 3 oz)   03/22/23 1611 (!) 140 kg (308 lb 13.8 oz)   03/02/22 1049 131 kg (288 lb 2.3 oz)   04/16/21 0000 135 kg (298 lb)   05/13/19 0000 117 kg (258 lb 2 oz)   02/11/19 0000 119 kg (261 lb 4 oz)            Wt Change Observation " Wt change/decrease of 23.7 # due to diuresis, Lasix 40 mg fernandez and Spironolactone 50 mg twice daily added on 4/20/23.  IV fluids were also administered on 4/20/23,  Patient received 750 ml total NS via peripheral line. May benefit from ONS.       Estimated/Assessed Needs       Energy Requirements    EST Needs (kcal/day) 25 kcal/kg adjusted body wt 98kg - 2450 kcals       Protein Requirements    EST Daily Needs (g/day) 08-1.0 g/kg adjusted body wt - 78-98 grams       Fluid Requirements     Estimated Needs (mL/day) 25 ml/kg of adjusted body wt - 2450 ml     Labs/Medications         Pertinent Labs Reviewed.   Results from last 7 days   Lab Units 04/22/23  0824 04/21/23  0455 04/20/23  1806   SODIUM mmol/L 130* 131* 135*   POTASSIUM mmol/L 4.3 4.4 4.7   CHLORIDE mmol/L 92* 92* 91*   CO2 mmol/L 29.3* 29.4* 34.4*   BUN mg/dL 19 23 23   CREATININE mg/dL 0.86 1.15 1.30*   CALCIUM mg/dL 9.4 8.9 9.6   BILIRUBIN mg/dL 0.9 0.8 0.8   ALK PHOS U/L 148* 152* 158*   ALT (SGPT) U/L 27 28 31   AST (SGOT) U/L 36 35 41*   GLUCOSE mg/dL 192* 167* 237*     Results from last 7 days   Lab Units 04/24/23  1328   HEMOGLOBIN g/dL 16.3   HEMATOCRIT % 48.3     COVID19   Date Value Ref Range Status   03/22/2023 Not Detected Not Detected - Ref. Range Final     Lab Results   Component Value Date    HGBA1C 8.40 (H) 03/02/2022         Pertinent Medications Reviewed.     Current Nutrition Orders & Evaluation of Intake       Oral Nutrition     Current PO Diet Diet: Cardiac Diets, Diabetic Diets; Healthy Heart (2-3 Na+); Consistent Carbohydrate; Texture: Regular Texture (IDDSI 7); Fluid Consistency: Thin (IDDSI 0)   Supplement No active supplement orders       Malnutrition Severity Assessment                Nutrition Diagnosis         Nutrition Dx Problem 1 Insufficient energy intake related to level of consciousness as evidenced by little to no PO intake.        Nutrition Intervention NA at this time             Medical Nutrition Therapy/Nutrition  Education          Learner     Readiness Wife and Daughter  Eager to assist the patient in any way to eat PO diet; Are not interested in enteral nutrition at this time.     Method     Response Explanation  Expressed their understanding regarding wt loss and other expected outcomes if patient is unable to consume diet.     Monitor/Evaluation        Monitor Monitor intake over next two days       Nutrition Discharge Plan         Undetermined at this time.       Electronically signed by:  Columba Varner RD  04/24/23 14:01 EDT

## 2023-04-24 NOTE — PLAN OF CARE
Goal Outcome Evaluation:  Plan of Care Reviewed With: patient    Skin care administered. Medicated for pain. VS WDL. No needs verbalized or observed at this time.

## 2023-04-24 NOTE — NURSING NOTE
Patient's wife requested to transition patient to comfort measures only and stop all routine medications.  Dr. Bishop was notified and new orders received.    Sarah TRIPP, RN, CHPN

## 2023-04-24 NOTE — THERAPY TREATMENT NOTE
Acute Care - Physical Therapy Treatment Note   Alan     Patient Name: Stephen Aguero  : 1945  MRN: [id number]  Today's Date: 2023      Visit Dx:     ICD-10-CM ICD-9-CM   1. Acute respiratory failure, unspecified whether with hypoxia or hypercapnia  J96.00 518.81   2. Decreased activities of daily living (ADL)  Z78.9 V49.89   3. Difficulty in walking  R26.2 719.7     Patient Active Problem List   Diagnosis   • Uncontrolled diabetes mellitus with hyperglycemia (HCC)   • Elevated cholesterol   • High blood pressure   • Sleep apnea   • Screening for colon cancer   • Elevated PSA   • Benign prostatic hyperplasia with urinary frequency   • Generalized weakness   • Pedal edema   • Chronic venous stasis dermatitis   • Hypoxia   • Microscopic hematuria   • Diabetes mellitus   • COPD (chronic obstructive pulmonary disease)   • Obesity hypoventilation syndrome   • Atrial fibrillation   • Chronic respiratory failure with hypercapnia   • Acute respiratory failure with hypoxia   • Altered mental status   • Pleural effusion   • Cirrhosis of liver     Past Medical History:   Diagnosis Date   • BPH (benign prostatic hyperplasia)    • Chronic a-fib    • Chronic pain    • Diabetes    • Diabetes mellitus    • Elevated cholesterol    • Elevated PSA    • GERD (gastroesophageal reflux disease)    • High blood pressure    • Hypertension    • Sleep apnea      Past Surgical History:   Procedure Laterality Date   • ABDOMINAL SURGERY     • APPENDECTOMY     • BACK SURGERY     • COLONOSCOPY      [facility]   • EYE FOREIGN BODY REMOVAL     • EYE SURGERY     • FACIAL COSMETIC SURGERY     • LAPAROSCOPIC CHOLECYSTECTOMY     • LEG SURGERY     • NECK SURGERY       PT Assessment (last 12 hours)     PT Evaluation and Treatment     Row Name 23 0929          Physical Therapy Time and Intention    Subjective Information no complaints  -DK     Document Type therapy note (daily note)  -DK     Mode of Treatment individual  therapy;physical therapy  -DK     Patient Effort fair  -DK     Symptoms Noted During/After Treatment none  -DK     Comment Pt is rather lethargic, not opening eyes, not speaking, little/no participation in exercises.  -     Row Name 04/24/23 0929          Pain    Pretreatment Pain Rating 0/10 - no pain  -DK     Posttreatment Pain Rating 0/10 - no pain  -DK     Row Name 04/24/23 0929          Cognition    Affect/Mental Status (Cognition) confused;flat/blunted affect;low arousal/lethargic  -DK     Behavioral Issues (Cognition) unable/difficult to assess  -DK     Orientation Status (Cognition) unable/difficult to assess  -DK     Follows Commands (Cognition) unable/difficult to assess  -DK     Cognitive Function unable/difficult to assess  -DK     Personal Safety Interventions nonskid shoes/slippers when out of bed;supervised activity  -     Row Name 04/24/23 0929          Motor Skills    Motor Skills --  therapeutic exercises  -DK     Coordination WFL  -DK     Therapeutic Exercise hip;knee;ankle  -DK     Additional Documentation --  No transfers due to pt lethargy/confusion/lack of participation.  -     Row Name 04/24/23 0929          Hip (Therapeutic Exercise)    Hip (Therapeutic Exercise) AAROM (active assistive range of motion)  -DK     Hip AAROM (Therapeutic Exercise) bilateral;flexion;extension;aBduction;aDduction;supine;10 repetitions;2 sets  -     Row Name 04/24/23 0929          Knee (Therapeutic Exercise)    Knee (Therapeutic Exercise) AAROM (active assistive range of motion)  -DK     Knee AAROM (Therapeutic Exercise) bilateral;flexion;extension;supine;10 repetitions;2 sets  -     Row Name 04/24/23 0929          Ankle (Therapeutic Exercise)    Ankle (Therapeutic Exercise) AAROM (active assistive range of motion)  -DK     Ankle AAROM (Therapeutic Exercise) bilateral;dorsiflexion;plantarflexion;supine;10 repetitions;2 sets  -     Row Name             Wound 04/13/23 2030 Bilateral perineum MASD  (Moisture associated skin damage)    Wound - Properties Group Placement Date: 04/13/23 -TH Placement Time: 2030 -TH Present on Hospital Admission: Y  -TH Side: Bilateral  -TH Location: perineum  -TH Primary Wound Type: MASD  -TH    Retired Wound - Properties Group Placement Date: 04/13/23 -TH Placement Time: 2030 -TH Present on Hospital Admission: Y  -TH Side: Bilateral  -TH Location: perineum  -TH Primary Wound Type: MASD  -TH    Retired Wound - Properties Group Date first assessed: 04/13/23 -TH Time first assessed: 2030 -TH Present on Hospital Admission: Y  -TH Side: Bilateral  -TH Location: perineum  -TH Primary Wound Type: MASD  -TH    Row Name             Wound 04/13/23 2030 Left lower leg Traumatic    Wound - Properties Group Placement Date: 04/13/23 -TH Placement Time: 2030 -TH Present on Hospital Admission: Y  -TH Side: Left  -TH Orientation: lower  -TH Location: leg  -TH Primary Wound Type: Traumatic  -TH    Retired Wound - Properties Group Placement Date: 04/13/23 -TH Placement Time: 2030 -TH Present on Hospital Admission: Y  -TH Side: Left  -TH Orientation: lower  -TH Location: leg  -TH Primary Wound Type: Traumatic  -TH    Retired Wound - Properties Group Date first assessed: 04/13/23 -TH Time first assessed: 2030 -TH Present on Hospital Admission: Y  -TH Side: Left  -TH Location: leg  -TH Primary Wound Type: Traumatic  -TH    Row Name             Wound 04/13/23 2030 Right posterior elbow Traumatic    Wound - Properties Group Placement Date: 04/13/23 -TH Placement Time: 2030 -TH Present on Hospital Admission: Y  -TH Side: Right  -TH Orientation: posterior  -TH Location: elbow  -TH Primary Wound Type: Traumatic  -TH    Retired Wound - Properties Group Placement Date: 04/13/23 -TH Placement Time: 2030 -TH Present on Hospital Admission: Y  -TH Side: Right  -TH Orientation: posterior  -TH Location: elbow  -TH Primary Wound Type: Traumatic  -TH    Retired Wound - Properties Group Date first  assessed: 04/13/23 -TH Time first assessed: 2030 -TH Present on Hospital Admission: Y  -TH Side: Right  -TH Location: elbow  -TH Primary Wound Type: Traumatic  -TH    Row Name             Wound 03/03/22 1712 Right lower leg Traumatic    Wound - Properties Group Placement Date: 03/03/22  -BM Placement Time: 1712  -BM Side: Right  -BM Orientation: lower  -BM Location: leg  -BM Primary Wound Type: Traumatic  -BM    Retired Wound - Properties Group Placement Date: 03/03/22  -BM Placement Time: 1712  -BM Side: Right  -BM Orientation: lower  -BM Location: leg  -BM Primary Wound Type: Traumatic  -BM    Retired Wound - Properties Group Date first assessed: 03/03/22  -BM Time first assessed: 1712  -BM Side: Right  -BM Location: leg  -BM Primary Wound Type: Traumatic  -BM    Row Name             Wound 03/23/23 1443 Bilateral groin    Wound - Properties Group Placement Date: 03/23/23  -DD Placement Time: 1443  -DD Side: Bilateral  -DD Orientation: --  -DD Location: groin  -DD    Retired Wound - Properties Group Placement Date: 03/23/23  -DD Placement Time: 1443  -DD Side: Bilateral  -DD Orientation: --  -DD Location: groin  -DD    Retired Wound - Properties Group Date first assessed: 03/23/23  -DD Time first assessed: 1443  -DD Side: Bilateral  -DD Location: groin  -DD    Row Name             Wound 04/13/23 2030 Right cheek Abrasion    Wound - Properties Group Placement Date: 04/13/23 -TH Placement Time: 2030 -TH Present on Hospital Admission: Y  -TH Side: Right  -TH Location: cheek  -TH Primary Wound Type: Abrasion  -TH    Retired Wound - Properties Group Placement Date: 04/13/23 -TH Placement Time: 2030 -TH Present on Hospital Admission: Y  -TH Side: Right  -TH Location: cheek  -TH Primary Wound Type: Abrasion  -TH    Retired Wound - Properties Group Date first assessed: 04/13/23 -TH Time first assessed: 2030 -TH Present on Hospital Admission: Y  -TH Side: Right  -TH Location: cheek  -TH Primary Wound Type: Abrasion   -TH    Row Name             Wound 04/14/23 Left posterior elbow Skin Tear    Wound - Properties Group Placement Date: 04/14/23  -NH Present on Hospital Admission: Y  -NH Side: Left  -NH Orientation: posterior  -NH Location: elbow  -NH Primary Wound Type: Skin tear  -NH    Retired Wound - Properties Group Placement Date: 04/14/23  -NH Present on Hospital Admission: Y  -NH Side: Left  -NH Orientation: posterior  -NH Location: elbow  -NH Primary Wound Type: Skin tear  -NH    Retired Wound - Properties Group Date first assessed: 04/14/23  -NH Present on Hospital Admission: Y  -NH Side: Left  -NH Location: elbow  -NH Primary Wound Type: Skin tear  -NH    Row Name 04/24/23 0929          Plan of Care Review    Plan of Care Reviewed With patient  -DK     Progress no change  -DK     Row Name 04/24/23 0929          Positioning and Restraints    Pre-Treatment Position in bed  -DK     Post Treatment Position bed  -DK     In Bed supine;call light within reach;encouraged to call for assist;exit alarm on;legs elevated;heels elevated  side rails up x 4  -DK     Row Name 04/24/23 0929          Therapy Assessment/Plan (PT)    Rehab Potential (PT) fair, will monitor progress closely  -DK     Criteria for Skilled Interventions Met (PT) skilled treatment is necessary  -DK     Therapy Frequency (PT) daily  -DK     Problem List (PT) problems related to;balance;cognition;mobility;strength;communication;hearing;inability to direct their own care  -DK     Activity Limitations Related to Problem List (PT) unable to ambulate safely;unable to transfer safely  -DK     Row Name 04/24/23 0929          Progress Summary (PT)    Progress Toward Functional Goals (PT) progress toward functional goals is gradual  -DK           User Key  (r) = Recorded By, (t) = Taken By, (c) = Cosigned By    Initials Name Provider Type    Kathi David RN Registered Nurse    Mayank Pleitez, RN Registered Nurse    Magnolia Jiménez PTA Physical Therapist  Assistant    Venessa Engel, RN Registered Nurse    Lisa Cornelius RN Registered Nurse                Physical Therapy Education     Title: PT OT SLP Therapies (In Progress)     Topic: Physical Therapy (Done)     Point: Mobility training (Done)     Learning Progress Summary           Patient Acceptance, E,TB, VU by PHYLLIS at 4/20/2023 1155                   Point: Precautions (Done)     Learning Progress Summary           Patient Acceptance, E,TB, VU by PHYLLIS at 4/20/2023 1155                               User Key     Initials Effective Dates Name Provider Type Discipline    PHYLLIS 06/03/21 -  Micky Perkins, PT Physical Therapist PT              PT Recommendation and Plan  Planned Therapy Interventions (PT): balance training, bed mobility training, strengthening, transfer training  Therapy Frequency (PT): daily  Progress Summary (PT)  Progress Toward Functional Goals (PT): progress toward functional goals is gradual  Plan of Care Reviewed With: patient  Progress: no change   Outcome Measures     Row Name 04/24/23 0928 04/21/23 1600          How much help from another person do you currently need...    Turning from your back to your side while in flat bed without using bedrails? 1  -DK 2  -CS     Moving from lying on back to sitting on the side of a flat bed without bedrails? 1  -DK 1  -CS     Moving to and from a bed to a chair (including a wheelchair)? 1  -DK 1  -CS     Standing up from a chair using your arms (e.g., wheelchair, bedside chair)? 1  -DK 1  -CS     Climbing 3-5 steps with a railing? 1  -DK 1  -CS     To walk in hospital room? 1  -DK 1  -CS     AM-PAC 6 Clicks Score (PT) 6  -DK 7  -CS        Functional Assessment    Outcome Measure Options AM-PAC 6 Clicks Basic Mobility (PT)  -DK AM-PAC 6 Clicks Basic Mobility (PT)  -CS           User Key  (r) = Recorded By, (t) = Taken By, (c) = Cosigned By    Initials Name Provider Type    Magnolia Jiménez, LEVY Physical Therapist Assistant    Elton Urrutia  LEVY Physical Therapist Assistant                 Time Calculation:    PT Charges     Row Name 04/24/23 0933             Time Calculation    PT Received On 04/24/23  -DK      PT Goal Re-Cert Due Date 04/29/23  -DK         Timed Charges    09951 - PT Therapeutic Exercise Minutes 14  -DK      64546 - PT Therapeutic Activity Minutes 3  -DK         Total Minutes    Timed Charges Total Minutes 17  -DK       Total Minutes 17  -DK            User Key  (r) = Recorded By, (t) = Taken By, (c) = Cosigned By    Initials Name Provider Type    Magnolia Jiménez PTA Physical Therapist Assistant              Therapy Charges for Today     Code Description Service Date Service Provider Modifiers Qty    28002867679 HC PT THER PROC EA 15 MIN 4/24/2023 Magnolia Go PTA GP 1          PT G-Codes  Outcome Measure Options: AM-PAC 6 Clicks Basic Mobility (PT)  AM-PAC 6 Clicks Score (PT): 6  AM-PAC 6 Clicks Score (OT): 7    Magnolia Go PTA  4/24/2023

## 2023-04-25 NOTE — SIGNIFICANT NOTE
04/25/23 1200   SOCIAL WORK ASSESSMENT   Referral Source physician   Reason for Consult palliative care   Visit Type ongoing       Pt resting comfortably with apneic breathing.   No issues with distress this morning per RN.   Contacted family with update and they are coming in shortly.     BHARGAV Hernandez

## 2023-04-25 NOTE — PROGRESS NOTES
[facility]     Progress Note    Patient Name: Stephen Aguero  : 1945  MRN: [id number]  Primary Care Physician:  Papi Vasquez MD  Date of admission: 2023      Subjective   Brief summary.  Patient admitted with decreased mental status and hypoxia, changed to DNR/DNI further changed to comfort care yesterday per family request    HPI:  Patient comfortable sleepy.  Upper airway sounds and chest congestion noted, family at bedside     .     Review of Systems   .  Lethargic and semi-comatose, not responding      Objective     Vitals:   Temp:  [97.3 °F (36.3 °C)] 97.3 °F (36.3 °C)  Heart Rate:  [] 67  Resp:  [18-20] 20  BP: ()/(28-84) 118/84    Physical Exam :     Elderly male not in any distress, appears comfortable  Neck supple.  Heart regular.  Lungs diminished breath sounds bilaterally.  Abdomen obese and soft.  Nontender  Extremities edema 1-2+      Result Review:  I have personally reviewed the results from the time of this admission to 2023 17:15 EDT and agree with these findings:  [x]  Laboratory  []  Microbiology  [x]  Radiology  []  EKG/Telemetry   []  Cardiology/Vascular   []  Pathology  []  Old records  []  Other:           Assessment / Plan       Active Hospital Problems:  Active Hospital Problems    Diagnosis    • **Acute respiratory failure with hypoxia    • Altered mental status    • Pleural effusion    • Cirrhosis of liver    • Atrial fibrillation    • COPD (chronic obstructive pulmonary disease)    • Diabetes mellitus    • Chronic venous stasis dermatitis    • Sleep apnea        Plan:   Patient appears comfortable, family at bedside, not much to add at this time continue comfort care  .         DVT prophylaxis:  Mechanical DVT prophylaxis orders are present.    CODE STATUS:   Level Of Support Discussed With: Next of Kin (If No Surrogate)  Code Status (Patient has no pulse and is not breathing): No CPR (Do Not Attempt to Resuscitate)  Medical Interventions (Patient  has pulse or is breathing): Comfort Measures                      Electronically signed by Marco Bishop MD, 04/25/23, 5:17 PM EDT.        .

## 2023-04-25 NOTE — SIGNIFICANT NOTE
04/25/23 1021   Spiritual Care   Spiritual Care Source  initiative   Spiritual Care Visit Type follow-up   Receptivity to Spiritual Care other (see comments)  (pt seems to be in the active stages of death.  notified pall care so they could reach out to family. While this  was in the room the pt was having 60sec pauses in breathing)

## 2023-04-25 NOTE — PLAN OF CARE
Goal Outcome Evaluation:        Patient medicated for pain and anxiety per comfort measure's orders on EMAR. Family at bedside most of shift, aiding with care and visiting with patient. Explained to family patients need for possible goode, they understood and are okay if one has to be placed.

## 2023-04-26 NOTE — PROGRESS NOTES
[facility]     Progress Note    Patient Name: Stephen Aguero  : 1945  MRN: [id number]  Primary Care Physician:  Papi Vasquez MD  Date of admission: 2023      Subjective   Brief summary.  Patient admitted with decreased mental status and hypoxia, changed to DNR/DNI further changed to comfort care  per family request    HPI:  Patient comfortable sleepy.  Upper airway sounds and chest congestion noted, family at bedside  No new issues reported     .     Review of Systems   .  Lethargic and semi-comatose, not responding  Shallow breathing      Objective     Vitals:   Temp:  [98.6 °F (37 °C)-98.8 °F (37.1 °C)] 98.8 °F (37.1 °C)  Heart Rate:  [] 93  Resp:  [20-28] 28  BP: ()/(52-66) 118/66    Physical Exam :     Elderly male not in any distress, appears comfortable  Neck supple.  Heart regular.  Lungs diminished breath sounds bilaterally.  Abdomen obese and soft.  Nontender  Extremities edema 1-2+      Result Review:  I have personally reviewed the results from the time of this admission to 2023 12:27 EDT and agree with these findings:  [x]  Laboratory  []  Microbiology  [x]  Radiology  []  EKG/Telemetry   []  Cardiology/Vascular   []  Pathology  []  Old records  []  Other:           Assessment / Plan       Active Hospital Problems:  Active Hospital Problems    Diagnosis    • **Acute respiratory failure with hypoxia    • Altered mental status    • Pleural effusion    • Cirrhosis of liver    • Atrial fibrillation    • COPD (chronic obstructive pulmonary disease)    • Diabetes mellitus    • Chronic venous stasis dermatitis    • Sleep apnea        Plan:   Patient appears comfortable, family at bedside, will add scopolamine patch. continue comfort care  .         DVT prophylaxis:  Mechanical DVT prophylaxis orders are present.    CODE STATUS:   Level Of Support Discussed With: Next of Kin (If No Surrogate)  Code Status (Patient has no pulse and is not breathing): No CPR (Do Not  Attempt to Resuscitate)  Medical Interventions (Patient has pulse or is breathing): Comfort Measures                      Electronically signed by Marco Bishop MD, 04/26/23, 12:27 PM EDT.        .

## 2023-04-26 NOTE — PLAN OF CARE
Goal Outcome Evaluation:               Comfort care patient receiving prn meds for respiratory needs and for secretions. IV maintained d/t still administering iv robinul for secretions Family at bedside most of shift. PRN meds offered every hour d/t respiratory status but has declined need d/t patient not showing any s/s pain/discomfort with tachypneic breathing. Apnea espisodes noted at times lasting no more than 30 seconds. No new issues/needs noted at this time.

## 2023-04-26 NOTE — SIGNIFICANT NOTE
04/26/23 1000   SOCIAL WORK ASSESSMENT   Referral Source physician   Reason for Consult palliative care   Visit Type ongoing       Pt resting comfortably with RR @ 29 with family at bedside. Spouse reported pt was medicated with atropine and morphine 30 minutes prior to palliative visit. Spouse feels pt is comfortable. Educated on watching respirations and intermittently medicating to limit sxs increasing. Explained spouse could ask for medications whenever she felt they were needed too.   No other needs at this time.  Palliative will continue to monitor and provide support.     BHARGAV Hernandez

## 2023-04-27 NOTE — PLAN OF CARE
Goal Outcome Evaluation:            Comfort measures provided. Vitals signs taken per family request. Will continue to make comfortable.

## 2023-04-27 NOTE — PROGRESS NOTES
[facility]     Progress Note    Patient Name: Stephen Aguero  : 1945  MRN: [id number]  Primary Care Physician:  Papi Vasquez MD  Date of admission: 2023      Subjective   Brief summary.  Patient admitted with decreased mental status and hypoxia, changed to DNR/DNI further changed to comfort care  per family request    HPI:  Lethargic and unresponsive.  Appears to be comfortable     .     Review of Systems   .  Not much of change      Objective     Vitals:   Temp:  [98.1 °F (36.7 °C)-100.6 °F (38.1 °C)] 98.2 °F (36.8 °C)  Heart Rate:  [42-68] 68  Resp:  [24-28] 28  BP: ()/(23-53) 57/26    Physical Exam :     Elderly male not in any distress, appears comfortable  Oral mucosa dry  Heart regular.  Lungs diminished breath sounds bilaterally.  Abdomen obese and soft.  Nontender  Extremities edema 1-2+      Result Review:  I have personally reviewed the results from the time of this admission to 2023 16:57 EDT and agree with these findings:  [x]  Laboratory  []  Microbiology  [x]  Radiology  []  EKG/Telemetry   []  Cardiology/Vascular   []  Pathology  []  Old records  []  Other:           Assessment / Plan       Active Hospital Problems:  Active Hospital Problems    Diagnosis    • **Acute respiratory failure with hypoxia    • Altered mental status    • Pleural effusion    • Cirrhosis of liver    • Atrial fibrillation    • COPD (chronic obstructive pulmonary disease)    • Diabetes mellitus    • Chronic venous stasis dermatitis    • Sleep apnea        Plan:   Patient appears comfortable,   Continue comfort measures.  Discussed with patient's daughter and wife outside in the hallway.    .         DVT prophylaxis:  Mechanical DVT prophylaxis orders are present.    CODE STATUS:   Level Of Support Discussed With: Next of Kin (If No Surrogate)  Code Status (Patient has no pulse and is not breathing): No CPR (Do Not Attempt to Resuscitate)  Medical Interventions (Patient has pulse or is  breathing): Comfort Measures                        Electronically signed by Marco Bishop MD, 04/27/23, 4:59 PM EDT.          .

## 2023-04-27 NOTE — PLAN OF CARE
Goal Outcome Evaluation:     Patient unresponsive this shift, comfort care, medicated and turned per family request.

## 2023-04-27 NOTE — SIGNIFICANT NOTE
04/27/23 0949   SOCIAL WORK ASSESSMENT   Referral Source physician   Reason for Consult palliative care   Visit Type ongoing       Pt presented as resting with eyes closed with family at bedside. Vital signs consistent with passing.   Pt medicated PRN.   Palliative continues to provide support.     BHARGAV Hernandez

## 2023-04-28 NOTE — SIGNIFICANT NOTE
Wound care refused at this time; family present at bedside. Patient is currently comfort measures. Cristela Stewart RN

## 2023-04-28 NOTE — PLAN OF CARE
Goal Outcome Evaluation:              Comfort care patient. Prn meds given at family request. Frequent checks to make sure all needs and care is given when family is agreeable. IV maintained d/t still administering iv robinul for secretions. Family continues at bedside and decline wound care management d/t declining patient. Routine skin care maintained when turning patient. No new issues/needs noted at this time.

## 2023-04-28 NOTE — PROGRESS NOTES
[facility]     Progress Note    Patient Name: Stephen Aguero  : 1945  MRN: [id number]  Primary Care Physician:  Papi Vasqeuz MD  Date of admission: 2023      Subjective   Brief summary.  Patient admitted with decreased mental status and hypoxia, changed to DNR/DNI further changed to comfort care  per family request    HPI:  Lethargic and unresponsive.  Appears to be comfortable  Wife at bedside     .     Review of Systems   .  Lethargic, short of breath with shallow breathing      Objective     Vitals:   Temp:  [97.7 °F (36.5 °C)-98.2 °F (36.8 °C)] 98.2 °F (36.8 °C)  Heart Rate:  [] 81  Resp:  [12-28] 28  BP: (71-98)/(30-51) 98/51    Physical Exam :     Elderly male not in any distress, appears comfortable  Breathing through open mouth  Heart regular.  Lungs diminished breath sounds bilaterally.  Abdomen obese and soft.  Nontender  Extremities edema 1-2+      Result Review:  I have personally reviewed the results from the time of this admission to 2023 17:30 EDT and agree with these findings:  [x]  Laboratory  []  Microbiology  [x]  Radiology  []  EKG/Telemetry   []  Cardiology/Vascular   []  Pathology  []  Old records  []  Other:           Assessment / Plan       Active Hospital Problems:  Active Hospital Problems    Diagnosis    • **Acute respiratory failure with hypoxia    • Altered mental status    • Pleural effusion    • Cirrhosis of liver    • Atrial fibrillation    • COPD (chronic obstructive pulmonary disease)    • Diabetes mellitus    • Chronic venous stasis dermatitis    • Sleep apnea        Plan:   Patient appears comfortable,   Continue comfort measures.  Discussed with patient's wife    .         DVT prophylaxis:  Mechanical DVT prophylaxis orders are present.    CODE STATUS:   Level Of Support Discussed With: Next of Kin (If No Surrogate)  Code Status (Patient has no pulse and is not breathing): No CPR (Do Not Attempt to Resuscitate)  Medical Interventions (Patient  has pulse or is breathing): Comfort Measures                          Electronically signed by Marco Bishop MD, 04/28/23, 5:31 PM EDT.  .          .

## 2023-04-28 NOTE — PLAN OF CARE
Goal Outcome Evaluation:  Patient comfort measures only. Non-responsive to stimuli at this time. Wife and daughter at bedside throughout the night attentive to patient. Patient turned per family request. Patient in no visible distress. No notable complaints of pain.

## 2023-04-28 NOTE — CONSULTS
"Nutrition Services    Patient Name: Stephen Aguero  YOB: 1945  MRN: [id number]  Admission date: 4/13/2023      CLINICAL NUTRITION ASSESSMENT      Reason for Assessment Updated progress note     H&P:    Past Medical History:   Diagnosis Date   • BPH (benign prostatic hyperplasia)    • Chronic a-fib    • Chronic pain    • Diabetes    • Diabetes mellitus    • Elevated cholesterol    • Elevated PSA    • GERD (gastroesophageal reflux disease)    • High blood pressure    • Hypertension    • Sleep apnea         Current Problems:   Active Hospital Problems    Diagnosis    • **Acute respiratory failure with hypoxia    • Altered mental status    • Pleural effusion    • Cirrhosis of liver    • Atrial fibrillation    • COPD (chronic obstructive pulmonary disease)    • Diabetes mellitus    • Chronic venous stasis dermatitis    • Sleep apnea         Nutrition/Diet History         Narrative       Pt is currently NPO and CMO,  Unable to tolerate any po nutrition and healthcare directive is no enteral nutrition.      Anthropometrics        Current Height, Weight Height: 182.9 cm (72\")  Weight: 130 kg (286 lb 6 oz)   Current BMI Body mass index is 38.84 kg/m².       Weight Hx  Wt Readings from Last 30 Encounters:   04/13/23 2325 130 kg (286 lb 6 oz)   03/22/23 2112 (!) 141 kg (310 lb 3 oz)   03/22/23 1611 (!) 140 kg (308 lb 13.8 oz)   03/02/22 1049 131 kg (288 lb 2.3 oz)   04/16/21 0000 135 kg (298 lb)   05/13/19 0000 117 kg (258 lb 2 oz)   02/11/19 0000 119 kg (261 lb 4 oz)            Wt Change Observation Wt change/decrease of 23.7 # due to diuresis, Lasix 40 mg fernandze and Spironolactone 50 mg twice daily added on 4/20/23.  IV fluids were also administered on 4/20/23,  Patient received 750 ml total NS via peripheral line. May benefit from ONS.       Estimated/Assessed Needs       Energy Requirements    EST Needs (kcal/day) 25 kcal/kg adjusted body wt 98kg - 2450 kcals       Protein Requirements    EST Daily Needs " (g/day) 08-1.0 g/kg adjusted body wt - 78-98 grams       Fluid Requirements     Estimated Needs (mL/day) 25 ml/kg of adjusted body wt - 2450 ml     Labs/Medications         Pertinent Labs Reviewed.   Results from last 7 days   Lab Units 04/24/23  1328 04/22/23  0824   SODIUM mmol/L 131* 130*   POTASSIUM mmol/L 5.5* 4.3   CHLORIDE mmol/L 96* 92*   CO2 mmol/L 22.3 29.3*   BUN mg/dL 24* 19   CREATININE mg/dL 1.04 0.86   CALCIUM mg/dL 9.2 9.4   BILIRUBIN mg/dL 1.1 0.9   ALK PHOS U/L 161* 148*   ALT (SGPT) U/L 24 27   AST (SGOT) U/L 44* 36   GLUCOSE mg/dL 190* 192*     Results from last 7 days   Lab Units 04/24/23  1328   HEMOGLOBIN g/dL 16.3   HEMATOCRIT % 48.3     COVID19   Date Value Ref Range Status   03/22/2023 Not Detected Not Detected - Ref. Range Final     Lab Results   Component Value Date    HGBA1C 8.40 (H) 03/02/2022         Pertinent Medications Reviewed.     Current Nutrition Orders & Evaluation of Intake       Oral Nutrition     Current PO Diet No diet orders on file   Supplement No active supplement orders       Malnutrition Severity Assessment                Nutrition Diagnosis         Nutrition Dx Problem 1 Insufficient energy intake related to level of consciousness as evidenced by no PO intake.        Nutrition Intervention NA at this time             Medical Nutrition Therapy/Nutrition Education          Learner     Readiness NA     Method     Response NA     Monitor/Evaluation        Monitor CMO     Nutrition Discharge Plan         CMO       Electronically signed by:  Columba Varner RD  04/28/23 10:42 EDT

## 2023-04-30 NOTE — DISCHARGE SUMMARY
[facility]         DISCHARGE SUMMARY    Patient Name: Stephen Aguero  : 1945  MRN: [id number]    Date of Admission: 2023  Date of Discharge: 2023  Primary Care Physician: Papi Vasquez MD    Consults     Date and Time Order Name Status Description    2023 12:12 AM Inpatient Pulmonology Consult Completed     2023  7:32 PM Inpatient Cardiology Consult Completed     2023  6:38 PM General MD Inpatient Consult Completed     3/22/2023  7:33 PM Inpatient Pulmonology Consult Completed           Presenting Problem:   Acute respiratory failure with hypoxia [J96.01]    Active and Resolved Hospital Problems:  Active Hospital Problems    Diagnosis POA   • **Acute respiratory failure with hypoxia [J96.01] Yes   • Altered mental status [R41.82] Unknown   • Pleural effusion [J90] Yes   • Cirrhosis of liver [K74.60] Yes   • Atrial fibrillation [I48.91] Yes   • COPD (chronic obstructive pulmonary disease) [J44.9] Yes   • Diabetes mellitus [E11.9] Yes   • Chronic venous stasis dermatitis [I87.2] Yes   • Sleep apnea [G47.30] Yes      Resolved Hospital Problems   No resolved problems to display.         Hospital Course     Hospital Course:  Stephen Aguero is a 77 y.o. male admitted to hospital from inpatient rehab hospital, [facility] for decreased level of consciousness with shortness of breath.  Patient had recent mycoplasma pneumonia and was discharged to [facility] rehab hospital after a prolonged stay in hospital for inpatient rehab.  He stayed in rehab for almost 5 days and was lethargic and decreased mental status noted and was sent to hospital.  Hospital work-up showed large pleural effusion as well as cirrhotic liver.  Patient was unconscious and unable to respond.  Further his lab work showed high ammonia level.  She had high CO2 and hypoxia.  She was.    He was aggressively treated with BiPAP and oxygen supplementation and antibiotics and diuretics.   Pulmonologist/cardiologist were consulted.    Despite aggressive treatment as patient was not getting better he was not motivated he was not moving out of bed patient talking about dying and does not want treatment he was refusing BiPAP.  His family was present pretty much all the time.  I had detailed discussion with family, daughter and wife as well as consultants and considering patient's poor prognosis patient's family requested palliative care/hospice care.  Further his CODE STATUS was changed to DNR/DNI and subsequently to comfort care.  He progressively got worse again with comfort care patient peacefully  on ..                Day of Discharge            Physical Exam:    Patient  early morning of   Pronounced dead by nursing staff      Pertinent  and/or Most Recent Results     LAB RESULTS:      Lab 23  1328   WBC 10.28   HEMOGLOBIN 16.3   HEMATOCRIT 48.3   PLATELETS 156   NEUTROS ABS 6.19   IMMATURE GRANS (ABS) 0.03   LYMPHS ABS 2.08   MONOS ABS 1.44*   EOS ABS 0.45*   MCV 98.4*         Lab 23  1328   SODIUM 131*   POTASSIUM 5.5*   CHLORIDE 96*   CO2 22.3   ANION GAP 12.7   BUN 24*   CREATININE 1.04   EGFR 73.5   GLUCOSE 190*   CALCIUM 9.2         Lab 23  1328   TOTAL PROTEIN 7.2   ALBUMIN 2.3*   GLOBULIN 4.9   ALT (SGPT) 24   AST (SGOT) 44*   BILIRUBIN 1.1   ALK PHOS 161*                     Lab 23  1149   PH, ARTERIAL 7.437   PCO2, ARTERIAL 47.1*   PO2 ART 59.6*   O2 SATURATION ART 90.2*   FIO2 21   HCO3 ART 31.1*   BASE EXCESS ART 5.6*   CARBOXYHEMOGLOBIN 1.0     Brief Urine Lab Results  (Last result in the past 365 days)      Color   Clarity   Blood   Leuk Est   Nitrite   Protein   CREAT   Urine HCG        23 1605 Dark Yellow   Turbid   Large (3+)   Moderate (2+)   Positive   30 mg/dL (1+)               Microbiology Results (last 10 days)     ** No results found for the last 240 hours. **          PROCEDURES:    [unfilled]    CT Head Without  Contrast    Result Date: 4/13/2023  Impression: No evidence of hemorrhage, mass effect or midline shift. No acute process identified.     SHIELA SELF MD       Electronically Signed and Approved By: SHIELA SELF MD on 4/13/2023 at 17:29             CT Chest With Contrast Diagnostic    Result Date: 4/13/2023  Impression:   1. No evidence for pulmonary embolism. 2. Cardiomegaly with mild to moderate pulmonary edema pattern with moderate to large sized pleural effusion present on the left.  No significant consolidation identified. 3. Cirrhotic morphology of the liver with portal venous hypertension with splenomegaly and a small amount of ascites. 4. Ancillary findings as described above..    SHIELA SELF MD       Electronically Signed and Approved By: SHIELA SELF MD on 4/13/2023 at 17:40             MRI Brain Without Contrast    Result Date: 4/21/2023  Impression:   Very limited examination, as above.      MORENA KINGSLEY MD       Electronically Signed and Approved By: MORENA KINGSLEY MD on 4/21/2023 at 18:41             MRI Brain Without Contrast    Result Date: 4/14/2023  Impression:   1. No acute ischemic change 2. Postsurgical changes, encephalomalacia associated with postsurgical changes involving the right frontal sinus and orbit 3. White matter changes compatible small-vessel ischemic disease in this age group. 4. Opacification of the mastoid air cells bilaterally.  Please correlate for mastoiditis, otitis media      ARGELIA TINSLEY MD       Electronically Signed and Approved By: ARGELIA TINSLEY MD on 4/14/2023 at 8:48             XR Chest 1 View    Result Date: 4/20/2023  Impression:   1. No acute cardiopulmonary disease.       Dipak Quispe M.D.       Electronically Signed and Approved By: Dipak Quispe M.D. on 4/20/2023 at 19:09             XR Chest 1 View    Result Date: 4/18/2023  Impression:   1. Stable appearance of mild patchy airspace infiltrates and interstitial changes bilaterally.       KAMI  MD AVINASH       Electronically Signed and Approved By: KAMI DA SILVA MD on 2023 at 5:44             XR Chest 1 View    Result Date: 2023  Impression:   Stable appearance of diffuse bilateral airspace opacities, which may be due to pulmonary edema and/or pneumonia.       EDIS FENG MD       Electronically Signed and Approved By: EDIS FENG MD on 2023 at 15:34             XR Chest 1 View    Result Date: 2023  Impression:  No significant change in comparison to 3/30/2023.       MORENA KINGSLEY MD       Electronically Signed and Approved By: MORENA KINGSLEY MD on 2023 at 18:28               Results for orders placed during the hospital encounter of 22    Duplex Venous Lower Extremity - Bilateral CAR    Interpretation Summary  · Normal bilateral lower extremity venous duplex scan.      Results for orders placed during the hospital encounter of 22    Duplex Venous Lower Extremity - Bilateral CAR    Interpretation Summary  · Normal bilateral lower extremity venous duplex scan.      Results for orders placed during the hospital encounter of 23    Adult Transthoracic Echo Complete W/ Cont if Necessary Per Protocol    Interpretation Summary  •  Left ventricular ejection fraction appears to be 51 - 55%.  •  Estimated right ventricular systolic pressure from tricuspid regurgitation is mildly elevated (35-45 mmHg).  •  Mild pulmonary hypertension is present.  •  Mild mitral valve regurgitation is present        Discharge Disposition:                          No future appointments.        Time spent on Discharge including face to face service: 20 minutes.            I have dictated this note utilizing Dragon Dictation.             Please note that portions of this note were completed with a voice recognition program.             Part of this note may be an electronic transcription/translation of spoken language to printed text         using the Dragon Dictation  System.       Electronically signed by Marco Bishop MD, 04/30/23, 1:53 PM EDT.

## 2023-05-03 LAB
MYCOBACTERIUM SPEC CULT: NORMAL
MYCOBACTERIUM SPEC CULT: NORMAL
NIGHT BLUE STAIN TISS: NORMAL
NIGHT BLUE STAIN TISS: NORMAL

## 2023-05-09 LAB — QT INTERVAL: 414 MS

## 2024-02-26 NOTE — PROGRESS NOTES
[facility]     Progress Note    Patient Name: Stephen Aguero  : 1945  MRN: [id number]  Primary Care Physician:  Papi Vasquez MD  Date of admission: 3/22/2023    Subjective   Subjective     Chief Complaint: Dyspnea and cough, hypoxic respiratory failure, obstructive sleep apnea    HPI:  Patient Reports he is doing well.  Aide helping him eat lunch.  Has occasional coughing.    Review of Systems   Review of Systems   Constitutional: Activity change: in bed.   HENT: Negative.    Eyes: Negative.    Respiratory: Positive for cough and shortness of breath.    Cardiovascular: Negative.    Gastrointestinal: Negative.    Endocrine: Negative.    Genitourinary: Negative.    Musculoskeletal: Negative.    Skin: Negative.    Allergic/Immunologic: Negative.    Neurological: Positive for weakness.   Hematological: Negative.    Psychiatric/Behavioral: Negative.        Objective   Objective     Vitals:   Temp:  [97.5 °F (36.4 °C)-98.1 °F (36.7 °C)] 97.9 °F (36.6 °C)  Heart Rate:  [72-80] 78  Resp:  [11-24] 14  BP: (111-135)/(51-82) 111/51  Flow (L/min):  [4] 4  Physical Exam    Constitutional: Awake, alert   Eyes: PERRLA, sclerae anicteric, no conjunctival injection   HENT: NCAT, mucous membranes moist   Neck: Supple, no thyromegaly, no lymphadenopathy, trachea midline   Respiratory: Clear to auscultation bilaterally, nonlabored respirations    Cardiovascular: RRR, no murmurs, rubs, or gallops, palpable pedal pulses bilaterally   Gastrointestinal: Positive bowel sounds, soft, nontender, nondistended   Musculoskeletal: No bilateral ankle edema, no clubbing or cyanosis to extremities   Psychiatric: Appropriate affect, cooperative   Neurologic: Oriented x 3, strength symmetric in all extremities, Cranial Nerves grossly intact to confrontation, speech clear   Skin: No rashes     Result Review    Result Review:  I have personally reviewed the results from the time of this admission to 2023 13:22 EDT and agree with  these findings:  [x]  Laboratory  []  Microbiology  []  Radiology  []  EKG/Telemetry   []  Cardiology/Vascular   []  Pathology  []  Old records  []  Other:  Most notable findings include: Respiratory failure on oxygen  Assessment & Plan   Assessment / Plan     Brief Patient Summary:  Stephen Aguero is a 77 y.o. male who has obstructive sleep apnea, COPD, obesity hypoventilation syndrome    Active Hospital Problems:  Active Hospital Problems    Diagnosis    • **Acute respiratory failure with hypercapnia    • Mycoplasma pneumonia    • New onset a-fib    • Tachycardia    • Hypoglycemia    • Obesity hypoventilation syndrome    • Diabetes mellitus    • COPD (chronic obstructive pulmonary disease)    • Pneumonia    • Acute UTI (urinary tract infection)      Plan:   Getting PT OT  On oxygen and BiPAP  CO2 remains high  Being followed by pulmonary  Wound care for chronic venous stasis wounds    DVT prophylaxis:  Medical DVT prophylaxis orders are present.    CODE STATUS:   Code Status (Patient has no pulse and is not breathing): CPR (Attempt to Resuscitate)  Medical Interventions (Patient has pulse or is breathing): Full Support      Electronically signed by Candace Wilkinson MD, 04/02/23, 1:22 PM EDT.            Detail Level: Detailed General Sunscreen Counseling: I recommended a broad spectrum sunscreen with a SPF of 30 or higher.  I explained that SPF 30 sunscreens block approximately 97 percent of the sun's harmful rays.  Sunscreens should be applied at least 15 minutes prior to expected sun exposure and then every 2 hours after that as long as sun exposure continues. If swimming or exercising sunscreen should be reapplied every 45 minutes to an hour after getting wet or sweating.  One ounce, or the equivalent of a shot glass full of sunscreen, is adequate to protect the skin not covered by a bathing suit. I also recommended a lip balm with a sunscreen as well. Sun protective clothing can be used in lieu of sunscreen but must be worn the entire time you are exposed to the sun's rays.

## 2025-01-27 NOTE — PLAN OF CARE
Goal Outcome Evaluation:  Plan of Care Reviewed With: patient        Progress: no change  Outcome Evaluation: Pt alert with periods of confusion, able to make needs known. Pt cont. with BI-Pap-carissa well. No c/o pain or discomfort.   Detail Level: Detailed Quality 226: Preventive Care And Screening: Tobacco Use: Screening And Cessation Intervention: Tobacco Screening not Performed